# Patient Record
Sex: MALE | Race: WHITE
[De-identification: names, ages, dates, MRNs, and addresses within clinical notes are randomized per-mention and may not be internally consistent; named-entity substitution may affect disease eponyms.]

---

## 2017-07-10 ENCOUNTER — HOSPITAL ENCOUNTER (OUTPATIENT)
Dept: HOSPITAL 45 - C.CTS | Age: 62
Discharge: HOME | End: 2017-07-10
Attending: OTOLARYNGOLOGY
Payer: COMMERCIAL

## 2017-07-10 DIAGNOSIS — J32.9: Primary | ICD-10-CM

## 2017-11-20 ENCOUNTER — HOSPITAL ENCOUNTER (OUTPATIENT)
Dept: HOSPITAL 45 - C.LABBC | Age: 62
Discharge: HOME | End: 2017-11-20
Attending: INTERNAL MEDICINE
Payer: COMMERCIAL

## 2017-11-20 DIAGNOSIS — I10: Primary | ICD-10-CM

## 2017-11-20 DIAGNOSIS — R73.01: ICD-10-CM

## 2017-11-20 LAB
ALBUMIN/GLOB SERPL: 1.1 {RATIO} (ref 0.9–2)
ALP SERPL-CCNC: 66 U/L (ref 45–117)
ALT SERPL-CCNC: 36 U/L (ref 12–78)
ANION GAP SERPL CALC-SCNC: 7 MMOL/L (ref 3–11)
APPEARANCE UR: CLEAR
AST SERPL-CCNC: 16 U/L (ref 15–37)
BASOPHILS # BLD: 0.03 K/UL (ref 0–0.2)
BASOPHILS NFR BLD: 0.4 %
BILIRUB UR-MCNC: (no result) MG/DL
BUN SERPL-MCNC: 19 MG/DL (ref 7–18)
BUN/CREAT SERPL: 13.9 (ref 10–20)
CALCIUM SERPL-MCNC: 8.4 MG/DL (ref 8.5–10.1)
CHLORIDE SERPL-SCNC: 101 MMOL/L (ref 98–107)
CHOLEST/HDLC SERPL: 4.2 {RATIO}
CO2 SERPL-SCNC: 30 MMOL/L (ref 21–32)
COLOR UR: (no result)
COMPLETE: YES
CREAT SERPL-MCNC: 1.35 MG/DL (ref 0.6–1.4)
EOSINOPHIL NFR BLD AUTO: 257 K/UL (ref 130–400)
EST. AVERAGE GLUCOSE BLD GHB EST-MCNC: 117 MG/DL
GLOBULIN SER-MCNC: 3.5 GM/DL (ref 2.5–4)
GLUCOSE SERPL-MCNC: 93 MG/DL (ref 70–99)
GLUCOSE UR QL: 46 MG/DL
HCT VFR BLD CALC: 49.2 % (ref 42–52)
IG%: 0.6 %
IMM GRANULOCYTES NFR BLD AUTO: 23.8 %
KETONES UR QL STRIP: 123 MG/DL
LYMPHOCYTES # BLD: 1.6 K/UL (ref 1.2–3.4)
MANUAL MICROSCOPIC REQUIRED?: NO
MCH RBC QN AUTO: 33.7 PG (ref 25–34)
MCHC RBC AUTO-ENTMCNC: 35.8 G/DL (ref 32–36)
MCV RBC AUTO: 94.3 FL (ref 80–100)
MONOCYTES NFR BLD: 8.6 %
NEUTROPHILS # BLD AUTO: 2.7 %
NEUTROPHILS NFR BLD AUTO: 63.9 %
NITRITE UR QL STRIP: (no result)
NITRITE UR QL STRIP: 120 MG/DL (ref 0–150)
PH UR STRIP: 7 [PH] (ref 4.5–7.5)
PH UR: 193 MG/DL (ref 0–200)
PMV BLD AUTO: 9 FL (ref 7.4–10.4)
POTASSIUM SERPL-SCNC: 3.7 MMOL/L (ref 3.5–5.1)
PSA SERPL-MCNC: 1.96 NG/ML (ref 0–4)
RBC # BLD AUTO: 5.22 M/UL (ref 4.7–6.1)
REVIEW REQ?: NO
SODIUM SERPL-SCNC: 138 MMOL/L (ref 136–145)
SP GR UR STRIP: 1.02 (ref 1–1.03)
TSH SERPL-ACNC: 1.85 UIU/ML (ref 0.3–4.5)
URINE EPITHELIAL CELL AUTO: (no result) /LPF (ref 0–5)
UROBILINOGEN UR-MCNC: (no result) MG/DL
VERY LOW DENSITY LIPOPROT CALC: 24 MG/DL
WBC # BLD AUTO: 6.73 K/UL (ref 4.8–10.8)

## 2017-11-22 ENCOUNTER — HOSPITAL ENCOUNTER (OUTPATIENT)
Dept: HOSPITAL 45 - C.LAB1850 | Age: 62
Discharge: HOME | End: 2017-11-22
Attending: INTERNAL MEDICINE
Payer: COMMERCIAL

## 2017-11-22 DIAGNOSIS — R31.29: Primary | ICD-10-CM

## 2017-11-22 LAB
APPEARANCE UR: CLEAR
BILIRUB UR-MCNC: (no result) MG/DL
COLOR UR: YELLOW
MANUAL MICROSCOPIC REQUIRED?: NO
NITRITE UR QL STRIP: (no result)
PH UR STRIP: 5.5 [PH] (ref 4.5–7.5)
REVIEW REQ?: NO
SP GR UR STRIP: 1.02 (ref 1–1.03)
URINE BILL WITH OR WITHOUT MIC: (no result)
UROBILINOGEN UR-MCNC: (no result) MG/DL

## 2018-01-12 ENCOUNTER — HOSPITAL ENCOUNTER (OUTPATIENT)
Dept: HOSPITAL 45 - C.CATH | Age: 63
Discharge: HOME | End: 2018-01-12
Attending: INTERNAL MEDICINE
Payer: COMMERCIAL

## 2018-01-12 VITALS — HEART RATE: 96 BPM | OXYGEN SATURATION: 98 % | DIASTOLIC BLOOD PRESSURE: 102 MMHG | SYSTOLIC BLOOD PRESSURE: 142 MMHG

## 2018-01-12 VITALS — SYSTOLIC BLOOD PRESSURE: 163 MMHG | OXYGEN SATURATION: 98 % | HEART RATE: 99 BPM | DIASTOLIC BLOOD PRESSURE: 117 MMHG

## 2018-01-12 VITALS — OXYGEN SATURATION: 98 % | SYSTOLIC BLOOD PRESSURE: 130 MMHG | DIASTOLIC BLOOD PRESSURE: 98 MMHG | HEART RATE: 96 BPM

## 2018-01-12 VITALS — DIASTOLIC BLOOD PRESSURE: 116 MMHG | SYSTOLIC BLOOD PRESSURE: 152 MMHG | HEART RATE: 96 BPM | OXYGEN SATURATION: 98 %

## 2018-01-12 VITALS
TEMPERATURE: 98.06 F | HEART RATE: 98 BPM | OXYGEN SATURATION: 98 % | DIASTOLIC BLOOD PRESSURE: 117 MMHG | SYSTOLIC BLOOD PRESSURE: 163 MMHG

## 2018-01-12 VITALS — HEART RATE: 96 BPM | OXYGEN SATURATION: 98 % | SYSTOLIC BLOOD PRESSURE: 142 MMHG | DIASTOLIC BLOOD PRESSURE: 102 MMHG

## 2018-01-12 VITALS
WEIGHT: 240.3 LBS | BODY MASS INDEX: 36.42 KG/M2 | HEIGHT: 67.99 IN | WEIGHT: 240.3 LBS | BODY MASS INDEX: 36.42 KG/M2 | HEIGHT: 67.99 IN

## 2018-01-12 VITALS — HEART RATE: 82 BPM | OXYGEN SATURATION: 98 % | SYSTOLIC BLOOD PRESSURE: 138 MMHG | DIASTOLIC BLOOD PRESSURE: 85 MMHG

## 2018-01-12 DIAGNOSIS — Z82.2: ICD-10-CM

## 2018-01-12 DIAGNOSIS — E78.5: ICD-10-CM

## 2018-01-12 DIAGNOSIS — Z79.899: ICD-10-CM

## 2018-01-12 DIAGNOSIS — I10: ICD-10-CM

## 2018-01-12 DIAGNOSIS — E66.3: ICD-10-CM

## 2018-01-12 DIAGNOSIS — F32.9: ICD-10-CM

## 2018-01-12 DIAGNOSIS — Z82.49: ICD-10-CM

## 2018-01-12 DIAGNOSIS — Z79.01: ICD-10-CM

## 2018-01-12 DIAGNOSIS — I48.91: Primary | ICD-10-CM

## 2018-01-15 NOTE — RESULT NOTES
Verified Results  XR HIPS BILATERAL WITH AP PELVIS 3-4 VIEW [id number] 10:31AM Miriam Bhardwaj Order Number: [id number]     Test Name Result Flag Reference   XR HIPS BILATERAL WITH AP PELVIS 3-4 VW (Report)     BILATERAL HIPS AND PELVIS     INDICATION: Low back pain     COMPARISON: None     VIEWS: AP pelvis and coned down views of each hip; 5 images      FINDINGS:     No acute pelvic fracture or pathologic bone lesions  Degenerative changes are noted in the right sacroiliac joint   Visualized bony pelvis appears intact  LEFT HIP:   No significant degenerative changes  Bony alignment is maintained  Soft tissues are unremarkable  RIGHT HIP:   No significant degenerative changes  Bony alignment is maintained  Soft tissues are unremarkable  IMPRESSION:     Mild degenerative changes, right sacroiliac joint  Intact hip joint spaces         Workstation performed: [id number]     Signed by:   Martha Wilburn MD   3/1/16     (1) CBC/PLT/DIFF [id number] 12:00AM [facility]     Test Name Result Flag Reference   WBC 7 1 x10E3/uL  3 4-10 8   RBC 4 79 x10E6/uL  4 14-5 80   Hemoglobin 14 5 g/dL  12 6-17 7   Hematocrit 44 2 %  37 5-51 0   MCV 92 fL  79-97   MCH 30 3 pg  26 6-33 0   MCHC 32 8 g/dL  31 5-35 7   RDW 14 9 %  12 3-15 4   Platelets 211 J40B7/ID  150-379   Neutrophils 72 %     Lymphs 19 %     Monocytes 8 %     Eos 1 %     Basos 0 %     Neutrophils (Absolute) 5 1 x10E3/uL  1 4-7 0   Lymphs (Absolute) 1 3 x10E3/uL  0 7-3 1   Monocytes(Absolute) 0 6 x10E3/uL  0 1-0 9   Eos (Absolute) 0 1 x10E3/uL  0 0-0 4   Baso (Absolute) 0 0 x10E3/uL  0 0-0 2   Immature Granulocytes 0 %     Immature Grans (Abs) 0 0 x10E3/uL  0 0-0 1     (1) COMPREHENSIVE METABOLIC PANEL [id number] 62:40QH [facility]     Test Name Result Flag Reference   Glucose, Serum 112 mg/dL H 65-99   BUN 12 mg/dL  8-27   Creatinine, Serum 0 85 mg/dL  0 76-1 27   eGFR If NonAfricn Am 94 mL/min/1 73  >59   eGFR If Africn Am 109 mL/min/1 73  >59   BUN/Creatinine Ratio 14  10-22   Sodium, Serum 142 mmol/L  134-144   Potassium, Serum 4 9 mmol/L  3 5-5 2   Chloride, Serum 101 mmol/L     Carbon Dioxide, Total 23 mmol/L  18-29   Calcium, Serum 9 7 mg/dL  8 6-10 2   Protein, Total, Serum 7 2 g/dL  6 0-8 5   Albumin, Serum 4 8 g/dL  3 6-4 8   Globulin, Total 2 4 g/dL  1 5-4 5   A/G Ratio 2 0  1 1-2 5   Bilirubin, Total 0 5 mg/dL  0 0-1 2   Alkaline Phosphatase, S 58 IU/L     AST (SGOT) 21 IU/L  0-40   ALT (SGPT) 18 IU/L  0-44     (1) TSH [id number] 12:00AM Dipika Ray     Test Name Result Flag Reference   TSH 14 280 uIU/mL H 0 450-4 500     (1) CK (CPK) [id number] 12:00AM Dipika Ray     Test Name Result Flag Reference   Creatine Kinase,Total,Serum 142 U/L       ([facility]) Sedimentation Rate-Westergren [id number] 12:00AM [place] Ray     Test Name Result Flag Reference   Sedimentation Rate-Westergren 7 mm/hr  0-30     (1) PSA (SCREEN) (Dx V76 44 Screen for Prostate Cancer) [id number] 12:00AM Dipika Ray     Test Name Result Flag Reference   Prostate Specific Ag, Serum 2 7 ng/mL  0 0-4 0   Roche ECLIA methodology  According to the American Urological Association, Serum PSA should  decrease and remain at undetectable levels after radical  prostatectomy  The AUA defines biochemical recurrence as an initial  PSA value 0 2 ng/mL or greater followed by a subsequent confirmatory  PSA value 0 2 ng/mL or greater  Values obtained with different assay methods or kits cannot be used  interchangeably  Results cannot be interpreted as absolute evidence  of the presence or absence of malignant disease  [facility]) LP [id number] 12:00AM [place] Ray     Test Name Result Flag Reference   Cholesterol, Total 287 mg/dL H 100-199   Triglycerides 281 mg/dL H 0-149   HDL Cholesterol 20 mg/dL L >39   According to ATP-III Guidelines, HDL-C >59 mg/dL is considered a  negative risk factor for CHD     VLDL Cholesterol Delon 56 mg/dL H 5-40   LDL Cholesterol Calc 211 mg/dL H 0-99     ([facility]) Rheumatoid Arthritis Factor [id number] 12:00AM Karl Push     Test Name Result Flag Reference   RA Latex Turbid   5 7 IU/mL  0 0-13 9

## 2018-01-17 NOTE — PROGRESS NOTES
Assessment   1  Encounter for preventive health examination (V70 0) (Z00 00)1   2  Arthralgia of multiple joints (719 49) (M25 50)  3  Myalgia (729 1) (M79 1)  4  Chronic low back pain (724 2,338 29) (M54 5,G89 29)  5  Bilateral hip pain (719 45) (M25 551,M25 552)     1 Amended By: Marvel Chun; Feb 29 2016 11:37 AM EST    Plan  Arthralgia of multiple joints, Benign essential hypertension, Hyperlipidemia,  Hypothyroidism, Myalgia    · (1) EARL SCREEN, (INC  PATTERN IF INDICATED); Status:Active; Requested  for:06Pcf4834;   Benign essential hypertension    · Renew: Lisinopril 20 MG Oral Tablet; take 1 tablet twice a day  Benign essential hypertension, Hyperlipidemia, Hypothyroidism    · (1) CBC/PLT/DIFF; Status:Active; Requested for:16Sqz6510;    · (1) COMPREHENSIVE METABOLIC PANEL; Status:Active; Requested for:29Feb2016;    · (1) LIPID PANEL FASTING W DIRECT LDL REFLEX; Status:Active; Requested  for:07Hjo0845;    · (1) LYME ANTIBODY PROFILE W/REFLEX TO WESTERN BLOT; Status:Active; Requested for:29Feb2016;    · (1) TSH; Status:Active; Requested for:90Eku5911;   Bilateral hip pain, Chronic low back pain    · XR HIPS BILATERAL WITH AP PELVIS 3-4 VIEW; Status:Active; Requested  for:10Jxx6935;   Esophagitis, reflux    · Renew: Omeprazole 20 MG Oral Capsule Delayed Release; take 1 capsule daily  Hypothyroidism    · Renew: Levothyroxine Sodium 137 MCG Oral Tablet; Take 1 tablet daily  Myalgia    · (1) CK (CPK); Status:Active; Requested for:29Feb2016;    · (1) RHEUMATOID FACTOR SCREEN; Status:Active; Requested for:86Xoc4347;    · ([facility]) Sedimentation Rate-Westergren; Status:Active; Requested for:29Feb2016;   Screening for colon cancer    · COLONOSCOPY; Status:Active; Requested for:29Feb2016;   Screening for prostate cancer    · (1) PSA (SCREEN) (Dx V76 44 Screen for Prostate Cancer); Status:Active; Requested  for:58Fxp0294;     Discussion/Summary  Impression: health maintenance visit   Currently, he eats an adequate diet and has an inadequate exercise regimen  Prostate cancer screening: PSA was ordered  Testicular cancer screening: the risks and benefits of testicular cancer screening were discussed  Colorectal cancer screening: colonoscopy has been ordered  Screening lab work includes glucose and lipid profile  The immunizations are up to date  Advice and education were given regarding nutrition, weight bearing exercise and cardiovascular risk reduction  Patient's overall physical exam at this time is fairly unremarkable  He does have symptoms consistent with some degenerative joint disease or myalgia involving his legs low back and hips  We will check lab work to rule out rheumatoid conditions  We will also check x-rays of the pelvis and hips  We will also check thyroid blood work lipids and PSA  He discontinued his Vytorin 10/10 several months ago and we will assess his need for restarting a statin  Given the fact that his myalgias did not improve after 3 months off of the Vytorin 10/10 it is likely that that was not the cause of his symptoms  His blood pressure today in the office is stable  I recommended he continue with his 40 mg daily of lisinopril   He does have labile or "white coat" hypertension but I do believe it is safe for him to proceed with his dental work  We will see him again in 6 months for routine follow-up but we will be in contact as the results of his testing is available  Chief Complaint  pt here for his yearly physical      History of Present Illness  HM, Adult Male:   General Health: The patient's health since the last visit is described as fair  He has regular dental visits  He denies vision problems  He denies hearing loss  Immunizations status: up to date  Lifestyle:  He consumes a diverse and healthy diet  He does not have any weight concerns  He does not exercise regularly  He does not use tobacco  He denies alcohol use     Screening:   HPI: Patient presents for annual physical  He has complaints of bilateral hip and leg pain which is joint and muscular related  He stopped his statin medication approximately 4 months ago on the assumption that his myalgias were caused by his Vytorin 10/10  Symptoms have persisted however off of the medication  He is compliant with his other medications  There have been some concerns with his blood pressure, in particular had a recent dental appointment when his pressure was found to be significantly elevated  The patient relates that his blood pressure is quite labile and when he is at home relaxing his blood pressure is actually quite good  He had discontinued one of his doses of lisinopril but as of one week ago is back on his full prescribed dosage  In the past week his pressures have been relatively good with maximums of 140s over 90s, but as low as 110/70 when he is at rest  He is also being treated for hypothyroidism and reflux esophagitis  Review of Systems    Constitutional: No fever or chills, feels well, no tiredness, no recent weight gain or weight loss  Eyes: No complaints of eye pain, no red eyes, no discharge from eyes, no itchy eyes  ENT: no complaints of earache, no hearing loss, no nosebleeds, no nasal discharge, no sore throat, no hoarseness  Cardiovascular: as noted in HPI  Respiratory: No complaints of shortness of breath, no wheezing, no cough, no SOB on exertion, no orthopnea or PND  Gastrointestinal: No complaints of abdominal pain, no constipation, no nausea or vomiting, no diarrhea or bloody stools  Genitourinary: No complaints of dysuria, no incontinence, no hesitancy, no nocturia, no genital lesion, no testicular pain  Musculoskeletal: as noted in HPI  Integumentary: No complaints of skin rash or skin lesions, no itching, no skin wound, no dry skin  Neurological: No compliants of headache, no confusion, no convulsions, no numbness or tingling, no dizziness or fainting, no limb weakness, no difficulty walking  Psychiatric: Is not suicidal, no sleep disturbances, no anxiety or depression, no change in personality, no emotional problems  Endocrine: No complaints of proptosis, no hot flashes, no muscle weakness, no erectile dysfunction, no deepening of the voice, no feelings of weakness  Hematologic/Lymphatic: No complaints of swollen glands, no swollen glands in the neck, does not bleed easily, no easy bruising  Active Problems   1  Benign essential hypertension (401 1) (I10)  2  Esophagitis, reflux (530 11) (K21 0)  3  Folliculitis (061 8) (V00 2)  4  Hyperlipidemia (272 4) (E78 5)  5  Hypothyroidism (244 9) (E03 9)    Past Medical History    · History of Squamous Cell Carcinoma Of The Tongue (V10 01)    Surgical History    · History of Radical Neck Dissection   · History of Rotator Cuff Repair    Family History    · Family history of Heart Disease (V17 49)   · Family history of Hypertension (V17 49)    Social History    · Being A Social Drinker   · Former smoker (V15 82) (V22 620)    Current Meds  1  Levothyroxine Sodium 137 MCG Oral Tablet; Take 1 tablet daily; Therapy: 30MVU7109 to (Last Rx:11Jan2015)  Requested for: 88OVO8044 Ordered  2  Lisinopril 20 MG Oral Tablet; take 1 tablet twice a day; Therapy: 93JFZ2295 to (Last Rx:85Fzw1398)  Requested for: 07JXL3606 Ordered  3  Omeprazole 20 MG Oral Capsule Delayed Release; take 1 capsule daily; Therapy: 23ZFP5150 to (Last Rx:22Oct2014)  Requested for: 27ESE1200 Ordered    Allergies   1  No Known Drug Allergies   2  No Known Environmental Allergies  3   No Known Food Allergies    Vitals   Recorded: 62VRJ7833 11:11AM Recorded: 26Fyr2133 10:50AM   Temperature  97 2 F, Tympanic   Heart Rate  103   Respiration  19   Systolic 073 663   Diastolic 90 90   Height  6 ft    Weight  203 lb 3 04 oz   BMI Calculated  27 56   BSA Calculated  2 15   O2 Saturation  98     Physical Exam    Constitutional   General appearance: No acute distress, well appearing and well nourished  Head and Face   Head and face: Abnormal   Scarring from previous head and neck surgery and radiation treatment for cancer  Eyes   Conjunctiva and lids: No erythema, swelling or discharge  Pupils and irises: Equal, round, reactive to light  Ears, Nose, Mouth, and Throat   External inspection of ears and nose: Normal     Otoscopic examination: Tympanic membranes translucent with normal light reflex  Canals patent without erythema  Hearing: Normal     Nasal mucosa, septum, and turbinates: Normal without edema or erythema  Lips, teeth, and gums: Normal, good dentition  Oropharynx: Normal with no erythema, edema, exudate or lesions  Neck   Neck: Abnormal   Surgical and radiation treatment scarring  Thyroid: Abnormal     Pulmonary   Respiratory effort: No increased work of breathing or signs of respiratory distress  Auscultation of lungs: Clear to auscultation  Cardiovascular   Auscultation of heart: Normal rate and rhythm, normal S1 and S2, no murmurs  Carotid pulses: 2+ bilaterally  Abdominal aorta: Normal     Examination of extremities for edema and/or varicosities: Normal     Abdomen   Abdomen: Non-tender, no masses  Examination for hernias: No hernias appreciated  Musculoskeletal   Gait and station: Normal     Inspection/palpation of joints, bones, and muscles: Normal     Stability: Normal     Neurologic   Cranial nerves: Cranial nerves 2-12 intact  Nonfocal neurologic exam    Psychiatric   Judgment and insight: Normal     Orientation to person, place and time: Normal     Recent and remote memory: Intact      Mood and affect: Normal        Signatures   Electronically signed by : Demetrice Ramirez DO; Feb 29 2016 11:38AM EST                       (Author)

## 2018-07-26 ENCOUNTER — HOSPITAL ENCOUNTER (OUTPATIENT)
Dept: HOSPITAL 45 - C.NEUR | Age: 63
Discharge: HOME | End: 2018-07-26
Attending: INTERNAL MEDICINE
Payer: COMMERCIAL

## 2018-07-26 DIAGNOSIS — G47.33: Primary | ICD-10-CM

## 2022-06-15 ENCOUNTER — OFFICE VISIT (OUTPATIENT)
Dept: URGENT CARE | Facility: CLINIC | Age: 67
End: 2022-06-15
Payer: COMMERCIAL

## 2022-06-15 VITALS
HEART RATE: 100 BPM | SYSTOLIC BLOOD PRESSURE: 136 MMHG | WEIGHT: 179 LBS | TEMPERATURE: 96.9 F | RESPIRATION RATE: 16 BRPM | OXYGEN SATURATION: 99 % | BODY MASS INDEX: 25.06 KG/M2 | HEIGHT: 71 IN | DIASTOLIC BLOOD PRESSURE: 68 MMHG

## 2022-06-15 DIAGNOSIS — M25.531 ACUTE PAIN OF RIGHT WRIST: Primary | ICD-10-CM

## 2022-06-15 PROCEDURE — 99203 OFFICE O/P NEW LOW 30 MIN: CPT | Performed by: FAMILY MEDICINE

## 2022-06-15 RX ORDER — ATORVASTATIN CALCIUM 20 MG/1
1 TABLET, FILM COATED ORAL DAILY
COMMUNITY
Start: 2016-04-07 | End: 2022-06-30 | Stop reason: ALTCHOICE

## 2022-06-15 RX ORDER — MELOXICAM 7.5 MG/1
7.5 TABLET ORAL DAILY
Qty: 60 TABLET | Refills: 0 | Status: SHIPPED | OUTPATIENT
Start: 2022-06-15 | End: 2022-07-26 | Stop reason: ALTCHOICE

## 2022-06-15 RX ORDER — LISINOPRIL 20 MG/1
1 TABLET ORAL 2 TIMES DAILY
COMMUNITY
Start: 2010-11-16 | End: 2022-07-26 | Stop reason: ALTCHOICE

## 2022-06-15 RX ORDER — METHYLPREDNISOLONE 4 MG/1
TABLET ORAL
Qty: 21 TABLET | Refills: 0 | Status: SHIPPED | OUTPATIENT
Start: 2022-06-15 | End: 2022-06-23 | Stop reason: ALTCHOICE

## 2022-06-15 RX ORDER — OMEPRAZOLE 20 MG/1
1 CAPSULE, DELAYED RELEASE ORAL DAILY
COMMUNITY
Start: 2011-02-11

## 2022-06-15 RX ORDER — LEVOTHYROXINE SODIUM 137 UG/1
1 TABLET ORAL DAILY
COMMUNITY
Start: 2011-02-11 | End: 2022-06-30 | Stop reason: ALTCHOICE

## 2022-06-15 NOTE — PROGRESS NOTES
[facility]        NAME: Soren Carmen is a 78 y o  male  : 1955    MRN: [id number]  DATE: Malia 15, 2022  TIME: 11:02 AM    Assessment and Plan   Acute pain of right wrist [M25 531]  1  Acute pain of right wrist  meloxicam (MOBIC) 7 5 mg tablet    methylPREDNISolone 4 MG tablet therapy pack         Patient Instructions       Follow up with PCP in 3-5 days  Proceed to  ER if symptoms worsen  Chief Complaint     Chief Complaint   Patient presents with    Wrist Pain     Pt reports right wrist pain and right hand swelling/redness noted on   Reports his wrist was sore on Saturday when fishing  History of Present Illness       58-year-old male presenting with right wrist pain beginning 4 days ago  He states that prior to the onset of his symptoms, he was fly fishing for a long period time and shortly thereafter, noticed increased redness and swelling of his right wrist   He states in the days following his swelling has increased and has now moved up into the middle forearm region  Pain is reproduced with any attempted bending of the wrist or turning of the wrist   Denies any numbness or tingling  Review of Systems   Review of Systems   Constitutional: Negative  HENT: Negative  Eyes: Negative  Respiratory: Negative  Cardiovascular: Negative  Gastrointestinal: Negative  Genitourinary: Negative  Musculoskeletal: Positive for arthralgias, joint swelling and myalgias  Skin: Negative  Allergic/Immunologic: Negative  Neurological: Negative  Hematological: Negative  Psychiatric/Behavioral: Negative            Current Medications       Current Outpatient Medications:     meloxicam (MOBIC) 7 5 mg tablet, Take 1 tablet (7 5 mg total) by mouth daily, Disp: 60 tablet, Rfl: 0    methylPREDNISolone 4 MG tablet therapy pack, Use as directed on package, Disp: 21 tablet, Rfl: 0    atorvastatin (LIPITOR) 20 mg tablet, Take 1 tablet by mouth daily (Patient not taking: Reported on 6/15/2022), Disp: , Rfl:     levothyroxine 137 mcg tablet, Take 1 tablet by mouth daily (Patient not taking: Reported on 6/15/2022), Disp: , Rfl:     lisinopril (ZESTRIL) 20 mg tablet, Take 1 tablet by mouth 2 (two) times a day (Patient not taking: Reported on 6/15/2022), Disp: , Rfl:     omeprazole (PriLOSEC) 20 mg delayed release capsule, Take 1 capsule by mouth daily (Patient not taking: Reported on 6/15/2022), Disp: , Rfl:     Current Allergies     Allergies as of 06/15/2022    (No Known Allergies)            The following portions of the patient's history were reviewed and updated as appropriate: allergies, current medications, past family history, past medical history, past social history, past surgical history and problem list      No past medical history on file  No past surgical history on file  No family history on file  Medications have been verified  Objective   /68   Pulse 100   Temp (!) 96 9 °F (36 1 °C)   Resp 16   Ht 5' 11" (1 803 m)   Wt 81 2 kg (179 lb)   SpO2 99%   BMI 24 97 kg/m²   No LMP for male patient  Physical Exam     Physical Exam  Constitutional:       Appearance: He is well-developed  HENT:      Head: Normocephalic  Eyes:      Pupils: Pupils are equal, round, and reactive to light  Cardiovascular:      Rate and Rhythm: Normal rate  Pulmonary:      Effort: Pulmonary effort is normal    Musculoskeletal:         General: Swelling and tenderness present  No signs of injury  Normal range of motion  Cervical back: Normal range of motion  Skin:     General: Skin is warm  Neurological:      Mental Status: He is alert and oriented to person, place, and time

## 2022-06-23 ENCOUNTER — APPOINTMENT (OUTPATIENT)
Dept: RADIOLOGY | Facility: CLINIC | Age: 67
End: 2022-06-23
Payer: COMMERCIAL

## 2022-06-23 ENCOUNTER — OFFICE VISIT (OUTPATIENT)
Dept: FAMILY MEDICINE CLINIC | Facility: CLINIC | Age: 67
End: 2022-06-23
Payer: COMMERCIAL

## 2022-06-23 ENCOUNTER — TELEPHONE (OUTPATIENT)
Dept: FAMILY MEDICINE CLINIC | Facility: CLINIC | Age: 67
End: 2022-06-23

## 2022-06-23 VITALS
TEMPERATURE: 98 F | WEIGHT: 180 LBS | RESPIRATION RATE: 18 BRPM | OXYGEN SATURATION: 99 % | HEIGHT: 71 IN | SYSTOLIC BLOOD PRESSURE: 168 MMHG | BODY MASS INDEX: 25.2 KG/M2 | DIASTOLIC BLOOD PRESSURE: 94 MMHG | HEART RATE: 108 BPM
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
## 2022-06-23 DIAGNOSIS — Z00.00 ANNUAL PHYSICAL EXAM: Primary | ICD-10-CM

## 2022-06-23 DIAGNOSIS — Z13.1 SCREENING FOR DIABETES MELLITUS: ICD-10-CM

## 2022-06-23 DIAGNOSIS — Z12.11 SCREENING FOR COLON CANCER: ICD-10-CM

## 2022-06-23 DIAGNOSIS — R21 RASH: ICD-10-CM

## 2022-06-23 DIAGNOSIS — Z13.29 SCREENING FOR THYROID DISORDER: ICD-10-CM

## 2022-06-23 DIAGNOSIS — Z13.6 SCREENING FOR CARDIOVASCULAR CONDITION: ICD-10-CM

## 2022-06-23 DIAGNOSIS — M79.641 BILATERAL HAND PAIN: ICD-10-CM

## 2022-06-23 DIAGNOSIS — M79.642 BILATERAL HAND PAIN: ICD-10-CM

## 2022-06-23 DIAGNOSIS — Z87.39 HISTORY OF GOUT: ICD-10-CM

## 2022-06-23 PROBLEM — Z85.89 HISTORY OF HEAD AND NECK CANCER: Status: ACTIVE | Noted: 2022-06-23

## 2022-06-23 PROBLEM — C14.0 THROAT CANCER (HCC): Status: ACTIVE | Noted: 2022-06-23

## 2022-06-23 PROCEDURE — 99387 INIT PM E/M NEW PAT 65+ YRS: CPT | Performed by: NURSE PRACTITIONER

## 2022-06-23 PROCEDURE — 3288F FALL RISK ASSESSMENT DOCD: CPT | Performed by: NURSE PRACTITIONER

## 2022-06-23 PROCEDURE — 1160F RVW MEDS BY RX/DR IN RCRD: CPT | Performed by: NURSE PRACTITIONER

## 2022-06-23 PROCEDURE — 1101F PT FALLS ASSESS-DOCD LE1/YR: CPT | Performed by: NURSE PRACTITIONER

## 2022-06-23 PROCEDURE — 73130 X-RAY EXAM OF HAND: CPT

## 2022-06-23 PROCEDURE — 3725F SCREEN DEPRESSION PERFORMED: CPT | Performed by: NURSE PRACTITIONER

## 2022-06-23 PROCEDURE — 3008F BODY MASS INDEX DOCD: CPT | Performed by: NURSE PRACTITIONER

## 2022-06-23 PROCEDURE — 1036F TOBACCO NON-USER: CPT | Performed by: NURSE PRACTITIONER

## 2022-06-23 RX ORDER — CLOTRIMAZOLE AND BETAMETHASONE DIPROPIONATE 10; .5 MG/ML; MG/ML
LOTION TOPICAL 2 TIMES DAILY
Qty: 30 ML | Refills: 0 | Status: SHIPPED | OUTPATIENT
Start: 2022-06-23 | End: 2022-06-23 | Stop reason: SDUPTHER

## 2022-06-23 RX ORDER — CLOTRIMAZOLE AND BETAMETHASONE DIPROPIONATE 10; .5 MG/ML; MG/ML
LOTION TOPICAL 2 TIMES DAILY
Qty: 30 ML | Refills: 0 | Status: SHIPPED | OUTPATIENT
Start: 2022-06-23 | End: 2022-07-26

## 2022-06-23 NOTE — TELEPHONE ENCOUNTER
Can you let him know I resent the prescription to [facility] in [place]

## 2022-06-23 NOTE — PATIENT INSTRUCTIONS
Have labs completed- must be fasting prior to lab draw  Have x-rays of bilateral hands done downstairs prior to leaving  Apply prescription cream to forehead rash  Instructed to monitor BP at home and record those results and bring results to next appointment  Wellness Visit for Adults   AMBULATORY CARE:   A wellness visit  is when you see your healthcare provider to get screened for health problems  Your healthcare provider will also give you advice on how to stay healthy  Write down your questions so you remember to ask them  Ask your healthcare provider how often you should have a wellness visit  What happens at a wellness visit:  Your healthcare provider will ask about your health, and your family history of health problems  This includes high blood pressure, heart disease, and cancer  He or she will ask if you have symptoms that concern you, if you smoke, and about your mood  You may also be asked about your intake of medicines, supplements, food, and alcohol  Any of the following may be done: Your weight  will be checked  Your height may also be checked so your body mass index (BMI) can be calculated  Your BMI shows if you are at a healthy weight  Your blood pressure  and heart rate will be checked  Your temperature may also be checked  Blood and urine tests  may be done  Blood tests may be done to check your cholesterol levels  Abnormal cholesterol levels increase your risk for heart disease and stroke  You may also need a blood or urine test to check for diabetes if you are at increased risk  Urine tests may be done to look for signs of an infection or kidney disease  A physical exam  includes checking your heartbeat and lungs with a stethoscope  Your healthcare provider may also check your skin to look for sun damage  Screening tests  may be recommended  A screening test is done to check for diseases that may not cause symptoms   The screening tests you may need depend on your age, gender, family history, and lifestyle habits  For example, colorectal screening may be recommended if you are 48years old or older  Screening tests you need if you are a woman:   A Pap smear  is used to screen for cervical cancer  Pap smears are usually done every 3 to 5 years depending on your age  You may need them more often if you have had abnormal Pap smear test results in the past  Ask your healthcare provider how often you should have a Pap smear  A mammogram  is an x-ray of your breasts to screen for breast cancer  Experts recommend mammograms every 2 years starting at age 48 years  You may need a mammogram at age 52 years or younger if you have an increased risk for breast cancer  Talk to your healthcare provider about when you should start having mammograms and how often you need them  Vaccines you may need:   Get an influenza vaccine  every year  The influenza vaccine protects you from the flu  Several types of viruses cause the flu  The viruses change over time, so new vaccines are made each year  Get a tetanus-diphtheria (Td) booster vaccine  every 10 years  This vaccine protects you against tetanus and diphtheria  Tetanus is a severe infection that may cause painful muscle spasms and lockjaw  Diphtheria is a severe bacterial infection that causes a thick covering in the back of your mouth and throat  Get a human papillomavirus (HPV) vaccine  if you are female and aged 23 to 32 or male 23 to 24 and never received it  This vaccine protects you from HPV infection  HPV is the most common infection spread by sexual contact  HPV may also cause vaginal, penile, and anal cancers  Get a pneumococcal vaccine  if you are aged 72 years or older  The pneumococcal vaccine is an injection given to protect you from pneumococcal disease  Pneumococcal disease is an infection caused by pneumococcal bacteria  The infection may cause pneumonia, meningitis, or an ear infection      Get a shingles vaccine  if you are 61 or older, even if you have had shingles before  The shingles vaccine is an injection to protect you from the varicella-zoster virus  This is the same virus that causes chickenpox  Shingles is a painful rash that develops in people who had chickenpox or have been exposed to the virus  How to eat healthy:  My Plate is a model for planning healthy meals  It shows the types and amounts of foods that should go on your plate  Fruits and vegetables make up about half of your plate, and grains and protein make up the other half  A serving of dairy is included on the side of your plate  The amount of calories and serving sizes you need depends on your age, gender, weight, and height  Examples of healthy foods are listed below:  Eat a variety of vegetables  such as dark green, red, and orange vegetables  You can also include canned vegetables low in sodium (salt) and frozen vegetables without added butter or sauces  Eat a variety of fresh fruits , canned fruit in 100% juice, frozen fruit, and dried fruit  Include whole grains  At least half of the grains you eat should be whole grains  Examples include whole-wheat bread, wheat pasta, brown rice, and whole-grain cereals such as oatmeal     Eat a variety of protein foods such as seafood (fish and shellfish), lean meat, and poultry without skin (turkey and chicken)  Examples of lean meats include pork leg, shoulder, or tenderloin, and beef round, sirloin, tenderloin, and extra lean ground beef  Other protein foods include eggs and egg substitutes, beans, peas, soy products, nuts, and seeds  Choose low-fat dairy products such as skim or 1% milk or low-fat yogurt, cheese, and cottage cheese  Limit unhealthy fats  such as butter, hard margarine, and shortening  Exercise:  Exercise at least 30 minutes per day on most days of the week  Some examples of exercise include walking, biking, dancing, and swimming   You can also fit in more physical activity by taking the stairs instead of the elevator or parking farther away from stores  Include muscle strengthening activities 2 days each week  Regular exercise provides many health benefits  It helps you manage your weight, and decreases your risk for type 2 diabetes, heart disease, stroke, and high blood pressure  Exercise can also help improve your mood  Ask your healthcare provider about the best exercise plan for you  General health and safety guidelines:   Do not smoke  Nicotine and other chemicals in cigarettes and cigars can cause lung damage  Ask your healthcare provider for information if you currently smoke and need help to quit  E-cigarettes or smokeless tobacco still contain nicotine  Talk to your healthcare provider before you use these products  Limit alcohol  A drink of alcohol is 12 ounces of beer, 5 ounces of wine, or 1½ ounces of liquor  Lose weight, if needed  Being overweight increases your risk of certain health conditions  These include heart disease, high blood pressure, type 2 diabetes, and certain types of cancer  Protect your skin  Do not sunbathe or use tanning beds  Use sunscreen with a SPF 15 or higher  Apply sunscreen at least 15 minutes before you go outside  Reapply sunscreen every 2 hours  Wear protective clothing, hats, and sunglasses when you are outside  Drive safely  Always wear your seatbelt  Make sure everyone in your car wears a seatbelt  A seatbelt can save your life if you are in an accident  Do not use your cell phone when you are driving  This could distract you and cause an accident  Pull over if you need to make a call or send a text message  Practice safe sex  Use latex condoms if are sexually active and have more than one partner  Your healthcare provider may recommend screening tests for sexually transmitted infections (STIs)  Wear helmets, lifejackets, and protective gear    Always wear a helmet when you ride a bike or motorcycle, go skiing, or play sports that could cause a head injury  Wear protective equipment when you play sports  Wear a lifejacket when you are on a boat or doing water sports  © Copyright [employer] 2022 Information is for End User's use only and may not be sold, redistributed or otherwise used for commercial purposes  All illustrations and images included in CareNotes® are the copyrighted property of A D A M , Inc  or [employer]  The above information is an  only  It is not intended as medical advice for individual conditions or treatments  Talk to your doctor, nurse or pharmacist before following any medical regimen to see if it is safe and effective for you

## 2022-06-23 NOTE — PROGRESS NOTES
[facility]    NAME: Jo Batista  AGE: 78 y o  SEX: male  : 1955     DATE: 2022     Assessment and Plan:     Problem List Items Addressed This Visit    None     Visit Diagnoses     Annual physical exam    -  Primary    Relevant Orders    CBC and differential    Comprehensive metabolic panel    Lipid panel    TSH, 3rd generation with Free T4 reflex    UA (URINE) with reflex to Scope    Screening for cardiovascular condition        Relevant Orders    CBC and differential    Comprehensive metabolic panel    Lipid panel    Screening for thyroid disorder        Relevant Orders    TSH, 3rd generation with Free T4 reflex    Screening for diabetes mellitus        Relevant Orders    UA (URINE) with reflex to Scope    Tick bite, unspecified site, subsequent encounter        Relevant Medications    clotrimazole-betamethasone (LOTRISONE) 1-0 05 % lotion    Other Relevant Orders    Lyme Antibody Profile with reflex to WB    History of gout        Relevant Orders    Uric acid    Bilateral hand pain        Relevant Orders    XR hand 3+ vw left    XR hand 3+ vw right    Screening for colon cancer        Relevant Orders    Ambulatory referral for colonoscopy    Rash        Relevant Medications    clotrimazole-betamethasone (LOTRISONE) 1-0 05 % lotion          Immunizations and preventive care screenings were discussed with patient today  Appropriate education was printed on patient's after visit summary  Counseling:  Alcohol/drug use: discussed moderation in alcohol intake, the recommendations for healthy alcohol use, and avoidance of illicit drug use  Dental Health: discussed importance of regular tooth brushing, flossing, and dental visits  Injury prevention: discussed safety/seat belts, safety helmets, smoke detectors, carbon dioxide detectors, and smoking near bedding or upholstery    Sexual health: discussed sexually transmitted diseases, partner selection, use of condoms, avoidance of unintended pregnancy, and contraceptive alternatives  · Exercise: the importance of regular exercise/physical activity was discussed  Recommend exercise 3-5 times per week for at least 30 minutes  BMI Counseling: Body mass index is 25 1 kg/m²  The BMI is above normal  Nutrition recommendations include decreasing portion sizes, encouraging healthy choices of fruits and vegetables, decreasing fast food intake, consuming healthier snacks, limiting drinks that contain sugar, moderation in carbohydrate intake and increasing intake of lean protein  Exercise recommendations include exercising 3-5 times per week  Rationale for BMI follow-up plan is due to patient being overweight or obese  Depression Screening and Follow-up Plan: Patient was screened for depression during today's encounter  They screened negative with a PHQ-2 score of 2  Return in 1 month (on 7/23/2022) for Recheck  Chief Complaint:     Chief Complaint   Patient presents with   [facility] Patient Complete Physical--has not seen a doctor for 3-4 years  Having pains in hands--told has gout      History of Present Illness:     Adult Annual Physical   Patient here for a comprehensive physical exam  The patient reports problems - joint pain in B/L hands  Patient diagnosed with throat cancer in 2006 and had neck dissection in 2006 followed by radiation and drug treatment  Patient also reports that he gets spikes in BP but has times that BP is normal  Patient reports he was monitoring BP's at home in past but not recently  Patient no longer taking Lisinopril and refusing to take BP medication at this time  Patient counseled on risks of uncontrolled hypertension  Patient advised to start monitoring BP once daily at home and record readings and bring readings to next appointment to review   Patient also refuses to take statins since they cause muscle pain in legs and weakness  Patient also stopped thyroid medication years ago and didn't notice any changes so he believes he doesn't need it  Patient c/o bilateral hand pain- worse at the base of right thumb  Patient reports he has been diagnosed with gout in his hands in the past  Discussed that I will order x-rays of B/L hands today to check for osteoarthritis and rule out any fractures and uric acid lab ordered  Patient also reports rash to his forehead and frontal head area that comes and goes  Patient has tried hydrocortisone cream and was on oral steroids last week which helped to improve the rash but unfortunately it keeps returning  Lotrisone cream prescribed today for rash and patient instructed to use for 1 week after rash resolves  Diet and Physical Activity  · Diet/Nutrition: well balanced diet and consuming 3-5 servings of fruits/vegetables daily  · Exercise: walking, 5-7 times a week on average and 1-2 miles daily- walks to work         Depression Screening  PHQ-2/9 Depression Screening    Little interest or pleasure in doing things: 1 - several days  Feeling down, depressed, or hopeless: 1 - several days  PHQ-2 Score: 2  PHQ-2 Interpretation: Negative depression screen       General Health  · Sleep: sleeps well and gets 7-8 hours of sleep on average  · Hearing: normal - bilateral   · Vision: most recent eye exam >1 year ago and wears glasses  · Dental: no dental visits for >1 year, brushes teeth twice daily and flosses teeth occasionally   Health  · Symptoms include: none     Review of Systems:     Review of Systems   Constitutional: Negative  Negative for chills, fatigue and fever  HENT: Positive for postnasal drip  Negative for congestion, ear discharge, ear pain, rhinorrhea, sinus pressure, sinus pain and sore throat  Baseline difficulty swallowing due to neck dissection surgery    Eyes: Negative  Negative for pain, discharge and visual disturbance  Respiratory: Negative  Negative for cough, chest tightness, shortness of breath and wheezing  Cardiovascular: Negative  Negative for chest pain, palpitations and leg swelling  Gastrointestinal: Negative  Negative for abdominal pain, constipation, diarrhea, nausea and vomiting  Endocrine: Negative  Negative for polydipsia and polyuria  Genitourinary: Negative  Negative for difficulty urinating, dysuria and hematuria  Musculoskeletal: Positive for arthralgias (B/L hands ), back pain (chronic lower back pain- reports DDD lumbar spine) and joint swelling (right wrist and base of right thumb )  Negative for myalgias  Skin: Positive for rash (rash on forehead- goes away and comes back  Oral steroids last week and topical hydrocortisone clears it up, just returns at a later date  )  Negative for wound  Allergic/Immunologic: Positive for environmental allergies  Neurological: Negative  Negative for dizziness, seizures, syncope, light-headedness and headaches  Hematological: Negative  Does not bruise/bleed easily  Psychiatric/Behavioral: Negative  Negative for dysphoric mood  The patient is not nervous/anxious  All other systems reviewed and are negative  Past Medical History:     History reviewed  No pertinent past medical history     Past Surgical History:     Past Surgical History:   Procedure Laterality Date    NECK SURGERY Right     SHOULDER OPEN ROTATOR CUFF REPAIR Right       Family History:     Family History   Problem Relation Age of Onset    Heart attack Father     Stroke Father     Kidney disease Sister     Prostate cancer Brother       Social History:     Social History     Socioeconomic History    Marital status:      Spouse name: None    Number of children: None    Years of education: None    Highest education level: None   Occupational History    None   Tobacco Use    Smoking status: Former Smoker    Smokeless tobacco: Former User   Vaping Use    Vaping Use: Some days   Emaline Folds Substances: THC, CBD   Substance and Sexual Activity    Alcohol use: Yes     Alcohol/week: 4 0 - 6 0 standard drinks     Types: 2 - 3 Cans of beer, 2 - 3 Shots of liquor per week    Drug use: Yes    Sexual activity: None   Other Topics Concern    None   Social History Narrative    None     Social Determinants of Health     Financial Resource Strain: Not on file   Food Insecurity: Not on file   Transportation Needs: Not on file   Physical Activity: Not on file   Stress: Not on file   Social Connections: Not on file   Intimate Partner Violence: Not At Risk    Fear of Current or Ex-Partner: No    Emotionally Abused: No    Physically Abused: No    Sexually Abused: No   Housing Stability: Not on file      Current Medications:     Current Outpatient Medications   Medication Sig Dispense Refill    clotrimazole-betamethasone (LOTRISONE) 1-0 05 % lotion Apply topically 2 (two) times a day 30 mL 0    meloxicam (MOBIC) 7 5 mg tablet Take 1 tablet (7 5 mg total) by mouth daily 60 tablet 0    omeprazole (PriLOSEC) 20 mg delayed release capsule Take 1 capsule by mouth daily      atorvastatin (LIPITOR) 20 mg tablet Take 1 tablet by mouth daily (Patient not taking: No sig reported)      levothyroxine 137 mcg tablet Take 1 tablet by mouth daily (Patient not taking: No sig reported)      lisinopril (ZESTRIL) 20 mg tablet Take 1 tablet by mouth 2 (two) times a day (Patient not taking: No sig reported)       No current facility-administered medications for this visit  Allergies:     No Known Allergies   Physical Exam:     /94   Pulse (!) 108   Temp 98 °F (36 7 °C) (Tympanic)   Resp 18   Ht 5' 11" (1 803 m)   Wt 81 6 kg (180 lb)   SpO2 99%   BMI 25 10 kg/m²     Physical Exam  Vitals and nursing note reviewed  Constitutional:       General: He is not in acute distress  Appearance: Normal appearance  He is normal weight  He is not ill-appearing  HENT:      Head: Normocephalic and atraumatic  Right Ear: Ear canal and external ear normal  There is impacted cerumen  Left Ear: Ear canal and external ear normal  There is impacted cerumen  Nose: Nose normal  No congestion or rhinorrhea  Mouth/Throat:      Mouth: Mucous membranes are moist       Pharynx: Oropharynx is clear  No oropharyngeal exudate or posterior oropharyngeal erythema  Eyes:      Extraocular Movements: Extraocular movements intact  Conjunctiva/sclera: Conjunctivae normal    Neck:      Comments: Right sided neck scar from neck dissection surgery   Cardiovascular:      Rate and Rhythm: Regular rhythm  Tachycardia present  Pulses: Normal pulses  Heart sounds: Normal heart sounds  No murmur heard  Pulmonary:      Effort: Pulmonary effort is normal  No respiratory distress  Breath sounds: Normal breath sounds  No wheezing  Abdominal:      General: Bowel sounds are normal       Palpations: Abdomen is soft  Tenderness: There is no abdominal tenderness  Musculoskeletal:         General: Normal range of motion  Cervical back: Normal range of motion and neck supple  Right lower leg: No edema  Left lower leg: No edema  Lymphadenopathy:      Cervical: No cervical adenopathy  Skin:     General: Skin is warm and dry  Capillary Refill: Capillary refill takes less than 2 seconds  Findings: Rash (scattered red macular spots to foreahead and frontal head ) present  Neurological:      General: No focal deficit present  Mental Status: He is alert and oriented to person, place, and time  Psychiatric:         Mood and Affect: Mood normal          Behavior: Behavior normal          Thought Content:  Thought content normal          Judgment: Judgment normal           HERMELINDA Dempsey  [facility]

## 2022-06-23 NOTE — TELEPHONE ENCOUNTER
Patient states that prescription of Myrna Mon should have been sent to [facility] in [place]  Can you correct this?     Thank you

## 2022-06-29 LAB
ALBUMIN SERPL-MCNC: 4.4 G/DL (ref 3.6–5.1)
ALBUMIN/GLOB SERPL: 1.6 (CALC) (ref 1–2.5)
ALP SERPL-CCNC: 58 U/L (ref 35–144)
ALT SERPL-CCNC: 17 U/L (ref 9–46)
APPEARANCE UR: CLEAR
AST SERPL-CCNC: 23 U/L (ref 10–35)
B BURGDOR AB SER IA-ACNC: <0.9 INDEX
BACTERIA UR QL AUTO: ABNORMAL /HPF
BASOPHILS # BLD AUTO: 57 CELLS/UL (ref 0–200)
BASOPHILS NFR BLD AUTO: 0.7 %
BILIRUB SERPL-MCNC: 0.4 MG/DL (ref 0.2–1.2)
BILIRUB UR QL STRIP: NEGATIVE
BUN SERPL-MCNC: 9 MG/DL (ref 7–25)
BUN/CREAT SERPL: 13 (CALC) (ref 6–22)
CALCIUM SERPL-MCNC: 9.8 MG/DL (ref 8.6–10.3)
CHLORIDE SERPL-SCNC: 101 MMOL/L (ref 98–110)
CHOLEST SERPL-MCNC: 289 MG/DL
CHOLEST/HDLC SERPL: 8 (CALC)
CO2 SERPL-SCNC: 30 MMOL/L (ref 20–32)
COLOR UR: YELLOW
CREAT SERPL-MCNC: 0.68 MG/DL (ref 0.7–1.25)
EOSINOPHIL # BLD AUTO: 164 CELLS/UL (ref 15–500)
EOSINOPHIL NFR BLD AUTO: 2 %
ERYTHROCYTE [DISTWIDTH] IN BLOOD BY AUTOMATED COUNT: 13.6 % (ref 11–15)
GLOBULIN SER CALC-MCNC: 2.7 G/DL (CALC) (ref 1.9–3.7)
GLUCOSE SERPL-MCNC: 107 MG/DL (ref 65–139)
GLUCOSE UR QL STRIP: NEGATIVE
HCT VFR BLD AUTO: 44.6 % (ref 38.5–50)
HDLC SERPL-MCNC: 36 MG/DL
HGB BLD-MCNC: 14.8 G/DL (ref 13.2–17.1)
HGB UR QL STRIP: NEGATIVE
HYALINE CASTS #/AREA URNS LPF: ABNORMAL /LPF
KETONES UR QL STRIP: NEGATIVE
LDLC SERPL CALC-MCNC: 229 MG/DL (CALC)
LEUKOCYTE ESTERASE UR QL STRIP: NEGATIVE
LYMPHOCYTES # BLD AUTO: 1681 CELLS/UL (ref 850–3900)
LYMPHOCYTES NFR BLD AUTO: 20.5 %
MCH RBC QN AUTO: 30.1 PG (ref 27–33)
MCHC RBC AUTO-ENTMCNC: 33.2 G/DL (ref 32–36)
MCV RBC AUTO: 90.7 FL (ref 80–100)
MONOCYTES # BLD AUTO: 640 CELLS/UL (ref 200–950)
MONOCYTES NFR BLD AUTO: 7.8 %
NEUTROPHILS # BLD AUTO: 5658 CELLS/UL (ref 1500–7800)
NEUTROPHILS NFR BLD AUTO: 69 %
NITRITE UR QL STRIP: NEGATIVE
NONHDLC SERPL-MCNC: 253 MG/DL (CALC)
PH UR STRIP: 8 [PH] (ref 5–8)
PLATELET # BLD AUTO: 278 THOUSAND/UL (ref 140–400)
PMV BLD REES-ECKER: 10.3 FL (ref 7.5–12.5)
POTASSIUM SERPL-SCNC: 4.3 MMOL/L (ref 3.5–5.3)
PROT SERPL-MCNC: 7.1 G/DL (ref 6.1–8.1)
PROT UR QL STRIP: ABNORMAL
RBC # BLD AUTO: 4.92 MILLION/UL (ref 4.2–5.8)
RBC #/AREA URNS HPF: ABNORMAL /HPF
SL AMB EGFR AFRICAN AMERICAN: 115 ML/MIN/1.73M2
SL AMB EGFR NON AFRICAN AMERICAN: 99 ML/MIN/1.73M2
SODIUM SERPL-SCNC: 140 MMOL/L (ref 135–146)
SP GR UR STRIP: 1.01 (ref 1–1.03)
SQUAMOUS #/AREA URNS HPF: ABNORMAL /HPF
T4 FREE SERPL-MCNC: 0.9 NG/DL (ref 0.8–1.8)
TRIGL SERPL-MCNC: 109 MG/DL
TSH SERPL-ACNC: 22.46 MIU/L (ref 0.4–4.5)
URATE SERPL-MCNC: 5.7 MG/DL (ref 4–8)
WBC # BLD AUTO: 8.2 THOUSAND/UL (ref 3.8–10.8)
WBC #/AREA URNS HPF: ABNORMAL /HPF

## 2022-06-30 ENCOUNTER — TELEPHONE (OUTPATIENT)
Dept: FAMILY MEDICINE CLINIC | Facility: CLINIC | Age: 67
End: 2022-06-30

## 2022-06-30 DIAGNOSIS — E03.9 HYPOTHYROIDISM, UNSPECIFIED TYPE: Primary | ICD-10-CM

## 2022-06-30 DIAGNOSIS — E78.5 HYPERLIPIDEMIA, UNSPECIFIED HYPERLIPIDEMIA TYPE: ICD-10-CM

## 2022-06-30 RX ORDER — LEVOTHYROXINE SODIUM 0.05 MG/1
50 TABLET ORAL DAILY
Qty: 30 TABLET | Refills: 1 | Status: SHIPPED | OUTPATIENT
Start: 2022-06-30 | End: 2022-07-01 | Stop reason: SDUPTHER

## 2022-06-30 RX ORDER — ROSUVASTATIN CALCIUM 10 MG/1
10 TABLET, COATED ORAL DAILY
Qty: 30 TABLET | Refills: 1 | Status: SHIPPED | OUTPATIENT
Start: 2022-06-30 | End: 2022-07-01 | Stop reason: SDUPTHER

## 2022-06-30 NOTE — TELEPHONE ENCOUNTER
Patient called  Patient is returning you call and would like a call back to discuss lab results      Thank you

## 2022-06-30 NOTE — TELEPHONE ENCOUNTER
Reviewed all lab results in detail with patient  Patient agreeable to trying thyroid and cholesterol medication so Levothyroxine 50 mcg once daily and Rosuvastain 10 mg once daily ordered and patient will  at pharmacy  Patient also reported that he continues to monitor BP's at home and is having both normal and elevated readings  Patient will bring BP log to upcoming appointment and we will determine if he needs BP medication at that time

## 2022-07-01 ENCOUNTER — TELEPHONE (OUTPATIENT)
Dept: FAMILY MEDICINE CLINIC | Facility: CLINIC | Age: 67
End: 2022-07-01

## 2022-07-01 DIAGNOSIS — E03.9 HYPOTHYROIDISM, UNSPECIFIED TYPE: ICD-10-CM

## 2022-07-01 DIAGNOSIS — E78.5 HYPERLIPIDEMIA, UNSPECIFIED HYPERLIPIDEMIA TYPE: ICD-10-CM

## 2022-07-01 RX ORDER — ROSUVASTATIN CALCIUM 10 MG/1
10 TABLET, COATED ORAL DAILY
Qty: 30 TABLET | Refills: 1 | Status: SHIPPED | OUTPATIENT
Start: 2022-07-01 | End: 2022-10-14 | Stop reason: SDUPTHER

## 2022-07-01 RX ORDER — LEVOTHYROXINE SODIUM 0.05 MG/1
50 TABLET ORAL DAILY
Qty: 30 TABLET | Refills: 1 | Status: SHIPPED | OUTPATIENT
Start: 2022-07-01 | End: 2022-08-23

## 2022-07-01 NOTE — TELEPHONE ENCOUNTER
Can these be resent to [address] patient does not use the pharmacy they were sent to yesterday

## 2022-07-26 ENCOUNTER — OFFICE VISIT (OUTPATIENT)
Dept: FAMILY MEDICINE CLINIC | Facility: CLINIC | Age: 67
End: 2022-07-26
Payer: COMMERCIAL

## 2022-07-26 VITALS
OXYGEN SATURATION: 100 % | HEIGHT: 71 IN | BODY MASS INDEX: 25.26 KG/M2 | TEMPERATURE: 97.6 F | HEART RATE: 89 BPM | WEIGHT: 180.4 LBS | SYSTOLIC BLOOD PRESSURE: 162 MMHG | RESPIRATION RATE: 18 BRPM | DIASTOLIC BLOOD PRESSURE: 86 MMHG

## 2022-07-26 DIAGNOSIS — R01.1 SYSTOLIC MURMUR: ICD-10-CM

## 2022-07-26 DIAGNOSIS — C14.0 THROAT CANCER (HCC): ICD-10-CM

## 2022-07-26 DIAGNOSIS — Z51.89 ENCOUNTER FOR MEDICATION ADJUSTMENT: Primary | ICD-10-CM

## 2022-07-26 DIAGNOSIS — I15.2 HYPERTENSION DUE TO ENDOCRINE DISORDER: ICD-10-CM

## 2022-07-26 PROCEDURE — 1160F RVW MEDS BY RX/DR IN RCRD: CPT | Performed by: NURSE PRACTITIONER

## 2022-07-26 PROCEDURE — 99214 OFFICE O/P EST MOD 30 MIN: CPT | Performed by: NURSE PRACTITIONER

## 2022-07-26 PROCEDURE — 3725F SCREEN DEPRESSION PERFORMED: CPT | Performed by: NURSE PRACTITIONER

## 2022-07-26 RX ORDER — LISINOPRIL 10 MG/1
10 TABLET ORAL DAILY
Qty: 30 TABLET | Refills: 2 | Status: SHIPPED | OUTPATIENT
Start: 2022-07-26 | End: 2022-10-07

## 2022-07-26 NOTE — PATIENT INSTRUCTIONS
Take rosuvastatin every other day   Adding lisinopril 10 mg PO thiago;y  TSH in 2-3 weeks  Schedule echo   Follow up in 4 weeks

## 2022-07-26 NOTE — PROGRESS NOTES
Assessment/Plan:    Throat cancer ([facility] [address] )  Treatment 2006,continues to see oncology in yearly follow up, will continue to monitor          Problem List Items Addressed This Visit        Digestive    Throat cancer [facility])     Treatment 2006,continues to see oncology in yearly follow up, will continue to monitor            Other Visit Diagnoses     Encounter for medication adjustment    -  Primary    Relevant Orders    TSH, 3rd generation with Free T4 reflex    Comprehensive metabolic panel    Systolic murmur        Relevant Orders    Echo complete w/ contrast if indicated    Hypertension due to endocrine disorder        Relevant Medications    lisinopril (ZESTRIL) 10 mg tablet            Subjective:      Patient ID: Louise Elena is a 78 y o  male  Here today in follow up -initially saw a different provider- history of throat cancer with radiation in 2006  Approximately 3 years ago stopped all medications for hypertension, hyperlipidemia, hypothyroid- he established care with this practice 2 months ago due to accelerated BP at the dentist - where he requires extensive dental care     Reviewed previous charts and labs and discussed returning to medications    Plan: TSH 22-current dose levothyroxine 50 mcg, last two weeks - will repeat TSH in 2 - 3 weeks             Beginning lisinopril 10 mg PO daily            2/6 systolic murmur noted on today's exam - echo referral for evaluation of murmur and probable Cardiology referral in future    Will follow up with me in 4 weeks       Hypertension  This is a chronic problem  The current episode started more than 1 year ago  The problem is unchanged  The problem is resistant  Associated symptoms include neck pain (""tightness in the neck")  Pertinent negatives include no anxiety, blurred vision, chest pain, headaches, malaise/fatigue, orthopnea, palpitations, peripheral edema, PND, shortness of breath or sweats  There are no associated agents to hypertension   Risk factors for coronary artery disease include male gender and dyslipidemia  Past treatments include ACE inhibitors  The current treatment provides no improvement  There are no compliance problems  There is no history of angina, heart failure or PVD  There is no history of chronic renal disease  Hyperlipidemia  This is a chronic problem  The current episode started more than 1 year ago  The problem is controlled  Recent lipid tests were reviewed and are high  Exacerbating diseases include hypothyroidism  He has no history of chronic renal disease, diabetes, liver disease, obesity or nephrotic syndrome  There are no known factors aggravating his hyperlipidemia  Pertinent negatives include no chest pain or shortness of breath  Current antihyperlipidemic treatment includes statins (reports has been having muscle aches- will trial [id number] other day )  The current treatment provides no improvement of lipids  Compliance problems: adhearence to medications  Risk factors for coronary artery disease include dyslipidemia, family history, hypertension and male sex  The following portions of the patient's history were reviewed and updated as appropriate: allergies, past family history, past medical history, past social history, past surgical history and problem list     Review of Systems   Constitutional: Negative  Negative for malaise/fatigue  HENT: Positive for dental problem  Negative for mouth sores and sinus pain  Eyes: Negative  Negative for blurred vision  Respiratory: Negative  Negative for shortness of breath  Cardiovascular: Negative  Negative for chest pain, palpitations, orthopnea and PND  Gastrointestinal: Negative  Endocrine: Negative  Genitourinary: Negative  Musculoskeletal: Positive for neck pain (""tightness in the neck")  Allergic/Immunologic: Negative  Neurological: Negative  Negative for headaches  Hematological: Negative  Psychiatric/Behavioral: Negative  Objective:      /86 (BP Location: Right arm, Patient Position: Sitting, Cuff Size: Standard)   Pulse 89   Temp 97 6 °F (36 4 °C) (Tympanic)   Resp 18   Ht 5' 11" (1 803 m)   Wt 81 8 kg (180 lb 6 4 oz)   SpO2 100%   BMI 25 16 kg/m²          Physical Exam  Vitals and nursing note reviewed  Constitutional:       General: He is not in acute distress  Appearance: Normal appearance  He is not ill-appearing  HENT:      Head: Normocephalic and atraumatic  Nose: Nose normal  No congestion  Eyes:      General:         Right eye: No discharge  Left eye: No discharge  Extraocular Movements: Extraocular movements intact  Conjunctiva/sclera: Conjunctivae normal    Cardiovascular:      Rate and Rhythm: Normal rate and regular rhythm  Heart sounds: Murmur heard  Pulmonary:      Effort: Pulmonary effort is normal  No respiratory distress  Breath sounds: Normal breath sounds  Abdominal:      General: Bowel sounds are normal       Palpations: Abdomen is soft  Musculoskeletal:         General: Normal range of motion  Cervical back: Normal range of motion  Skin:     General: Skin is warm and dry  Capillary Refill: Capillary refill takes less than 2 seconds  Neurological:      Mental Status: He is alert and oriented to person, place, and time  Psychiatric:         Mood and Affect: Mood normal          Behavior: Behavior normal          Thought Content:  Thought content normal          Judgment: Judgment normal        No Known Allergies

## 2022-08-08 ENCOUNTER — HOSPITAL ENCOUNTER (OUTPATIENT)
Dept: NON INVASIVE DIAGNOSTICS | Age: 67
Discharge: HOME | End: 2022-08-08
Payer: COMMERCIAL

## 2022-08-08 VITALS
DIASTOLIC BLOOD PRESSURE: 86 MMHG | WEIGHT: 180 LBS | HEIGHT: 71 IN | BODY MASS INDEX: 25.2 KG/M2 | SYSTOLIC BLOOD PRESSURE: 162 MMHG

## 2022-08-08 DIAGNOSIS — R01.1 SYSTOLIC MURMUR: ICD-10-CM

## 2022-08-08 LAB
AORTIC ROOT: 3.9 CM
AORTIC VALVE MEAN VELOCITY: 17 M/S
APICAL FOUR CHAMBER EJECTION FRACTION: 46 %
AV AREA BY CONTINUOUS VTI: 2.5 CM2
AV AREA PEAK VELOCITY: 2.3 CM2
AV LVOT MEAN GRADIENT: 5 MMHG
AV LVOT PEAK GRADIENT: 9 MMHG
AV MEAN GRADIENT: 14 MMHG
AV PEAK GRADIENT: 23 MMHG
AV VALVE AREA: 2.49 CM2
AV VELOCITY RATIO: 0.61
AVA (PLAN): 1.5 CM2
DOP CALC AO PEAK VEL: 2.41 M/S
DOP CALC AO VTI: 42.46 CM
DOP CALC LVOT AREA: 3.8 CM2
DOP CALC LVOT DIAMETER: 2.2 CM
DOP CALC LVOT PEAK VEL VTI: 27.85 CM
DOP CALC LVOT PEAK VEL: 1.47 M/S
DOP CALC LVOT STROKE INDEX: 50 ML/M2
DOP CALC LVOT STROKE VOLUME: 105.81 CM3
E WAVE DECELERATION TIME: 407 MS
FRACTIONAL SHORTENING: 24 % (ref 28–44)
INTERVENTRICULAR SEPTUM IN DIASTOLE (PARASTERNAL SHORT AXIS VIEW): 1 CM
INTERVENTRICULAR SEPTUM: 1 CM (ref 0.6–1.1)
LAAS-AP2: 14 CM2
LAAS-AP4: 14 CM2
LEFT ATRIUM AREA SYSTOLE SINGLE PLANE A4C: 14.2 CM2
LEFT ATRIUM SIZE: 3.4 CM
LEFT INTERNAL DIMENSION IN SYSTOLE: 3.1 CM (ref 2.1–4)
LEFT VENTRICLE DIASTOLIC VOLUME (MOD BIPLANE): 106 ML
LEFT VENTRICLE SYSTOLIC VOLUME (MOD BIPLANE): 47 ML
LEFT VENTRICULAR INTERNAL DIMENSION IN DIASTOLE: 4.1 CM (ref 3.5–6)
LEFT VENTRICULAR POSTERIOR WALL IN END DIASTOLE: 1.1 CM
LEFT VENTRICULAR STROKE VOLUME: 35 ML
LV EF: 56 %
LVSV (TEICH): 35 ML
MV E'TISSUE VEL-SEP: 6 CM/S
MV PEAK A VEL: 1.06 M/S
MV PEAK E VEL: 60 CM/S
MV STENOSIS PRESSURE HALF TIME: 118 MS
MV VALVE AREA P 1/2 METHOD: 1.86 CM2
PA SYSTOLIC PRESSURE: 30 MMHG
RIGHT ATRIUM AREA SYSTOLE A4C: 13.2 CM2
RIGHT VENTRICLE ID DIMENSION: 2.8 CM
SL CV LEFT ATRIUM LENGTH A2C: 3.9 CM
SL CV LV EF: 55
SL CV PED ECHO LEFT VENTRICLE DIASTOLIC VOLUME (MOD BIPLANE) 2D: 73 ML
SL CV PED ECHO LEFT VENTRICLE SYSTOLIC VOLUME (MOD BIPLANE) 2D: 39 ML
TR MAX PG: 23 MMHG
TR PEAK VELOCITY: 2.4 M/S
TRICUSPID VALVE PEAK REGURGITATION VELOCITY: 2.42 M/S

## 2022-08-08 PROCEDURE — 93306 TTE W/DOPPLER COMPLETE: CPT | Performed by: INTERNAL MEDICINE

## 2022-08-08 PROCEDURE — 93306 TTE W/DOPPLER COMPLETE: CPT

## 2022-08-19 LAB
ALBUMIN SERPL-MCNC: 4.3 G/DL (ref 3.6–5.1)
ALBUMIN/GLOB SERPL: 1.8 (CALC) (ref 1–2.5)
ALP SERPL-CCNC: 60 U/L (ref 35–144)
ALT SERPL-CCNC: 17 U/L (ref 9–46)
AST SERPL-CCNC: 22 U/L (ref 10–35)
BILIRUB SERPL-MCNC: 0.3 MG/DL (ref 0.2–1.2)
BUN SERPL-MCNC: 12 MG/DL (ref 7–25)
BUN/CREAT SERPL: NORMAL (CALC) (ref 6–22)
CALCIUM SERPL-MCNC: 9.5 MG/DL (ref 8.6–10.3)
CHLORIDE SERPL-SCNC: 100 MMOL/L (ref 98–110)
CO2 SERPL-SCNC: 30 MMOL/L (ref 20–32)
CREAT SERPL-MCNC: 0.71 MG/DL (ref 0.7–1.35)
GFR/BSA.PRED SERPLBLD CYS-BASED-ARV: 101 ML/MIN/1.73M2
GLOBULIN SER CALC-MCNC: 2.4 G/DL (CALC) (ref 1.9–3.7)
GLUCOSE SERPL-MCNC: 97 MG/DL (ref 65–99)
POTASSIUM SERPL-SCNC: 4.7 MMOL/L (ref 3.5–5.3)
PROT SERPL-MCNC: 6.7 G/DL (ref 6.1–8.1)
SODIUM SERPL-SCNC: 137 MMOL/L (ref 135–146)
T4 FREE SERPL-MCNC: 1.2 NG/DL (ref 0.8–1.8)
TSH SERPL-ACNC: 16.65 MIU/L (ref 0.4–4.5)

## 2022-08-23 DIAGNOSIS — E03.9 ACQUIRED HYPOTHYROIDISM: Primary | ICD-10-CM

## 2022-08-23 RX ORDER — LEVOTHYROXINE SODIUM 0.07 MG/1
75 TABLET ORAL
Qty: 30 TABLET | Refills: 3 | Status: SHIPPED | OUTPATIENT
Start: 2022-08-23 | End: 2022-09-30

## 2022-08-26 ENCOUNTER — OFFICE VISIT (OUTPATIENT)
Dept: FAMILY MEDICINE CLINIC | Facility: CLINIC | Age: 67
End: 2022-08-26
Payer: COMMERCIAL

## 2022-08-26 VITALS
OXYGEN SATURATION: 98 % | RESPIRATION RATE: 14 BRPM | SYSTOLIC BLOOD PRESSURE: 142 MMHG | HEART RATE: 82 BPM | TEMPERATURE: 98.4 F | HEIGHT: 71 IN | DIASTOLIC BLOOD PRESSURE: 90 MMHG | WEIGHT: 177.8 LBS | BODY MASS INDEX: 24.89 KG/M2

## 2022-08-26 DIAGNOSIS — Z23 IMMUNIZATION DUE: ICD-10-CM

## 2022-08-26 DIAGNOSIS — R01.1 HEART MURMUR: ICD-10-CM

## 2022-08-26 DIAGNOSIS — I10 PRIMARY HYPERTENSION: Primary | ICD-10-CM

## 2022-08-26 DIAGNOSIS — R21 RASH: ICD-10-CM

## 2022-08-26 PROCEDURE — 90471 IMMUNIZATION ADMIN: CPT | Performed by: NURSE PRACTITIONER

## 2022-08-26 PROCEDURE — 99214 OFFICE O/P EST MOD 30 MIN: CPT | Performed by: NURSE PRACTITIONER

## 2022-08-26 PROCEDURE — 90750 HZV VACC RECOMBINANT IM: CPT | Performed by: NURSE PRACTITIONER

## 2022-08-26 PROCEDURE — 1160F RVW MEDS BY RX/DR IN RCRD: CPT | Performed by: NURSE PRACTITIONER

## 2022-08-26 PROCEDURE — 90472 IMMUNIZATION ADMIN EACH ADD: CPT | Performed by: NURSE PRACTITIONER

## 2022-08-26 PROCEDURE — 3725F SCREEN DEPRESSION PERFORMED: CPT | Performed by: NURSE PRACTITIONER

## 2022-08-26 PROCEDURE — 90677 PCV20 VACCINE IM: CPT | Performed by: NURSE PRACTITIONER

## 2022-08-26 RX ORDER — CLINDAMYCIN PHOSPHATE AND BENZOYL PEROXIDE 10; 50 MG/G; MG/G
1 GEL TOPICAL 2 TIMES DAILY
Qty: 45 G | Refills: 1 | Status: SHIPPED | OUTPATIENT
Start: 2022-08-26

## 2022-08-26 NOTE — PROGRESS NOTES
Assessment/Plan   Problem List Items Addressed This Visit    None     Visit Diagnoses     Primary hypertension    -  Primary    Heart murmur        Relevant Orders    Ambulatory Referral to Cardiology    Rash        Relevant Medications    Clindamycin Phos-Benzoyl Perox gel    Immunization due        Relevant Orders    Zoster Vaccine Recombinant IM (Completed)    Pneumococcal Conjugate Vaccine 20-valent (Pcv20) (Completed)                Chief Complaint   Patient presents with    Follow-up     1 month       Subjective   Patient ID: Pankaj Fuentes is a 78 y o  male  Vitals:    08/26/22 0952   BP: 142/90   Pulse: 82   Resp: 14   Temp: 98 4 °F (36 9 °C)   SpO2: 98%     Lengthy discussion with Simeon in regards to his BP - I would like to increase his dose of lisinopril or try a different hypertensive medication but he reports that he is afraid that he will get "too low and dizzy" He will continue to monitor at home and call with any changes      Will trial a antibiotic/petty peroxide cream on forehead rash     Hypertension  This is a recurrent problem  The current episode started more than 1 month ago  The problem is unchanged  The problem is controlled  Pertinent negatives include no blurred vision, chest pain, malaise/fatigue, neck pain, peripheral edema or shortness of breath  There are no associated agents to hypertension  There are no known risk factors for coronary artery disease  Past treatments include ACE inhibitors  The current treatment provides mild improvement  There are no compliance problems  Rash  This is a recurrent problem  The current episode started 1 to 4 weeks ago  The problem has been waxing and waning since onset  Location: [place]  The problem is mild  The rash is characterized by redness (scattered papules)  Pertinent negatives include no congestion, decreased physical activity, decreased responsiveness, itching, joint pain or shortness of breath   Past treatments include oral steroids and antihistamine  The treatment provided no relief  There is no history of allergies, asthma, eczema or varicella  The following portions of the patient's history were reviewed and updated as appropriate: allergies, current medications, past medical history, past social history, past surgical history and problem list     Review of Systems   Constitutional: Negative  Negative for decreased responsiveness and malaise/fatigue  HENT: Positive for trouble swallowing (history of throat cancer )  Negative for congestion and facial swelling  Eyes: Negative  Negative for blurred vision  Respiratory: Negative  Negative for shortness of breath  Cardiovascular: Negative  Negative for chest pain  Gastrointestinal: Negative  Endocrine: Negative  Genitourinary: Negative  Musculoskeletal: Negative  Negative for joint pain and neck pain  Skin: Positive for rash  Negative for itching  Allergic/Immunologic: Negative  Neurological: Negative  Hematological: Negative  Psychiatric/Behavioral: Negative  Objective     Physical Exam  Vitals and nursing note reviewed  Constitutional:       Appearance: He is not ill-appearing  HENT:      Head: Normocephalic and atraumatic  Nose: Nose normal  No congestion  Eyes:      General:         Right eye: No discharge  Extraocular Movements: Extraocular movements intact  Conjunctiva/sclera: Conjunctivae normal    Cardiovascular:      Rate and Rhythm: Normal rate and regular rhythm  Pulses: Normal pulses  Heart sounds: Murmur heard  Pulmonary:      Effort: Pulmonary effort is normal  No respiratory distress  Abdominal:      Palpations: Abdomen is soft  Tenderness: There is no right CVA tenderness or left CVA tenderness  Musculoskeletal:         General: Normal range of motion  Cervical back: Normal range of motion  Right lower leg: No edema  Left lower leg: No edema     Lymphadenopathy: Cervical: No cervical adenopathy  Skin:     General: Skin is warm and dry  Capillary Refill: Capillary refill takes less than 2 seconds  Findings: Rash present  Neurological:      Mental Status: He is alert and oriented to person, place, and time  Psychiatric:         Mood and Affect: Mood normal          Behavior: Behavior normal          Thought Content:  Thought content normal          Judgment: Judgment normal

## 2022-09-21 ENCOUNTER — OFFICE VISIT (OUTPATIENT)
Dept: WOUND CARE | Facility: CLINIC | Age: 67
End: 2022-09-21
Payer: COMMERCIAL

## 2022-09-21 VITALS
DIASTOLIC BLOOD PRESSURE: 140 MMHG | HEART RATE: 106 BPM | TEMPERATURE: 64.4 F | RESPIRATION RATE: 18 BRPM | SYSTOLIC BLOOD PRESSURE: 200 MMHG

## 2022-09-21 DIAGNOSIS — Z87.891 FORMER SMOKER, STOPPED SMOKING IN DISTANT PAST: ICD-10-CM

## 2022-09-21 DIAGNOSIS — I16.0 HYPERTENSIVE URGENCY: ICD-10-CM
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
## 2022-09-21 DIAGNOSIS — M27.2 OSTEORADIONECROSIS OF JAW: Primary | ICD-10-CM

## 2022-09-21 PROCEDURE — 99213 OFFICE O/P EST LOW 20 MIN: CPT | Performed by: FAMILY MEDICINE

## 2022-09-21 PROCEDURE — 99204 OFFICE O/P NEW MOD 45 MIN: CPT | Performed by: FAMILY MEDICINE

## 2022-09-21 NOTE — PROGRESS NOTES
Patient ID: Nakul Howard is a 78 y o  male Date of Birth 1955       Chief Complaint   Patient presents with   [facility]     Patient here for HBO consult at the recommendation of his oral surgery prior tooth extractions  He has a hx of tongue cancer which was treated with surgery, radiation and chemo  He had prior HBO therapy in 2013 at [facility]  Allergies:  Patient has no known allergies  Diagnosis:      Diagnosis ICD-10-CM Associated Orders   1  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  M27 2     Y84 2    2  Former smoker, stopped smoking in distant past  Z87 [id number]    3  Hypertensive urgency  I16 0            Assessment & Plan:   Multiple dental caries with 17 extractions needed with a history of bone loss possibly secondary to ORN   Awaiting records for radiation and chemo   Hypertensive urgency without symptoms  Carefully instructed that should he develop any symptoms of stroke or chest pain, etc , he is to call 911 and go to the emergency room  Patient has appointment with Cardiology tomorrow  Heart murmur also  EF normal    Former smoker  Will need chest x-ray  Normal CBC and CMP within the past two months   Cerumen impaction both ears  Will make appointment with PCP for cleaning  [facility] presents today for a consultation for HBO treatment  HBO Indication    Osteoradionecrosis    Osteoradionecrosis location 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    The patient has/had cancer and location  Yes right side of the tongue    Date radiation treatments started 2006    Date radiation treatments completed 2007    Radiation treatments ended at least 6 months previous to consideration of HBO   Yes    Anatomical site of radiation therapy right neck and jaw      Radiation therapy records obtained No    The patient has a degenerative process of Bone    HBO is being used  Pre-operatively `    There is a plan that includes pre and post-surgery HBO and HBO being used in adjunct to conventional Rx  Based on the information included herein, it is my determination that the patient has a medical necessity for HBOT and meets the conditions for the adjunctive RX wound care or comprehensive plan  Yes    Based on the patients diagnosis of Osteoradionecrosis HBO is medically necessary      Brief history of co-morbidities that may affect HBO treatment:    Previously treated with: No chemotherapy or medications which are contraindications to HBO    Patient reports s/s of No acute infections    Eyes    Patient has  No history of Myopia, Cataracts or Optic Nerve    Ears    Patient has a history of No ear abnormalities or conditions    Ears assessed for tympanic membrane or middle ear abnormalities via otoscope    Patient instructed on how to clear ears and demonstrate proper technique: Yes    Right ear baseline TEEDS:  cerumen impaction    Left ear baseline TEEDS:  cerumen impaction    ENT consult for Myringotomy tube insertion due to No consult is needed    Cerumen removal performed:  PCP    Neurological    Patient jackson a history of seizures: No    Cardiovascular    Patient has a history of: Hypertension    EKG ordered: No    Echocardiogram ordered: No, done 8/8/22  EF 46%    Cardiovascular consult ordered: Yes    Smoking  Social History     Tobacco Use   Smoking Status Former Smoker   Smokeless Tobacco Former User       Respiratory    Patient has a history of pneumothorax: No    Patient has a history of: No respiratory conditions requiring further work-up prior to HBO    Lungs clear bilaterally: No    Chest x-ray ordered: Yes    Psychiatric    Patient has confinement anxiety: No but he is anxious  Patient education and consent    Informed Consent:  Joaquin Diaz has no contraindications to hyperbaric oxygen therapy   We have discussed the risks (ear and sinus barotrauma, pneumothorax, visual refractory changes, oxygen-induced seizures, and claustrophobia) and potential benefits of hyperbaric oxygen therapy  Patient understands these risks along with the potential benefits and agrees to undergo hyperbaric oxygen therapy  I discussed with and educated the patient about the HBO procedure, smoking/drug/alcohol policy, and items not allowed in the hyperbaric chamber  Yes    Patient has been oriented to the HBO chamber  Clearing techniques have been reviewed  Written consent for HBO obtained: Yes    A trained emergency response team and ICU is available in this facility to assist with complications if required: Yes    HBO treatment goal    Pavel Franco is an excellent candidate for hyperbaric oxygen treatment as adjunctive therapy for the treatment of osteoradionecrosis  Radiation causes an obliterative endarteritis, subsequent hypoxic/hypovascular tissue and secondary fibrosis  Hyperbaric oxygen has been shown to be an effective treatment in radiation tissue injury by stimulating angiogenesis and inducing neovacularization in the hypoxic irradiated tissue  Serial hyperbaric oxygen treatments improve local host immune response and enhance the clearance of infection by improving the leukocyte oxidative killing of aerobic bacteria and the direct bactericidal activity against many anaerobic bacteria  Hyperbaric oxygen also stimulates tissue growth through fibroblast proliferation, collagen synthesis, stimulation of the release of vascular endothelial growth factors (VEGF), induction of the receptor appearance of platelet derived growth factors (PDGF) and angiogenesis  Subjective:   9/21/22: This is a 65-year-old male referred for hyperbaric oxygen consultation  In 2006 the patient was diagnosed with tongue cancer on the right side  He underwent radical neck dissection, chemotherapy and radiation therapy  Radiation extended in to 2007  He has done relatively well except he  continues to have problems with dental caries    He states that he has had mandibular bone loss and needs 17 extractions  He requires hyperbaric oxygen therapy for possible ORN  He also has xerostomia and has tried many medications over the years  At this time he tries to control this with frequent drinks of water  Patient has no history of claustrophobia or seizure disorder  He was recently diagnosed with a heart murmur and has an appointment to see Cardiology tomorrow  He did have echocardiogram within EF of 46%  Mild diastolic dysfunction  Mild mitral regurg  Patient also has hypertension which is not adequately controled  Pre pandemic, the patient stopped all of his medications and then recently restarted  Unfortunately, he has wide swings of his blood pressure  Today his blood pressure is extremely high but without any symptoms        The following portions of the patient's history were reviewed and updated as appropriate:   Patient Active Problem List   Diagnosis    Acute pain of right wrist    Arthralgia of multiple joints    Bilateral hip pain    Benign essential hypertension    Chronic low back pain    Esophagitis, reflux    Folliculitis    Hyperlipidemia    Hypothyroidism    Myalgia    [facility]    History of head and neck cancer     Past Medical History:   Diagnosis Date    Anxiety     Cancer ([facility] [address] )     Chemotherapy follow-up examination     Heart murmur     History of radiation therapy 2006    Hypertension      Past Surgical History:   Procedure Laterality Date    ANKLE SURGERY Left     NECK SURGERY Right     SHOULDER OPEN ROTATOR CUFF REPAIR Right      Family History   Problem Relation Age of Onset    Hypertension Mother     Obesity Mother     Diabetes Father     Heart attack Father     Stroke Father     Heart disease Father     Hyperlipidemia Sister     Obesity Sister     Hypertension Sister     Diabetes Sister     Kidney disease Sister     Hyperlipidemia Brother     Hypertension Brother     Diabetes Brother     Prostate cancer Brother Social History     Socioeconomic History    Marital status:      Spouse name: None    Number of children: None    Years of education: None    Highest education level: None   Occupational History    None   Tobacco Use    Smoking status: Former Smoker    Smokeless tobacco: Former User   Vaping Use    Vaping Use: Never used   Substance and Sexual Activity    Alcohol use: Yes     Alcohol/week: 4 0 - 6 0 standard drinks     Types: 2 - 3 Cans of beer, 2 - 3 Shots of liquor per week     Comment: socially    Drug use: Yes     Types: Marijuana    Sexual activity: None   Other Topics Concern    None   Social History Narrative    None     Social Determinants of Health     Financial Resource Strain: Not on file   Food Insecurity: Not on file   Transportation Needs: Not on file   Physical Activity: Inactive    Days of Exercise per Week: 0 days    Minutes of Exercise per Session: 0 min   Stress: No Stress Concern Present    Feeling of Stress : Only a little   Social Connections: Not on file   Intimate Partner Violence: Not At Risk    Fear of Current or Ex-Partner: No    Emotionally Abused: No    Physically Abused: No    Sexually Abused: No   Housing Stability: Not on file        Current Outpatient Medications:     Clindamycin Phos-Benzoyl Perox gel, Apply 1 application topically 2 (two) times a day, Disp: 45 g, Rfl: 1    levothyroxine (Euthyrox) 75 mcg tablet, Take 1 tablet (75 mcg total) by mouth daily in the early morning, Disp: 30 tablet, Rfl: 3    lisinopril (ZESTRIL) 10 mg tablet, Take 1 tablet (10 mg total) by mouth daily, Disp: 30 tablet, Rfl: 2    omeprazole (PriLOSEC) 20 mg delayed release capsule, Take 1 capsule by mouth daily, Disp: , Rfl:     rosuvastatin (CRESTOR) 10 MG tablet, Take 1 tablet (10 mg total) by mouth daily, Disp: 30 tablet, Rfl: 1    Review of Systems   Constitutional: Negative for appetite change, chills, fatigue, fever and unexpected weight change     HENT: Positive for dental problem (Multiple dental caries), trouble swallowing (Dryness) and voice change  Negative for congestion, hearing loss, postnasal drip and sinus pressure  Eyes: Negative for discharge and visual disturbance  Respiratory: Negative for cough and shortness of breath  Cardiovascular: Negative for chest pain, palpitations and leg swelling  Gastrointestinal: Negative for abdominal pain, blood in stool, constipation, diarrhea and nausea  Endocrine: Negative  Genitourinary: Negative for difficulty urinating, dysuria and urgency  Musculoskeletal: Negative for back pain and gait problem  Skin: Negative for rash and wound  Allergic/Immunologic: Negative  Neurological: Negative for dizziness, tremors, seizures, weakness, numbness and headaches  Hematological: Does not bruise/bleed easily  Psychiatric/Behavioral: Negative for dysphoric mood  The patient is nervous/anxious  Objective:  BP (!) 200/140   Pulse (!) 106   Temp (!) 64 4 °F (18 °C)   Resp 18   Pain Score: 0-No pain     Physical Exam  Vitals and nursing note reviewed  Constitutional:       Appearance: Normal appearance  He is well-developed and normal weight  HENT:      Head: Normocephalic and atraumatic  Right Ear: External ear normal  There is impacted cerumen  Left Ear: External ear normal  There is impacted cerumen  Mouth/Throat:      Mouth: Mucous membranes are dry  Dentition: Dental caries present  Eyes:      General: Lids are normal          Right eye: No discharge  Left eye: No discharge  Conjunctiva/sclera: Conjunctivae normal    Neck:      Vascular: No carotid bruit  Comments: Scarring on the right neck secondary to radical neck dissection  Cardiovascular:      Rate and Rhythm: Normal rate and regular rhythm  Heart sounds: Murmur (Radiating to the left axilla) heard  Systolic murmur is present with a grade of 3/6  No friction rub  No gallop  Pulmonary:      Effort: Pulmonary effort is normal       Breath sounds: Normal breath sounds and air entry  Abdominal:      General: Abdomen is flat  Palpations: Abdomen is soft  There is no hepatomegaly or splenomegaly  Tenderness: There is no abdominal tenderness  There is no guarding or rebound  Musculoskeletal:      Cervical back: Neck supple  Right lower leg: No edema  Left lower leg: No edema  Lymphadenopathy:      Cervical: No cervical adenopathy  Skin:     General: Skin is warm and dry  Findings: No wound  Neurological:      Mental Status: He is alert and oriented to person, place, and time  Gait: Gait is intact  Psychiatric:         Attention and Perception: Attention normal          Mood and Affect: Affect normal  Mood is anxious (Slightly)  Speech: Speech normal          Behavior: Behavior is cooperative  Cognition and Memory: Cognition normal               Procedures           Wound Instructions:  No orders of the defined types were placed in this encounter  Marcus Jorge MD, CHT, CWS    Portions of the record may have been created with voice recognition software  Occasional wrong word or "sound alike" substitutions may have occurred due to the inherent limitations of voice recognition software  Read the chart carefully and recognize, using context, where substitutions have occurred

## 2022-09-21 NOTE — PROGRESS NOTES
Patient here for HBO therapy consult at the request of his oral surgeon  He needs teeth extractions  He reports a recent dx of heart murmur for which he is seeing cardiology on 9/22  He reports HTN, at times "dangerously high"  He will discuss with cardiology as well  He denies chest pain or SOB but admits to some chest 'tightness' with exertion that is relieved with rest   He had previous HBO treatments in [facility] and reports tolerating treatments well  He denies ear problems with previous HBO treatment but reports he has 'wax buildup" and needs to have his ears cleaned prior to HBO  He denies sinus problems  He denies confinement anxiety but feels his anxiety level is high and that he may need some anxiety medications during treatment  He denies respiratory issues such as asthma, emphysema, COPD  He does not recall the last CXR he had done  Denies history of spontaneous pneumothorax or seizures  He denies any implanted devices  He has "mild cataracts which haven't changed in the last 5 yrs"    HBO therapy reviewed with patient and Patient informations given, Patient declined tour of HBO room

## 2022-09-22 ENCOUNTER — CONSULT (OUTPATIENT)
Dept: CARDIOLOGY CLINIC | Facility: CLINIC | Age: 67
End: 2022-09-22
Payer: COMMERCIAL

## 2022-09-22 ENCOUNTER — TELEPHONE (OUTPATIENT)
Dept: FAMILY MEDICINE CLINIC | Facility: CLINIC | Age: 67
End: 2022-09-22

## 2022-09-22 VITALS
DIASTOLIC BLOOD PRESSURE: 122 MMHG | BODY MASS INDEX: 24.81 KG/M2 | SYSTOLIC BLOOD PRESSURE: 180 MMHG | WEIGHT: 177.2 LBS | HEIGHT: 71 IN | HEART RATE: 87 BPM

## 2022-09-22 DIAGNOSIS — R94.31 ABNORMAL ECG: ICD-10-CM

## 2022-09-22 DIAGNOSIS — R07.89 OTHER CHEST PAIN: ICD-10-CM

## 2022-09-22 DIAGNOSIS — I35.0 NONRHEUMATIC AORTIC VALVE STENOSIS: ICD-10-CM

## 2022-09-22 DIAGNOSIS — E78.5 HYPERLIPIDEMIA, UNSPECIFIED HYPERLIPIDEMIA TYPE: ICD-10-CM

## 2022-09-22 DIAGNOSIS — E03.9 ACQUIRED HYPOTHYROIDISM: Primary | ICD-10-CM

## 2022-09-22 DIAGNOSIS — R93.1 ABNORMAL ECHOCARDIOGRAM: ICD-10-CM

## 2022-09-22 DIAGNOSIS — I10 PRIMARY HYPERTENSION: Primary | ICD-10-CM

## 2022-09-22 PROCEDURE — 99214 OFFICE O/P EST MOD 30 MIN: CPT | Performed by: INTERNAL MEDICINE

## 2022-09-22 PROCEDURE — 93000 ELECTROCARDIOGRAM COMPLETE: CPT | Performed by: INTERNAL MEDICINE

## 2022-09-22 RX ORDER — AMOXICILLIN 500 MG/1
500 TABLET, FILM COATED ORAL DAILY
COMMUNITY
Start: 2022-09-16 | End: 2022-10-14

## 2022-09-22 NOTE — PROGRESS NOTES
[facility] Cardiology  Office Consultation  Joaquin Diaz 78 y o  male MRN: [id number]        Chief Complaint    Chief Complaint   Patient presents with    Heart Murmur     New pt  Cardiac Clearance  Referring Provider: HERMELINDA Nettles    Impression & Plan:    1  Primary hypertension  I think there is a significant component of white coat hypertension  He has extraordinarily high blood pressures in the office with very normal blood pressures at home  Presently on lisinopril 10 mg daily  We did check his cuff against our BP readings today and it is consistent  Continue current lisinopril 10 mg daily, patient will call with about 1 week of home blood pressure readings  - Ambulatory Referral to Cardiology  - POCT ECG    2  Hyperlipidemia, unspecified hyperlipidemia type  Has recently started rosuvastatin 10 mg daily  Recheck lipid panel in 1-2 months  - Lipid panel    3  Nonrheumatic aortic valve stenosis  Mild in severity presently  We will continue to monitor  Echocardiogram about every 2 years would suffice for surveillance  4  Abnormal ECG  His EKG has anterior T-wave abnormalities that are pretty significant  There is some LAD territory hypokinesis/akinesis on echocardiography  My concern is that this may represent prior infarct  However, also in the setting of his asymmetric septal hypertrophy there is the possibility this is due to hypertrophic cardiomyopathy  We will start with a nuclear stress test to evaluate for perfusion in this territory   - NM myocardial perfusion spect (rx stress and/or rest); Future    5  Abnormal echocardiogram  - NM myocardial perfusion spect (rx stress and/or rest); Future    6  Other chest pain  - NM myocardial perfusion spect (rx stress and/or rest); Future         We will see Joaquin Diaz back in 3 months for routine follow-up      HPI: Joaquin Diaz is a 78y o  year old male with mild aortic stenosis, hypertension, hyperlipidemia, and 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) pending hyperbaric oxygen treatment  He was referred for HTN and a cardiac murmur  His echo on 8/8/22 showed normal LV size and systolic function, LVEF 89%, G1DD, mild MR/TR and mild aortic stenosis, mild aortic root dilation, mild LVH  I personally reviewed and reinterpreted this study  There is moderate to severe septal hypertrophy with thickness of about 1 6-1 7 cm, compared to 1 3 cm in LVPW  There is regional hypokinesis involving the apex, apical septal, apical inferior segments  His home BP log shows yesterday 130s/80s in the morning, 197/142 when he was at his hyperbaric oxygen consultation, and 120/72 when he returned home  He reports that his home BP cuff matched his HBO consultation BP yesterday  His recorded BP yesterday at HBO consultation was 200/140  Presently, his home cuff is recording 183/130  He states that his blood pressure when he checks at work is low, if anything  He reports that he will get chest tightness with exertion, such as when walking up a steep hill, which has been occurring for decades  He had a stress test and was told her had a high blood pressure response to exercise which was the cause of his symptoms  It was around then he was started on antihypertensive medications  He does also state he frequently walks or hikes casually without symptoms  There has been no significant change to the nature of this in decade  He walks hours per day for work without symptoms  No issues this past winter shoveling snow  Review of Systems   Constitutional: Negative for activity change and unexpected weight change  HENT: Negative for facial swelling  Eyes: Negative for visual disturbance  Respiratory: Negative for cough and chest tightness  Cardiovascular: Negative for chest pain  Gastrointestinal: Negative for blood in stool  Musculoskeletal: Negative for myalgias  Skin: Negative for pallor  Allergic/Immunologic: Negative for immunocompromised state  Neurological: Negative for syncope and light-headedness  Psychiatric/Behavioral: Negative for agitation and hallucinations  Past Medical History:   Diagnosis Date    Anxiety     Cancer [facility])     Chemotherapy follow-up examination     History of radiation therapy 2006    Hypertension      Past Surgical History:   Procedure Laterality Date    ANKLE SURGERY Left     NECK SURGERY Right     SHOULDER OPEN ROTATOR CUFF REPAIR Right      Social History     Substance and Sexual Activity   Alcohol Use Yes    Alcohol/week: 4 0 - 6 0 standard drinks    Types: 2 - 3 Cans of beer, 2 - 3 Shots of liquor per week    Comment: socially     Social History     Substance and Sexual Activity   Drug Use Yes    Types: Marijuana     Social History     Tobacco Use   Smoking Status Former Smoker   Smokeless Tobacco Former User     Family History   Problem Relation Age of Onset    Hypertension Mother     Obesity Mother     Diabetes Father     Heart attack Father     Stroke Father     Heart disease Father     Hyperlipidemia Sister     Obesity Sister     Hypertension Sister     Diabetes Sister     Kidney disease Sister     Hyperlipidemia Brother     Hypertension Brother     Diabetes Brother     Prostate cancer Brother        Allergies:  No Known Allergies    Medications (as of START of this encounter):    Outpatient Medications Prior to Visit   Medication Sig Dispense Refill    amoxicillin (AMOXIL) 500 MG tablet Take 500 mg by mouth in the morning      Clindamycin Phos-Benzoyl Perox gel Apply 1 application topically 2 (two) times a day 45 g 1    levothyroxine (Euthyrox) 75 mcg tablet Take 1 tablet (75 mcg total) by mouth daily in the early morning 30 tablet 3    lisinopril (ZESTRIL) 10 mg tablet Take 1 tablet (10 mg total) by mouth daily 30 tablet 2    omeprazole (PriLOSEC) 20 mg delayed release capsule Take 1 capsule by mouth daily      rosuvastatin (CRESTOR) 10 MG tablet Take 1 tablet (10 mg total) by mouth daily 30 tablet 1     No facility-administered medications prior to visit  Vitals:    09/22/22 0903   BP: (!) 180/122   Pulse: 87     Weight (last 2 days)     Date/Time Weight    09/22/22 0903 80 4 (177 2)          General: Nadiya Olivares is a well appearing male, in no acute distress, sitting comfortably  HEENT: moist mucous membranes, EOMI  Neck:  No JVD, supple, trachea midline   Cardiovascular: unremarkable S1, soft S2, 3/6 systolic murmur, regular rate and rhythm  Pulmonary: normal respiratory effort, CTAB   Abdomen: soft and nondistended  Extremities: No lower extremity edema  Warm and well perfused extremities  Neuro: no focal motor deficits, AAOx3 (person, place, time)  Psych: Normal mood and affect, cooperative      Laboratory Studies:    Laboratory studies personally reviewed  Lipid panel 06/28/2022, , , HDL 36,  mg/dL    Cardiac testing:     EKG reviewed personally:   EKG in the office today shows normal sinus rhythm, 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, 87 bpm, marked anterior T-wave abnormalities  Abnormal study  Time Spent:  Total time (face-to-face and non-face-to-face) spent on today's visit was 40 minutes  This includes preparation for the visits (i e  reviewing test results), performance of a medically appropriate history and examination, and orders for medications, tests or other procedures  This time is exclusive of procedures performed and time spent teaching  Vinod Rehman MD    This note was completed in part utilizing [employer] fluency direct voice recognition software  Grammatical errors, random word insertion, spelling mistakes, occasional wrong word or "sound-alike" substitutions and incomplete sentences may be an occasional consequence of the system secondary to software limitations, ambient noise and hardware issues   At the time of dictation, efforts were made to edit, clarify and /or correct errors  Please read the chart carefully and recognize, using context, where substitutions have occurred  If you have any questions or concerns about the context, text or information contained within the body of this dictation, please contact myself, the provider, for further clarification

## 2022-09-22 NOTE — PATIENT INSTRUCTIONS
Schedule stress test     Please call us in 1-2 weeks with about 7 days worth of your blood pressure log from home / work

## 2022-09-22 NOTE — H&P (VIEW-ONLY)
[facility] Cardiology  Office Consultation  Hiram Castro 78 y o  male MRN: [id number]        Chief Complaint    Chief Complaint   Patient presents with    Heart Murmur     New pt  Cardiac Clearance  Referring Provider: HERMELINDA Guzman    Impression & Plan:    1  Primary hypertension  I think there is a significant component of white coat hypertension  He has extraordinarily high blood pressures in the office with very normal blood pressures at home  Presently on lisinopril 10 mg daily  We did check his cuff against our BP readings today and it is consistent  Continue current lisinopril 10 mg daily, patient will call with about 1 week of home blood pressure readings  - Ambulatory Referral to Cardiology  - POCT ECG    2  Hyperlipidemia, unspecified hyperlipidemia type  Has recently started rosuvastatin 10 mg daily  Recheck lipid panel in 1-2 months  - Lipid panel    3  Nonrheumatic aortic valve stenosis  Mild in severity presently  We will continue to monitor  Echocardiogram about every 2 years would suffice for surveillance  4  Abnormal ECG  His EKG has anterior T-wave abnormalities that are pretty significant  There is some LAD territory hypokinesis/akinesis on echocardiography  My concern is that this may represent prior infarct  However, also in the setting of his asymmetric septal hypertrophy there is the possibility this is due to hypertrophic cardiomyopathy  We will start with a nuclear stress test to evaluate for perfusion in this territory   - NM myocardial perfusion spect (rx stress and/or rest); Future    5  Abnormal echocardiogram  - NM myocardial perfusion spect (rx stress and/or rest); Future    6  Other chest pain  - NM myocardial perfusion spect (rx stress and/or rest); Future         We will see Hiram Castro back in 3 months for routine follow-up      HPI: Hiram Castro is a 78y o  year old male with mild aortic stenosis, hypertension, hyperlipidemia, and 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) pending hyperbaric oxygen treatment  He was referred for HTN and a cardiac murmur  His echo on 8/8/22 showed normal LV size and systolic function, LVEF 41%, G1DD, mild MR/TR and mild aortic stenosis, mild aortic root dilation, mild LVH  I personally reviewed and reinterpreted this study  There is moderate to severe septal hypertrophy with thickness of about 1 6-1 7 cm, compared to 1 3 cm in LVPW  There is regional hypokinesis involving the apex, apical septal, apical inferior segments  His home BP log shows yesterday 130s/80s in the morning, 197/142 when he was at his hyperbaric oxygen consultation, and 120/72 when he returned home  He reports that his home BP cuff matched his HBO consultation BP yesterday  His recorded BP yesterday at HBO consultation was 200/140  Presently, his home cuff is recording 183/130  He states that his blood pressure when he checks at work is low, if anything  He reports that he will get chest tightness with exertion, such as when walking up a steep hill, which has been occurring for decades  He had a stress test and was told her had a high blood pressure response to exercise which was the cause of his symptoms  It was around then he was started on antihypertensive medications  He does also state he frequently walks or hikes casually without symptoms  There has been no significant change to the nature of this in decade  He walks hours per day for work without symptoms  No issues this past winter shoveling snow  Review of Systems   Constitutional: Negative for activity change and unexpected weight change  HENT: Negative for facial swelling  Eyes: Negative for visual disturbance  Respiratory: Negative for cough and chest tightness  Cardiovascular: Negative for chest pain  Gastrointestinal: Negative for blood in stool  Musculoskeletal: Negative for myalgias  Skin: Negative for pallor  Allergic/Immunologic: Negative for immunocompromised state  Neurological: Negative for syncope and light-headedness  Psychiatric/Behavioral: Negative for agitation and hallucinations  Past Medical History:   Diagnosis Date    Anxiety     Cancer [facility])     Chemotherapy follow-up examination     History of radiation therapy 2006    Hypertension      Past Surgical History:   Procedure Laterality Date    ANKLE SURGERY Left     NECK SURGERY Right     SHOULDER OPEN ROTATOR CUFF REPAIR Right      Social History     Substance and Sexual Activity   Alcohol Use Yes    Alcohol/week: 4 0 - 6 0 standard drinks    Types: 2 - 3 Cans of beer, 2 - 3 Shots of liquor per week    Comment: socially     Social History     Substance and Sexual Activity   Drug Use Yes    Types: Marijuana     Social History     Tobacco Use   Smoking Status Former Smoker   Smokeless Tobacco Former User     Family History   Problem Relation Age of Onset    Hypertension Mother     Obesity Mother     Diabetes Father     Heart attack Father     Stroke Father     Heart disease Father     Hyperlipidemia Sister     Obesity Sister     Hypertension Sister     Diabetes Sister     Kidney disease Sister     Hyperlipidemia Brother     Hypertension Brother     Diabetes Brother     Prostate cancer Brother        Allergies:  No Known Allergies    Medications (as of START of this encounter):    Outpatient Medications Prior to Visit   Medication Sig Dispense Refill    amoxicillin (AMOXIL) 500 MG tablet Take 500 mg by mouth in the morning      Clindamycin Phos-Benzoyl Perox gel Apply 1 application topically 2 (two) times a day 45 g 1    levothyroxine (Euthyrox) 75 mcg tablet Take 1 tablet (75 mcg total) by mouth daily in the early morning 30 tablet 3    lisinopril (ZESTRIL) 10 mg tablet Take 1 tablet (10 mg total) by mouth daily 30 tablet 2    omeprazole (PriLOSEC) 20 mg delayed release capsule Take 1 capsule by mouth daily      rosuvastatin (CRESTOR) 10 MG tablet Take 1 tablet (10 mg total) by mouth daily 30 tablet 1     No facility-administered medications prior to visit  Vitals:    09/22/22 0903   BP: (!) 180/122   Pulse: 87     Weight (last 2 days)     Date/Time Weight    09/22/22 0903 80 4 (177 2)          General: Abiola Kaiser is a well appearing male, in no acute distress, sitting comfortably  HEENT: moist mucous membranes, EOMI  Neck:  No JVD, supple, trachea midline   Cardiovascular: unremarkable S1, soft S2, 3/6 systolic murmur, regular rate and rhythm  Pulmonary: normal respiratory effort, CTAB   Abdomen: soft and nondistended  Extremities: No lower extremity edema  Warm and well perfused extremities  Neuro: no focal motor deficits, AAOx3 (person, place, time)  Psych: Normal mood and affect, cooperative      Laboratory Studies:    Laboratory studies personally reviewed  Lipid panel 06/28/2022, , , HDL 36,  mg/dL    Cardiac testing:     EKG reviewed personally:   EKG in the office today shows normal sinus rhythm, 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, 87 bpm, marked anterior T-wave abnormalities  Abnormal study  Time Spent:  Total time (face-to-face and non-face-to-face) spent on today's visit was 40 minutes  This includes preparation for the visits (i e  reviewing test results), performance of a medically appropriate history and examination, and orders for medications, tests or other procedures  This time is exclusive of procedures performed and time spent teaching  Adri Moreno MD    This note was completed in part utilizing [employer] fluency direct voice recognition software  Grammatical errors, random word insertion, spelling mistakes, occasional wrong word or "sound-alike" substitutions and incomplete sentences may be an occasional consequence of the system secondary to software limitations, ambient noise and hardware issues   At the time of dictation, efforts were made to edit, clarify and /or correct errors  Please read the chart carefully and recognize, using context, where substitutions have occurred  If you have any questions or concerns about the context, text or information contained within the body of this dictation, please contact myself, the provider, for further clarification

## 2022-09-22 NOTE — TELEPHONE ENCOUNTER
Patient came in and said you wanted lab work done but he never received lab slips  He uses [facility]  Also he is going to do treatment in the hypobaric chamber and they want a chest xray done first   Are you also able to order the chest xray? He will get that done downstairs   I told him you were out of the office and would be returning on 9/23

## 2022-09-23 ENCOUNTER — APPOINTMENT (OUTPATIENT)
Dept: RADIOLOGY | Facility: CLINIC | Age: 67
End: 2022-09-23
Payer: COMMERCIAL

## 2022-09-23 DIAGNOSIS — Z87.891 FORMER SMOKER, STOPPED SMOKING IN DISTANT PAST: ICD-10-CM

## 2022-09-23 PROCEDURE — 71046 X-RAY EXAM CHEST 2 VIEWS: CPT

## 2022-09-23 NOTE — TELEPHONE ENCOUNTER
Please let syeda know I entered the lab for his thyroid and the chest xray was already placed for the hyperbaric treatment by that provider

## 2022-09-28 LAB
CHOLEST SERPL-MCNC: 207 MG/DL
CHOLEST/HDLC SERPL: 6.9 (CALC)
HDLC SERPL-MCNC: 30 MG/DL
LDLC SERPL CALC-MCNC: 157 MG/DL (CALC)
NONHDLC SERPL-MCNC: 177 MG/DL (CALC)
TRIGL SERPL-MCNC: 95 MG/DL

## 2022-09-29 ENCOUNTER — HOSPITAL ENCOUNTER (OUTPATIENT)
Dept: NUCLEAR MEDICINE | Facility: HOSPITAL | Age: 67
End: 2022-09-29
Attending: INTERNAL MEDICINE
Payer: COMMERCIAL

## 2022-09-29 ENCOUNTER — HOSPITAL ENCOUNTER (OUTPATIENT)
Dept: NON INVASIVE DIAGNOSTICS | Facility: HOSPITAL | Age: 67
Discharge: HOME | End: 2022-09-29
Attending: INTERNAL MEDICINE
Payer: COMMERCIAL

## 2022-09-29 DIAGNOSIS — R07.89 OTHER CHEST PAIN: ICD-10-CM

## 2022-09-29 DIAGNOSIS — R93.1 ABNORMAL ECHOCARDIOGRAM: ICD-10-CM

## 2022-09-29 DIAGNOSIS — R94.31 ABNORMAL ECG: ICD-10-CM

## 2022-09-29 LAB
CHEST PAIN STATEMENT: NORMAL
MAX DIASTOLIC BP: 110 MMHG
MAX HEART RATE: 115 BPM
MAX PREDICTED HEART RATE: 153 BPM
MAX. SYSTOLIC BP: 170 MMHG
PROTOCOL NAME: NORMAL
REASON FOR TERMINATION: NORMAL
T4 FREE SERPL-MCNC: 1.2 NG/DL (ref 0.8–1.8)
TARGET HR FORMULA: NORMAL
TEST INDICATION: NORMAL
TIME IN EXERCISE PHASE: NORMAL
TSH SERPL-ACNC: 9.59 MIU/L (ref 0.4–4.5)

## 2022-09-29 PROCEDURE — G1004 CDSM NDSC: HCPCS

## 2022-09-29 PROCEDURE — 78452 HT MUSCLE IMAGE SPECT MULT: CPT

## 2022-09-29 PROCEDURE — 78452 HT MUSCLE IMAGE SPECT MULT: CPT | Performed by: INTERNAL MEDICINE

## 2022-09-29 PROCEDURE — A9502 TC99M TETROFOSMIN: HCPCS

## 2022-09-29 PROCEDURE — 93017 CV STRESS TEST TRACING ONLY: CPT

## 2022-09-29 RX ADMIN — REGADENOSON 0.4 MG: 0.08 INJECTION, SOLUTION INTRAVENOUS at 14:53

## 2022-09-30 ENCOUNTER — TELEPHONE (OUTPATIENT)
Dept: CARDIOLOGY CLINIC | Facility: CLINIC | Age: 67
End: 2022-09-30

## 2022-09-30 DIAGNOSIS — E03.9 ACQUIRED HYPOTHYROIDISM: Primary | ICD-10-CM

## 2022-09-30 LAB
NUC STRESS EJECTION FRACTION: 48 %
SL CV REST NUCLEAR ISOTOPE DOSE: 10.53 MCI
SL CV STRESS NUCLEAR ISOTOPE DOSE: 30.1 MCI
STRESS ANGINA INDEX: 1
STRESS BASELINE HR: 80 BPM
STRESS POST PEAK BP: 170 MMHG
STRESS/REST PERFUSION RATIO: 1.13

## 2022-09-30 PROCEDURE — 93018 CV STRESS TEST I&R ONLY: CPT | Performed by: INTERNAL MEDICINE

## 2022-09-30 PROCEDURE — 93016 CV STRESS TEST SUPVJ ONLY: CPT | Performed by: INTERNAL MEDICINE

## 2022-09-30 RX ORDER — LEVOTHYROXINE SODIUM 0.1 MG/1
100 TABLET ORAL
Qty: 30 TABLET | Refills: 5 | Status: SHIPPED | OUTPATIENT
Start: 2022-09-30

## 2022-10-03 ENCOUNTER — TELEPHONE (OUTPATIENT)
Dept: CARDIOLOGY CLINIC | Facility: CLINIC | Age: 67
End: 2022-10-03

## 2022-10-03 DIAGNOSIS — I35.0 NONRHEUMATIC AORTIC VALVE STENOSIS: Primary | ICD-10-CM

## 2022-10-03 NOTE — TELEPHONE ENCOUNTER
----- Message from Maday Batres MD sent at 10/3/2022  9:31 AM EDT -----  Regarding: Cardiac catheterization      This is a patient of Dylan's who had an abnormal stress nuclear study last week that I read  Since he has been off I called the patient and recommended a cardiac catheterization and he agrees  Can you help set this patient up for cardiac catheterization? I placed the orders    Thank you

## 2022-10-03 NOTE — TELEPHONE ENCOUNTER
Patient scheduled for Left Heart CATH on 10/7/22 at [facility] with Dr Mookie Buenrostro  Patient aware of all general instructions given verbally over the phone  Insurance: [employer]     Please obtain auth       Thank you,  Ita Rose

## 2022-10-04 ENCOUNTER — APPOINTMENT (OUTPATIENT)
Dept: LAB | Facility: CLINIC | Age: 67
End: 2022-10-04
Payer: COMMERCIAL

## 2022-10-04 DIAGNOSIS — I35.0 NODULAR CALCIFIC AORTIC VALVE STENOSIS: Primary | ICD-10-CM

## 2022-10-04 LAB
ALBUMIN SERPL BCP-MCNC: 4.1 G/DL (ref 3.5–5)
ALP SERPL-CCNC: 71 U/L (ref 46–116)
ALT SERPL W P-5'-P-CCNC: 28 U/L (ref 12–78)
ANION GAP SERPL CALCULATED.3IONS-SCNC: 3 MMOL/L (ref 4–13)
AST SERPL W P-5'-P-CCNC: 25 U/L (ref 5–45)
BASOPHILS # BLD AUTO: 0.05 THOUSANDS/ΜL (ref 0–0.1)
BASOPHILS NFR BLD AUTO: 1 % (ref 0–1)
BILIRUB SERPL-MCNC: 0.6 MG/DL (ref 0.2–1)
BUN SERPL-MCNC: 15 MG/DL (ref 5–25)
CALCIUM SERPL-MCNC: 9.8 MG/DL (ref 8.3–10.1)
CHLORIDE SERPL-SCNC: 102 MMOL/L (ref 96–108)
CO2 SERPL-SCNC: 31 MMOL/L (ref 21–32)
CREAT SERPL-MCNC: 0.91 MG/DL (ref 0.6–1.3)
EOSINOPHIL # BLD AUTO: 0.08 THOUSAND/ΜL (ref 0–0.61)
EOSINOPHIL NFR BLD AUTO: 1 % (ref 0–6)
ERYTHROCYTE [DISTWIDTH] IN BLOOD BY AUTOMATED COUNT: 14.9 % (ref 11.6–15.1)
GFR SERPL CREATININE-BSD FRML MDRD: 86 ML/MIN/1.73SQ M
GLUCOSE P FAST SERPL-MCNC: 85 MG/DL (ref 65–99)
HCT VFR BLD AUTO: 42.4 % (ref 36.5–49.3)
HGB BLD-MCNC: 13.5 G/DL (ref 12–17)
IMM GRANULOCYTES # BLD AUTO: 0.03 THOUSAND/UL (ref 0–0.2)
IMM GRANULOCYTES NFR BLD AUTO: 0 % (ref 0–2)
LYMPHOCYTES # BLD AUTO: 2.09 THOUSANDS/ΜL (ref 0.6–4.47)
LYMPHOCYTES NFR BLD AUTO: 24 % (ref 14–44)
MCH RBC QN AUTO: 30.3 PG (ref 26.8–34.3)
MCHC RBC AUTO-ENTMCNC: 31.8 G/DL (ref 31.4–37.4)
MCV RBC AUTO: 95 FL (ref 82–98)
MONOCYTES # BLD AUTO: 0.79 THOUSAND/ΜL (ref 0.17–1.22)
MONOCYTES NFR BLD AUTO: 9 % (ref 4–12)
NEUTROPHILS # BLD AUTO: 5.66 THOUSANDS/ΜL (ref 1.85–7.62)
NEUTS SEG NFR BLD AUTO: 65 % (ref 43–75)
NRBC BLD AUTO-RTO: 0 /100 WBCS
PLATELET # BLD AUTO: 294 THOUSANDS/UL (ref 149–390)
PMV BLD AUTO: 10.6 FL (ref 8.9–12.7)
POTASSIUM SERPL-SCNC: 4.6 MMOL/L (ref 3.5–5.3)
PROT SERPL-MCNC: 7.7 G/DL (ref 6.4–8.4)
RBC # BLD AUTO: 4.46 MILLION/UL (ref 3.88–5.62)
SODIUM SERPL-SCNC: 136 MMOL/L (ref 135–147)
WBC # BLD AUTO: 8.7 THOUSAND/UL (ref 4.31–10.16)

## 2022-10-04 PROCEDURE — 85025 COMPLETE CBC W/AUTO DIFF WBC: CPT

## 2022-10-04 PROCEDURE — 80053 COMPREHEN METABOLIC PANEL: CPT

## 2022-10-04 PROCEDURE — 36415 COLL VENOUS BLD VENIPUNCTURE: CPT

## 2022-10-05 NOTE — TELEPHONE ENCOUNTER
Reviewed  Thank you  Good BP's at home, suggests White Coat Hypertension in office  Patient coming in a couple days for cath  No need to call at this time to follow-up blood pressures

## 2022-10-07 ENCOUNTER — HOSPITAL ENCOUNTER (OUTPATIENT)
Facility: HOSPITAL | Age: 67
Setting detail: OUTPATIENT SURGERY
Discharge: HOME | End: 2022-10-07
Attending: INTERNAL MEDICINE | Admitting: INTERNAL MEDICINE
Payer: COMMERCIAL

## 2022-10-07 VITALS
DIASTOLIC BLOOD PRESSURE: 94 MMHG | TEMPERATURE: 97.9 F | HEART RATE: 74 BPM | HEIGHT: 71 IN | BODY MASS INDEX: 24.51 KG/M2 | WEIGHT: 175.04 LBS | RESPIRATION RATE: 18 BRPM | OXYGEN SATURATION: 98 % | SYSTOLIC BLOOD PRESSURE: 137 MMHG

## 2022-10-07 DIAGNOSIS — I25.119 CORONARY ARTERY DISEASE INVOLVING NATIVE CORONARY ARTERY OF NATIVE HEART WITH ANGINA PECTORIS (HCC): ICD-10-CM

## 2022-10-07 DIAGNOSIS — I42.1 HOCM (HYPERTROPHIC OBSTRUCTIVE CARDIOMYOPATHY) (HCC): Primary | ICD-10-CM

## 2022-10-07 DIAGNOSIS — R94.39 ABNORMAL NUCLEAR STRESS TEST: ICD-10-CM

## 2022-10-07 DIAGNOSIS — I10 PRIMARY HYPERTENSION: ICD-10-CM

## 2022-10-07 LAB
ATRIAL RATE: 91 BPM
P AXIS: 48 DEGREES
PR INTERVAL: 192 MS
QRS AXIS: -51 DEGREES
QRSD INTERVAL: 100 MS
QT INTERVAL: 386 MS
QTC INTERVAL: 456 MS
T WAVE AXIS: 65 DEGREES
VENTRICULAR RATE: 84 BPM

## 2022-10-07 PROCEDURE — 93010 ELECTROCARDIOGRAM REPORT: CPT | Performed by: INTERNAL MEDICINE

## 2022-10-07 PROCEDURE — C1769 GUIDE WIRE: HCPCS | Performed by: INTERNAL MEDICINE

## 2022-10-07 PROCEDURE — 93005 ELECTROCARDIOGRAM TRACING: CPT

## 2022-10-07 PROCEDURE — 99152 MOD SED SAME PHYS/QHP 5/>YRS: CPT | Performed by: INTERNAL MEDICINE

## 2022-10-07 PROCEDURE — NC001 PR NO CHARGE: Performed by: INTERNAL MEDICINE

## 2022-10-07 PROCEDURE — C1894 INTRO/SHEATH, NON-LASER: HCPCS | Performed by: INTERNAL MEDICINE

## 2022-10-07 PROCEDURE — 93454 CORONARY ARTERY ANGIO S&I: CPT | Performed by: INTERNAL MEDICINE

## 2022-10-07 PROCEDURE — 99153 MOD SED SAME PHYS/QHP EA: CPT | Performed by: INTERNAL MEDICINE

## 2022-10-07 PROCEDURE — 93458 L HRT ARTERY/VENTRICLE ANGIO: CPT | Performed by: INTERNAL MEDICINE

## 2022-10-07 RX ORDER — HEPARIN SODIUM 1000 [USP'U]/ML
INJECTION, SOLUTION INTRAVENOUS; SUBCUTANEOUS AS NEEDED
Status: DISCONTINUED | OUTPATIENT
Start: 2022-10-07 | End: 2022-10-07 | Stop reason: HOSPADM

## 2022-10-07 RX ORDER — ASPIRIN 81 MG/1
324 TABLET, CHEWABLE ORAL ONCE
Status: COMPLETED | OUTPATIENT
Start: 2022-10-07 | End: 2022-10-07

## 2022-10-07 RX ORDER — SODIUM CHLORIDE 9 MG/ML
125 INJECTION, SOLUTION INTRAVENOUS CONTINUOUS
Status: DISCONTINUED | OUTPATIENT
Start: 2022-10-07 | End: 2022-10-07 | Stop reason: HOSPADM

## 2022-10-07 RX ORDER — MIDAZOLAM HYDROCHLORIDE 2 MG/2ML
INJECTION, SOLUTION INTRAMUSCULAR; INTRAVENOUS AS NEEDED
Status: DISCONTINUED | OUTPATIENT
Start: 2022-10-07 | End: 2022-10-07 | Stop reason: HOSPADM

## 2022-10-07 RX ORDER — SODIUM CHLORIDE 9 MG/ML
150 INJECTION, SOLUTION INTRAVENOUS CONTINUOUS
Status: DISCONTINUED | OUTPATIENT
Start: 2022-10-07 | End: 2022-10-07 | Stop reason: HOSPADM

## 2022-10-07 RX ORDER — LIDOCAINE HYDROCHLORIDE 10 MG/ML
INJECTION, SOLUTION EPIDURAL; INFILTRATION; INTRACAUDAL; PERINEURAL AS NEEDED
Status: DISCONTINUED | OUTPATIENT
Start: 2022-10-07 | End: 2022-10-07 | Stop reason: HOSPADM

## 2022-10-07 RX ORDER — NITROGLYCERIN 20 MG/100ML
INJECTION INTRAVENOUS AS NEEDED
Status: DISCONTINUED | OUTPATIENT
Start: 2022-10-07 | End: 2022-10-07 | Stop reason: HOSPADM

## 2022-10-07 RX ORDER — VERAPAMIL HCL 2.5 MG/ML
AMPUL (ML) INTRAVENOUS AS NEEDED
Status: DISCONTINUED | OUTPATIENT
Start: 2022-10-07 | End: 2022-10-07 | Stop reason: HOSPADM

## 2022-10-07 RX ORDER — METOPROLOL SUCCINATE 25 MG/1
25 TABLET, EXTENDED RELEASE ORAL DAILY
Qty: 30 TABLET | Refills: 3 | Status: SHIPPED | OUTPATIENT
Start: 2022-10-07

## 2022-10-07 RX ORDER — FENTANYL CITRATE 50 UG/ML
INJECTION, SOLUTION INTRAMUSCULAR; INTRAVENOUS AS NEEDED
Status: DISCONTINUED | OUTPATIENT
Start: 2022-10-07 | End: 2022-10-07 | Stop reason: HOSPADM

## 2022-10-07 RX ADMIN — SODIUM CHLORIDE 125 ML/HR: 0.9 INJECTION, SOLUTION INTRAVENOUS at 07:25

## 2022-10-07 RX ADMIN — ASPIRIN 81 MG CHEWABLE TABLET 324 MG: 81 TABLET CHEWABLE at 07:01

## 2022-10-07 NOTE — DISCHARGE INSTRUCTIONS
SITE CARE  1  Please see the post cardiac catheterization dishcarge instructions  No lifting greater than 10 lbs  or strenuous activity for 48 hrs  2  Remove band aid tomorrow  Shower and wash area (wrist) gently with soap and water- beginning tomorrow  Rinse and pat dry  Apply new water seal band aid  Repeat this process for 5 days  No powders, creams lotions or antibiotic ointments for 5 days  No tub baths, hot tubs/sauna or swimming for 5 days  3  Please call our office ([phone]) if you have any fever, redness, swelling, discharge from your wrist/access site  4  No driving for 1 day       -----------------------------------------------------------------------------------------------------------------------------------------------------------------------------  PROCEDURE INFORMATION    LEFT HEART CATHETERIZATION    WHAT YOU NEED TO KNOW:   A left heart catheterization is a procedure to look at your heart and its arteries  You may need this procedure if you have chest pain, heart disease, or your heart is not working as it should  DISCHARGE INSTRUCTIONS:   Follow up with your healthcare provider as directed:  Write down your questions so you remember to ask them during your visits  Limit activity as directed:   Avoid unnecessary stair climbing for 48 hours, if a catheter was put in your groin  Do not place pressure on your arm, hand, or wrist, if the catheter was placed in your wrist  Avoid pushing, pulling, or heavy lifting with that arm  If you need to cough, support the area where the catheter was inserted with your hand  Ask your healthcare provider how long you need to limit movement and avoid certain activities  You may feel like resting more after your procedure  Slowly start to do more each day  Rest when you feel it is needed  Drink liquids as directed:  Liquids help flush the dye used for your procedure out of your body   Ask your healthcare provider how much liquid to drink each day, and which liquids to drink  Some foods, such as soup and fruit, also provide liquid  Wound care:  Ask your healthcare provider about how to care for your incision wound  Ask when you can get into a tub, shower, or pool  Contact your healthcare provider if:   You have a fever  The skin around your wound is red, swollen, or has pus coming from it  You have trouble breathing, or your skin is itchy, swollen, or has a rash  You have questions or concerns about your condition or care  Seek care immediately or call 911 if: The area where the catheter was placed is swollen and filled with blood or is bleeding  The leg or arm used for the procedure becomes numb or turns white or blue  You feel lightheaded, short of breath, and have chest pain  You cough up blood  You have any of the following signs of a heart attack:      Squeezing, pressure, or pain in your chest that lasts longer than 5 minutes or returns    Discomfort or pain in your back, neck, jaw, stomach, or arm     Trouble breathing    Nausea or vomiting    Lightheadedness or a sudden cold sweat, especially with chest pain or trouble breathing    Your arm or leg feels warm, tender, and painful  It may look swollen and red  You have any of the following signs of a stroke:     Part of your face droops or is numb    Weakness in an arm or leg    Confusion or difficulty speaking    Dizziness, a severe headache, or vision loss  © 2017 [facility] Information is for End User's use only and may not be sold, redistributed or otherwise used for commercial purposes  All illustrations and images included in CareNotes® are the copyrighted property of A D A M , Inc  or [employer]  The above information is an  only  It is not intended as medical advice for individual conditions or treatments   Talk to your doctor, nurse or pharmacist before following any medical regimen to see if it is safe and effective for you

## 2022-10-07 NOTE — PROGRESS NOTES
Cardiac cath performed with the R radial artery  95 % mid LAD lesion  R to L collaterals      AV gradient on pull back - 30 mmHg    LVOT gradient at rest from VIKTORIA - 45 mmHg                               Post PVC -   115 mmHg    Needs referral to Dr Edy Dimas for HOCM

## 2022-10-07 NOTE — INTERVAL H&P NOTE
Update: (This section must be completed if the H&P was completed greater than 24 hrs to procedure or admission)    H&P reviewed  After examining the patient, I find no changed to the H&P since it had been written  Patient re-evaluated   Accept as history and physical     Simeon Pastor/October 7, 2022/8:21 AM

## 2022-10-14 ENCOUNTER — OFFICE VISIT (OUTPATIENT)
Dept: FAMILY MEDICINE CLINIC | Facility: CLINIC | Age: 67
End: 2022-10-14
Payer: COMMERCIAL

## 2022-10-14 VITALS
DIASTOLIC BLOOD PRESSURE: 90 MMHG | HEIGHT: 71 IN | SYSTOLIC BLOOD PRESSURE: 140 MMHG | OXYGEN SATURATION: 99 % | TEMPERATURE: 96.5 F | RESPIRATION RATE: 18 BRPM | WEIGHT: 179.8 LBS | HEART RATE: 71 BPM | BODY MASS INDEX: 25.17 KG/M2

## 2022-10-14 DIAGNOSIS — E78.5 HYPERLIPIDEMIA, UNSPECIFIED HYPERLIPIDEMIA TYPE: ICD-10-CM

## 2022-10-14 DIAGNOSIS — Z23 NEED FOR INFLUENZA VACCINATION: Primary | ICD-10-CM

## 2022-10-14 DIAGNOSIS — H61.23 BILATERAL IMPACTED CERUMEN: ICD-10-CM

## 2022-10-14 DIAGNOSIS — L98.9 SKIN LESION: ICD-10-CM

## 2022-10-14 DIAGNOSIS — I42.1 HOCM (HYPERTROPHIC OBSTRUCTIVE CARDIOMYOPATHY) (HCC): ICD-10-CM

## 2022-10-14 PROCEDURE — 69209 REMOVE IMPACTED EAR WAX UNI: CPT | Performed by: NURSE PRACTITIONER

## 2022-10-14 PROCEDURE — 99213 OFFICE O/P EST LOW 20 MIN: CPT | Performed by: NURSE PRACTITIONER

## 2022-10-14 PROCEDURE — 90662 IIV NO PRSV INCREASED AG IM: CPT | Performed by: NURSE PRACTITIONER

## 2022-10-14 PROCEDURE — 90471 IMMUNIZATION ADMIN: CPT | Performed by: NURSE PRACTITIONER

## 2022-10-14 RX ORDER — ROSUVASTATIN CALCIUM 10 MG/1
10 TABLET, COATED ORAL DAILY
Qty: 90 TABLET | Refills: 1 | Status: SHIPPED | OUTPATIENT
Start: 2022-10-14

## 2022-10-14 RX ORDER — CHLORHEXIDINE GLUCONATE 0.12 MG/ML
RINSE ORAL
COMMUNITY
Start: 2022-09-28

## 2022-10-14 NOTE — PROGRESS NOTES
Assessment/Plan   Problem List Items Addressed This Visit        Cardiovascular and Mediastinum    HOCM (hypertrophic obstructive cardiomyopathy) ([facility] [address] )     Sees cardiology - new medications initialed-will continue to monitor            Other    Hyperlipidemia    Relevant Medications    rosuvastatin (CRESTOR) 10 MG tablet      Other Visit Diagnoses     Need for influenza vaccination    -  Primary    Relevant Orders    influenza vaccine, high-dose, PF 0 7 mL (FLUZONE HIGH-DOSE) (Completed)    Skin lesion        Relevant Orders    Ambulatory Referral to Dermatology        Ear cerumen removal    Date/Time: 10/14/2022 9:55 AM  Performed by: HERMELINDA Herman  Authorized by: HERMELINDA Herman   Universal Protocol:  Risks and benefits: risks, benefits and alternatives were discussed  Consent given by: patient  Timeout called at: 10/14/2022 9:56 AM   Patient understanding: patient states understanding of the procedure being performed      Patient location:  Clinic  Indications / Diagnosis:  Cerumen impaction - hearing loss  Procedure details:     Local anesthetic:  None    Location:  L ear and R ear    Procedure type: irrigation only    Post-procedure details:     Complication:  None    Hearing quality:  Normal    Patient tolerance of procedure: Tolerated well, no immediate complications              Chief Complaint   Patient presents with   • Cerumen Impaction     Ear lavage         Subjective   Patient ID: Shawna Cortez is a 78 y o  male  Vitals:    10/14/22 0926   BP: 140/90   Pulse: 71   Resp: 18   Temp: (!) 96 5 °F (35 8 °C)   SpO2: 99%     Ear Fullness   There is pain in both ears  This is a chronic problem  The current episode started more than 1 month ago  The problem occurs constantly  The problem has been unchanged  There has been no fever  The patient is experiencing no pain  Associated symptoms include hearing loss  Treatments tried: peroxide  The treatment provided mild relief   His past medical history is significant for hearing loss  The following portions of the patient's history were reviewed and updated as appropriate: allergies, current medications, past medical history, past social history, past surgical history and problem list     Review of Systems   Constitutional: Negative  HENT: Positive for hearing loss  Eyes: Negative  Respiratory: Negative  Cardiovascular: Negative  Gastrointestinal: Negative  Endocrine: Negative  Genitourinary: Negative  Musculoskeletal: Negative  Skin: Positive for wound  Allergic/Immunologic: Negative  Neurological: Negative  Hematological: Negative  Psychiatric/Behavioral: Negative  Objective     Physical Exam  HENT:      Head:        Right Ear: No decreased hearing noted  No middle ear effusion  There is impacted cerumen (remnoved without difficulty via irrigation -bi;ateral ear canals)  Tympanic membrane is not perforated, erythematous or bulging  Tympanic membrane has normal mobility  Left Ear: No decreased hearing noted  No middle ear effusion  There is impacted cerumen  Tympanic membrane is not perforated, erythematous or bulging  Tympanic membrane has normal mobility  Nose: Nose normal  No congestion  Eyes:      General:         Right eye: No discharge  Left eye: No discharge  Extraocular Movements: Extraocular movements intact  Conjunctiva/sclera: Conjunctivae normal    Cardiovascular:      Rate and Rhythm: Normal rate and regular rhythm  Pulses: Normal pulses  Heart sounds: Normal heart sounds  No murmur heard  Pulmonary:      Effort: Pulmonary effort is normal       Breath sounds: Normal breath sounds  Musculoskeletal:         General: Normal range of motion  Cervical back: Normal range of motion  Skin:     General: Skin is warm and dry  Neurological:      Mental Status: He is oriented to person, place, and time     Psychiatric:         Mood and Affect: Mood normal          Behavior: Behavior normal          Thought Content:  Thought content normal          Judgment: Judgment normal

## 2022-11-02 ENCOUNTER — OFFICE VISIT (OUTPATIENT)
Dept: CARDIOLOGY CLINIC | Facility: CLINIC | Age: 67
End: 2022-11-02

## 2022-11-02 VITALS
BODY MASS INDEX: 24.53 KG/M2 | HEIGHT: 71 IN | DIASTOLIC BLOOD PRESSURE: 118 MMHG | WEIGHT: 175.2 LBS | HEART RATE: 78 BPM | SYSTOLIC BLOOD PRESSURE: 182 MMHG

## 2022-11-02 DIAGNOSIS — I10 WHITE COAT SYNDROME WITH DIAGNOSIS OF HYPERTENSION: ICD-10-CM

## 2022-11-02 DIAGNOSIS — E78.5 HYPERLIPIDEMIA, UNSPECIFIED HYPERLIPIDEMIA TYPE: ICD-10-CM

## 2022-11-02 DIAGNOSIS — I42.1 HOCM (HYPERTROPHIC OBSTRUCTIVE CARDIOMYOPATHY) (HCC): Primary | ICD-10-CM

## 2022-11-02 DIAGNOSIS — I25.119 CORONARY ARTERY DISEASE INVOLVING NATIVE CORONARY ARTERY OF NATIVE HEART WITH ANGINA PECTORIS (HCC): ICD-10-CM

## 2022-11-02 DIAGNOSIS — I35.0 NONRHEUMATIC AORTIC VALVE STENOSIS: ICD-10-CM

## 2022-11-02 RX ORDER — ROSUVASTATIN CALCIUM 20 MG/1
20 TABLET, COATED ORAL DAILY
Qty: 30 TABLET | Refills: 11 | Status: SHIPPED | OUTPATIENT
Start: 2022-11-02

## 2022-11-02 RX ORDER — METOPROLOL SUCCINATE 50 MG/1
50 TABLET, EXTENDED RELEASE ORAL DAILY
Qty: 30 TABLET | Refills: 11 | Status: SHIPPED | OUTPATIENT
Start: 2022-11-02

## 2022-11-02 RX ORDER — ASPIRIN 81 MG/1
81 TABLET ORAL DAILY
Qty: 30 TABLET | Refills: 11 | Status: SHIPPED | OUTPATIENT
Start: 2022-11-02

## 2022-11-02 NOTE — PROGRESS NOTES
Cardiology Outpatient Follow-Up Note - Bethel Leon 78 y o  male MRN: [id number]      Assessment/Plan:    1  HOCM (hypertrophic obstructive cardiomyopathy) ([facility] )  With NYHA I - II symptoms  Euvolemic  Symptoms improved on metoprolol XL  Wall Thickness: IVS 1 7 cm by echo 8/8/22 (my remeasurement)  LVOT Obstruction: Resting gradient 45 mmHg by cardiac cath; post PVC gradient 125 mmHg by cath 10/7/22  SCD Risk: No fx of SCD, no personal hx of syncope  Holter ordered  MRI: Ordered, pending  Current medical therapy: Metoprolol XL 25 mg daily; increase to 50 mg daily    - metoprolol succinate (TOPROL-XL) 50 mg 24 hr tablet; Take 1 tablet (50 mg total) by mouth daily  Dispense: 30 tablet; Refill: 11  - MRI cardiac  w wo contrast; Future  - Holter monitor; Future    2  White coat syndrome with diagnosis of hypertension  Well controlled BP at home, consistently elevated in the healthcare setting  Continue metoprolol XL as above, will not add another agent at this time  - metoprolol succinate (TOPROL-XL) 50 mg 24 hr tablet; Take 1 tablet (50 mg total) by mouth daily  Dispense: 30 tablet; Refill: 11    3  Nonrheumatic aortic valve stenosis  Will continue to periodically monitor, last mild by echo 8/2/22    4  Hyperlipidemia, unspecified hyperlipidemia type  Increase crestor to 20 mg daily  - rosuvastatin (CRESTOR) 20 MG tablet; Take 1 tablet (20 mg total) by mouth daily  Dispense: 30 tablet; Refill: 11    5  Coronary artery disease involving native coronary artery of native heart with angina pectoris ([facility] )  Near chronic total occlusion of mid LAD by 10/07/2022 cardiac catheterization  Reversible perfusion abnormality by NM SPECT 09/29/2022  Presently without angina on medical therapy  Increase metoprolol XL to 50 mg daily  Increase Crestor to 20 mg daily  - metoprolol succinate (TOPROL-XL) 50 mg 24 hr tablet; Take 1 tablet (50 mg total) by mouth daily  Dispense: 30 tablet;  Refill: 11  - aspirin (ECOTRIN LOW STRENGTH) 81 mg EC tablet; Take 1 tablet (81 mg total) by mouth daily  Dispense: 30 tablet; Refill: 11    We discussed screening of first-degree relatives by EKG and echocardiogram     Cardiac catheterization attending requested consultation with Dr Cherry Ro scheduled for 2022  We will see Jaxson Reyes back after his consultation with Dr Cherry Ro    Subjective:     HPI: Jaxson Reyes is a 78y o  year old male with aortic stenosis (mild), hyperlipidemia, hypertension and white coat hypertension, and recently diagnosed CAD (near  of mid LAD on 10/07/2022 cardiac catheterization), hypertrophic obstructive cardiomyopathy who presents today for follow-up  The patient was initially seen by me on 2022 for evaluation of a heart murmur, with complaints of exertional chest tightness  My review of his echocardiogram performed on 2022 showed moderate to severe asymmetric septal hypertrophy 1 6-1 7 cm and LAD territory regional wall motion abnormalities  He was sent for stress testing  Nuclear stress test on 2022 showed reversible perfusion defect in the mid to apical anterior and anterior septal segments  He was subsequently sent for cardiac catheterization  Cardiac catheterization on 10/07/2022 showed a 99% mid tubular LAD lesion with JUAN 1-2 flow, concern for   There was significant LVOT obstructive gradient, about 45 mmHg at rest but 125 mmHg post PVC  He was started on metoprolol XL 25 mg daily, no PCI performed  His grandmother  at 80 from CHF "with an enlarged heart"  Otherwise there's no significant family history of heart failure  There is no known family history of sudden death  Lately he feels that his chest tightness has improved  He has been walking 3-4 flights of steps at a time for work without symptoms  He has had no symptoms on his walks, including up hills  His home BP this morning was 129/88  He has averaged 110-120 / 70-80 at work   His home averages at 120-130 / 80-90  ROS:  Review of Systems:  Review of Systems   Constitutional: Negative for activity change and unexpected weight change  HENT: Negative for facial swelling  Eyes: Negative for visual disturbance  Respiratory: Negative for cough and chest tightness  Cardiovascular: Negative for chest pain  Gastrointestinal: Negative for blood in stool  Musculoskeletal: Negative for myalgias  Skin: Negative for pallor  Allergic/Immunologic: Negative for immunocompromised state  Neurological: Negative for syncope and light-headedness  Psychiatric/Behavioral: Negative for agitation and hallucinations  Objective:     Vitals:   Vitals:    11/02/22 0913   Weight: 79 5 kg (175 lb 3 2 oz)   Height: 5' 11" (1 803 m)    Body surface area is 1 99 meters squared  Wt Readings from Last 3 Encounters:   11/02/22 79 5 kg (175 lb 3 2 oz)   10/14/22 81 6 kg (179 lb 12 8 oz)   10/07/22 79 4 kg (175 lb 0 7 oz)       Physical Exam:  General: Agustin Ambriz is a well appearing male, in no acute distress, sitting comfortably  HEENT: moist mucous membranes, EOMI  Neck:  No JVD, supple, trachea midline  Cardiovascular: unremarkable S1/S2, regular rate and rhythm, 3/6 SM LUSB/RUSB  Pulmonary: normal respiratory effort, CTAB  Abdomen: soft and nondistended  Extremities: No lower extremity edema  Warm and well perfused extremities  Neuro: no focal motor deficits, AAOx3 (person, place, time)  Psych: Normal mood and affect, cooperative      Medications (at the START of this encounter):   Outpatient Medications Prior to Visit   Medication Sig Dispense Refill   • chlorhexidine (PERIDEX) 0 12 % solution RINSE MOUTH WITH 1/2OZ FOR 30-60 SECONDS TWICE DAILY     • levothyroxine (Euthyrox) 100 mcg tablet Take 1 tablet (100 mcg total) by mouth daily in the early morning 30 tablet 5   • metoprolol succinate (TOPROL-XL) 25 mg 24 hr tablet Take 1 tablet (25 mg total) by mouth daily 30 tablet 3   • omeprazole (PriLOSEC) 20 mg delayed release capsule Take 1 capsule by mouth daily     • rosuvastatin (CRESTOR) 10 MG tablet Take 1 tablet (10 mg total) by mouth daily 90 tablet 1   • Clindamycin Phos-Benzoyl Perox gel Apply 1 application topically 2 (two) times a day (Patient not taking: Reported on 11/2/2022) 45 g 1     No facility-administered medications prior to visit  Labs & Results:        EKG personally reviewed:  n/a        Time Spent:  Total time (face-to-face and non-face-to-face) spent on today's visit was 25 minutes  This includes preparation for the visits (i e  reviewing test results), performance of a medically appropriate history and examination, and orders for medications, tests or other procedures  This time is exclusive of procedures performed and time spent teaching  This note was completed in part utilizing M*QPD direct voice recognition software  Grammatical errors, random word insertion, spelling mistakes, occasional wrong word or "sound-alike" substitutions and incomplete sentences may be an occasional consequence of the system secondary to software limitations, ambient noise and hardware issues  At the time of dictation, efforts were made to edit, clarify and /or correct errors  Please read the chart carefully and recognize, using context, where substitutions have occurred  If you have any questions or concerns about the context, text or information contained within the body of this dictation, please contact myself, the provider, for further clarification

## 2022-11-02 NOTE — PATIENT INSTRUCTIONS
Your first degree relatives should have an EKG and ultrasound of their heart (echocardiogram) to screen for hypertrophic cardiomyopathy

## 2022-11-04 ENCOUNTER — HOSPITAL ENCOUNTER (OUTPATIENT)
Dept: NON INVASIVE DIAGNOSTICS | Age: 67
Discharge: HOME | End: 2022-11-04

## 2022-11-04 DIAGNOSIS — I42.1 HOCM (HYPERTROPHIC OBSTRUCTIVE CARDIOMYOPATHY) (HCC): ICD-10-CM

## 2022-11-10 ENCOUNTER — TELEPHONE (OUTPATIENT)
Dept: FAMILY MEDICINE CLINIC | Facility: CLINIC | Age: 67
End: 2022-11-10

## 2022-11-10 NOTE — TELEPHONE ENCOUNTER
Katherin Woods wanted you to please place lab orders for [facility]  He would like to go on Monday Am He would like us to call him when placed    Thank you

## 2022-11-11 DIAGNOSIS — E03.9 ACQUIRED HYPOTHYROIDISM: Primary | ICD-10-CM

## 2022-11-11 NOTE — TELEPHONE ENCOUNTER
"              After Visit Summary   1/19/2018    Feng Dunn    MRN: [id number]           Patient Information     Date Of Birth          2005        Visit Information        Provider Department      1/19/2018 3:00 PM Viviana Madrigal MD [facility]        Today's Diagnoses     Viral illness    -  1    Throat pain        Childhood obesity, BMI  percentile          Care Instructions    Follow up in 1 week if not improved  Follow up in 6 months for weight visit and labs          Follow-ups after your visit        Who to contact     If you have questions or need follow up information about today's clinic visit or your schedule please contact [facility] directly at [phone].  Normal or non-critical lab and imaging results will be communicated to you by [employer], letter or phone within 4 business days after the clinic has received the results. If you do not hear from us within 7 days, please contact the clinic through [employer] or phone. If you have a critical or abnormal lab result, we will notify you by phone as soon as possible.  Submit refill requests through [email or web address] or call your pharmacy and they will forward the refill request to us. Please allow 3 business days for your refill to be completed.          Additional Information About Your Visit        MyChart Information     [email or web address] lets you send messages to your doctor, view your test results, renew your prescriptions, schedule appointments and more. To sign up, go to [email or web address], contact your [place] clinic or call [phone] during business hours.            Care EveryWhere ID     This is your Care EveryWhere ID. This could be used by other organizations to access your [place] medical records  Opted out of Care Everywhere exchange        Your Vitals Were     Pulse Temperature Height Pulse Oximetry BMI (Body Mass Index)       99 97.2  F (36.2  C) (Oral) 5' 4.75\" (1.645 m) 96% 36.51 kg/m2 " Orders placed        Blood Pressure from Last 3 Encounters:   01/19/18 139/79   10/19/17 120/70   04/28/16 124/76    Weight from Last 3 Encounters:   01/19/18 217 lb 11.2 oz (98.7 kg) (>99 %)*   10/19/17 216 lb (98 kg) (>99 %)*   04/28/16 166 lb 12.8 oz (75.7 kg) (>99 %)*     * Growth percentiles are based on [facility] 2-20 Years data.              We Performed the Following     Beta strep group A culture     Strep, Rapid Screen          Today's Medication Changes          These changes are accurate as of: 1/19/18  3:19 PM.  If you have any questions, ask your nurse or doctor.               Stop taking these medicines if you haven't already. Please contact your care team if you have questions.     guaiFENesin-dextromethorphan 100-10 MG/5ML syrup   Commonly known as:  ROBITUSSIN DM   Stopped by:  Viviana Madrigal MD           pseudoePHEDrine 30 MG tablet   Commonly known as:  SUDAFED   Stopped by:  Viviana Madrigal MD                    Primary Care Provider Office Phone # Fax #    Deysi Brenda Emelyn Martin [phone]       [address] [place] [address]        Equal Access to Services     CARSON BURR AH: Hadii luis ku hadasho Soomaali, waaxda luqadaha, qaybta kaalmada adeegyada, waxay rubiin haypetern isael love. So [facility] [phone].    ATENCIÓN: Si habla español, tiene a landaverde disposición servicios gratuitos de asistencia lingüística. Llame al [phone].    We comply with applicable federal civil rights laws and [place] laws. We do not discriminate on the basis of race, color, national origin, age, disability, sex, sexual orientation, or gender identity.            Thank you!     Thank you for choosing [facility]  for your care. Our goal is always to provide you with excellent care. Hearing back from our patients is one way we can continue to improve our services. Please take a few minutes to complete the written survey that you may receive in the mail after your visit with us. Thank  you!             Your Updated Medication List - Protect others around you: Learn how to safely use, store and throw away your medicines at www.disposemymeds.org.      Notice  As of 1/19/2018  3:19 PM    You have not been prescribed any medications.

## 2022-11-14 ENCOUNTER — CLINICAL SUPPORT (OUTPATIENT)
Dept: FAMILY MEDICINE CLINIC | Facility: CLINIC | Age: 67
End: 2022-11-14

## 2022-11-14 DIAGNOSIS — E03.9 ACQUIRED HYPOTHYROIDISM: Primary | ICD-10-CM

## 2022-11-14 DIAGNOSIS — Z23 IMMUNIZATION DUE: Primary | ICD-10-CM

## 2022-11-14 LAB
T4 FREE SERPL-MCNC: 1.4 NG/DL (ref 0.8–1.8)
TSH SERPL-ACNC: 9.23 MIU/L (ref 0.4–4.5)

## 2022-11-14 RX ORDER — LEVOTHYROXINE SODIUM 0.12 MG/1
125 TABLET ORAL
Qty: 90 TABLET | Refills: 0 | Status: SHIPPED | OUTPATIENT
Start: 2022-11-14

## 2022-11-18 ENCOUNTER — HOSPITAL ENCOUNTER (OUTPATIENT)
Dept: MRI IMAGING | Facility: HOSPITAL | Age: 67
End: 2022-11-18
Attending: INTERNAL MEDICINE

## 2022-11-18 DIAGNOSIS — I42.1 HOCM (HYPERTROPHIC OBSTRUCTIVE CARDIOMYOPATHY) (HCC): ICD-10-CM

## 2022-11-18 RX ADMIN — GADOBUTROL 15 ML: 604.72 INJECTION INTRAVENOUS at 12:23

## 2022-11-23 ENCOUNTER — TELEPHONE (OUTPATIENT)
Dept: DERMATOLOGY | Facility: CLINIC | Age: 67
End: 2022-11-23

## 2022-11-23 NOTE — TELEPHONE ENCOUNTER
Called pt to see if needing dermatology services via referral; pt was waiting on wound care treatment and was going to cb, as not heard back, called to check if now wants/needs to schedule apt

## 2022-12-05 ENCOUNTER — TELEPHONE (OUTPATIENT)
Dept: CARDIOLOGY CLINIC | Facility: CLINIC | Age: 67
End: 2022-12-05

## 2022-12-13 ENCOUNTER — PREP FOR PROCEDURE (OUTPATIENT)
Dept: CARDIOLOGY CLINIC | Facility: CLINIC | Age: 67
End: 2022-12-13

## 2022-12-13 ENCOUNTER — TELEPHONE (OUTPATIENT)
Dept: CARDIOLOGY CLINIC | Facility: CLINIC | Age: 67
End: 2022-12-13

## 2022-12-13 ENCOUNTER — TELEPHONE (OUTPATIENT)
Dept: DERMATOLOGY | Facility: CLINIC | Age: 67
End: 2022-12-13

## 2022-12-13 DIAGNOSIS — E03.9 HYPOTHYROIDISM, UNSPECIFIED TYPE: Primary | ICD-10-CM

## 2022-12-13 DIAGNOSIS — I25.10 LAD STENOSIS: Primary | ICD-10-CM

## 2022-12-13 DIAGNOSIS — I35.0 NONRHEUMATIC AORTIC VALVE STENOSIS: Primary | ICD-10-CM

## 2022-12-13 RX ORDER — LEVOTHYROXINE SODIUM 0.15 MG/1
150 TABLET ORAL
Qty: 90 TABLET | Refills: 1 | Status: SHIPPED | OUTPATIENT
Start: 2022-12-13
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
## 2022-12-16 ENCOUNTER — PATIENT MESSAGE (OUTPATIENT)
Dept: CARDIOLOGY CLINIC | Facility: CLINIC | Age: 67
End: 2022-12-16

## 2022-12-16 NOTE — TELEPHONE ENCOUNTER
---- Message -----  From: Krys Harper MA  Sent: 12/16/2022   1:53 PM EST  To: Kathi Rea, Brandt Blackwell, *    AIM approved order # [id number] for [id number] @ [facility]    Used ROGER  Valid dates:  12/16/22-2/13/23

## 2022-12-16 NOTE — TELEPHONE ENCOUNTER
Patient scheduled for PCI on 12/28/22 at [facility] with Dr Chrissy Shaffer  Instructions sent to patient through [address]  Patient aware of all general instructions  Medication holds:   N/A    Labs to be done by 12/20/22: BMP / CMP / CBC (fasting)     Insurance: BLUE CROSS      Please obtain auth       Thank you,  Arla Kussmaul

## 2022-12-17 ENCOUNTER — APPOINTMENT (OUTPATIENT)
Dept: LAB | Facility: HOSPITAL | Age: 67
End: 2022-12-17
Attending: INTERNAL MEDICINE

## 2022-12-17 DIAGNOSIS — I35.0 NONRHEUMATIC AORTIC VALVE STENOSIS: Primary | ICD-10-CM

## 2022-12-17 LAB
ALBUMIN SERPL BCP-MCNC: 3.7 G/DL (ref 3.5–5)
ALP SERPL-CCNC: 71 U/L (ref 46–116)
ALT SERPL W P-5'-P-CCNC: 29 U/L (ref 12–78)
ANION GAP SERPL CALCULATED.3IONS-SCNC: 7 MMOL/L (ref 4–13)
AST SERPL W P-5'-P-CCNC: 24 U/L (ref 5–45)
BASOPHILS # BLD AUTO: 0.05 THOUSANDS/ÂΜL (ref 0–0.1)
BASOPHILS NFR BLD AUTO: 1 % (ref 0–1)
BILIRUB SERPL-MCNC: 0.52 MG/DL (ref 0.2–1)
BUN SERPL-MCNC: 14 MG/DL (ref 5–25)
CALCIUM SERPL-MCNC: 9.6 MG/DL (ref 8.3–10.1)
CHLORIDE SERPL-SCNC: 106 MMOL/L (ref 96–108)
CO2 SERPL-SCNC: 30 MMOL/L (ref 21–32)
CREAT SERPL-MCNC: 0.78 MG/DL (ref 0.6–1.3)
EOSINOPHIL # BLD AUTO: 0.09 THOUSAND/ÂΜL (ref 0–0.61)
EOSINOPHIL NFR BLD AUTO: 1 % (ref 0–6)
ERYTHROCYTE [DISTWIDTH] IN BLOOD BY AUTOMATED COUNT: 14.5 % (ref 11.6–15.1)
GFR SERPL CREATININE-BSD FRML MDRD: 92 ML/MIN/1.73SQ M
GLUCOSE P FAST SERPL-MCNC: 97 MG/DL (ref 65–99)
HCT VFR BLD AUTO: 44.8 % (ref 36.5–49.3)
HGB BLD-MCNC: 13.8 G/DL (ref 12–17)
IMM GRANULOCYTES # BLD AUTO: 0.03 THOUSAND/UL (ref 0–0.2)
IMM GRANULOCYTES NFR BLD AUTO: 0 % (ref 0–2)
LYMPHOCYTES # BLD AUTO: 1.7 THOUSANDS/ÂΜL (ref 0.6–4.47)
LYMPHOCYTES NFR BLD AUTO: 21 % (ref 14–44)
MCH RBC QN AUTO: 29.7 PG (ref 26.8–34.3)
MCHC RBC AUTO-ENTMCNC: 30.8 G/DL (ref 31.4–37.4)
MCV RBC AUTO: 96 FL (ref 82–98)
MONOCYTES # BLD AUTO: 0.62 THOUSAND/ÂΜL (ref 0.17–1.22)
MONOCYTES NFR BLD AUTO: 8 % (ref 4–12)
NEUTROPHILS # BLD AUTO: 5.44 THOUSANDS/ÂΜL (ref 1.85–7.62)
NEUTS SEG NFR BLD AUTO: 69 % (ref 43–75)
NRBC BLD AUTO-RTO: 0 /100 WBCS
PLATELET # BLD AUTO: 285 THOUSANDS/UL (ref 149–390)
PMV BLD AUTO: 9.9 FL (ref 8.9–12.7)
POTASSIUM SERPL-SCNC: 4 MMOL/L (ref 3.5–5.3)
PROT SERPL-MCNC: 7.4 G/DL (ref 6.4–8.4)
RBC # BLD AUTO: 4.65 MILLION/UL (ref 3.88–5.62)
SODIUM SERPL-SCNC: 143 MMOL/L (ref 135–147)
TSH SERPL DL<=0.05 MIU/L-ACNC: 3.05 UIU/ML (ref 0.45–4.5)
WBC # BLD AUTO: 7.93 THOUSAND/UL (ref 4.31–10.16)

## 2022-12-28 ENCOUNTER — HOSPITAL ENCOUNTER (OUTPATIENT)
Facility: HOSPITAL | Age: 67
Setting detail: OUTPATIENT SURGERY
Discharge: HOME | End: 2022-12-29
Attending: INTERNAL MEDICINE | Admitting: INTERNAL MEDICINE

## 2022-12-28 DIAGNOSIS — Z95.5 STATUS POST INSERTION OF DRUG ELUTING CORONARY ARTERY STENT: Primary | ICD-10-CM

## 2022-12-28 DIAGNOSIS — E78.5 HYPERLIPIDEMIA, UNSPECIFIED HYPERLIPIDEMIA TYPE: ICD-10-CM

## 2022-12-28 DIAGNOSIS — I25.10 LAD STENOSIS: ICD-10-CM

## 2022-12-28 LAB
ANION GAP SERPL CALCULATED.3IONS-SCNC: 4 MMOL/L (ref 4–13)
ATRIAL RATE: 63 BPM
ATRIAL RATE: 71 BPM
BUN SERPL-MCNC: 11 MG/DL (ref 5–25)
CALCIUM SERPL-MCNC: 9.3 MG/DL (ref 8.3–10.1)
CHLORIDE SERPL-SCNC: 104 MMOL/L (ref 96–108)
CO2 SERPL-SCNC: 30 MMOL/L (ref 21–32)
CREAT SERPL-MCNC: 0.72 MG/DL (ref 0.6–1.3)
GFR SERPL CREATININE-BSD FRML MDRD: 95 ML/MIN/1.73SQ M
GLUCOSE P FAST SERPL-MCNC: 109 MG/DL (ref 65–99)
GLUCOSE SERPL-MCNC: 109 MG/DL (ref 65–140)
KCT BLD-ACNC: 239 SEC (ref 89–137)
KCT BLD-ACNC: 334 SEC (ref 89–137)
P AXIS: 35 DEGREES
P AXIS: 37 DEGREES
POTASSIUM SERPL-SCNC: 3.8 MMOL/L (ref 3.5–5.3)
PR INTERVAL: 186 MS
PR INTERVAL: 194 MS
QRS AXIS: -46 DEGREES
QRS AXIS: -47 DEGREES
QRSD INTERVAL: 104 MS
QRSD INTERVAL: 94 MS
QT INTERVAL: 384 MS
QT INTERVAL: 396 MS
QTC INTERVAL: 405 MS
QTC INTERVAL: 417 MS
SODIUM SERPL-SCNC: 138 MMOL/L (ref 135–147)
SPECIMEN SOURCE: ABNORMAL
SPECIMEN SOURCE: ABNORMAL
T WAVE AXIS: 71 DEGREES
T WAVE AXIS: 86 DEGREES
VENTRICULAR RATE: 63 BPM
VENTRICULAR RATE: 71 BPM

## 2022-12-28 DEVICE — XIENCE SKYPOINT™ EVEROLIMUS ELUTING CORONARY STENT SYSTEM 3.25 MM X 38 MM / RAPID-EXCHANGE
Type: IMPLANTABLE DEVICE | Site: CORONARY | Status: FUNCTIONAL
Brand: XIENCE SKYPOINT™

## 2022-12-28 RX ORDER — NITROGLYCERIN 20 MG/100ML
INJECTION INTRAVENOUS CODE/TRAUMA/SEDATION MEDICATION
Status: DISCONTINUED | OUTPATIENT
Start: 2022-12-28 | End: 2022-12-28 | Stop reason: HOSPADM

## 2022-12-28 RX ORDER — LIDOCAINE HYDROCHLORIDE 10 MG/ML
INJECTION, SOLUTION EPIDURAL; INFILTRATION; INTRACAUDAL; PERINEURAL CODE/TRAUMA/SEDATION MEDICATION
Status: DISCONTINUED | OUTPATIENT
Start: 2022-12-28 | End: 2022-12-28 | Stop reason: HOSPADM

## 2022-12-28 RX ORDER — ONDANSETRON 2 MG/ML
4 INJECTION INTRAMUSCULAR; INTRAVENOUS EVERY 6 HOURS PRN
Status: DISCONTINUED | OUTPATIENT
Start: 2022-12-28 | End: 2022-12-29 | Stop reason: HOSPADM

## 2022-12-28 RX ORDER — CLOPIDOGREL BISULFATE 75 MG/1
600 TABLET ORAL ONCE
Status: COMPLETED | OUTPATIENT
Start: 2022-12-28 | End: 2022-12-28

## 2022-12-28 RX ORDER — ACETAMINOPHEN 325 MG/1
650 TABLET ORAL EVERY 4 HOURS PRN
Status: DISCONTINUED | OUTPATIENT
Start: 2022-12-28 | End: 2022-12-29 | Stop reason: HOSPADM

## 2022-12-28 RX ORDER — HYDRALAZINE HYDROCHLORIDE 20 MG/ML
5 INJECTION INTRAMUSCULAR; INTRAVENOUS EVERY 8 HOURS PRN
Status: DISCONTINUED | OUTPATIENT
Start: 2022-12-28 | End: 2022-12-29 | Stop reason: HOSPADM

## 2022-12-28 RX ORDER — ATORVASTATIN CALCIUM 80 MG/1
80 TABLET, FILM COATED ORAL
Status: DISCONTINUED | OUTPATIENT
Start: 2022-12-28 | End: 2022-12-29 | Stop reason: HOSPADM

## 2022-12-28 RX ORDER — PANTOPRAZOLE SODIUM 20 MG/1
20 TABLET, DELAYED RELEASE ORAL
Status: DISCONTINUED | OUTPATIENT
Start: 2022-12-29 | End: 2022-12-29 | Stop reason: HOSPADM

## 2022-12-28 RX ORDER — ASPIRIN 81 MG/1
324 TABLET, CHEWABLE ORAL ONCE
Status: COMPLETED | OUTPATIENT
Start: 2022-12-28 | End: 2022-12-28

## 2022-12-28 RX ORDER — CLOPIDOGREL BISULFATE 75 MG/1
75 TABLET ORAL DAILY
Status: DISCONTINUED | OUTPATIENT
Start: 2022-12-29 | End: 2022-12-29 | Stop reason: HOSPADM

## 2022-12-28 RX ORDER — LEVOTHYROXINE SODIUM 0.07 MG/1
150 TABLET ORAL
Status: DISCONTINUED | OUTPATIENT
Start: 2022-12-29 | End: 2022-12-29 | Stop reason: HOSPADM

## 2022-12-28 RX ORDER — METOPROLOL SUCCINATE 50 MG/1
50 TABLET, EXTENDED RELEASE ORAL DAILY
Status: DISCONTINUED | OUTPATIENT
Start: 2022-12-29 | End: 2022-12-29 | Stop reason: HOSPADM

## 2022-12-28 RX ORDER — ASPIRIN 81 MG/1
81 TABLET, CHEWABLE ORAL DAILY
Status: DISCONTINUED | OUTPATIENT
Start: 2022-12-29 | End: 2022-12-29 | Stop reason: HOSPADM

## 2022-12-28 RX ORDER — VERAPAMIL HCL 2.5 MG/ML
AMPUL (ML) INTRAVENOUS CODE/TRAUMA/SEDATION MEDICATION
Status: DISCONTINUED | OUTPATIENT
Start: 2022-12-28 | End: 2022-12-28 | Stop reason: HOSPADM

## 2022-12-28 RX ORDER — SODIUM CHLORIDE 9 MG/ML
75 INJECTION, SOLUTION INTRAVENOUS CONTINUOUS
Status: DISPENSED | OUTPATIENT
Start: 2022-12-28 | End: 2022-12-28

## 2022-12-28 RX ORDER — MIDAZOLAM HYDROCHLORIDE 2 MG/2ML
INJECTION, SOLUTION INTRAMUSCULAR; INTRAVENOUS CODE/TRAUMA/SEDATION MEDICATION
Status: DISCONTINUED | OUTPATIENT
Start: 2022-12-28 | End: 2022-12-28 | Stop reason: HOSPADM

## 2022-12-28 RX ORDER — NITROGLYCERIN 0.4 MG/1
0.4 TABLET SUBLINGUAL
Status: DISCONTINUED | OUTPATIENT
Start: 2022-12-28 | End: 2022-12-29 | Stop reason: HOSPADM

## 2022-12-28 RX ORDER — METOPROLOL SUCCINATE 50 MG/1
50 TABLET, EXTENDED RELEASE ORAL ONCE
Status: DISCONTINUED | OUTPATIENT
Start: 2022-12-28 | End: 2022-12-28

## 2022-12-28 RX ORDER — SODIUM CHLORIDE 9 MG/ML
125 INJECTION, SOLUTION INTRAVENOUS CONTINUOUS
Status: DISCONTINUED | OUTPATIENT
Start: 2022-12-28 | End: 2022-12-28

## 2022-12-28 RX ORDER — HEPARIN SODIUM 1000 [USP'U]/ML
INJECTION, SOLUTION INTRAVENOUS; SUBCUTANEOUS CODE/TRAUMA/SEDATION MEDICATION
Status: DISCONTINUED | OUTPATIENT
Start: 2022-12-28 | End: 2022-12-28 | Stop reason: HOSPADM

## 2022-12-28 RX ORDER — FENTANYL CITRATE 50 UG/ML
INJECTION, SOLUTION INTRAMUSCULAR; INTRAVENOUS CODE/TRAUMA/SEDATION MEDICATION
Status: DISCONTINUED | OUTPATIENT
Start: 2022-12-28 | End: 2022-12-28 | Stop reason: HOSPADM

## 2022-12-28 RX ADMIN — SODIUM CHLORIDE 125 ML/HR: 0.9 INJECTION, SOLUTION INTRAVENOUS at 09:52

## 2022-12-28 RX ADMIN — CLOPIDOGREL BISULFATE 600 MG: 75 TABLET ORAL at 09:52

## 2022-12-28 RX ADMIN — HYDRALAZINE HYDROCHLORIDE 5 MG: 20 INJECTION, SOLUTION INTRAMUSCULAR; INTRAVENOUS at 20:26

## 2022-12-28 RX ADMIN — ASPIRIN 243 MG: 81 TABLET, CHEWABLE ORAL at 09:52

## 2022-12-28 RX ADMIN — ATORVASTATIN CALCIUM 80 MG: 80 TABLET, FILM COATED ORAL at 18:04

## 2022-12-28 NOTE — DISCHARGE SUMMARY
Discharge Summary - Ezio Lambert 75 y o  male MRN: [id number]    Unit/Bed#: BE CATH LAB ROOM Encounter: [id number]    Admission Date: 12/28/2022   Discharge Date:  12/29/22    Disposition: Home    Condition at Discharge: good     PCP: Cheryle Bare, [place]      OP Cardiologist: Dr Dimitri Fan     Interventional cardiologist: Dr Tashi Baker     Admitting Diagnosis: Coronary artery disease       Secondary Diagnoses:   Hypertrophic obstructive cardiomyopathy  Hypertension            BP (!) 154/108 (BP Location: Left arm)   Pulse 92   Temp 98 2 °F (36 8 °C) (Oral)   Resp 18   Ht 5' 11" (1 803 m)   Wt 77 1 kg (170 lb)   SpO2 98%   BMI 23 71 kg/m²       Review of Systems    Physical Exam        HPI and Hospital Course:  Shaun Albert, a 75year-old male patient, presented to [address] [place] [address] for staged PCI of subtotal chronic occlusion mid LAD lesion  Past medical history significant for aortic stenosis (mild), hypertrophic obstructive cardiomyopathy (resting gradient on cardiac cath 45 and post PVC of 125, asymmetric septal hypertrophy measuring 1 7),  hyperlipidemia, hypertension (whitecoat hypertension)  Cardiac catheterization performed 10 7/2022 showed near  of mid LAD JUAN flow of 2  Cardiac catheterization was performed via the right radial artery  Successful PCI of mid LAD chronic total occlusion  The occlusion was crossed with a  150 wire via mamba microcatheter  In addition IVUS was used to assist with vessel size and placement of a 3 25 X 38 mm MENDY with guide liner support  Residual stenosis 0%  No events overnight  Patient denies chest pain, shortness of breath or palpitations  Right radial dressing changed no hematoma no bleeding  Discharge labs:    creatinine 0 63 and   Hemoglobin 13 2 and hematocrit 41 4  Platelets 514          Discharge plan  1  Dual antiplatelet therapy with aspirin 81 mg daily and Plavix 75 mg daily    2   Crestor 40 mg daily  3   metoprolol 50 mg daily  4    Cardiac rehab  5  Follow-up office visit with Dr Juan Luis Jones on 1/17/2023 at 11:40 AM in the [facility] office    6   Office visit with Dr Barbara Bond on 2/14/2023 at 9 AM for further evaluation HCOM          Current Facility-Administered Medications   Medication Dose Route Frequency   • acetaminophen (TYLENOL) tablet 650 mg  650 mg Oral Q4H PRN   • [START ON 12/29/2022] aspirin chewable tablet 81 mg  81 mg Oral Daily   • atorvastatin (LIPITOR) tablet 80 mg  80 mg Oral Daily With Dinner   • [START ON 12/29/2022] clopidogrel (PLAVIX) tablet 75 mg  75 mg Oral Daily   • [START ON 12/29/2022] levothyroxine tablet 150 mcg  150 mcg Oral Early Morning   • [START ON 12/29/2022] metoprolol succinate (TOPROL-XL) 24 hr tablet 50 mg  50 mg Oral Daily   • nitroglycerin (NITROSTAT) SL tablet 0 4 mg  0 4 mg Sublingual Q5 Min PRN   • ondansetron (ZOFRAN) injection 4 mg  4 mg Intravenous Q6H PRN   • [START ON 12/29/2022] pantoprazole (PROTONIX) EC tablet 20 mg  20 mg Oral Early Morning   • sodium chloride 0 9 % infusion  75 mL/hr Intravenous Continuous       Pertinent Labs/diagnostics:        Lab Ressults:  Recent Results (from the past 24 hour(s))   Basic metabolic panel    Collection Time: 12/28/22  9:53 AM   Result Value Ref Range    Sodium 138 135 - 147 mmol/L    Potassium 3 8 3 5 - 5 3 mmol/L    Chloride 104 96 - 108 mmol/L    CO2 30 21 - 32 mmol/L    ANION GAP 4 4 - 13 mmol/L    BUN 11 5 - 25 mg/dL    Creatinine 0 72 0 60 - 1 30 mg/dL    Glucose 109 65 - 140 mg/dL    Glucose, Fasting 109 (H) 65 - 99 mg/dL    Calcium 9 3 8 3 - 10 1 mg/dL    eGFR 95 ml/min/1 73sq m           Lipid Profile:   Lab Results   Component Value Date    CHOL 287 (H) 02/29/2016    CHOL 132 11/27/2013    CHOL 186 05/25/2013     Lab Results   Component Value Date    HDL 30 (L) 09/28/2022    HDL 36 (L) 06/28/2022    HDL 20 (L) 02/29/2016     Lab Results   Component Value Date    LDLCALC 157 (H) 09/28/2022    LDLCALC 229 (H) 06/28/2022    LDLCALC 211 (H) 02/29/2016     Lab Results   Component Value Date    TRIG 95 09/28/2022    TRIG 109 06/28/2022    TRIG 281 (H) 02/29/2016         Cardiac testing:   No results found for this or any previous visit  No results found for this or any previous visit  No valid procedures specified  No results found for this or any previous visit  Tele: NSR , rate 70-80's  No ectopy noted on the monitor  Discharge instructions/Information to patient and family:   See after visit summary for information provided to patient and family  Provisions for Follow-Up Care:  See after visit summary for information related to follow-up care and any pertinent home health orders  Planned Readmission: No    Discharge Statement:  I spent 1 hour minutes discharging the patient  This time was spent on the day of discharge  I had direct contact with the patient on the day of discharge  Additional documentation is required if more than 30 minutes were spent on discharge  ** Please Note: Fluency Direct Dictation voice to text software may have been used in the creation of this document   **

## 2022-12-28 NOTE — H&P
Interventional H&P   Ann Marie Eli 75 y o  male MRN: [id number]  Unit/Bed#:  Encounter: [id number]      Consults  PCP: HERMELINDA Valdes   Outpatient Cardiologist: Jimenez    History of Present Illness   Physician Requesting Consult: Meir Aquino MD  Reason for Consult / Principal Problem: HOCM and CAD    HPI: Ann Marie Eli is a 75y o  year old male with a past medical history of hypertrophic obstructive cardiomyopathy with a resting gradient on cath of 39 and post PVC of 125, asymmetric septal hypertrophy measuring at 1 7, hypertension, hyperlipidemia, coronary artery disease subtotal  of the mid LAD with JUAN flow 2 that is here for coronary angiogram and LAD stenting  The patient initially presented in the fall to the outpatient cardiology clinic for hypertension and newfound murmur  He was diagnosed with hypertrophic cardiomyopathy and he also had a pharmacological stress test done  At that time the stress test did show a moderate defect in the mid to apical anterior wall and anterior septal locations that were predominantly reversible  His echo did have some LAD territory hypokinesis  He was brought to the catheterization lab that showed a resting and post PVC gradient  He also had a mid LAD lesion that was 90% with JUAN flow 2  He did not have stenting of the LAD at that time and it was recommended for medical management with outpatient follow-up  Review of Systems  Review of system was conducted and was negative except for as stated in the HPI  Historical Information   Past Medical History:   Diagnosis Date   • Anxiety    • Cancer [facility])    • Chemotherapy follow-up examination    • History of radiation therapy 2006   • Hypertension      Past Surgical History:   Procedure Laterality Date   • ANKLE SURGERY Left    • CARDIAC CATHETERIZATION Left 10/7/2022    Procedure: Cardiac catheterization;  Surgeon: Alhaji Hay MD;  Location: [facility] CARDI[facility];   Service: Cardiology   • CARDIAC CATHETERIZATION Left 10/7/2022    Procedure: Cardiac Left Heart Cath;  Surgeon: Rosanna Samayoa MD;  Location: [facility]; Service: Cardiology   • CARDIAC CATHETERIZATION N/A 10/7/2022    Procedure: Cardiac Coronary Angiogram;  Surgeon: Rosanna Samayoa MD;  Location: [facility]; Service: Cardiology   • NECK SURGERY Right    • SHOULDER OPEN ROTATOR CUFF REPAIR Right      Social History     Substance and Sexual Activity   Alcohol Use Yes   • Alcohol/week: 4 0 - 6 0 standard drinks   • Types: 2 - 3 Cans of beer, 2 - 3 Shots of liquor per week    Comment: once a mnth     Social History     Substance and Sexual Activity   Drug Use Yes   • Types: Marijuana    Comment: occ last used 10/6     Social History     Tobacco Use   Smoking Status Former   Smokeless Tobacco Former     Family History: non-contributory    Meds/Allergies   Hospital Medications:   No current facility-administered medications for this encounter  Home Medications:   No medications prior to admission  No Known Allergies    Objective   Vitals: There were no vitals taken for this visit  Invasive Devices     Line  Duration           Arterial Sheath -- days                Physical Exam  GEN: Vera Goff appears well, alert and oriented x 3, pleasant and cooperative   HEENT:  Normocephalic, atraumatic, anicteric, moist mucous membranes  NECK: No JVD or carotid bruits   HEART: regular rhythm, regular rate, normal S1 and S2, no murmurs, clicks, gallops or rubs   LUNGS: Clear to auscultation bilaterally; no wheezes, rales, or rhonchi; respiration nonlabored   ABDOMEN:  Normoactive bowel sounds, soft, no tenderness, no distention  EXTREMITIES: peripheral pulses palpable; no edema  NEURO: no gross focal findings; cranial nerves grossly intact   SKIN:  Dry, intact, warm to touch    Lab Results: I have personally reviewed pertinent lab results                              Imaging: I have personally reviewed pertinent reports  Telemetry:       EKG:         Previous STRESS TEST:  No results found for this or any previous visit  No results found for this or any previous visit  Results for orders placed during the hospital encounter of 09/29/22    NM myocardial perfusion spect (rx stress and/or rest)    Interpretation Summary  •  Stress ECG: A pharmacological stress test was performed using regadenoson  The patient experienced non-limiting angina during the test  Low level exercise was used during the pharmacological stress test  The patient reported dyspnea and chest tightness during the stress test  Symptoms ended during recovery  •  Stress ECG: The stress ECG is negative for ischemia after pharmacologic stress  •  Perfusion: There is a left ventricular perfusion defect that is medium in size present in the mid to apical anterior and anteroseptal location(s) that is predominantly reversible  Viability in the area is good  •  Stress Function: Left ventricular function post-stress is low normal  Post-stress ejection fraction is 48 %  There is a defect in the mid to apical anterior location(s)  Previous Cath/PCI:  No results found for this or any previous visit  No results found for this or any previous visit  No results found for this or any previous visit  ECHO:  No results found for this or any previous visit  Results for orders placed during the hospital encounter of 08/08/22    Echo complete w/ contrast if indicated    Interpretation Summary  •  Left Ventricle: Left ventricular cavity size is normal  Wall thickness is mildly increased  The left ventricular ejection fraction is 55%  Systolic function is normal  Wall motion is normal  Diastolic function is mildly abnormal, consistent with grade I (abnormal) relaxation  •  Aortic Valve: The aortic valve is trileaflet  The leaflets are not thickened  The leaflets are moderately calcified  There is moderately reduced mobility   There is mild stenosis  The aortic valve velocity is increased in the setting of stenosis and increased flow  •  Mitral Valve: There is mild regurgitation  •  Tricuspid Valve: There is mild regurgitation  •  Pulmonic Valve: There is mild regurgitation  •  Aorta: The aortic root is mildly dilated  PERYR:  No results found for this or any previous visit  No results found for this or any previous visit  CMR:  No results found for this or any previous visit  Results for orders placed during the hospital encounter of 11/18/22    MRI cardiac  w wo contrast    Narrative  Cardiac MRI with and without contrast    INDICATION:  I42 1: Obstructive hypertrophic cardiomyopathy  Technique:  1  3 plane SSFP localizers  2  SSFP cine imaging in long and short axis plane  3  T2 weighted DIR FSE in short axis plane  4  15 ml gadobutrol power injected  5  2D inversion recovery FGRE for delayed myocardial enhancement  6  The patient tolerated the procedure well without complication  Measurements:  Gregg-septal wall 18 mm  Postero-lateral wall 9 mm  Left ventricular end-diastolic dimension 38 mm  Left ventricular end-systolic dimension 20 mm  Ejection fraction 65 % by visual estimate, as artifact from ectopy renders postprocessing software unreliable  Left atrium 29 mm  Aortic Root 29 mm    Findings:  1  Normal left ventricular systolic function  Basal interventricular septal hypertrophy measuring up to 18 mm with a spiral pattern of hypertrophy from base to apex  Systolic flow acceleration within the LVOT with systolic anterior motion of the  anterior mitral valve leaflet  Normal LV cavity size  2  Normal right ventricular size and systolic function by visual estimate  3  The aortic, mitral, and tricuspid valves open without restriction  There is no significant valvular regurgitation, however cine MRI is inaccurate in the qualitative assessment of valvular regurgitation  4  The left atrium is normal in size   The aortic root is normal in size  5  There is no evidence of myocardial edema  6  Delayed post-gadolinium imaging demonstrates possible small foci of delayed gadolinium enhancement in the basal interventricular septum, involving less than 5% of the myocardium, noting limited evaluation due to poor myocardial nulling  Impression  Impression:  1  Findings of hypertrophic cardiomyopathy with a component of SHANNON and LVOT obstruction  Normal left ventricular systolic function  2  Normal right ventricular size and systolic function  3  Normal bilateral atria  No valvular abnormalities  4  Possible small foci of interstitial fibrosis in the basal interventricular septum, involving less than 5% of myocardium  Workstation performed: [id number]    No results found for this or any previous visit  HOLTER  No results found for this or any previous visit  Results for orders placed during the hospital encounter of 22    Holter monitor    Interpretation Summary  PT NAME: Priya Tran  : 1955  AGE: 75 y o  GENDER: male  MRN: [id number]   PROCEDURE: Holter monitor        INDICATIONS: HOCM      FINDINGS:  1  A 48 hour holter monitor demonstrated normal sinus rhythm with an average rate of 66 BPM; a minimum rate of 46 BPM; and a maximum rate of 152 BPM   2  There were no ventricular ectopic beats  3  There were 56 supraventricular ectopic beats, including a 36 beat run of atrial tachycardia  4  The longest R-R interval was 1 3 seconds  5  The patient kept a diary during holter monitor  It demonstrated episodes of chest tightness that did not correlate to any dysrhythmia  Impression  Abnormal 48 hour holter monitor  Patient in normal sinus rhythm throughout holter monitoring  No premature ventricular contractions  36 beat run of atrial tachycardia  No significant pauses  Symptoms on diary did not correlate to any dysrhythmia  Assessment/Plan   Priya Tran is a 75 y o  year old male with a past medical history of hypertrophic obstructive cardiomyopathy with a resting gradient on cath of 45 and post PVC of 125, asymmetric septal hypertrophy measuring at 1 7, hypertension, hyperlipidemia, coronary artery disease subtotal  of the mid LAD with JUAN flow 2 that is here for coronary angiogram and LAD stenting  Coronary artery disease mid LAD subtotal occlusion but with JUAN flow 2:  -Plan for revascularization and stenting of the mid LAD lesion  The patient will need aggressive lipid control-would recommend increasing Crestor and adding another agent      Case discussed and reviewed with Dr Stephanie Lind who agrees with my assessment and plan  Thank you for involving us in the care of your patient  Kuldeep Eubanks MD  Cardiology Fellow   PGY-5    ==========================================================================================    Counseling / Coordination of Care  Total floor / unit time spent today 1 hour minutes  Greater than 50% of total time was spent with the patient and / or family counseling and / or coordination of care  A description of the counseling / coordination of care:          Epic/ Allscripts/Care Everywhere records reviewed:     ** Please Note: Fluency DirectDictation voice to text software may have been used in the creation of this document   **

## 2022-12-28 NOTE — Clinical Note
Guide catheter inserted over guidewire  Patient states that he has not been out of his house in about 6-8 months due to Covid and is not willing to come out at this time with the numbers being so high in our area.  He states he is taking Carbamazepine 200 mg BID and Gabapentin 300 mg TID.  Please refill 90 supply of each to [facility] and patient is on recall list starting in December to call him to schedule.

## 2022-12-28 NOTE — DISCHARGE INSTRUCTIONS
1  Please see the post angioplasty discharge instructions  No heavy lifting, greater than 10 lbs  or strenuous activity for 5 days  Follow angioplasty discharge instructions  2 Remove band aid tomorrow  Shower and wash area- wrist gently with soap and water- beginning tomorrow  Rinse and pat dry  Apply new water seal band aid  Repeat this process for 5 days  No powders, creams lotions or antibiotic ointments  for 5 days  No tub baths, hot tubs or swimming for 5 days  3  Call [facility]  Cardiology Office ([phone]) if you develop a fever, redness or drainage at your wrist access site  4  No driving for 2 days    5  Do not stop aspirin or Plavix (clopiogrel) any reason without a cardiologist’s consent, or the stent could block up and cause a heart attack  6  Stent card and book

## 2022-12-29 VITALS
OXYGEN SATURATION: 98 % | RESPIRATION RATE: 18 BRPM | BODY MASS INDEX: 23.8 KG/M2 | DIASTOLIC BLOOD PRESSURE: 108 MMHG | HEIGHT: 71 IN | HEART RATE: 92 BPM | SYSTOLIC BLOOD PRESSURE: 154 MMHG | WEIGHT: 170 LBS | TEMPERATURE: 98.2 F

## 2022-12-29 LAB
ANION GAP SERPL CALCULATED.3IONS-SCNC: 6 MMOL/L (ref 4–13)
BUN SERPL-MCNC: 10 MG/DL (ref 5–25)
CALCIUM SERPL-MCNC: 9.5 MG/DL (ref 8.3–10.1)
CHLORIDE SERPL-SCNC: 105 MMOL/L (ref 96–108)
CO2 SERPL-SCNC: 28 MMOL/L (ref 21–32)
CREAT SERPL-MCNC: 0.63 MG/DL (ref 0.6–1.3)
ERYTHROCYTE [DISTWIDTH] IN BLOOD BY AUTOMATED COUNT: 14.6 % (ref 11.6–15.1)
GFR SERPL CREATININE-BSD FRML MDRD: 101 ML/MIN/1.73SQ M
GLUCOSE SERPL-MCNC: 98 MG/DL (ref 65–140)
HCT VFR BLD AUTO: 41.4 % (ref 36.5–49.3)
HGB BLD-MCNC: 13.2 G/DL (ref 12–17)
MCH RBC QN AUTO: 29.9 PG (ref 26.8–34.3)
MCHC RBC AUTO-ENTMCNC: 31.9 G/DL (ref 31.4–37.4)
MCV RBC AUTO: 94 FL (ref 82–98)
PLATELET # BLD AUTO: 249 THOUSANDS/UL (ref 149–390)
PMV BLD AUTO: 10.1 FL (ref 8.9–12.7)
POTASSIUM SERPL-SCNC: 4 MMOL/L (ref 3.5–5.3)
RBC # BLD AUTO: 4.41 MILLION/UL (ref 3.88–5.62)
SODIUM SERPL-SCNC: 139 MMOL/L (ref 135–147)
WBC # BLD AUTO: 10.12 THOUSAND/UL (ref 4.31–10.16)

## 2022-12-29 RX ORDER — CLOPIDOGREL BISULFATE 75 MG/1
75 TABLET ORAL DAILY
Qty: 30 TABLET | Refills: 11 | Status: SHIPPED | OUTPATIENT
Start: 2022-12-30

## 2022-12-29 RX ORDER — ROSUVASTATIN CALCIUM 40 MG/1
40 TABLET, COATED ORAL DAILY
Qty: 30 TABLET | Refills: 11 | Status: SHIPPED | OUTPATIENT
Start: 2022-12-29

## 2022-12-29 RX ADMIN — ASPIRIN 81 MG: 81 TABLET, CHEWABLE ORAL at 08:22

## 2022-12-29 RX ADMIN — METOPROLOL SUCCINATE 50 MG: 50 TABLET, EXTENDED RELEASE ORAL at 08:23

## 2022-12-29 RX ADMIN — CLOPIDOGREL BISULFATE 75 MG: 75 TABLET ORAL at 08:22

## 2022-12-29 RX ADMIN — PANTOPRAZOLE SODIUM 20 MG: 20 TABLET, DELAYED RELEASE ORAL at 05:00

## 2022-12-29 RX ADMIN — LEVOTHYROXINE SODIUM 150 MCG: 75 TABLET ORAL at 05:00

## 2023-01-07 ENCOUNTER — APPOINTMENT (OUTPATIENT)
Dept: LAB | Facility: CLINIC | Age: 68
End: 2023-01-07

## 2023-01-07 DIAGNOSIS — E03.9 ACQUIRED HYPOTHYROIDISM: Primary | ICD-10-CM

## 2023-01-07 LAB — TSH SERPL DL<=0.05 MIU/L-ACNC: 0.55 UIU/ML (ref 0.45–4.5)

## 2023-01-11 ENCOUNTER — CLINICAL SUPPORT (OUTPATIENT)
Dept: CARDIAC REHAB | Facility: HOSPITAL | Age: 68
End: 2023-01-11

## 2023-01-11 DIAGNOSIS — Z95.5 STATUS POST INSERTION OF DRUG ELUTING CORONARY ARTERY STENT: Primary | ICD-10-CM

## 2023-01-11 NOTE — PROGRESS NOTES
CARDIAC REHAB ASSESSMENT    Today's date: 2023  Patient name: Sarita Diop     : 1955       MRN: [id number]  PCP: HERMELINDA Coleman  Referring Physician: Dr Yaniv Hernandez  Cardiologist: Dr Judd Acosta  Surgeon: n/a  Dx:   Encounter Diagnosis   Name Primary? • Status post insertion of drug eluting coronary artery stent        Date of onset: 2022  Cultural needs: n/a    Weight    Wt Readings from Last 1 Encounters:   22 77 1 kg (170 lb)      Height:   Ht Readings from Last 1 Encounters:   22 5' 11" (1 803 m)     Medical History:   Past Medical History:   Diagnosis Date   • Anxiety    • Cancer ([facility] [address] )    • Chemotherapy follow-up examination    • History of radiation therapy    • Hypertension          Physical Limitations: arthritis, left shoulder pain, right shoulder operated on previously    Fall Risk: Low   Comments: Ambulates with a steady gait with no assist device    Anginal Equivalent: None/denies angina   NTG use: No prescription    Risk Factors   Cholesterol: Yes  Smoking: Never used  HTN: Yes  DM: No  Obesity: No   Inactivity: No  Stress:  perceived  stress: 6/10   Stressors:social stress/anxiety   Goals for Stress Management:Practice Relaxation Techniques, Exercise, Keep a positive mindset, Consult a Counselor, Enjoy a hobby and Spend time with family    Family History:  Family History   Problem Relation Age of Onset   • Hypertension Mother    • Obesity Mother    • Diabetes Father    • Heart attack Father    • Stroke Father    • Heart disease Father    • Hyperlipidemia Sister    • Obesity Sister    • Hypertension Sister    • Diabetes Sister    • Kidney disease Sister    • Hyperlipidemia Brother    • Hypertension Brother    • Diabetes Brother    • Prostate cancer Brother        Allergies: Patient has no known allergies    ETOH:   Social History     Substance and Sexual Activity   Alcohol Use Yes   • Alcohol/week: 4 0 - 6 0 standard drinks   • Types: 2 - 3 Cans of beer, 2 - 3 Shots of liquor per week    Comment: once a mnth         Current Medications:   Current Outpatient Medications   Medication Sig Dispense Refill   • aspirin (ECOTRIN LOW STRENGTH) 81 mg EC tablet Take 1 tablet (81 mg total) by mouth daily 30 tablet 11   • chlorhexidine (PERIDEX) 0 12 % solution RINSE MOUTH WITH 1/2OZ FOR 30-60 SECONDS TWICE DAILY     • clopidogrel (PLAVIX) 75 mg tablet Take 1 tablet (75 mg total) by mouth daily Do not start before December 30, 2022  30 tablet 11   • levothyroxine (Euthyrox) 150 mcg tablet Take 1 tablet (150 mcg total) by mouth daily in the early morning 90 tablet 1   • metoprolol succinate (TOPROL-XL) 50 mg 24 hr tablet Take 1 tablet (50 mg total) by mouth daily 30 tablet 11   • omeprazole (PriLOSEC) 20 mg delayed release capsule Take 1 capsule by mouth daily     • rosuvastatin (CRESTOR) 40 MG tablet Take 1 tablet (40 mg total) by mouth daily 30 tablet 11     No current facility-administered medications for this visit           Functional Status Prior to Diagnosis for Treatment   Occupation: full time job security  Recreation: none  ADL’s: No limitations  [place]: No limitations  Exercise: no regular aerobic exercise  Other: walks a lot with his job    Current Functional Status  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  Recreation: none  ADL’s:No limitations  [place]: No limitations  Exercise: no regular aerobic exercise  Other: n/a    Patient Specific Goals:  Complete cardiac rehab program so that he is able to be cleared for further procedures leading to dental work, improve feelings of anxiety and learn to control BP in stressful situations (white coat syndrome, social interactions), establish a regular exercise program which he can maintain once rehab sessions are completed    Short Term Program Goals: dietary modifications increased strength improved energy/stamina with ADLs exercise 120-150 mins/wk    Long Term Goals: increased maximal walking duration  increased intial training workload  Improved [facility] Activity Status score  Improved functional capacity  Improved Quality of Life - [facility] score reduced  Improved lipid profile    Ability to reach goals/rehabilitation potential:  Excellent    Projected return to function: 12 weeks  Objective tests: sub-max TM ETT      Nutritional   Reviewed details of Rate your Plate  Discussed key elements of heart healthy eating  Reviewed patient goals for dietary modifications and their clinical implications  Reviewed most recent lipid profile  Goals for dietary modification based on Rate Your Plate Dietary Assessment:  more meatless meals  low fat dairy   increase whole grains  low sodium      Emotional/Social  Patient has a history of anxiety   Patient reports feelings of anxiety  Reports sufficient emotional support  Provided contact information for [facility]  Provided information on [employer] [place]    Marital status: significant other    Domestic Violence Screening: No    Comments: n/a

## 2023-01-11 NOTE — PROGRESS NOTES
Cardiac Rehabilitation Plan of Care   Initial Care Plan          Today's date: 2023   # of Exercise Sessions Completed: 1-evaluation  Patient name: Anthony Garcia      : 1955  Age: 75 y o  MRN: [id number]  Referring Physician: HERMELINDA Lakhani  Cardiologist: Dr Shirley Das  Provider: Ondina  Clinician: Elan Ramsey MS, CEP      Dx:   Encounter Diagnosis   Name Primary? • Status post insertion of drug eluting coronary artery stent      Date of onset: 2022      SUMMARY OF PROGRESS:  Mr Juliette Anaya is here today for his cardiac rehabilitation evaluation after recent stenting procedure  He reports doing well overall  His main concern at this time is elevated BP  He reports it has always been elevated anytime he is at a doctors office or a setting like today for his evaluation  He keeps a log of BP at home home and reading seem to trend in a normal range  BP is elevated today at rest 200/110  He reports he is doing well with his diet, and is unable to eat a lot of meat, fruit, and some other foods due to texture and taste since he had throat cancer and operation  Overall he feels he follows a pretty healthy diet  He denies any depression (PHQ-Q-9=0), but does admit to feelings of anxiety (FREEMAN-7=4)  He reports he has good emotional support from his significant other and family  His goals for his rehab program are to complete cardiac rehab program so that he is able to be cleared for further procedures leading to dental work, improve feelings of anxiety and learn to control BP in stressful situations (white coat syndrome, social interactions), and to establish a regular exercise program which he can maintain once rehab sessions are completed  He is hoping that getting into a regular exercise routine will help control some of is anxiety and help to control BP better   He has considered counseling as a way to manage BP/anxiety, and so I gave him information on [facility] today to help him get started  He completed TM ETT today for his assessment and was able to reach 5 8 METs at 11:30 min with RPE of 6  He tolerated exercise well and showed normal hemodynamic response to exercise  Will plan to start exercise at 3 0-3 5 METs and increase as tolerated by patient over first 30 days of rehab  He is in agreement to attend cardiac rehab sessions 3x/week for 12 weeks, or 36 sessions  He will start his sessions tomorrow 2023  He is also planning to attend weekly education sessions on cardiac risk factor management        Medication compliance: Yes   Comments: Pt reports to be compliant with medications  Fall Risk: Low   Comments: Ambulates with a steady gait with no assist device    EKG Interpretation: NSR      EXERCISE ASSESSMENT and PLAN    Exercise Prescription:      Frequency: 3 days/week   Supplement with home exercise 2+ days/wk as tolerated       Minutes: 30-40         METS: 3 0-3 5            HR: 30 beats above resting   RPE: 4-6         Modalities: Treadmill, UBE, Lifecycle, Rower, NuStep and Recumbent bike      30 Day Goals for Exercise Progression:    Frequency: 3 days/week of cardiac rehab       Supplement with home exercise 2+ days/wk as tolerated    Minutes: 40                              >150 mins/wk of moderate intensity exercise   METS: 3 5   HR: 30 beats above resting    RPE: 4-6   Modalities: Treadmill, UBE, Lifecycle, Elliptical, Rower, NuStep and Recumbent bike    Strength trainin-3 days / week  12-15 repetitions  1-2 sets per modality   Will be added following 2-3 weeks of monitored exercise sessions   Modalities: Pull Downs, Lateral Raise, Arm Extension, Arm Curl, Upright Rows, Front Raises and leg ext    Home Exercise: none, 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, walks 10 min to and from work    Goals: 10% improvement in functional capacity - based on max METs achieved in fitness assessment, improved DASI score by 10%, Increase in exercise capacity by 40% - based on peak METs tolerated in cardiac rehab exercise session, Exercise 5 days/wk, >150mins/wk of moderate intensity exercise, Resume ADLs with increased strength, Attend Rehab regularly and start a home exercise program    Progression Toward Goals:  Reviewed Pt goals and determined plan of care    Education: benefit of exercise for CAD risk factors, home exercise guidelines, AHA guidelines to achieve >150 mins/wk of moderate exercise and RPE scale   Plan:education on home exercise guidelines, home exercise 30+ mins 2 days opposite CR and Education class: Risk Factors for Heart Disease  Readiness to change: Preparation:  (Getting ready to change)       NUTRITION ASSESSMENT AND PLAN    Weight control:    Starting weight: 174 2 lb   Current weight:       Diabetes: N/A  A1c: n/a    last measured: n/a    Lipid management: Discussed diet and lipid management and Last lipid profile 9/28/2022  Chol 207  TRG 95  HDL 30      Goals:LDL <100, HDL >40, TRG <150 and CHOL <200    Measurable goals were based Rate Your Plate Dietary Self-Assessment  These are the areas in which the patient could score higher on the assessment  Goals include recommendations for a heart healthy diet based on American Heart Association      Progression Toward Goals: Reviewed Pt goals and determined plan of care    Education: heart healthy eating  low sodium diet  hydration  nutrition for  lipid management  Plan: Education class: Reading Food Labels and Education Class: Heart Healthy Eating  Readiness to change: Action:  (Changing behavior)      PSYCHOSOCIAL ASSESSMENT AND PLAN    Emotional:  Depression assessment:  PHQ-9 = 0  0 =No Depression            Anxiety measure:  FREEMAN-7 = 4  0-4  = Not anxious  Self-reported stress level:  6-social (anxiety?) interactions  Social support: Excellent-significant other and family    Goals:  Reduce perceived stress to 1-3/10, PHQ-9 - reduced severity by one level, Feelings in [facility] Score < 3, Physical Fitness in [facility] Score < 3, Daily Activity in [facility] Score < 3, Overall Health in [facility] Score < 3, increased energy, Feel less anxious and manage BP    Progression Toward Goals: Reviewed Pt goals and determined plan of care    Education: signs/sxs of depression, benefits of a positive support system and stress management techniques  Plan: Class: Stress and Your Health, Class: Relaxation, Refer to [employer], Practice relaxation techniques, Exercise, Enjoy a hobby and Keep a positive mindset  Readiness to change: Preparation:  (Getting ready to change)       OTHER CORE COMPONENTS     Tobacco:   Social History     Tobacco Use   Smoking Status Former   Smokeless Tobacco Former       Tobacco Use Intervention:   N/A:  Patient is a non-smoker     Anginal Symptoms:  None   NTG use: No prescription    Blood pressure:    Restin/98   Exercise: 200/110    Goals: consistent BP < 130/80, reduced dietary sodium <2300mg, moderate intensity exercise >150 mins/wk and medication compliance    Progression Toward Goals: Reviewed Pt goals and determined plan of care    Education:  understanding high blood pressure and it's relationship to CAD and low sodium diet and HTN  Plan: Class: Understanding Heart Disease, Class: Common Heart Medications, Avoid Processed foods, engage in regular exercise, check labels for sodium content and manage BP better when in stressful situations  Readiness to change: Preparation:  (Getting ready to change)

## 2023-01-12 ENCOUNTER — CLINICAL SUPPORT (OUTPATIENT)
Dept: CARDIAC REHAB | Facility: HOSPITAL | Age: 68
End: 2023-01-12

## 2023-01-12 DIAGNOSIS — Z95.5 STATUS POST INSERTION OF DRUG ELUTING CORONARY ARTERY STENT: Primary | ICD-10-CM

## 2023-01-13 ENCOUNTER — CLINICAL SUPPORT (OUTPATIENT)
Dept: CARDIAC REHAB | Facility: HOSPITAL | Age: 68
End: 2023-01-13

## 2023-01-13 DIAGNOSIS — Z95.5 STATUS POST INSERTION OF DRUG ELUTING CORONARY ARTERY STENT: Primary | ICD-10-CM

## 2023-01-17 ENCOUNTER — CLINICAL SUPPORT (OUTPATIENT)
Dept: CARDIAC REHAB | Facility: HOSPITAL | Age: 68
End: 2023-01-17

## 2023-01-17 ENCOUNTER — OFFICE VISIT (OUTPATIENT)
Dept: CARDIOLOGY CLINIC | Facility: CLINIC | Age: 68
End: 2023-01-17

## 2023-01-17 VITALS
BODY MASS INDEX: 24.61 KG/M2 | DIASTOLIC BLOOD PRESSURE: 98 MMHG | SYSTOLIC BLOOD PRESSURE: 196 MMHG | HEIGHT: 71 IN | HEART RATE: 94 BPM | WEIGHT: 175.8 LBS

## 2023-01-17 DIAGNOSIS — I42.1 HOCM (HYPERTROPHIC OBSTRUCTIVE CARDIOMYOPATHY) (HCC): Primary | ICD-10-CM

## 2023-01-17 DIAGNOSIS — I35.0 NONRHEUMATIC AORTIC VALVE STENOSIS: ICD-10-CM

## 2023-01-17 DIAGNOSIS — K21.9 GASTROESOPHAGEAL REFLUX DISEASE, UNSPECIFIED WHETHER ESOPHAGITIS PRESENT: ICD-10-CM

## 2023-01-17 DIAGNOSIS — I10 PRIMARY HYPERTENSION: ICD-10-CM

## 2023-01-17 DIAGNOSIS — Z95.5 STATUS POST INSERTION OF DRUG ELUTING CORONARY ARTERY STENT: ICD-10-CM

## 2023-01-17 DIAGNOSIS — I25.10 CORONARY ARTERY DISEASE INVOLVING NATIVE CORONARY ARTERY OF NATIVE HEART WITHOUT ANGINA PECTORIS: ICD-10-CM

## 2023-01-17 DIAGNOSIS — E78.5 HYPERLIPIDEMIA, UNSPECIFIED HYPERLIPIDEMIA TYPE: ICD-10-CM

## 2023-01-17 DIAGNOSIS — Z95.5 STATUS POST INSERTION OF DRUG ELUTING CORONARY ARTERY STENT: Primary | ICD-10-CM

## 2023-01-17 RX ORDER — FAMOTIDINE 20 MG/1
20 TABLET, FILM COATED ORAL DAILY
Qty: 30 TABLET | Refills: 11 | Status: SHIPPED | OUTPATIENT
Start: 2023-01-17

## 2023-01-17 NOTE — PROGRESS NOTES
Cardiology Outpatient Follow-Up Note - Miriam Polanco 75 y o  male MRN: [id number]      Assessment/Plan:  1  HOCM (hypertrophic obstructive cardiomyopathy) ([facility] [address] )  With NYHA I - II symptoms  Euvolemic  Symptoms improved on metoprolol XL  Wall Thickness: IVS 1 7 cm by echo 8/8/22 (my remeasurement); anteroseptum 18 mm by MRI 11/18/22  LVOT Obstruction: Resting gradient 45 mmHg by cardiac cath; post PVC gradient 125 mmHg by cath 10/7/22; we will check an supine bicycle echo stress  SCD Risk: No fx of SCD, no personal hx of syncope  Holter 11/4/22 without PVCs or VT    MRI: MRI 11/18/22 c/w HOCM showed interstitial fibrosis involving less than 5% of the myocardium  - Echo stress test, exercise; Future    2  Primary hypertension  Home readings are very good  There is likely a white-coat component to his higher office readings  No changes to therapy  3  Coronary artery disease involving native coronary artery of native heart without angina pectoris  S/p PCI to the LAD Dec 2022  Continue metoprolol XL 50 mg daily; DAPT with ASA + clopidogrel until June 2023      4  Status post insertion of drug eluting coronary artery stent    5  Hyperlipidemia, unspecified hyperlipidemia type  Continue rosuvastatin 40 mg daily    6  Nonrheumatic aortic valve stenosis  Continue to monitor  Mild  7  Gastroesophageal reflux disease, unspecified whether esophagitis present  Ompeprazole reduces the metabolism of clopidogrel into its active metabolite  We will substitute with famotidine until he has completed his DAPT duration    - famotidine (PEPCID) 20 mg tablet; Take 1 tablet (20 mg total) by mouth in the morning  Dispense: 30 tablet; Refill: 11      We will see Miriam Polanco back in 6 months for routine follow-up  Subjective:     HPI: Miriam Polanco is a 75y o  year old male with HOCM, mild AS, HLD, HTN and white coat syndrome, CAD with near  of LAD s/p PCI Dec 2022, presenting today for follow-up       The patient was initially seen by me on 09/22/2022 for evaluation of a heart murmur, with complaints of exertional chest tightness  My review of his echocardiogram performed on 08/08/2022 showed moderate to severe asymmetric septal hypertrophy 1 6-1 7 cm and LAD territory regional wall motion abnormalities  He was sent for stress testing      Nuclear stress test on 09/29/2022 showed reversible perfusion defect in the mid to apical anterior and anterior septal segments  He was subsequently sent for cardiac catheterization      Cardiac catheterization on 10/07/2022 showed a 99% mid tubular LAD lesion with JUAN 1-2 flow, concern for   There was significant LVOT obstructive gradient, about 45 mmHg at rest but 125 mmHg post PVC  He was started on metoprolol XL 25 mg daily, no PCI performed  He returned for PCI which was performed on 12/28/22 with 3 25x38 mm MENDY to mLAD  He had a cardiac MRI on 11/18/22 for further evaluation of HOCM  The MRI showed wall thickness of 18 mm in the anteroseptum, Kirk and LVOT obstruction, normal RV size and function, and small foci of interstitial fibrosis in the basal interventricular septum, involving less than 5% of the myocardium      He reports resolution of exertional chest pressure and shortness of breath  It had initially improved with initiation of medical therapy, further improved now s/p PCI  Has not had BASS, presyncope, syncope  Home BP readings today 100-130 / 61-81  Most recent this /65 with HR 66  On Sunday morning 118/81 HR 80  ROS:  Review of Systems:  Review of Systems   Constitutional: Negative for activity change and unexpected weight change  HENT: Negative for facial swelling  Eyes: Negative for visual disturbance  Respiratory: Negative for cough and chest tightness  Cardiovascular: Negative for chest pain  Gastrointestinal: Negative for blood in stool  Musculoskeletal: Negative for myalgias  Skin: Negative for pallor  Allergic/Immunologic: Negative for immunocompromised state  Neurological: Negative for syncope and light-headedness  Psychiatric/Behavioral: Negative for agitation and hallucinations  Objective:     Vitals:   Vitals:    01/17/23 1136   BP: (!) 196/98   BP Location: Left arm   Patient Position: Sitting   Cuff Size: Standard   Pulse: 94   Weight: 79 7 kg (175 lb 12 8 oz)   Height: 5' 11" (1 803 m)    Body surface area is 2 meters squared  Wt Readings from Last 3 Encounters:   01/17/23 79 7 kg (175 lb 12 8 oz)   12/28/22 77 1 kg (170 lb)   11/02/22 79 5 kg (175 lb 3 2 oz)       Physical Exam:  General: Juliann Cardoso is a well appearing male, in no acute distress, sitting comfortably  HEENT: moist mucous membranes, EOMI  Neck:  No JVD, supple, trachea midline  Cardiovascular: unremarkable S1/S2, regular rate and rhythm, 3/6 systolic murmur  Pulmonary: normal respiratory effort, CTAB  Abdomen: soft and nondistended  Extremities: No lower extremity edema  Warm and well perfused extremities  Neuro: no focal motor deficits, AAOx3 (person, place, time)  Psych: Normal mood and affect, cooperative      Medications (at the START of this encounter):   Outpatient Medications Prior to Visit   Medication Sig Dispense Refill   • aspirin (ECOTRIN LOW STRENGTH) 81 mg EC tablet Take 1 tablet (81 mg total) by mouth daily 30 tablet 11   • chlorhexidine (PERIDEX) 0 12 % solution RINSE MOUTH WITH 1/2OZ FOR 30-60 SECONDS TWICE DAILY     • clopidogrel (PLAVIX) 75 mg tablet Take 1 tablet (75 mg total) by mouth daily Do not start before December 30, 2022  30 tablet 11   • levothyroxine (Euthyrox) 150 mcg tablet Take 1 tablet (150 mcg total) by mouth daily in the early morning 90 tablet 1   • metoprolol succinate (TOPROL-XL) 50 mg 24 hr tablet Take 1 tablet (50 mg total) by mouth daily 30 tablet 11   • omeprazole (PriLOSEC) 20 mg delayed release capsule Take 1 capsule by mouth daily     • rosuvastatin (CRESTOR) 40 MG tablet Take 1 tablet (40 mg total) by mouth daily 30 tablet 11     No facility-administered medications prior to visit  Labs & Results:          EKG personally reviewed:  n/a        Time Spent:  Total time (face-to-face and non-face-to-face) spent on today's visit was 20 minutes  This includes preparation for the visits (i e  reviewing test results), performance of a medically appropriate history and examination, and orders for medications, tests or other procedures  This time is exclusive of procedures performed and time spent teaching  This note was completed in part utilizing [employer] direct voice recognition software  Grammatical errors, random word insertion, spelling mistakes, occasional wrong word or "sound-alike" substitutions and incomplete sentences may be an occasional consequence of the system secondary to software limitations, ambient noise and hardware issues  At the time of dictation, efforts were made to edit, clarify and /or correct errors  Please read the chart carefully and recognize, using context, where substitutions have occurred  If you have any questions or concerns about the context, text or information contained within the body of this dictation, please contact myself, the provider, for further clarification

## 2023-01-18 ENCOUNTER — CLINICAL SUPPORT (OUTPATIENT)
Dept: CARDIAC REHAB | Facility: HOSPITAL | Age: 68
End: 2023-01-18

## 2023-01-18 DIAGNOSIS — Z95.5 STATUS POST INSERTION OF DRUG ELUTING CORONARY ARTERY STENT: Primary | ICD-10-CM

## 2023-01-19 ENCOUNTER — APPOINTMENT (OUTPATIENT)
Dept: CARDIAC REHAB | Facility: HOSPITAL | Age: 68
End: 2023-01-19

## 2023-01-20 ENCOUNTER — CLINICAL SUPPORT (OUTPATIENT)
Dept: CARDIAC REHAB | Facility: HOSPITAL | Age: 68
End: 2023-01-20

## 2023-01-20 DIAGNOSIS — Z95.5 STATUS POST INSERTION OF DRUG ELUTING CORONARY ARTERY STENT: Primary | ICD-10-CM

## 2023-01-24 ENCOUNTER — CLINICAL SUPPORT (OUTPATIENT)
Dept: CARDIAC REHAB | Facility: HOSPITAL | Age: 68
End: 2023-01-24

## 2023-01-24 DIAGNOSIS — Z95.5 STATUS POST INSERTION OF DRUG ELUTING CORONARY ARTERY STENT: Primary | ICD-10-CM

## 2023-01-24 NOTE — PROGRESS NOTES
Exercise session details
Detail Level: Detailed
Add 43122 Cpt? (Important Note: In 2017 The Use Of 28377 Is Being Tracked By Cms To Determine Future Global Period Reimbursement For Global Periods): yes

## 2023-01-26 ENCOUNTER — CLINICAL SUPPORT (OUTPATIENT)
Dept: CARDIAC REHAB | Facility: HOSPITAL | Age: 68
End: 2023-01-26

## 2023-01-26 DIAGNOSIS — Z95.5 STATUS POST INSERTION OF DRUG ELUTING CORONARY ARTERY STENT: Primary | ICD-10-CM

## 2023-01-27 ENCOUNTER — CLINICAL SUPPORT (OUTPATIENT)
Dept: CARDIAC REHAB | Facility: HOSPITAL | Age: 68
End: 2023-01-27

## 2023-01-27 DIAGNOSIS — Z95.5 STATUS POST INSERTION OF DRUG ELUTING CORONARY ARTERY STENT: Primary | ICD-10-CM

## 2023-01-30 ENCOUNTER — TELEPHONE (OUTPATIENT)
Dept: NON INVASIVE DIAGNOSTICS | Facility: HOSPITAL | Age: 68
End: 2023-01-30

## 2023-01-30 NOTE — TELEPHONE ENCOUNTER
LVM for PT to call back to reschedule his Echo stress test that is a Bike stress  He is scheduled at [facility] so test needs to be moved to here at [facility]

## 2023-01-31 ENCOUNTER — CLINICAL SUPPORT (OUTPATIENT)
Dept: CARDIAC REHAB | Facility: HOSPITAL | Age: 68
End: 2023-01-31

## 2023-01-31 ENCOUNTER — HOSPITAL ENCOUNTER (OUTPATIENT)
Dept: NON INVASIVE DIAGNOSTICS | Age: 68
Discharge: HOME | End: 2023-01-31

## 2023-01-31 DIAGNOSIS — Z95.5 STATUS POST INSERTION OF DRUG ELUTING CORONARY ARTERY STENT: Primary | ICD-10-CM

## 2023-02-02 ENCOUNTER — CLINICAL SUPPORT (OUTPATIENT)
Dept: CARDIAC REHAB | Facility: HOSPITAL | Age: 68
End: 2023-02-02

## 2023-02-02 DIAGNOSIS — Z95.5 STATUS POST INSERTION OF DRUG ELUTING CORONARY ARTERY STENT: Primary | ICD-10-CM

## 2023-02-03 ENCOUNTER — CLINICAL SUPPORT (OUTPATIENT)
Dept: CARDIAC REHAB | Facility: HOSPITAL | Age: 68
End: 2023-02-03

## 2023-02-03 DIAGNOSIS — Z95.5 STATUS POST INSERTION OF DRUG ELUTING CORONARY ARTERY STENT: Primary | ICD-10-CM

## 2023-02-06 ENCOUNTER — HOSPITAL ENCOUNTER (OUTPATIENT)
Dept: NON INVASIVE DIAGNOSTICS | Facility: HOSPITAL | Age: 68
Discharge: HOME | End: 2023-02-06

## 2023-02-06 DIAGNOSIS — I42.1 HOCM (HYPERTROPHIC OBSTRUCTIVE CARDIOMYOPATHY) (HCC): ICD-10-CM

## 2023-02-06 LAB
AORTIC ROOT: 3.8 CM
GLOBAL LONGITUIDAL STRAIN: -11 %
MAX HR PERCENT: 82 %
MAX HR: 126 BPM
RATE PRESSURE PRODUCT: NORMAL
SL CV ECHO LV DYNAMIC OBSTRUCTION PEAK GRADIENT (REST): 27 MMHG
SL CV ECHO LV DYNAMIC OBSTRUCTION PEAK GRADIENT (VALSAL: 42 MMHG
SL CV LV EF: 60
SL CV STRESS RECOVERY BP: NORMAL MMHG
SL CV STRESS RECOVERY HR: 93 BPM
SL CV STRESS RECOVERY O2 SAT: 98 %
SL CV STRESS STAGE REACHED: 4
STRESS ANGINA INDEX: 0
STRESS BASELINE BP: NORMAL MMHG
STRESS BASELINE HR: 88 BPM
STRESS DUKE TREADMILL SCORE: 7
STRESS O2 SAT REST: 99 %
STRESS PEAK HR: 108 BPM
STRESS POST ESTIMATED WORKLOAD: 5.4 METS
STRESS POST EXERCISE DUR MIN: 7 MIN
STRESS POST EXERCISE DUR SEC: 4 SEC
STRESS POST O2 SAT PEAK: 98 %
STRESS POST PEAK BP: 214 MMHG
STRESS ST DEPRESSION: 0 MM

## 2023-02-07 ENCOUNTER — CLINICAL SUPPORT (OUTPATIENT)
Dept: CARDIAC REHAB | Facility: HOSPITAL | Age: 68
End: 2023-02-07

## 2023-02-07 DIAGNOSIS — Z95.5 STATUS POST INSERTION OF DRUG ELUTING CORONARY ARTERY STENT: Primary | ICD-10-CM

## 2023-02-07 LAB
MAX DIASTOLIC BP: 100 MMHG
MAX HEART RATE: 126 BPM
MAX PREDICTED HEART RATE: 152 BPM
MAX. SYSTOLIC BP: 214 MMHG
PROTOCOL NAME: NORMAL
TARGET HR FORMULA: NORMAL
TEST INDICATION: NORMAL
TIME IN EXERCISE PHASE: NORMAL

## 2023-02-09 ENCOUNTER — CLINICAL SUPPORT (OUTPATIENT)
Dept: CARDIAC REHAB | Facility: HOSPITAL | Age: 68
End: 2023-02-09

## 2023-02-09 DIAGNOSIS — Z95.5 STATUS POST INSERTION OF DRUG ELUTING CORONARY ARTERY STENT: Primary | ICD-10-CM

## 2023-02-10 ENCOUNTER — CLINICAL SUPPORT (OUTPATIENT)
Dept: CARDIAC REHAB | Facility: HOSPITAL | Age: 68
End: 2023-02-10

## 2023-02-10 DIAGNOSIS — Z95.5 STATUS POST INSERTION OF DRUG ELUTING CORONARY ARTERY STENT: Primary | ICD-10-CM

## 2023-02-10 NOTE — PROGRESS NOTES
Cardiac Rehabilitation Plan of Care   30 Day Reassessment          Today's date: 2/10/2023   # of Exercise Sessions Completed: 15  Patient name: Mor Saavedra      : 1955  Age: 75 y o  MRN: [id number]  Referring Physician: Yuliana Hope MD  Cardiologist: Dr Dre Rosado  Provider: Ondina  Clinician: Dillon Kussmaul, MS, CEP      Dx:   Encounter Diagnosis   Name Primary? • Status post insertion of drug eluting coronary artery stent Yes     Date of onset: 2022      SUMMARY OF PROGRESS:  Mr Daniel Espinosa has started his cardiac rehabilitation program after recent stenting procedure  He has attended 15 sessions over the past 30 days and is attending his rehab sessions regularly 3x/week  He reports he has been feeling good with the exercise and he is enjoying coming to cardiac rehab  He is doing well progressing his exercise times and intensities  He is currently completing 40-50 min of aerobic exercise at 4 1 METs with stable hemodynamic response to exercise  He started strength training program today and will focus on 2x/week  He is participating in weekly education sessions on cardiac risk factor management  Will continue to progress exercise times and intensities as tolerated over next 30 days  His goals for his rehab program are to complete cardiac rehab program so that he is able to be cleared for further procedures leading to dental work, improve feelings of anxiety and learn to control BP in stressful situations (white coat syndrome, social interactions), and to establish a regular exercise program which he can maintain once rehab sessions are completed  He reports he is on target with his personal goals at 30 days  He reports he feels his blood pressure is getting better controlled  He has started daily meditation since starting rehab and he feels exercise program is helping ease some of his anxiety as well   He is attending rehab sessions regularly and feels he is benefiting from the program  He is establishing a regular exercise program at rehab, and he has also started doing more walking at home  He typically walks to and from work daily and he has added on 30 min of walking some days after work and also on the weekends  He reports he is doing well with his diet, and is unable to eat a lot of meat, fruit, and some other foods due to texture and taste since he had throat cancer and operation  Overall he feels he follows a pretty healthy diet  He denies any depression (PHQ-Q-9=0), but does admit to feelings of anxiety (FREEMAN-7=4)  He reports he has good emotional support from his significant other and family          Medication compliance: Yes   Comments: Pt reports to be compliant with medications  Fall Risk: Low   Comments: Ambulates with a steady gait with no assist device    EKG Interpretation: NSR      EXERCISE ASSESSMENT and PLAN    Exercise Prescription:      Frequency: 3 days/week   Supplement with home exercise 2+ days/wk as tolerated       Minutes: 40-50         METS: 4 1            HR: 30 beats above resting   RPE: 4-6         Modalities: Treadmill, UBE, Lifecycle, Rower, NuStep and Recumbent bike      30 Day Goals for Exercise Progression:    Frequency: 3 days/week of cardiac rehab       Supplement with home exercise 2+ days/wk as tolerated    Minutes: 40-60                              >150 mins/wk of moderate intensity exercise   METS: 4 1-4 5   HR: 30 beats above resting    RPE: 4-6   Modalities: Treadmill, UBE, Lifecycle, Elliptical, Rower, NuStep and Recumbent bike    Strength trainin-3 days / week  12-15 repetitions  1-2 sets per modality   Will be added following 2-3 weeks of monitored exercise sessions   Modalities: Pull Downs, Lateral Raise, Arm Extension, Arm Curl, Upright Rows, Front Raises and leg ext    Home Exercise: walking 5-6x/week, 10-30 min, 1-1 5 miles    Goals: 10% improvement in functional capacity - based on max METs achieved in fitness assessment, improved DASI score by 10%, Increase in exercise capacity by 40% - based on peak METs tolerated in cardiac rehab exercise session, Exercise 5 days/wk, >150mins/wk of moderate intensity exercise, Resume ADLs with increased strength, Attend Rehab regularly and start a home exercise program    Progression Toward Goals:  Pt is progressing and showing improvement  toward the following goals:  attending CR sessions 3x/week regularly, increasing strength and endurance with exercise, showing increased functional capacity with increased MET levels, establishing regular exercise program, adding more walking at home up to 30 min some evenings and on weekends  Education: benefit of exercise for CAD risk factors, home exercise guidelines, AHA guidelines to achieve >150 mins/wk of moderate exercise and RPE scale   Plan:education on home exercise guidelines, home exercise 30+ mins 2 days opposite CR and Education class: Risk Factors for Heart Disease  Readiness to change: Action      NUTRITION ASSESSMENT AND PLAN    Weight control:    Starting weight: 174 2 lb   Current weight: 175 4 lb    Diabetes: N/A  A1c: n/a    last measured: n/a    Lipid management: Discussed diet and lipid management and Last lipid profile 9/28/2022  Chol 207  TRG 95  HDL 30      Goals:LDL <100, HDL >40, TRG <150 and CHOL <200    Measurable goals were based Rate Your Plate Dietary Self-Assessment  These are the areas in which the patient could score higher on the assessment  Goals include recommendations for a heart healthy diet based on American Heart Association  Progression Toward Goals: Pt is progressing and showing improvement  toward the following goals:  reports doing well with diet and is trying to make some changes to his diet  He has trouble finding healthy foods that he is able to eat due to texture          Education: heart healthy eating  low sodium diet  hydration  nutrition for  lipid management  Plan: Education class: Reading Food Labels and Education Class: Heart Healthy Eating  Readiness to change: Action:  (Changing behavior)      PSYCHOSOCIAL ASSESSMENT AND PLAN    Emotional:  Depression assessment:  PHQ-9 = 0  0 =No Depression            Anxiety measure:  FREEMAN-7 = 4  0-4  = Not anxious  Self-reported stress level:  6-social (anxiety?) interactions  Social support: Excellent-significant other and family    Goals:  Reduce perceived stress to 1-3/10, PHQ-9 - reduced severity by one level, Feelings in [place] Score < 3, Physical Fitness in [place] Score < 3, Daily Activity in [place] Score < 3, Overall Health in [place] Score < 3, increased energy, Feel less anxious and manage BP    Progression Toward Goals: Pt is progressing and showing improvement  toward the following goals:  no concerns with depression at this time  Does admit to anxiety, but feels he is managing better  He reports he has started daily meditation which is helping anxiety and decreasing BP  Education: signs/sxs of depression, benefits of a positive support system and stress management techniques  Plan: Class: Stress and Your Health, Class: Relaxation, Refer to [employer], Practice relaxation techniques, Exercise, Enjoy a hobby and Keep a positive mindset  Readiness to change: Action      OTHER CORE COMPONENTS     Tobacco:   Social History     Tobacco Use   Smoking Status Former   Smokeless Tobacco Former       Tobacco Use Intervention:   N/A:  Patient is a non-smoker     Anginal Symptoms:  None   NTG use: No prescription    Blood pressure:    Restin//98   Exercise: 158//110    Goals: consistent BP < 130/80, reduced dietary sodium <2300mg, moderate intensity exercise >150 mins/wk and medication compliance    Progression Toward Goals: Pt is progressing and showing improvement  toward the following goals:  BP is being checked sporadically at rehab sessions due to white coat syndrome and causing anxiety   When checked BP it is elevated, but over time we have seen it trending down at rest  He keeps log at home and reports 130-140/70-80  Roxanne Castro         Education:  understanding high blood pressure and it's relationship to CAD and low sodium diet and HTN  Plan: Class: Understanding Heart Disease, Class: Common Heart Medications, Avoid Processed foods, engage in regular exercise, check labels for sodium content and manage BP better when in stressful situations  Readiness to change: Preparation:  (Getting ready to change)

## 2023-02-13 NOTE — PROGRESS NOTES
HCM Consult- Cardiology   Elvis Poe 75 y o  male MRN: [id number]  Unit/Bed#:  Encounter: [id number]    Patient Active Problem List    Diagnosis Date Noted   • Coronary artery disease involving native coronary artery of native heart without angina pectoris 01/17/2023   • Status post insertion of drug eluting coronary artery stent 12/28/2022   • HOCM (hypertrophic obstructive cardiomyopathy) ([facility] ) 10/14/2022   • Nonrheumatic aortic valve stenosis 09/22/2022   • Throat cancer ([facility] ) 06/23/2022   • History of head and neck cancer 06/23/2022   • Acute pain of right wrist 06/15/2022   • Arthralgia of multiple joints 02/29/2016   • Bilateral hip pain 02/29/2016   • Chronic low back pain 02/29/2016   • Myalgia 07/67/2767   • Folliculitis 04/87/0798   • Esophagitis, reflux 10/10/2012   • Hyperlipidemia 10/10/2012   • Primary hypertension 05/10/2012   • Hypothyroidism 05/10/2012     Plan: Mr Salvador Zavala was seen and evaluated with cardiology fellow, Dr Nathan Mcdermott  He was recently diagnosed with obstructive HCM  After being initiated on medical therapy along undergoing PCI for CAD his cardiac symptoms improved markedly  He has completed 15 cardiac rehab sessions on 2- with excellent progress  He walks daily for about an hour and works as a , which involves walking and taking stairs (2-3 flights), with no symptoms  Although his blood pressure is elevated at today's visit, he is known to have white coat syndrome  He reports BP being better controlled when checking at home: -130s, DBP 80-90s, and HR 70-80s  These readings are on metoprolol succinate 50 mg qd  We will change metoprolol to bisoprolol 5 mg qd for even better blood pressure control  He reports weight loss in the past few years due to dietary changes associated with difficulty swallowing along with loss of taste attributed to previous radical surgery and radiation therapy for cancer   He is also undergoing work up for jaw osteonecrosis with plan to have multiple teeth extraction once he is done with DAPT  Lipid panel from 9/2022 revealed elevated total cholesterol (207) and LDL (157) levels along with low HDL (30)  He has been taking rosuvastatin 40 mg qd for lipid management and we will recheck lipid panel  Today we also will initiate genetic testing to evaluate for pathogenic mutations associted with HCM and phenocopies and to help with family screening  He does have a heart murmur on examination today that appears to be consistent with aortic valve sclerosis/ stenosis  Mr Nain Patterson will be seen in [facility] clinic in 6 month  Physician Requesting Consult: Anand Montelongo MD  Reason for Consult / Principal Problem: HOCM    HPI: Mr Sha Arias is a 75year old man with past medical history of mild aortic valve stenosis, hyperlipidemia, hypertension, and coronary artery disease (s/p PCI for  of LAD in December 2022), head and neck cancer in 2006 (s/p surgery and radiation) who is referred for evaluation after recent diagnosis of obstructive HCM  He was seen by Dr Lina Payne on 09/22/2022 for evaluation of a heart murmur and complaint of exertional chest tightness  Re-interpretation of an echocardiogram performed earlier (08/08/2022) indicated severe asymmetric septal hypertrophy 1 6-1 7 cm and LAD territory regional wall motion abnormalities  Further evaluation included nuclear stress test on 09/29/2022 that showed reversible perfusion defect in the mid to apical anterior and anterior septal segments  Cardiac catheterization subsequently (10-) indicated a 99% tubular mid LAD lesion with JUAN 1-2 flow  Significant LVOT obstruction was noted during catheterization with a resting gradient of 45 mmHg that increased to 125 mmHg after a PVC  He was started on metoprolol XL 25 mg and underwent PCI of the LAD lesion on 12/28/22 with placement of a 3 25x38 mm MENDY   A cardiac MRI on 11/18/22 showed wall thickness of 18 mm in the anteroseptum, SHANNON and LVOT obstruction, normal RV size and function, and small foci of interstitial fibrosis in the basal interventricular septum, involving less than 5% of the myocardium  His symptoms improved markedly after initiation of medical therapy and PCI  Other work up has included:    48-hour ambulatory ECG monitor (11-4-2022): INDICATIONS: HOCM   FINDINGS:  1  A 48 hour holter monitor demonstrated normal sinus rhythm with an average rate of 66 BPM; a minimum rate of 46 BPM; and a maximum rate of 152 BPM   2  There were no ventricular ectopic beats  3  There were 56 supraventricular ectopic beats, including a 36 beat run of atrial tachycardia  4  The longest R-R interval was 1 3 seconds  5  The patient kept a diary during holter monitor  It demonstrated episodes of chest tightness that did not correlate to any dysrhythmia      IMPRESSION:  1  Abnormal 48 hour holter monitor  2  Patient in normal sinus rhythm throughout holter monitoring  3  No premature ventricular contractions  4  36 beat run of atrial tachycardia  5  No significant pauses  Symptoms on diary did not correlate to any dysrhythmia  Bicycle stress echo (2-6-2023): •  Left Ventricle: Left ventricular cavity size is normal  Wall thickness is severely increased  There is severe asymmetric hypertrophy of the septal wall  maximum septal wall thickness was 21 mm  The left ventricular ejection fraction is 60%  Systolic function is normal  Global longitudinal strain is reduced at -11%  There is focal akinesis of the apical anteroseptal segment  Diastolic function is abnormal  There is  outflow tract dynamic obstruction at rest with a peak gradient of 27 0 mmHg  There is outflow tract dynamic obstruction with valsalva with a peak gradient of 42 0 mmHg  •  Aortic Valve: The aortic valve is trileaflet  The leaflets are moderately thickened  The leaflets are moderately calcified  There is mildly reduced mobility  •  Mitral Valve:  There is mild annular calcification  There is systolic anterior motion of the anterior leaflet with late peaking gradient  •  Stress ECG: No ST deviation is noted  There were no arrhythmias during recovery    The stress ECG is negative for ischemia after maximal exercise, without reproduction of symptoms  •  Post Stress Echo: Left ventricle cavity has normal reduction in size post-stress  The left ventricle systolic function is hyperdynamic post-stress  The post-stress echo showed unchanged wall motion abnormalities compared to baseline  •  Echo Post Impression: Study was negative for inducible myocardial ischemia after submaximal exercise  There was no inducible high outflow tract gradients at peak exercise  Borderline aortic valve stenosis was present  Risk stratification:  Nonsustained ventricular tachycardia - No  Severe left ventricular hypertrophy (>30 mm) - No  Family history of sudden death: No  Unexplained syncope: No  LVOT obstruction: yes  Atrial fibrillation and left atrial dilation: no  Age - 75  NYHA class 1  Myocardial fibrosis - 5%  LV systolic dysfunction - No  Apical aneurysm - apical akinesis due to CAD    Review of systems: Denies exertional shortnes of breath, chest pain, dizziness, lower extremity edema, or palpitations  Family history: Father,  at age of 59, presumably from MI, was heavy smoker, had CABG at 64  Mother,  at age of 68 from GBS  He had 4 sisters, 2 brothers  1 sister  at age of 54 from kidney failure and one brother  at age of 72 from prostate cancer  Living sisters' age ranges from 48-76, have HTN and diabetes  Living brother is 58 and has HLD, and HTN  Mr Obed Coleman has 2 sons: 28 and 46 yo, both have white coat syndrome related HTN    Denies history of sudden cardiac death in the close or distant family    Genetic testing: initiated at this visit    Historical Information   Past Medical History:   Diagnosis Date   • Anxiety    • Cancer [facility])    • Chemotherapy follow-up examination    • History of radiation therapy 2006   • Hypertension      Past Surgical History:   Procedure Laterality Date   • ANKLE SURGERY Left    • CARDIAC CATHETERIZATION Left 10/7/2022    Procedure: Cardiac catheterization;  Surgeon: Joann Larose MD;  Location: [facility]; Service: Cardiology   • CARDIAC CATHETERIZATION Left 10/7/2022    Procedure: Cardiac Left Heart Cath;  Surgeon: Joann Larose MD;  Location: [facility]; Service: Cardiology   • CARDIAC CATHETERIZATION N/A 10/7/2022    Procedure: Cardiac Coronary Angiogram;  Surgeon: Joann Larose MD;  Location: [facility]; Service: Cardiology   • CARDIAC CATHETERIZATION N/A 12/28/2022    Procedure: Cardiac pci;  Surgeon: Janeth Garcia MD;  Location: [facility];   Service: Cardiology   • NECK SURGERY Right    • SHOULDER OPEN ROTATOR CUFF REPAIR Right      Family History   Problem Relation Age of Onset   • Hypertension Mother    • Obesity Mother    • Diabetes Father    • Heart attack Father    • Stroke Father    • Heart disease Father    • Hyperlipidemia Sister    • Obesity Sister    • Hypertension Sister    • Diabetes Sister    • Kidney disease Sister    • Hyperlipidemia Brother    • Hypertension Brother    • Diabetes Brother    • Prostate cancer Brother      Current Outpatient Medications on File Prior to Visit   Medication Sig Dispense Refill   • aspirin (ECOTRIN LOW STRENGTH) 81 mg EC tablet Take 1 tablet (81 mg total) by mouth daily 30 tablet 11   • chlorhexidine (PERIDEX) 0 12 % solution RINSE MOUTH WITH 1/2OZ FOR 30-60 SECONDS TWICE DAILY     • clopidogrel (PLAVIX) 75 mg tablet Take 1 tablet (75 mg total) by mouth daily Do not start before December 30, 2022  30 tablet 11   • famotidine (PEPCID) 20 mg tablet Take 1 tablet (20 mg total) by mouth in the morning 30 tablet 11   • levothyroxine (Euthyrox) 150 mcg tablet Take 1 tablet (150 mcg total) by mouth daily in the early morning 90 tablet 1   • metoprolol succinate (TOPROL-XL) 50 mg 24 hr tablet Take 1 tablet (50 mg total) by mouth daily 30 tablet 11   • rosuvastatin (CRESTOR) 40 MG tablet Take 1 tablet (40 mg total) by mouth daily 30 tablet 11     No current facility-administered medications on file prior to visit  No Known Allergies  Social History     Substance and Sexual Activity   Alcohol Use Yes   • Alcohol/week: 4 0 - 6 0 standard drinks   • Types: 2 - 3 Cans of beer, 2 - 3 Shots of liquor per week    Comment: once a mnth     Social History     Substance and Sexual Activity   Drug Use Yes   • Types: Marijuana    Comment: occ last used 10/6     Social History     Tobacco Use   Smoking Status Former   Smokeless Tobacco Former     Objective   Vitals:    02/14/23 0904 02/14/23 0909   BP: (!) 187/113 162/98   BP Location: Right arm Left arm   Patient Position: Sitting Sitting   Cuff Size: Standard Standard   Pulse: 86    SpO2: 99%    Weight: 77 7 kg (171 lb 3 2 oz)    Body surface area is 1 97 meters squared  Body mass index is 23 88 kg/m²      Invasive Devices     Peripheral Intravenous Line  Duration           Peripheral IV 12/28/22 Left Antecubital 46 days          Line  Duration           Arterial Sheath -- days              Physical Exam:  GEN: Ary Thompson appears well, alert and oriented x 3, pleasant and cooperative   HEENT: pupils equal, round, and reactive to light; extraocular muscles intact  NECK: supple, no carotid bruits   HEART: regular rhythm, normal S1 and S2, 3/6 systolic murmur with radiation to the carotids  LUNGS: clear to auscultation bilaterally; no wheezes, rales, or rhonchi   ABDOMEN: normal bowel sounds, soft, no tenderness, no distention  EXTREMITIES: peripheral pulses normal; no clubbing, cyanosis, or edema  NEURO: no focal findings   SKIN: normal without suspicious lesions on exposed skin    Lab Results:   Lab Results   Component Value Date    WBC 10 12 12/29/2022    RBC 4 41 12/29/2022    HGB 13 2 12/29/2022    HCT 41 4 12/29/2022    MCV 94 12/29/2022     12/29/2022    RDW 14 6 12/29/2022     Lab Results   Component Value Date     02/29/2016    K 4 0 12/29/2022     12/29/2022    CO2 28 12/29/2022    BUN 10 12/29/2022    CREATININE 0 63 12/29/2022    EGFR 101 12/29/2022    GLUCOSE 112 (H) 02/29/2016    CALCIUM 9 5 12/29/2022    AST 24 12/17/2022    ALT 29 12/17/2022    ALKPHOS 71 12/17/2022    PROT 7 2 02/29/2016    BILITOT 0 5 02/29/2016     No results found for: MG  Lab Results   Component Value Date    CHOL 287 (H) 02/29/2016    HDL 30 (L) 09/28/2022    TRIG 95 09/28/2022    LDLCALC 157 (H) 09/28/2022     Lab Results   Component Value Date    ZRE8BRYQRZPY 0 545 01/07/2023    FREET4 1 3 12/09/2022     Imaging:   I have personally reviewed pertinent films in PACS  Stress strip    Result Date: 2/7/2023  Narrative: Confirmed by Feliciano Gomez ([id number]),  Ihsan Miller ([id number]) on 2/7/2023 9:09:59 AM    Echo stress test, exercise    Result Date: 2/6/2023  Narrative: •  Left Ventricle: Left ventricular cavity size is normal  Wall thickness is severely increased  There is severe asymmetric hypertrophy of the septal wall  maximum septal wall thickness was 21 mm  The left ventricular ejection fraction is 60%  Systolic function is normal  Global longitudinal strain is reduced at -11%  There is focal akinesis of the apical anteroseptal segment  Diastolic function is abnormal  There is  outflow tract dynamic obstruction at rest with a peak gradient of 27 0 mmHg  There is outflow tract dynamic obstruction with valsalva with a peak gradient of 42 0 mmHg  •  Aortic Valve: The aortic valve is trileaflet  The leaflets are moderately thickened  The leaflets are moderately calcified  There is mildly reduced mobility  •  Mitral Valve: There is mild annular calcification  There is systolic anterior motion of the anterior leaflet with late peaking gradient  •  Stress ECG: No ST deviation is noted   There were no arrhythmias during recovery    The stress ECG is negative for ischemia after maximal exercise, without reproduction of symptoms  •  Post Stress Echo: Left ventricle cavity has normal reduction in size post-stress  The left ventricle systolic function is hyperdynamic post-stress  The post-stress echo showed unchanged wall motion abnormalities compared to baseline  •  Echo Post Impression: Study was negative for inducible myocardial ischemia after submaximal exercise  There was no inducible high outflow tract gradients at peak exercise  Borderline aortic valve stenosis was present  Strain was performed to quantify interventricular dyssynchrony and evaluate components of myocardial function due to HCM  Results from the utilization of Strain Analysis are listed in the report below  MRI cardiac  w wo contrast  Status: Final result     PACS Images   Show images for MRI cardiac w wo contrast  Study Result  Narrative & Impression   Cardiac MRI with and without contrast     INDICATION:  I42 1: Obstructive hypertrophic cardiomyopathy      Technique:  1  3 plane SSFP localizers  2  SSFP cine imaging in long and short axis plane  3  T2 weighted DIR FSE in short axis plane  4  15 ml gadobutrol power injected  5  2D inversion recovery FGRE for delayed myocardial enhancement  6  The patient tolerated the procedure well without complication      Measurements:   Gregg-septal wall 18 mm  Postero-lateral wall 9 mm  Left ventricular end-diastolic dimension 38 mm  Left ventricular end-systolic dimension 20 mm  Ejection fraction 65 % by visual estimate, as artifact from ectopy renders postprocessing software unreliable  Left atrium 29 mm  Aortic Root 29 mm     Findings:    1  Normal left ventricular systolic function  Basal interventricular septal hypertrophy measuring up to 18 mm with a spiral pattern of hypertrophy from base to apex    Systolic flow acceleration within the LVOT with systolic anterior motion of the   anterior mitral valve leaflet  Normal LV cavity size  2  Normal right ventricular size and systolic function by visual estimate  3  The aortic, mitral, and tricuspid valves open without restriction  There is no significant valvular regurgitation, however cine MRI is inaccurate in the qualitative assessment of valvular regurgitation  4  The left atrium is normal in size  The aortic root is normal in size  5  There is no evidence of myocardial edema  6  Delayed post-gadolinium imaging demonstrates possible small foci of delayed gadolinium enhancement in the basal interventricular septum, involving less than 5% of the myocardium, noting limited evaluation due to poor myocardial nulling      IMPRESSION:  Impression:  1  Findings of hypertrophic cardiomyopathy with a component of SHANNON and LVOT obstruction  Normal left ventricular systolic function  2  Normal right ventricular size and systolic function  3  Normal bilateral atria  No valvular abnormalities  4  Possible small foci of interstitial fibrosis in the basal interventricular septum, involving less than 5% of myocardium      Workstation performed: [id number]      EKG:       Counseling / Coordination of Care  Total floor / unit time spent today 60 minutes  Greater than 50% of total time was spent with the patient and / or family counseling and / or coordination of care

## 2023-02-14 ENCOUNTER — APPOINTMENT (OUTPATIENT)
Dept: CARDIAC REHAB | Facility: HOSPITAL | Age: 68
End: 2023-02-14

## 2023-02-14 ENCOUNTER — OFFICE VISIT (OUTPATIENT)
Dept: CARDIAC SURGERY | Facility: CLINIC | Age: 68
End: 2023-02-14

## 2023-02-14 VITALS
SYSTOLIC BLOOD PRESSURE: 162 MMHG | BODY MASS INDEX: 23.88 KG/M2 | HEART RATE: 86 BPM | WEIGHT: 171.2 LBS | DIASTOLIC BLOOD PRESSURE: 98 MMHG | OXYGEN SATURATION: 99 %

## 2023-02-14 DIAGNOSIS — I42.1 HOCM (HYPERTROPHIC OBSTRUCTIVE CARDIOMYOPATHY) (HCC): ICD-10-CM

## 2023-02-14 DIAGNOSIS — I25.10 CORONARY ARTERY DISEASE INVOLVING NATIVE CORONARY ARTERY OF NATIVE HEART, UNSPECIFIED WHETHER ANGINA PRESENT: ICD-10-CM

## 2023-02-14 DIAGNOSIS — I35.0 NONRHEUMATIC AORTIC VALVE STENOSIS: Primary | ICD-10-CM

## 2023-02-14 RX ORDER — BISOPROLOL FUMARATE 5 MG/1
2.5 TABLET, FILM COATED ORAL 2 TIMES DAILY
Qty: 90 TABLET | Refills: 3 | Status: SHIPPED | OUTPATIENT
Start: 2023-02-14

## 2023-02-14 RX ORDER — BISOPROLOL FUMARATE 10 MG/1
10 TABLET, FILM COATED ORAL DAILY
Qty: 90 TABLET | Refills: 3 | Status: CANCELLED | OUTPATIENT
Start: 2023-02-14

## 2023-02-16 ENCOUNTER — CONSULT (OUTPATIENT)
Dept: DERMATOLOGY | Facility: CLINIC | Age: 68
End: 2023-02-16

## 2023-02-16 ENCOUNTER — CLINICAL SUPPORT (OUTPATIENT)
Dept: CARDIAC REHAB | Facility: HOSPITAL | Age: 68
End: 2023-02-16

## 2023-02-16 VITALS — BODY MASS INDEX: 24.08 KG/M2 | WEIGHT: 172 LBS | TEMPERATURE: 97.7 F | HEIGHT: 71 IN

## 2023-02-16 DIAGNOSIS — C44.219 BASAL CELL CARCINOMA OF AURICLE OF LEFT EAR: ICD-10-CM

## 2023-02-16 DIAGNOSIS — Z87.2 HISTORY OF DERMATITIS: ICD-10-CM

## 2023-02-16 DIAGNOSIS — Z95.5 STATUS POST INSERTION OF DRUG ELUTING CORONARY ARTERY STENT: Primary | ICD-10-CM

## 2023-02-16 DIAGNOSIS — L98.9 SKIN LESION: ICD-10-CM

## 2023-02-16 DIAGNOSIS — D48.5 NEOPLASM OF UNCERTAIN BEHAVIOR OF SKIN: Primary | ICD-10-CM

## 2023-02-16 DIAGNOSIS — D18.01 HEMANGIOMA OF SKIN: ICD-10-CM

## 2023-02-16 NOTE — PATIENT INSTRUCTIONS
NEOPLASM OF UNCERTAIN BEHAVIOR OF SKIN    Assessment and Plan:  I have discussed with the patient that a sample of skin via a "skin biopsy” would be potentially helpful to further make a specific diagnosis under the microscope  Based on a thorough discussion of this condition and the management approach to it (including a comprehensive discussion of the known risks, side effects and potential benefits of treatment), the patient (family) agrees to implement the following specific plan:    Procedure:  Skin Biopsy  After a thorough discussion of treatment options and risk/benefits/alternatives (including but not limited to local pain, scarring, dyspigmentation, blistering, possible superinfection, and inability to confirm a diagnosis via histopathology), verbal and written consent were obtained and portion of the rash was biopsied for tissue sample  See below for consent that was obtained from patient and subsequent Procedure Note  INFORMED CONSENT DISCUSSION AND POST-OPERATIVE INSTRUCTIONS FOR PATIENT    I   RATIONALE FOR PROCEDURE  I understand that a skin biopsy allows the Dermatologist to test a lesion or rash under the microscope to obtain a diagnosis  It usually involves numbing the area with numbing medication and removing a small piece of skin; sometimes the area will be closed with sutures  In this specific procedure, sutures are not usually needed  If any sutures are placed, then they are usually need to be removed in 2 weeks or less  I understand that my Dermatologist recommends that a skin "shave" biopsy be performed today  A local anesthetic, similar to the kind that a dentist uses when filling a cavity, will be injected with a very small needle into the skin area to be sampled  The injected skin and tissue underneath "will go to sleep” and become numb so no pain should be felt afterwards    An instrument shaped like a tiny "razor blade" (shave biopsy instrument) will be used to cut a small piece of tissue and skin from the area so that a sample of tissue can be taken and examined more closely under the microscope  A slight amount of bleeding will occur, but it will be stopped with direct pressure and a pressure bandage and any other appropriate methods  I understands that a scar will form where the wound was created  Surgical ointment will be applied to help protect the wound  Sutures are not usually needed  II   RISKS AND POTENTIAL COMPLICATIONS   I understand the risks and potential complications of a skin biopsy include but are not limited to the following:  Bleeding  Infection  Pain  Scar/keloid  Skin discoloration  Incomplete Removal  Recurrence  Nerve Damage/Numbness/Loss of Function  Allergic Reaction to Anesthesia  Biopsies are diagnostic procedures and based on findings additional treatment or evaluation may be required  Loss or destruction of specimen resulting in no additional findings    My Dermatologist has explained to me the nature of the condition, the nature of the procedure, and the benefits to be reasonably expected compared with alternative approaches  My Dermatologist has discussed the likelihood of major risks or complications of this procedure including the specific risks listed above, such as bleeding, infection, and scarring/keloid  I understand that a scar is expected after this procedure  I understand that my physician cannot predict if the scar will form a "keloid," which extends beyond the borders of the wound that is created  A keloid is a thick, painful, and bumpy scar  A keloid can be difficult to treat, as it does not always respond well to therapy, which includes injecting cortisone directly into the keloid every few weeks  While this usually reduces the pain and size of the scar, it does not eliminate it  I understand that photographs may be taken before and after the procedure    These will be maintained as part of the medical providers confidential records and may not be made available to me  I further authorize the medical provider to use the photographs for teaching purposes or to illustrate scientific papers, books, or lectures if in his/her judgment, medical research, education, or science may benefit from its use  I have had an opportunity to fully inquire about the risks and benefits of this procedure and its alternatives  I have been given ample time and opportunity to ask questions and to seek a second opinion if I wished to do so  I acknowledge that there have specifically been no guarantees as to the cosmetic results from the procedure  I am aware that with any procedure there is always the possibility of an unexpected complication  III  POST-PROCEDURAL CARE (WHAT YOU WILL NEED TO DO "AFTER THE BIOPSY" TO OPTIMIZE HEALING)    Keep the area clean and dry  Try NOT to remove the bandage or get it wet for the first 24 hours  Gently clean the area and apply surgical ointment (such as Vaseline petrolatum ointment, which is available "over the counter" and not a prescription) to the biopsy site for up to 2 weeks straight  This acts to protect the wound from the outside world  Sutures are not usually placed in this procedure  If any sutures were placed, return for suture removal as instructed (generally 1 week for the face, 2 weeks for the body)  Take Acetaminophen (Tylenol) for discomfort, if no contraindications  Ibuprofen or aspirin could make bleeding worse  Call our office immediately for signs of infection: fever, chills, increased redness, warmth, tenderness, discomfort/pain, or pus or foul smell coming from the wound  WHAT TO DO IF THERE IS ANY BLEEDING? If a small amount of bleeding is noticed, place a clean cloth over the area and apply firm pressure for ten minutes  Check the wound after 10 minutes of direct pressure    If bleeding persists, try one more time for an additional 10 minutes of direct pressure on the area   If the bleeding becomes heavier or does not stop after the second attempt, or if you have any other questions about this procedure, then please call your [facility] [address]'s Dermatologist by calling [phone] (SKIN)  I hereby acknowledge that I have reviewed and verified the site with my Dermatologist and have requested and authorized my Dermatologist to proceed with the procedure        HISTORY OF DERMATITIS    Assessment and Plan:  Based on a thorough discussion of this condition and the management approach to it (including a comprehensive discussion of the known risks, side effects and potential benefits of treatment), the patient (family) agrees to implement the following specific plan:  No treatment at today's visit

## 2023-02-16 NOTE — PROGRESS NOTES
[facility] Note     Patient Name: Mor Saavedra  Encounter Date: 2/16/23    Have you been cared for by a [place] [address] Dermatologist in the last 3 years and, if so, which description applies to you? NO  I am considered a "new" patient and must complete all patient intake questions  I am MALE/not capable of bearing children  REVIEW OF SYSTEMS:  Have you recently had or currently have any of the following? · Recent fever or chills? No  · Any non-healing wound? YES, behind left ear   PAST MEDICAL HISTORY:  Have you personally ever had or currently have any of the following? If "YES," then please provide more detail  · Skin cancer (such as Melanoma, Basal Cell Carcinoma, Squamous Cell Carcinoma? No  · Tuberculosis, HIV/AIDS, Hepatitis B or C: No  · Systemic Immunosuppression such as Diabetes, Biologic or Immunotherapy, Chemotherapy, Organ Transplantation, Bone Marrow Transplantation YES, chemo ,2006  · Radiation Treatment YES, 2006   FAMILY HISTORY:  Any "first degree relatives" (parent, brother, sister, or child) with the following? • Skin Cancer, Pancreatic or Other Cancer? YES, maternal grandfather had hx of skin cancer   PATIENT EXPERIENCE:    • Do you want the Dermatologist to perform a COMPLETE skin exam today including a clinical examination under the "bra and underwear" areas? NO  • If necessary, do we have your permission to call and leave a detailed message on your Preferred Phone number that includes your specific medical information?   Yes      No Known Allergies   Current Outpatient Medications:   •  aspirin (ECOTRIN LOW STRENGTH) 81 mg EC tablet, Take 1 tablet (81 mg total) by mouth daily, Disp: 30 tablet, Rfl: 11  •  bisoprolol (ZEBETA) 5 mg tablet, Take 0 5 tablets (2 5 mg total) by mouth 2 (two) times a day, Disp: 90 tablet, Rfl: 3  •  chlorhexidine (PERIDEX) 0 12 % solution, RINSE MOUTH WITH 1/2OZ FOR 30-60 SECONDS TWICE DAILY, Disp: , Rfl:   •  clopidogrel (PLAVIX) 75 mg tablet, Take 1 tablet (75 mg total) by mouth daily Do not start before December 30, 2022 , Disp: 30 tablet, Rfl: 11  •  famotidine (PEPCID) 20 mg tablet, Take 1 tablet (20 mg total) by mouth in the morning, Disp: 30 tablet, Rfl: 11  •  levothyroxine (Euthyrox) 150 mcg tablet, Take 1 tablet (150 mcg total) by mouth daily in the early morning, Disp: 90 tablet, Rfl: 1  •  rosuvastatin (CRESTOR) 40 MG tablet, Take 1 tablet (40 mg total) by mouth daily, Disp: 30 tablet, Rfl: 11          • Whom besides the patient is providing clinical information about today's encounter?   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 back in 2006, patient doesn't remember what cancer it was    Assessment and Plan:  • I have discussed with the patient that a sample of skin via a "skin biopsy” would be potentially helpful to further make a specific diagnosis under the microscope  • Based on a thorough discussion of this condition and the management approach to it (including a comprehensive discussion of the known risks, side effects and potential benefits of treatment), the patient (family) agrees to implement the following specific plan:    o Procedure:  Skin Biopsy    After a thorough discussion of treatment options and risk/benefits/alternatives (including but not limited to local pain, scarring, dyspigmentation, blistering, possible superinfection, and inability to confirm a diagnosis via histopathology), verbal and written consent were obtained and portion of the rash was biopsied for tissue sample  See below for consent that was obtained from patient and subsequent Procedure Note  PROCEDURE TANGENTIAL (SHAVE) BIOPSY NOTE:    • Performing Physician: Dr Girard  • Anatomic Location; Clinical Description with size (cm); Pre-Op Diagnosis:   ATTENTION:  DERMPATH GROUP    SPECIMEN A; Skin; Anatomic Location: Left post auricular ear; Procedure/Protocol: Skin Specimen (submit in [facility]):Tangential Biopsy (includes shave, scoop, saucerization, curette) (CPT 81010; each additional tangential biopsy is CPT 60840)   75y o  year old  Male with a Morphological Description 1 1 cm eroted pearly pink plaque:  Differential Diagnosis and/or Specific Clinical Question: Rule out:  BCC    • Post-op diagnosis: Same     • Local anesthesia: 1% xylocaine     • Topical anesthesia: None    • Hemostasis: Aluminum chloride       After obtaining informed consent  at which time there was a discussion about the purpose of biopsy  and low risks of infection and bleeding  The area was prepped and draped in the usual fashion  Anesthesia was obtained with 1% lidocaine with epinephrine  A shave biopsy to an appropriate sampling depth was obtained by Shave (Dermablade or 15 blade) The resulting wound was covered with surgical ointment and bandaged appropriately  The patient tolerated the procedure well without complications and was without signs of functional compromise  Specimen has been sent for review by Dermatopathology  Standard post-procedure care has been explained and has been included in written form within the patient's copy of Informed Consent  INFORMED CONSENT DISCUSSION AND POST-OPERATIVE INSTRUCTIONS FOR PATIENT    I   RATIONALE FOR PROCEDURE  I understand that a skin biopsy allows the Dermatologist to test a lesion or rash under the microscope to obtain a diagnosis    It usually involves numbing the area with numbing medication and removing a small piece of skin; sometimes the area will be closed with sutures  In this specific procedure, sutures are not usually needed  If any sutures are placed, then they are usually need to be removed in 2 weeks or less  I understand that my Dermatologist recommends that a skin "shave" biopsy be performed today  A local anesthetic, similar to the kind that a dentist uses when filling a cavity, will be injected with a very small needle into the skin area to be sampled  The injected skin and tissue underneath "will go to sleep” and become numb so no pain should be felt afterwards  An instrument shaped like a tiny "razor blade" (shave biopsy instrument) will be used to cut a small piece of tissue and skin from the area so that a sample of tissue can be taken and examined more closely under the microscope  A slight amount of bleeding will occur, but it will be stopped with direct pressure and a pressure bandage and any other appropriate methods  I understands that a scar will form where the wound was created  Surgical ointment will be applied to help protect the wound  Sutures are not usually needed  II   RISKS AND POTENTIAL COMPLICATIONS   I understand the risks and potential complications of a skin biopsy include but are not limited to the following:  • Bleeding  • Infection  • Pain  • Scar/keloid  • Skin discoloration  • Incomplete Removal  • Recurrence  • Nerve Damage/Numbness/Loss of Function  • Allergic Reaction to Anesthesia  • Biopsies are diagnostic procedures and based on findings additional treatment or evaluation may be required  • Loss or destruction of specimen resulting in no additional findings    My Dermatologist has explained to me the nature of the condition, the nature of the procedure, and the benefits to be reasonably expected compared with alternative approaches    My Dermatologist has discussed the likelihood of major risks or complications of this procedure including the specific risks listed above, such as bleeding, infection, and scarring/keloid  I understand that a scar is expected after this procedure  I understand that my physician cannot predict if the scar will form a "keloid," which extends beyond the borders of the wound that is created  A keloid is a thick, painful, and bumpy scar  A keloid can be difficult to treat, as it does not always respond well to therapy, which includes injecting cortisone directly into the keloid every few weeks  While this usually reduces the pain and size of the scar, it does not eliminate it  I understand that photographs may be taken before and after the procedure  These will be maintained as part of the medical providers confidential records and may not be made available to me  I further authorize the medical provider to use the photographs for teaching purposes or to illustrate scientific papers, books, or lectures if in his/her judgment, medical research, education, or science may benefit from its use  I have had an opportunity to fully inquire about the risks and benefits of this procedure and its alternatives  I have been given ample time and opportunity to ask questions and to seek a second opinion if I wished to do so  I acknowledge that there have specifically been no guarantees as to the cosmetic results from the procedure  I am aware that with any procedure there is always the possibility of an unexpected complication  III  POST-PROCEDURAL CARE (WHAT YOU WILL NEED TO DO "AFTER THE BIOPSY" TO OPTIMIZE HEALING)    • Keep the area clean and dry  Try NOT to remove the bandage or get it wet for the first 24 hours  • Gently clean the area and apply surgical ointment (such as Vaseline petrolatum ointment, which is available "over the counter" and not a prescription) to the biopsy site for up to 2 weeks straight  This acts to protect the wound from the outside world        • Sutures are not usually placed in this procedure  If any sutures were placed, return for suture removal as instructed (generally 1 week for the face, 2 weeks for the body)  • Take Acetaminophen (Tylenol) for discomfort, if no contraindications  Ibuprofen or aspirin could make bleeding worse  • Call our office immediately for signs of infection: fever, chills, increased redness, warmth, tenderness, discomfort/pain, or pus or foul smell coming from the wound  WHAT TO DO IF THERE IS ANY BLEEDING? If a small amount of bleeding is noticed, place a clean cloth over the area and apply firm pressure for ten minutes  Check the wound after 10 minutes of direct pressure  If bleeding persists, try one more time for an additional 10 minutes of direct pressure on the area  If the bleeding becomes heavier or does not stop after the second attempt, or if you have any other questions about this procedure, then please call your [facility] Dermatologist by calling [phone] (SKIN)  I hereby acknowledge that I have reviewed and verified the site with my Dermatologist and have requested and authorized my Dermatologist to proceed with the procedure  HISTORY OF DERMATITIS  Physical Exam:  • Anatomic Location Affected:  forehead  • Morphological Description:  Clear today  • Pertinent Positives:  • Pertinent Negatives: Additional History of Present Condition:  Patient noted that rash comes and goes; had for about 1 year, applied clindamycin in past    Assessment and Plan:  Based on a thorough discussion of this condition and the management approach to it (including a comprehensive discussion of the known risks, side effects and potential benefits of treatment), the patient (family) agrees to implement the following specific plan:  • No treatment at today's visit      The patient has a 0 4 cm bright red vascular papule at the superior aspect of the base of his left ear, consistent with a longstanding hemangioma dating back years    It has been examined by multiple healthcare providers  It does not bother him  Return to soon as possible with any new or changing skin lesions    Scribe Attestation    I,:  Bharath Cables am acting as a scribe while in the presence of the attending physician :       I,:  Uriel Freitas MD personally performed the services described in this documentation    as scribed in my presence :

## 2023-02-17 ENCOUNTER — TELEPHONE (OUTPATIENT)
Dept: CARDIOLOGY CLINIC | Facility: CLINIC | Age: 68
End: 2023-02-17

## 2023-02-17 ENCOUNTER — CLINICAL SUPPORT (OUTPATIENT)
Dept: CARDIAC REHAB | Facility: HOSPITAL | Age: 68
End: 2023-02-17

## 2023-02-17 DIAGNOSIS — Z95.5 STATUS POST INSERTION OF DRUG ELUTING CORONARY ARTERY STENT: Primary | ICD-10-CM

## 2023-02-21 ENCOUNTER — CLINICAL SUPPORT (OUTPATIENT)
Dept: CARDIAC REHAB | Facility: HOSPITAL | Age: 68
End: 2023-02-21

## 2023-02-21 DIAGNOSIS — Z95.5 STATUS POST INSERTION OF DRUG ELUTING CORONARY ARTERY STENT: Primary | ICD-10-CM

## 2023-02-22 ENCOUNTER — TELEPHONE (OUTPATIENT)
Dept: CARDIAC SURGERY | Facility: CLINIC | Age: 68
End: 2023-02-22

## 2023-02-22 NOTE — TELEPHONE ENCOUNTER
Patient was seen in the [facility] clinic on 2/14/2023 and at the time his blood pressure was noted to be elevated  His metoprolol was changed to bisoprolol 5 mg daily I contacted the patient today and attempt to obtain updated blood pressure readings  I left a message for the patient, with my reason for call and asked him to return call to our office with updated blood pressure readings  We will await patient's return call      UPDATE from 2/23/2023    Patient returned an email with the following BP readings since medication change as above    2/17 8am 117/65 HR 67, 2/18 8am 136/87 HR 63, 2/19 8am 120/77 HR 63, 2/20 7am 119/74 HR66, 2/21 8am 110/68 HR 63, 2/22 1am 112/73 HR61, 8am 128/77 HR 69, 11am 111/68 HR 62    Will inform patient that these are acceptable numbers and we will continue current med regimen

## 2023-02-23 ENCOUNTER — CLINICAL SUPPORT (OUTPATIENT)
Dept: CARDIAC REHAB | Facility: HOSPITAL | Age: 68
End: 2023-02-23

## 2023-02-23 DIAGNOSIS — Z95.5 STATUS POST INSERTION OF DRUG ELUTING CORONARY ARTERY STENT: Primary | ICD-10-CM

## 2023-02-24 ENCOUNTER — CLINICAL SUPPORT (OUTPATIENT)
Dept: CARDIAC REHAB | Facility: HOSPITAL | Age: 68
End: 2023-02-24

## 2023-02-24 DIAGNOSIS — Z95.5 STATUS POST INSERTION OF DRUG ELUTING CORONARY ARTERY STENT: Primary | ICD-10-CM

## 2023-02-28 ENCOUNTER — TELEPHONE (OUTPATIENT)
Dept: DERMATOLOGY | Facility: CLINIC | Age: 68
End: 2023-02-28

## 2023-02-28 ENCOUNTER — OFFICE VISIT (OUTPATIENT)
Dept: FAMILY MEDICINE CLINIC | Facility: CLINIC | Age: 68
End: 2023-02-28

## 2023-02-28 ENCOUNTER — CLINICAL SUPPORT (OUTPATIENT)
Dept: CARDIAC REHAB | Facility: HOSPITAL | Age: 68
End: 2023-02-28

## 2023-02-28 VITALS
HEIGHT: 71 IN | DIASTOLIC BLOOD PRESSURE: 90 MMHG | TEMPERATURE: 97.6 F | OXYGEN SATURATION: 94 % | BODY MASS INDEX: 24.08 KG/M2 | WEIGHT: 172 LBS | SYSTOLIC BLOOD PRESSURE: 140 MMHG | RESPIRATION RATE: 20 BRPM | HEART RATE: 81 BPM

## 2023-02-28 DIAGNOSIS — R68.84 JAW PAIN: ICD-10-CM

## 2023-02-28 DIAGNOSIS — E03.9 HYPOTHYROIDISM, UNSPECIFIED TYPE: ICD-10-CM

## 2023-02-28 DIAGNOSIS — R13.10 DYSPHAGIA, UNSPECIFIED TYPE: ICD-10-CM

## 2023-02-28 DIAGNOSIS — I10 PRIMARY HYPERTENSION: Primary | ICD-10-CM

## 2023-02-28 DIAGNOSIS — C14.0 THROAT CANCER (HCC): ICD-10-CM

## 2023-02-28 DIAGNOSIS — Z95.5 STATUS POST INSERTION OF DRUG ELUTING CORONARY ARTERY STENT: Primary | ICD-10-CM

## 2023-02-28 DIAGNOSIS — K08.109 LOSS OF TEETH: ICD-10-CM

## 2023-02-28 DIAGNOSIS — N28.9: ICD-10-CM

## 2023-02-28 DIAGNOSIS — Z85.819 HISTORY OF THROAT CANCER: ICD-10-CM

## 2023-02-28 RX ORDER — LEVOTHYROXINE SODIUM 0.12 MG/1
125 TABLET ORAL
Qty: 30 TABLET | Refills: 4 | Status: SHIPPED | OUTPATIENT
Start: 2023-02-28

## 2023-02-28 NOTE — TELEPHONE ENCOUNTER
Pre- operative Mohs Telephone Scheduling Note    Do you have a pacemaker or defibrillator? no    Do you take antibiotics before skin or dental procedures? no  If yes, will likely require pre-operative antibiotics  Ask  the patient why they take the antibiotics (usually because of joint replacement)  Do you have a history of a joint replacements within the past 2 years? no   If yes, will likely require pre-operative antibiotics  Ask if orthopaedic surgeon has prescribed pre-operative antibiotics to take before procedures/dental work? Do you take any OTC medications that thin your blood (Aspirin, Aleve, Ibuprofen) or supplements that thin your blood (fish oil, garlic, vitamin E, Ginko Biloba)? yes: ASA 81 mg    Do you take any prescribed medications that thin your blood (Coumadin, Plavix, Xarelto, Eliquis or another prescribed blood thinner)? yes: plavix    Do you have an allergy to lidocaine or epinephrine? no    Do you have an allergy to shellfish? no    Do you have allergies to Iodine? no    Do you wear a lidocaine patch? no    Have you ever been diagnosed with HIV, AIDS, Hep B and Hep C? no    Do you use a cane, walker or wheelchair, oxygen? no    Is the patient from a nursing home? no     Do you smoke? no      If yes,  patient to try and stop 2 days before surgery and 7 days after the surgery  Minimizing smoking as much as possible during this time will improve healing and the cosmetic result after surgery  Date scheduled:   BCC left post-auricular ear 6/12 9:00 AM Dr Violeta Dickinson of Care with other provider (Oculoplastics, Plastics, ENT) required? no   IF YES, PLEASE FORWARD TO APPROPRIATE PERSONNEL TO HELP COORDINATE  Are there remaining tumors to be scheduled? no    Was Prior Authorization obtained?  No

## 2023-02-28 NOTE — PROGRESS NOTES
Name: Jabier Donaldson      : 1955      MRN: [id number]  Encounter Provider: HERMELINDA Kohler  Encounter Date: 2023   Encounter department: [address]     1  Primary hypertension    2  History of throat cancer  -     CT facial bones w contrast; Future; Expected date: 2023  -     CT soft tissue neck w contrast; Future; Expected date: 2023    3  Jaw pain  -     CT facial bones w contrast; Future; Expected date: 2023    4  Loss of teeth  -     CT facial bones w contrast; Future; Expected date: 2023    5  Dysphagia, unspecified type  -     CT soft tissue neck w contrast; Future; Expected date: 2023    6  Throat cancer ([facility] [address] )    7  Poor renal function  -     Basic metabolic panel; Future    8  Hypothyroidism, unspecified type  -     TSH, 3rd generation with Free T4 reflex; Future  -     levothyroxine (Euthyrox) 125 mcg tablet;  Take 1 tablet (125 mcg total) by mouth daily in the early morning           Subjective      Here today for 3 month follow up   Sees cardiology post MI and is currently in Cardiac rehab   He has an oral surgery pending due to recent MI and is currently on plavix   He was to have Hyperbaric Oxygen for his left jaw and prep for oral surgery - this has been on hold due to cardiac issues  But he continues with left sided jaw pain, gum swelling - numbness of lower lip and reports increased swallowing difficulty  Have discussed in the past need for imaging to see if there is an underlying pathology- history of throat cancer with treatment (2016)- has deferred in the past -waiting for oral surgeon- discussed need to scan at this time - may have a return of his cancer or a different pathology - agrees to CT of face and neck  He appears thinner - reports loss of appetite and difficulty swallowing - no change in weight on scale today     Also has a basal cell lesion behind left ear - has seen derm ad waiting for treatment     Will obtain additional labs and CT scans   Follow up post scans    Hypertension  This is a chronic (recent change to BP medications - reviewed at home readings which were WNL - "always have elevated readings here") problem  The current episode started more than 1 year ago  The problem has been waxing and waning since onset  The problem is controlled  Associated symptoms include shortness of breath (with activity)  Pertinent negatives include no anxiety, malaise/fatigue or sweats  There are no associated agents to hypertension  Risk factors for coronary artery disease include male gender, stress and dyslipidemia  Past treatments include beta blockers  The current treatment provides significant improvement  There are no compliance problems  Review of Systems   Constitutional: Positive for unexpected weight change  Negative for malaise/fatigue  HENT: Positive for dental problem and trouble swallowing  Eyes: Negative  Respiratory: Positive for shortness of breath (with activity)  Cardiovascular: Negative  Gastrointestinal: Negative  Endocrine: Negative  Genitourinary: Negative  Musculoskeletal: Negative  Skin: Negative  Allergic/Immunologic: Negative  Neurological: Negative  Hematological: Negative  Psychiatric/Behavioral: Negative  Current Outpatient Medications on File Prior to Visit   Medication Sig   • aspirin (ECOTRIN LOW STRENGTH) 81 mg EC tablet Take 1 tablet (81 mg total) by mouth daily   • bisoprolol (ZEBETA) 5 mg tablet Take 0 5 tablets (2 5 mg total) by mouth 2 (two) times a day   • chlorhexidine (PERIDEX) 0 12 % solution RINSE MOUTH WITH 1/2OZ FOR 30-60 SECONDS TWICE DAILY   • clopidogrel (PLAVIX) 75 mg tablet Take 1 tablet (75 mg total) by mouth daily Do not start before December 30, 2022     • famotidine (PEPCID) 20 mg tablet Take 1 tablet (20 mg total) by mouth in the morning   • rosuvastatin (CRESTOR) 40 MG tablet Take 1 tablet (40 mg total) by mouth daily   • [DISCONTINUED] levothyroxine (Euthyrox) 150 mcg tablet Take 1 tablet (150 mcg total) by mouth daily in the early morning       Objective     /90 (BP Location: Right arm, Patient Position: Sitting, Cuff Size: Standard)   Pulse 81   Temp 97 6 °F (36 4 °C) (Tympanic)   Resp 20   Ht 5' 11" (1 803 m)   Wt 78 kg (172 lb)   SpO2 94%   BMI 23 99 kg/m²     Physical Exam  Vitals and nursing note reviewed  Constitutional:       Appearance: He is ill-appearing  HENT:      Head: Normocephalic and atraumatic  Jaw: Tenderness and swelling present  Nose: Nose normal       Mouth/Throat:      Mouth: Mucous membranes are moist       Dentition: Abnormal dentition  Dental tenderness and dental caries present  Tongue: No lesions  Palate: No mass  Pharynx: Oropharynx is clear  No oropharyngeal exudate or posterior oropharyngeal erythema  Eyes:      General:         Right eye: No discharge  Left eye: No discharge  Extraocular Movements: Extraocular movements intact  Conjunctiva/sclera: Conjunctivae normal    Cardiovascular:      Rate and Rhythm: Normal rate and regular rhythm  Pulses: Normal pulses  Pulmonary:      Effort: Pulmonary effort is normal  No respiratory distress  Breath sounds: Normal breath sounds  Abdominal:      General: Bowel sounds are normal       Palpations: Abdomen is soft  Musculoskeletal:         General: No tenderness  Normal range of motion  Cervical back: Normal range of motion  Right lower leg: No edema  Skin:     General: Skin is warm and dry  Capillary Refill: Capillary refill takes less than 2 seconds  Coloration: Skin is not jaundiced  Findings: Lesion present  No bruising  Neurological:      Mental Status: He is alert and oriented to person, place, and time  Psychiatric:         Mood and Affect: Mood normal          Behavior: Behavior normal          Thought Content:  Thought content normal          Judgment: Judgment normal        HERMELINDA Leal

## 2023-02-28 NOTE — PATIENT INSTRUCTIONS
Obtain labs   Schedule CT 's   Follow up in office post CT's   Change levothyroxine 125 mcg and tsh in 6 weeks

## 2023-02-28 NOTE — LETTER
Handy Long     1955    [facility]  [address] [address]    Dear Handy Long,    You are scheduled to have the MOHS procedure on 6/12/2023 at 9:00 AM for [facility] with Cassie Childs  Our office is located in [facility] at the [facility] our address is [address] [place] [address]  Once you arrive please check in with our front staff in [address] and they will escort you to the [place] [address] waiting room  If you have someone bringing you to your appointment they may wait in the waiting room or accompany you in your visit  Below you will find some pre-op instructions along with some information regarding the MOHS procedure  If you have any questions please call our office at [phone]  Thank you,    [facility] MOHS Department         PRE-OPERATIVE INSTRUCTIONS - MOHS    Before your scheduled surgery, there are a number of important precautions and positive steps you should take to help prepare yourself for a successful treatment and speedy recovery  Some of the steps, which are listed below, may seem unnecessary and inconvenient, but they are important  For example, when you stop smoking, you increase your ability to heal  Occasionally, there may be valid reasons, personal or medical, why you can't comply  In such cases, please call the office so we can discuss possible ways to overcome any obstacles you may be encountering  If you have any questions about the surgery, or remember additional medical information that you forgot to mention to our staff, please contact the office prior to your surgery  GENERAL INFORMATION REGARDING MOHS MICROGRAPHIC SURGERY    Mohs surgery is a specialized technique for the removal of skin cancer developed by Dr Penny Loser Mohs over 50 years ago to improve the cure rates of skin cancer   Traditionally, skin cancers are treated by destructive methods (radiation, freezing, scraping, and burning) or excision (cutting out the tissue with standards margins and sending it to an outside laboratory for testing)  These methods all yield cure rates between 65%-94%  However, for cancers located in cosmetically sensitive areas, large tumors, or tumors unsuccessfully treated by other means, Mohs surgery offers a higher cure rate  In most cases, Mohs surgery provides you with a 99% cure rate for primary (previously untreated) basal cell cancer and a 95% cure rate for primary squamous cell cancer  In Mohs surgery, tissue is removed and processed in a way that we are able to check 100% of the margins, giving the highest cure rate for any method of treating skin cancers while providing maximal preservation of normal skin  This allows the surgeon to produce an optimal cosmetic result for the patient by maximizing the amount of tissue removed yielding as small a scar as possible    On the day of surgery, you will be given local anesthesia only (similar to what was given to you during your initial biopsy)  You will remain awake  You will verify the location of the skin cancer prior to the onset of the surgery  Once the area is numb, the tissue containing the skin cancer will be removed, taking a small safety margin  This margin is usually smaller than what would be taken with a standard excision  Once the tissue is removed, it is marked and oriented  The first layer (“Stage I”) will be processed in our laboratory  The wound will be treated for bleeding and a bandage will be placed to keep you comfortable while you wait an approximate 45 minutes-1 hour (for the processing of the tissue) in your room  Your Mohs surgeon will examine the pathology in the lab, checking all the margins  If any tumor remains, you will need to take a second layer of skin (“Stage 2”)  The area will be re-anesthetized and your Mohs surgeon will remove more skin only in the area where the tumor exists   This process will continue until all the skin cancer is removed  Unfortunately, there is no method to predict how many layers or stages will be taken  Once the tumor has been removed completely, we will discuss the best ways to close the defect  Most wounds may be closed with stitches  A larger wound may require a skin graft or a flap  In rare instances, especially for cancers around the eye or for larger cancers, we may work with another surgeon (oculoplastic, ENT, plastics) with special skills to assist with reconstruction  Medications: Please take all your normal medications the morning of your surgery  If you are a diabetic, please bring your insulin or medications with you, as well as a snack to avoid having low blood sugar during your day with us  1)  Blood Thinners  - Most people should stop all aspirins, aspirin-containing medications ([place], Anacin, Ecotrin, etc), and non-steroidal anti-inflammatory medications (Motrin, Naproxen, Advil, Midol, Aleve, etc ) for 7 days prior to your scheduled surgery and 2 days after (unless instructed otherwise after surgery)  You may take Tylenol for pain  - VERY IMPORTANT: If you take aspirin because you have had a stroke, heart attack, heart disease, other condition, or your physician has prescribed you to take it, please continue your aspirin     - Ask the doctor (that prescribed the medication) if prior to surgery you should stop your prescribed blood thinners, such as Coumadin/Warfarin, Plavix, or Aggrenox  NEVER stop them without your doctor's permission or knowledge  If you have had a stroke, heart attack, or have an irregular heartbeat, your doctor may want you to continue your medication  We can still do your surgery  You may have more bruising  2) Antibiotics  - If you usually require antibiotics prior to dental work, please let the office know at least 24 hours prior to your surgery   Medical conditions that sometimes require preoperative antibiotics include artificial heart valves, heart murmurs, artificial joints, and related problems  We will give you a different medication than the dentist, so please contact us for the correct antibiotic   - If you were prescribed pre-operative antibiotics by our office, please take the medication 2 hours prior to your procedure  3) Vitamins and Supplements  - Avoid taking any supplements with Vitamin E, Fish Oil, Gingko, Ginseng, and Garlic for 2 weeks before and 2 days after your surgery  These thin your blood    Alcohol: Avoid drinking alcohol for 2 days prior to your surgery, and for 2 days afterwards (it thins the blood and causes more bruising and swelling)  Smoking: Try to STOP or reduce smoking significantly the week before your surgery, and especially the week afterwards (it greatly improves how well you heal)  Tobacco smoke deprives the blood of oxygen, which is urgently needed by the wound during the healing process  Contact Lenses: Do not wear them on the day of the surgery  Instead, wear glasses and bring your case, in case we need to remove them  Clothing: Do not wear your nicest clothing on your surgery day  We recommend wearing a button down shirt that will not disrupt your post-operative dressing when changing later that night  Bathing: On the morning of your surgery, you may bathe or shower normally  If you get your hair done on a weekly basis, remember to get your hair washed the day before surgery  You will need to keep your surgical site dry for a minimum of 48 hours  Makeup: If your surgery is on the face, please do not wear any makeup on the day of the surgery  Jewelry: Please try to avoid wearing jewelry on the day of surgery  Food: On the morning of surgery, have breakfast but limit your intake of caffeinated beverages  They are diuretic and may inconvenience you during surgery   If you are following up with another surgeon the same day as your Mohs surgery, you must receive permission to eat breakfast from that surgeon  What to bring with you on the day of your surgery:  ? Bring snacks - Since you could be at the office long, you may bring snacks and/or lunch with you  Some snacks and drinks are available at the office as well    ? Bring a sweater - Bring a sweater or jacket that buttons or zips down the front and will not disturb your wound dressing during removal   ? Bring something to do - You will be spending much of the day in our office  There will be 45-60 minute waiting periods  between layers/stages, and while there is a television with cable in every room, it is nice to have something to keep you occupied such as books, magazines, knitting, music, or work  Planning Ahead:  ? Other Appointments - It is important to realize that no matter how small the skin cancer appears to be, looks can be deceiving  Since your surgery may last the entire day, you should not schedule any other appointments that day  ? Special Occasions - Surgery often creates swelling and bruising  Also, the post-op dressing may be rather large and obvious  Keep this in mind as you arrange your social/work schedule  If an important event is already planned, please check with your referring physician or your Mohs surgeon to see if the surgery can be postponed  ? Activity Limits after Surgery - If surgery was performed on your face, we recommend that you keep your activity level to a minimum for 2-3 days (the blood pressure elevation related to exercise can lead to bleeding)  If you have stitches in an area that will be under tension or significant movement (neck, back, arms, legs), you will need to avoid heavy lifting (anything over 5 lbs) or exercise for at least 2 weeks and possibly longer  We also advise that you limit out of town travel for the first 7 days after surgery  You should also wait at least 7 days before going into a pool or the ocean  ?  Housework - Since you will need to minimize activity after surgery, plan to do your groceries, laundry, gardening, and other heavy household chores prior to your surgery  Please make arrangements for assistance during the post-op period  If surgery is around your mouth area, you may need to eat soft foods, such as soup, milkshakes, or yogurt for 48 hours  Purchasing bandage supplies: Prior to surgery, please purchase the following items to care for your surgical wound properly   - Cotton swabs (Q-tips)  - Vaseline or Aquaphor  - Telfa pads (or any non-stick dressing)  - Paper tape or Hypafix tape  - Gauze pads (3x3)      We will provide you with some bandage supplies after surgery to get you started  Transportation: It is often reassuring and comforting to have a  drive you to and from the surgery  He or she is welcome to wait in the office during the surgery  Please note that for safety reasons, only the patient is allowed in the procedure room during surgery  Thank you for your cooperation  Rescheduling: If you need to reschedule your surgery, please notify the office as soon as possible

## 2023-03-02 ENCOUNTER — CLINICAL SUPPORT (OUTPATIENT)
Dept: CARDIAC REHAB | Facility: HOSPITAL | Age: 68
End: 2023-03-02

## 2023-03-02 DIAGNOSIS — Z95.5 STATUS POST INSERTION OF DRUG ELUTING CORONARY ARTERY STENT: Primary | ICD-10-CM

## 2023-03-03 ENCOUNTER — CLINICAL SUPPORT (OUTPATIENT)
Dept: CARDIAC REHAB | Facility: HOSPITAL | Age: 68
End: 2023-03-03

## 2023-03-03 DIAGNOSIS — Z95.5 STATUS POST INSERTION OF DRUG ELUTING CORONARY ARTERY STENT: Primary | ICD-10-CM

## 2023-03-04 ENCOUNTER — APPOINTMENT (OUTPATIENT)
Dept: LAB | Facility: CLINIC | Age: 68
End: 2023-03-04

## 2023-03-04 DIAGNOSIS — N28.9: ICD-10-CM

## 2023-03-04 DIAGNOSIS — I25.10 DISEASE OF CARDIOVASCULAR SYSTEM: ICD-10-CM

## 2023-03-04 DIAGNOSIS — E03.9 HYPOTHYROIDISM, UNSPECIFIED TYPE: ICD-10-CM

## 2023-03-04 LAB
ANION GAP SERPL CALCULATED.3IONS-SCNC: 1 MMOL/L (ref 4–13)
BUN SERPL-MCNC: 9 MG/DL (ref 5–25)
CALCIUM SERPL-MCNC: 9.8 MG/DL (ref 8.3–10.1)
CHLORIDE SERPL-SCNC: 105 MMOL/L (ref 96–108)
CHOLEST SERPL-MCNC: 103 MG/DL
CO2 SERPL-SCNC: 32 MMOL/L (ref 21–32)
CREAT SERPL-MCNC: 0.71 MG/DL (ref 0.6–1.3)
GFR SERPL CREATININE-BSD FRML MDRD: 96 ML/MIN/1.73SQ M
GLUCOSE P FAST SERPL-MCNC: 95 MG/DL (ref 65–99)
HDLC SERPL-MCNC: 30 MG/DL
LDLC SERPL CALC-MCNC: 62 MG/DL (ref 0–100)
POTASSIUM SERPL-SCNC: 4.5 MMOL/L (ref 3.5–5.3)
SODIUM SERPL-SCNC: 138 MMOL/L (ref 135–147)
T4 FREE SERPL-MCNC: 1.33 NG/DL (ref 0.76–1.46)
TRIGL SERPL-MCNC: 54 MG/DL
TSH SERPL DL<=0.05 MIU/L-ACNC: 0.11 UIU/ML (ref 0.45–4.5)

## 2023-03-07 ENCOUNTER — CLINICAL SUPPORT (OUTPATIENT)
Dept: CARDIAC REHAB | Facility: HOSPITAL | Age: 68
End: 2023-03-07

## 2023-03-07 DIAGNOSIS — Z95.5 STATUS POST INSERTION OF DRUG ELUTING CORONARY ARTERY STENT: Primary | ICD-10-CM

## 2023-03-09 ENCOUNTER — CLINICAL SUPPORT (OUTPATIENT)
Dept: CARDIAC REHAB | Facility: HOSPITAL | Age: 68
End: 2023-03-09

## 2023-03-09 DIAGNOSIS — Z95.5 STATUS POST INSERTION OF DRUG ELUTING CORONARY ARTERY STENT: Primary | ICD-10-CM

## 2023-03-10 ENCOUNTER — CLINICAL SUPPORT (OUTPATIENT)
Dept: CARDIAC REHAB | Facility: HOSPITAL | Age: 68
End: 2023-03-10

## 2023-03-10 DIAGNOSIS — Z95.5 STATUS POST INSERTION OF DRUG ELUTING CORONARY ARTERY STENT: Primary | ICD-10-CM

## 2023-03-10 NOTE — PROGRESS NOTES
Cardiac Rehabilitation Plan of Care   60 Day Reassessment        Today's date: 3/10/2023   # of Exercise Sessions Completed: 26  Patient name: Jabier Donaldson      : 1955  Age: 75 y o  MRN: [id number]  Referring Physician: Iam Rhoades MD  Cardiologist: Dr Benoit Barclay  Provider: Ondina  Clinician: Dasha Valencia MS, CEP       Dx:   Encounter Diagnosis   Name Primary? • Status post insertion of drug eluting coronary artery stent Yes     Date of onset: 2022      SUMMARY OF PROGRESS:  Mr Kenyatta Wright continues his cardiac rehabilitation program after stenting procedure on 2022  He has attended 26 sessions over the past 60 days and is attending his rehab sessions regularly 3x/week  He has 10 more sessions until discharge  He reports he has been feeling good with the exercise and he is enjoying coming to cardiac rehab  He is doing well progressing his exercise times and intensities  He is currently completing 50-60 min of aerobic exercise at 4 1-4 8 METs on different modalities such as TRM, UBE, Nustep, and recumbent bike  He has also started a strength training program completing 2x/week  His blood pressure is done sporadically due to white coat syndrome  Resting /98 and exercise //104  He is participating in weekly education sessions on cardiac risk factor management  Will continue to progress exercise times and intensities as tolerated over next 30 days and start to get patient ready for discharge planning  His goals for his rehab program are to complete cardiac rehab program so that he is able to be cleared for further procedures leading to dental work, improve feelings of anxiety and learn to control BP in stressful situations (white coat syndrome, social interactions), and to establish a regular exercise program which he can maintain once rehab sessions are completed  He reports he is on target with his personal goals at 60 days   He reported today that he has officially decided to retire next week because he thinks a lot of his stress and high blood pressures are related to his job  Yesterday he attended Stress and Your Health education class and he reported that gave him the push to officially retire! He is very happy with his decision  He reports he feels his blood pressure is getting better controlled and continues to monitor at home as well  He has started daily meditation since starting rehab and he feels exercise program is helping ease some of his anxiety as well  He is attending rehab sessions regularly and feels he is benefiting from the program  He is establishing a regular exercise program at rehab, and he has also started doing more walking at home  He typically walks to and from work daily and he has added on 30 min of walking some days after work and also on the weekends  He reports he is doing well with his diet, and is unable to eat a lot of meat, fruit, and some other foods due to texture and taste since he had throat cancer and operation  Overall he feels he follows a pretty healthy diet  He had recent lipid profile completed and he had overall improvement on chol, trig, and LDL! He denies any depression (PHQ-Q-9=0), but does admit to feelings of anxiety (FREEMAN-7=4)  Will reasses at discharge  He reports he has good emotional support from his significant other and family  Will continue to educate, monitor, and progress as tolerated in the next 30 days         Medication compliance: Yes   Comments: Pt reports to be compliant with medications  Fall Risk: Low   Comments: Ambulates with a steady gait with no assist device    EKG Interpretation: NSR      EXERCISE ASSESSMENT and PLAN    Exercise Prescription:      Frequency: 3 days/week   Supplement with home exercise 2+ days/wk as tolerated       Minutes: 45-60        METS: 4 1-4 8           HR: 30 beats above resting   RPE: 4-6         Modalities: Treadmill, UBE, NuStep and Recumbent bike      30 Day Goals for Exercise Progression:    Frequency: 3 days/week of cardiac rehab       Supplement with home exercise 2+ days/wk as tolerated    Minutes: 60                              >150 mins/wk of moderate intensity exercise   METS: 4 9-5 2   HR: 30 beats above resting    RPE: 4-6   Modalities: Treadmill, UBE, Rower, NuStep and Recumbent bike    Strength trainin-3 days / week  12-15 repetitions  1-2 sets per modality    Modalities: Pull Downs, Lateral Raise, Arm Extension, Arm Curl, Upright Rows, Front Raises and leg ext    Home Exercise: walking 5-6x/week, 10-30 min, 1-1 5 miles    Goals: 10% improvement in functional capacity - based on max METs achieved in fitness assessment, improved DASI score by 10%, Increase in exercise capacity by 40% - based on peak METs tolerated in cardiac rehab exercise session, Exercise 5 days/wk, >150mins/wk of moderate intensity exercise, Resume ADLs with increased strength, Attend Rehab regularly and start a home exercise program    Progression Toward Goals:  Pt is progressing and showing improvement  toward the following goals:  attending CR sessions 3x/week regularly, increasing strength and endurance with exercise, showing increased functional capacity with increased MET levels in the past 30 days, establishing regular exercise program, strength training program 2x/week, and adding more walking at home up to 30 min some evenings and on weekends  , Patient will continue to increase functional METs and establish structured continuation exercise program following discharge in the next 30 days, Will continue to educate and progress as tolerated      Education: benefit of exercise for CAD risk factors, home exercise guidelines, AHA guidelines to achieve >150 mins/wk of moderate exercise and RPE scale   Plan:education on home exercise guidelines, home exercise 30+ mins 2 days opposite CR and Education class: Risk Factors for Heart Disease  Readiness to change: Action      NUTRITION ASSESSMENT AND PLAN    Weight control:    Starting weight: 174 2 lb   Current weight:  167 4 lbs     Diabetes: N/A  A1c: n/a    last measured: n/a    Lipid management: Discussed diet and lipid management and Last lipid profile 3/4/2023  Chol 103  TRG 54  HDL 30  LDL 62    Goals:LDL <100, HDL >40, TRG <150 and CHOL <200    Measurable goals were based Rate Your Plate Dietary Self-Assessment  These are the areas in which the patient could score higher on the assessment  Goals include recommendations for a heart healthy diet based on American Heart Association  Progression Toward Goals: Pt is progressing and showing improvement  toward the following goals:  reports doing well with diet and is trying to make some changes to his diet  Weight loss since start of the program  He has trouble finding healthy foods that he is able to eat due to texture  Recent lipid profile completed and improved on overall Chol, Trig, and LDL! He reports 100% medication as well    , Will continue to educate and progress as tolerated      Education: heart healthy eating  low sodium diet  hydration  nutrition for  lipid management  Plan: Education class: Reading Food Labels and Education Class: Heart Healthy Eating  Readiness to change: Action:  (Changing behavior)      PSYCHOSOCIAL ASSESSMENT AND PLAN    Emotional:  Depression assessment:  PHQ-9 = 0  0 =No Depression            Anxiety measure:  FREEMAN-7 = 4  0-4  = Not anxious  Self-reported stress level:  6-social (anxiety?) interactions  Social support: Excellent-significant other and family    Goals:  Reduce perceived stress to 1-3/10, PHQ-9 - reduced severity by one level, Feelings in [place] Score < 3, Physical Fitness in [place] Score < 3, Daily Activity in [place] Score < 3, Overall Health in [place] Score < 3, increased energy, Feel less anxious and manage BP    Progression Toward Goals: Pt is progressing and showing improvement  toward the following goals:  no concerns with depression at this time  Does admit to anxiety, but feels he is managing better  He reports he has started daily meditation which is helping anxiety and decreasing BP  He has offically decided to retire next week! He reported that the Stress and Your Health education class yesterday helped him make the decision  He hopes this will help with his anxiety and blood pressure    , Will continue to educate and progress as tolerated  Education: signs/sxs of depression, benefits of a positive support system and stress management techniques  Plan: Class: Stress and Your Health, Class: Relaxation, Refer to [employer], Practice relaxation techniques, Exercise, Enjoy a hobby and Keep a positive mindset  Readiness to change: Action      OTHER CORE COMPONENTS     Tobacco:   Social History     Tobacco Use   Smoking Status Former   Smokeless Tobacco Former       Tobacco Use Intervention:   N/A:  Patient is a non-smoker     Anginal Symptoms:  None   NTG use: No prescription    Blood pressure:    Restin/98   Exercise: 140//104    Goals: consistent BP < 130/80, reduced dietary sodium <2300mg, moderate intensity exercise >150 mins/wk and medication compliance    Progression Toward Goals: Pt is progressing and showing improvement  toward the following goals:  BP is being checked sporadically at rehab sessions due to white coat syndrome and causing anxiety, medication compliance, wathcing sodium intake and acheiving moderate intensity exercise >150 mins/week with rehab and home walking    , Pt has not made progress toward the following goals: ongoing hypertension when sporadically checked in rehab but reports home blood pressure readings have been alot better   , Will continue to educate and progress as tolerated  He hopes with retiring it will help with his stress and his blood pressure control!      Education:  understanding high blood pressure and it's relationship to CAD and low sodium diet and HTN  Plan: Class: Understanding Heart Disease, Class: Common Heart Medications, Avoid Processed foods, engage in regular exercise, check labels for sodium content and manage BP better when in stressful situations  Readiness to change: Preparation:  (Getting ready to change)

## 2023-03-14 ENCOUNTER — CLINICAL SUPPORT (OUTPATIENT)
Dept: CARDIAC REHAB | Facility: HOSPITAL | Age: 68
End: 2023-03-14

## 2023-03-14 DIAGNOSIS — Z95.5 STATUS POST INSERTION OF DRUG ELUTING CORONARY ARTERY STENT: Primary | ICD-10-CM

## 2023-03-16 ENCOUNTER — CLINICAL SUPPORT (OUTPATIENT)
Dept: CARDIAC REHAB | Facility: HOSPITAL | Age: 68
End: 2023-03-16

## 2023-03-16 DIAGNOSIS — Z95.5 STATUS POST INSERTION OF DRUG ELUTING CORONARY ARTERY STENT: Primary | ICD-10-CM

## 2023-03-17 ENCOUNTER — CLINICAL SUPPORT (OUTPATIENT)
Dept: CARDIAC REHAB | Facility: HOSPITAL | Age: 68
End: 2023-03-17

## 2023-03-17 DIAGNOSIS — Z95.5 STATUS POST INSERTION OF DRUG ELUTING CORONARY ARTERY STENT: Primary | ICD-10-CM

## 2023-03-21 ENCOUNTER — CLINICAL SUPPORT (OUTPATIENT)
Dept: CARDIAC REHAB | Facility: HOSPITAL | Age: 68
End: 2023-03-21

## 2023-03-21 DIAGNOSIS — Z95.5 STATUS POST INSERTION OF DRUG ELUTING CORONARY ARTERY STENT: Primary | ICD-10-CM

## 2023-03-22 ENCOUNTER — HOSPITAL ENCOUNTER (OUTPATIENT)
Dept: CT IMAGING | Facility: HOSPITAL | Age: 68
Discharge: HOME | End: 2023-03-22

## 2023-03-22 DIAGNOSIS — R68.84 JAW PAIN: ICD-10-CM

## 2023-03-22 DIAGNOSIS — K08.109 LOSS OF TEETH: ICD-10-CM

## 2023-03-22 DIAGNOSIS — R13.10 DYSPHAGIA, UNSPECIFIED TYPE: ICD-10-CM

## 2023-03-22 DIAGNOSIS — Z85.819 HISTORY OF THROAT CANCER: ICD-10-CM

## 2023-03-22 RX ADMIN — IOHEXOL 85 ML: 350 INJECTION, SOLUTION INTRAVENOUS at 07:25

## 2023-03-22 NOTE — RESULT ENCOUNTER NOTE
Left message - CT showed no signs on malignancies, but other abnormalities were noted in the patient's jaw and teeth   Requested that the patient keep his upcoming follow-up on 03/29, where Nahomy Alvares will discuss this in more detail with him

## 2023-03-23 ENCOUNTER — CLINICAL SUPPORT (OUTPATIENT)
Dept: CARDIAC REHAB | Facility: HOSPITAL | Age: 68
End: 2023-03-23

## 2023-03-23 DIAGNOSIS — Z95.5 STATUS POST INSERTION OF DRUG ELUTING CORONARY ARTERY STENT: Primary | ICD-10-CM

## 2023-03-24 ENCOUNTER — CLINICAL SUPPORT (OUTPATIENT)
Dept: CARDIAC REHAB | Facility: HOSPITAL | Age: 68
End: 2023-03-24

## 2023-03-24 DIAGNOSIS — Z95.5 STATUS POST INSERTION OF DRUG ELUTING CORONARY ARTERY STENT: Primary | ICD-10-CM

## 2023-03-28 ENCOUNTER — CLINICAL SUPPORT (OUTPATIENT)
Dept: CARDIAC REHAB | Facility: HOSPITAL | Age: 68
End: 2023-03-28

## 2023-03-28 DIAGNOSIS — Z95.5 STATUS POST INSERTION OF DRUG ELUTING CORONARY ARTERY STENT: Primary | ICD-10-CM

## 2023-03-29 ENCOUNTER — APPOINTMENT (OUTPATIENT)
Dept: LAB | Facility: CLINIC | Age: 68
End: 2023-03-29

## 2023-03-29 ENCOUNTER — OFFICE VISIT (OUTPATIENT)
Dept: FAMILY MEDICINE CLINIC | Facility: CLINIC | Age: 68
End: 2023-03-29

## 2023-03-29 VITALS
WEIGHT: 167.4 LBS | OXYGEN SATURATION: 96 % | TEMPERATURE: 98.2 F | DIASTOLIC BLOOD PRESSURE: 88 MMHG | HEART RATE: 80 BPM | BODY MASS INDEX: 23.44 KG/M2 | RESPIRATION RATE: 16 BRPM | HEIGHT: 71 IN | SYSTOLIC BLOOD PRESSURE: 146 MMHG

## 2023-03-29 DIAGNOSIS — E03.9 ACQUIRED HYPOTHYROIDISM: ICD-10-CM

## 2023-03-29 DIAGNOSIS — J34.89 NASAL SORE: ICD-10-CM

## 2023-03-29 DIAGNOSIS — Z12.5 SCREENING PSA (PROSTATE SPECIFIC ANTIGEN): ICD-10-CM

## 2023-03-29 DIAGNOSIS — H80.90 OTOSCLEROSIS, UNSPECIFIED: Primary | ICD-10-CM

## 2023-03-29 RX ORDER — LEVOTHYROXINE SODIUM 0.1 MG/1
100 TABLET ORAL
Qty: 90 TABLET | Refills: 3 | Status: SHIPPED | OUTPATIENT
Start: 2023-03-29

## 2023-03-29 NOTE — PROGRESS NOTES
Name: Xochilt Navarro      : 1955      MRN: [id number]  Encounter Provider: HERMELINDA Urban  Encounter Date: 3/29/2023   Encounter department: [facility]    Assessment & Plan     1  Otosclerosis, unspecified  -     Ambulatory Referral to Oral Maxillofacial Surgery; Future    2  Screening PSA (prostate specific antigen)  -     PSA, ultrasensitive; Future    3  Nasal sore  -     mupirocin (BACTROBAN) 2 % ointment; Apply topically 3 (three) times a day    4  Acquired hypothyroidism  -     levothyroxine (Levo-T) 100 mcg tablet; Take 1 tablet (100 mcg total) by mouth daily in the early morning  -     TSH, 3rd generation with Free T4 reflex; Future           Subjective      Here today to discuss results of labs and CT of neck and facial bones  Labs - TSH remains hypothyroid- will decrease levothyroxine to 100 mcg daily and repeat TSH in 4-6 weeks      All other labs WNL   CT of neck and facial bones - no signs of metastatic disease - problem definitely dental related - Referral placed to [facility] in [facility]  Will follow up with appropriate speciality        Review of Systems   Constitutional: Negative  HENT: Negative  Eyes: Negative  Respiratory: Negative  Cardiovascular: Negative  Gastrointestinal: Negative  Endocrine: Negative  Genitourinary: Negative  Musculoskeletal: Negative  Skin: Negative  Allergic/Immunologic: Negative  Neurological: Negative  Hematological: Negative  Psychiatric/Behavioral: Negative  Current Outpatient Medications on File Prior to Visit   Medication Sig   • aspirin (ECOTRIN LOW STRENGTH) 81 mg EC tablet Take 1 tablet (81 mg total) by mouth daily   • bisoprolol (ZEBETA) 5 mg tablet Take 0 5 tablets (2 5 mg total) by mouth 2 (two) times a day   • clopidogrel (PLAVIX) 75 mg tablet Take 1 tablet (75 mg total) by mouth daily Do not start before 2022     • famotidine (PEPCID) 20 mg tablet Take 1 "tablet (20 mg total) by mouth in the morning   • rosuvastatin (CRESTOR) 40 MG tablet Take 1 tablet (40 mg total) by mouth daily   • [DISCONTINUED] levothyroxine (Euthyrox) 125 mcg tablet Take 1 tablet (125 mcg total) by mouth daily in the early morning   • [DISCONTINUED] chlorhexidine (PERIDEX) 0 12 % solution RINSE MOUTH WITH 1/2OZ FOR 30-60 SECONDS TWICE DAILY (Patient not taking: Reported on 3/29/2023)       Objective     /88 (BP Location: Right arm, Patient Position: Sitting, Cuff Size: Standard)   Pulse 80   Temp 98 2 °F (36 8 °C) (Tympanic)   Resp 16   Ht 5' 11\" (1 803 m)   Wt 75 9 kg (167 lb 6 4 oz)   SpO2 96%   BMI 23 35 kg/m²     Physical Exam  Vitals and nursing note reviewed  Constitutional:       Appearance: Normal appearance  He is not ill-appearing  HENT:      Head: Normocephalic and atraumatic  Nose: No congestion  Comments: Dry patch of skin inner right nares by septum     Mouth/Throat:      Mouth: Mucous membranes are moist    Eyes:      Extraocular Movements: Extraocular movements intact  Conjunctiva/sclera: Conjunctivae normal    Cardiovascular:      Rate and Rhythm: Normal rate and regular rhythm  Pulses: Normal pulses  Heart sounds: Normal heart sounds  No murmur heard  Pulmonary:      Effort: Pulmonary effort is normal  No respiratory distress  Breath sounds: Normal breath sounds  Abdominal:      General: Bowel sounds are normal       Palpations: Abdomen is soft  Musculoskeletal:         General: Normal range of motion  Cervical back: Normal range of motion  Skin:     General: Skin is dry  Capillary Refill: Capillary refill takes less than 2 seconds  Neurological:      Mental Status: He is alert and oriented to person, place, and time  Psychiatric:         Mood and Affect: Mood normal          Behavior: Behavior normal          Thought Content:  Thought content normal          Judgment: Judgment normal        Annia Toro, " CRNP

## 2023-03-29 NOTE — PATIENT INSTRUCTIONS
Referral to Oral Maxillary Surgeon    Bactroban ointment in right nares as needed  Follow up in end [place]

## 2023-03-30 ENCOUNTER — CLINICAL SUPPORT (OUTPATIENT)
Dept: CARDIAC REHAB | Facility: HOSPITAL | Age: 68
End: 2023-03-30

## 2023-03-30 DIAGNOSIS — Z95.5 STATUS POST INSERTION OF DRUG ELUTING CORONARY ARTERY STENT: Primary | ICD-10-CM

## 2023-03-31 ENCOUNTER — CLINICAL SUPPORT (OUTPATIENT)
Dept: CARDIAC REHAB | Facility: HOSPITAL | Age: 68
End: 2023-03-31

## 2023-03-31 DIAGNOSIS — Z95.5 STATUS POST INSERTION OF DRUG ELUTING CORONARY ARTERY STENT: Primary | ICD-10-CM

## 2023-03-31 LAB — PSA SERPL DL<=0.01 NG/ML-MCNC: 2.34 NG/ML (ref 0–4)

## 2023-04-04 ENCOUNTER — CLINICAL SUPPORT (OUTPATIENT)
Dept: CARDIAC REHAB | Facility: HOSPITAL | Age: 68
End: 2023-04-04

## 2023-04-04 DIAGNOSIS — Z95.5 STATUS POST INSERTION OF DRUG ELUTING CORONARY ARTERY STENT: Primary | ICD-10-CM

## 2023-04-04 NOTE — PROGRESS NOTES
Cardiac Rehabilitation Plan of Care   Discharge        Today's date: 2023   # of Exercise Sessions Completed: 36  Patient name: Kelly Arroyo      : 1955  Age: 75 y o  MRN: [id number]  Referring Physician: Gene Rodarte MD  Cardiologist: Dr Jaret Lazo  Provider: [facility]  Clinician: Diogenes Hartmann MS, CEP       Dx:   Encounter Diagnosis   Name Primary? • Status post insertion of drug eluting coronary artery stent Yes     Date of onset: 2022      SUMMARY OF PROGRESS:  Discharge note for Alisa Bill in Cardiac rehabilitation for the diagnosis of S/P insertion of drug eluding coronary artery stent  He had 29 3% improvement in functional capacity increasing His max METs in the submaximal TM ETT  from 5 8  to 7 5 with test termination of RPE 6  He had a 49 6% improvement in the [facility] activity estimated MET level with ADLs and physical activity  His [facility] QOL stayed the same from initial to discharge with a score of 20  PHQ-9 score increased from 0 to 2  His weight decreased by 7 8 pounds  Rate Your Plate score improved from 55 to 60  Giulianashannan Mckeon has been supplementing CR sessions with home exercise which includes walking outdoors daily for 2-5 miles  He reports increased stamina, strength and reduced SOB with activity  He tolerates 50-60 mins at 4 1 - 5 2 METs plus wt training  NSR on telemetry  RHR 61-70, ExHR   Resting -138/70-98 with appropriate response to exercise reaching 134-178/  All group education classes on cardiac risk factor modification were attended by the patient  Patient states his discharge plans are to continue to walk daily for 2-5 miles  Encouraged Pt to continue exercise  Frequency: 4-6 days/wk, Intensity: RPE 4-5, Time: 40-50 mins daily, 150-200 mins/wk  Pt was encouraged to continue eating heart healthy   Pt was encouraged to remain compliant with medications and f/u with cardiologist with any cardiac symptoms, medication management and updated lipid profile  Medication compliance: Yes   Comments: Pt reports to be compliant with medications  Fall Risk: Low   Comments: Ambulates with a steady gait with no assist device    EKG Interpretation: NSR      EXERCISE ASSESSMENT and PLAN    Exercise Prescription:      Frequency: 3 days/week   Supplement with home exercise 2+ days/wk as tolerated       Minutes: 50-60        METS: 4 1-5 2           HR: 30 beats above resting   RPE: 4-6         Modalities: Treadmill, UBE, NuStep and Recumbent bike      Exercise Progression after Discharge:    Frequency: 3-5 days of gym or home exercise   Minutes: 30-50  >150 mins/wk of moderate intensity exercise   METS: 4 0-5 5   HR: 30 beats above resting    RPE: 4-6   Modalities: Treadmill and outdoor walking    Strength trainin-3 days / week  12-15 repetitions  1-2 sets per modality    Modalities: Pull Downs, Lateral Raise, Arm Extension, Arm Curl, Upright Rows, Front Raises and leg ext    Home Exercise: walking 5-7x/week, 2-5 miles    Goals: 10% improvement in functional capacity - based on max METs achieved in fitness assessment, improved DASI score by 10%, Increase in exercise capacity by 40% - based on peak METs tolerated in cardiac rehab exercise session, Exercise 5 days/wk, >150mins/wk of moderate intensity exercise, Resume ADLs with increased strength, Attend Rehab regularly and start a home exercise program    Progression Toward Goals:  Goals met: improvement in functiona capacity, improved DASI score, increased exercise capacity, exercise 5 days/week, > 150 mins/week of moderate intensity exercise, resume ADLs with increased strength, attend rehab regularly, continuing to exercise at home , Patient will be encouraged to focus on lifestyle modifications following discharge      Education: benefit of exercise for CAD risk factors, home exercise guidelines, AHA guidelines to achieve >150 mins/wk of moderate exercise, RPE scale, class: Risk Factors for Heart Disease and exercise instructions/guidelines for discharge    Plan:education on home exercise guidelines, home exercise 30+ mins 2 days opposite CR and Education class: Risk Factors for Heart Disease  Readiness to change: Action      NUTRITION ASSESSMENT AND PLAN    Weight control:    Starting weight: 174 2 lb   Current weight:  166 lbs     Diabetes: N/A  A1c: n/a    last measured: n/a    Lipid management: Discussed diet and lipid management and Last lipid profile 3/4/2023  Chol 103  TRG 54  HDL 30  LDL 62    Goals:LDL <100, HDL >40, TRG <150 and CHOL <200    Measurable goals were based Rate Your Plate Dietary Self-Assessment  These are the areas in which the patient could score higher on the assessment  Goals include recommendations for a heart healthy diet based on American Heart Association  Progression Toward Goals: Pt is progressing and showing improvement  toward the following goals:  reports doing well with diet and is trying to make some changes to his diet  Weight loss since start of the program  He has trouble finding healthy foods that he is able to eat due to texture  Recent lipid profile completed and improved on overall Chol, Trig, and LDL! He reports 100% medication as well    , Will continue to educate and progress as tolerated  , Current weight of 166 lbs    Education: heart healthy eating  low sodium diet  hydration  nutrition for  lipid management  education class: Heart Healthy Eating  education class:  Label Reading  Plan: Education class: Reading Food Labels and Education Class: Heart Healthy Eating  Readiness to change: Action:  (Changing behavior)      PSYCHOSOCIAL ASSESSMENT AND PLAN    Emotional:  Depression assessment:  PHQ-9 = 2  1-4 = Minimal Depression            Anxiety measure:  FREEMAN-7 = 6  5-9 = Mild anxiety  Self-reported stress level:  6-social (anxiety?) interactions  Social support: Excellent-significant other and family    Goals:  Reduce perceived stress to 1-3/10, PHQ-9 - reduced severity by one level, Feelings in [facility] Score < 3, Physical Fitness in [facility] Score < 3, Daily Activity in [facility] Score < 3, Overall Health in [facility] Score < 3, increased energy, Feel less anxious and manage BP    Progression Toward Goals: Pt is progressing and showing improvement  toward the following goals:  no concerns with depression oand/or anxiety at this time, feels he is managing better  Compliant with medication for anxiety   , Goals met: Daily Activity in [facility] Score < 3, increased energy, feels less anxious  , Patient will be encouraged to focus on lifestyle modifications following discharge  Education: signs/sxs of depression, benefits of a positive support system, stress management techniques and class:  Stress and Your Health   Plan: Class: Stress and Your Health, Class: Relaxation, Refer to [employer], Practice relaxation techniques, Exercise, Enjoy a hobby and Keep a positive mindset  Readiness to change: Action      OTHER CORE COMPONENTS     Tobacco:   Social History     Tobacco Use   Smoking Status Former   Smokeless Tobacco Former       Tobacco Use Intervention:   N/A:  Patient is a non-smoker     Anginal Symptoms:  None   NTG use: No prescription    Blood pressure:    Restin-138/70-98   Exercise: 134-178/    Goals: consistent BP < 130/80, reduced dietary sodium <2300mg, moderate intensity exercise >150 mins/wk and medication compliance    Progression Toward Goals: Pt is progressing and showing improvement  toward the following goals:  more consistent BP, reducing dietary sodium and finding foods that have lower sodium content   , Goals met: > 150 mins/week of moderate intensity exercise, medication compliance , Patient will be encouraged to focus on lifestyle modifications following discharge  He hopes with retiring it will help with his stress and his blood pressure control!      Education:  understanding high blood pressure and it's relationship to CAD, low sodium diet and HTN, Education class:  Common Heart Medications and Education class: Understanding Heart Disease  Plan: Class: Understanding Heart Disease, Class: Common Heart Medications, Avoid Processed foods, engage in regular exercise, check labels for sodium content and manage BP better when in stressful situations  Readiness to change: Preparation:  (Getting ready to change)

## 2023-04-27 ENCOUNTER — VBI (OUTPATIENT)
Dept: ADMINISTRATIVE | Facility: OTHER | Age: 68
End: 2023-04-27

## 2023-05-12 ENCOUNTER — TELEPHONE (OUTPATIENT)
Dept: GASTROENTEROLOGY | Facility: CLINIC | Age: 68
End: 2023-05-12

## 2023-05-12 NOTE — TELEPHONE ENCOUNTER
Patients GI provider:  New pt    Number to return call: [phone]    Reason for call: Pt calling to set up colon consult  He is taking Plavix until the end of June  He would like to go the [place] office  No appointments available      Scheduled procedure/appointment date if applicable: N/A

## 2023-05-15 ENCOUNTER — CONSULT (OUTPATIENT)
Dept: GASTROENTEROLOGY | Facility: CLINIC | Age: 68
End: 2023-05-15

## 2023-05-15 VITALS
OXYGEN SATURATION: 99 % | SYSTOLIC BLOOD PRESSURE: 150 MMHG | HEIGHT: 71 IN | WEIGHT: 168 LBS | DIASTOLIC BLOOD PRESSURE: 90 MMHG | TEMPERATURE: 98.1 F | BODY MASS INDEX: 23.52 KG/M2 | HEART RATE: 76 BPM

## 2023-05-15 DIAGNOSIS — Z12.11 COLON CANCER SCREENING: Primary | ICD-10-CM

## 2023-05-15 DIAGNOSIS — K21.01 GASTROESOPHAGEAL REFLUX DISEASE WITH ESOPHAGITIS AND HEMORRHAGE: ICD-10-CM

## 2023-05-15 DIAGNOSIS — R13.12 OROPHARYNGEAL DYSPHAGIA: ICD-10-CM

## 2023-05-15 NOTE — PROGRESS NOTES
[facility] - Outpatient Consultation  Joe Queen 75 y o  male MRN: [id number]  Encounter: [id number]          ASSESSMENT AND PLAN:      1  Colon cancer screening  -His last colonoscopy was over 10 years ago reportedly normal  -I will schedule him for screening colonoscopy  Bowel prep for GoLytely given  - polyethylene glycol (GOLYTELY) 4000 mL solution; Take 4,000 mL by mouth once for 1 dose  Dispense: 4000 mL; Refill: 0    2  Gastroesophageal reflux disease with esophagitis and hemorrhage  -He is on famotidine 20 mg daily  - I reviewed diet and lifestyle precautions  This includes limiting coffee, soda, tomatoes, citrus, fatty and spicy foods  I recommend waiting 3 hours after dinner to lie down  I recommend eating small frequent meals as well as sleeping with head of bed elevated at night  -EGD to assess for esophagitis and Fall's esophagus  - EGD; Future    3  Oropharyngeal dysphagia  -Likely secondary to radiation related injury to esophageal muscle rule out stricture  -I will schedule him for EGD with possible dilation  -I asked him to eat soft food, cut meat and vegetable into small pieces  - EGD; Future      4  Prior history of throat cancer status postradiation complicated by osteonecrosis of the jaw -he is scheduled to see oral surgeon next week    ______________________________________________________________________    HPI: 58-year-old male with history of throat cancer in 2006 status postradiation here for colon cancer screening consult and evaluation of dysphagia  He had colonoscopy over 10 years ago  Reportedly normal colonoscopy  He has no change in bowel habit, melena or hematochezia  Reports intermittent left lower quadrant pain related to movement  He has been experiencing dysphagia and reports progressive weight loss  He has a dental issue please because of 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    He is scheduled to see oral surgeon next week     He has difficulty swallowing  Food gets stuck in the upper and mid esophagus  REVIEW OF SYSTEMS:    CONSTITUTIONAL: Denies any fever, chills, rigors, and weight loss  HEENT: No earache or tinnitus  Denies hearing loss or visual disturbances  CARDIOVASCULAR: No chest pain or palpitations  RESPIRATORY: Denies any cough, hemoptysis, shortness of breath or dyspnea on exertion  GASTROINTESTINAL: As noted in the History of Present Illness  GENITOURINARY: No problems with urination  Denies any hematuria or dysuria  NEUROLOGIC: No dizziness or vertigo, denies headaches  MUSCULOSKELETAL: Denies any muscle or joint pain  SKIN: Denies skin rashes or itching  ENDOCRINE: Denies excessive thirst  Denies intolerance to heat or cold  PSYCHOSOCIAL: Denies depression or anxiety  Denies any recent memory loss  Historical Information   Past Medical History:   Diagnosis Date   • Anxiety    • Cancer [facility])    • Chemotherapy follow-up examination    • History of radiation therapy 2006   • Hypertension      Past Surgical History:   Procedure Laterality Date   • ANKLE SURGERY Left    • CARDIAC CATHETERIZATION Left 10/07/2022    Procedure: Cardiac catheterization;  Surgeon: Shannon Canada MD;  Location: [facility] CARDIAC [facility]; Service: Cardiology   • CARDIAC CATHETERIZATION Left 10/07/2022    Procedure: Cardiac Left Heart Cath;  Surgeon: Shannon Canada MD;  Location: [facility]; Service: Cardiology   • CARDIAC CATHETERIZATION N/A 10/07/2022    Procedure: Cardiac Coronary Angiogram;  Surgeon: Shannon Canada MD;  Location: [facility]; Service: Cardiology   • CARDIAC CATHETERIZATION N/A 12/28/2022    Procedure: Cardiac pci;  Surgeon: Mychal Chan MD;  Location: [facility]ARDI[facility];   Service: Cardiology   • NECK SURGERY Right    • SHOULDER OPEN ROTATOR CUFF REPAIR Right    • SKIN BIOPSY       Social History   Social History     Substance and Sexual Activity   Alcohol Use Yes   • "Alcohol/week: 4 0 - 6 0 standard drinks   • Types: 2 - 3 Cans of beer, 2 - 3 Shots of liquor per week    Comment: once a mnth     Social History     Substance and Sexual Activity   Drug Use Yes   • Types: Marijuana    Comment: occ last used 10/6     Social History     Tobacco Use   Smoking Status Former   Smokeless Tobacco Former     Family History   Problem Relation Age of Onset   • Hypertension Mother    • Obesity Mother    • Diabetes Father    • Heart attack Father    • Stroke Father    • Heart disease Father    • Hyperlipidemia Sister    • Obesity Sister    • Hypertension Sister    • Diabetes Sister    • Kidney disease Sister    • Hyperlipidemia Brother    • Hypertension Brother    • Diabetes Brother    • Prostate cancer Brother    • Skin cancer Maternal Grandfather        Meds/Allergies       Current Outpatient Medications:   •  aspirin (ECOTRIN LOW STRENGTH) 81 mg EC tablet  •  bisoprolol (ZEBETA) 5 mg tablet  •  clopidogrel (PLAVIX) 75 mg tablet  •  famotidine (PEPCID) 20 mg tablet  •  levothyroxine (Levo-T) 100 mcg tablet  •  mupirocin (BACTROBAN) 2 % ointment  •  polyethylene glycol (GOLYTELY) 4000 mL solution  •  rosuvastatin (CRESTOR) 40 MG tablet    No Known Allergies        Objective     Blood pressure 150/90, pulse 76, temperature 98 1 °F (36 7 °C), temperature source Tympanic, height 5' 11\" (1 803 m), weight 76 2 kg (168 lb), SpO2 99 %  Body mass index is 23 43 kg/m²  PHYSICAL EXAM:      General Appearance:   Alert, cooperative, no distress   HEENT:   Normocephalic, atraumatic, anicteric      Neck:  Supple, symmetrical, trachea midline   Lungs:   Clear to auscultation bilaterally; no rales, rhonchi or wheezing; respirations unlabored    Heart[de-identified]   Regular rate and rhythm; no murmur, rub, or gallop     Abdomen:   Soft, non-tender, non-distended; normal bowel sounds; no masses, no organomegaly    Genitalia:   Deferred    Rectal:   Deferred    Extremities:  No cyanosis, clubbing or edema    Pulses:  " 2+ and symmetric    Skin:  No jaundice, rashes, or lesions    Lymph nodes:  No palpable cervical lymphadenopathy        Lab Results:   No visits with results within 1 Day(s) from this visit  Latest known visit with results is:   Appointment on 03/29/2023   Component Date Value   • PSA, Ultrasensitive 03/29/2023 2 340          Radiology Results:   No results found  Answers for HPI/ROS submitted by the patient on 5/12/2023  Chronicity: recurrent  Onset: more than 1 year ago  Onset quality: undetermined  Frequency: intermittently  Progression since onset: unchanged  Pain location: left flank  Pain - numeric: 2/10  Pain quality: aching  Radiates to: does not radiate  anorexia: No  arthralgias: No  belching: No  constipation: No  diarrhea: No  dysuria: No  fever: No  flatus: No  frequency: No  headaches: No  hematochezia: No  hematuria: No  melena: No  myalgias: Yes  nausea: No  weight loss:  Yes  Aggravated by: movement  Diagnostic workup: CT scan, ultrasound

## 2023-05-25 DIAGNOSIS — M86.68 OTHER CHRONIC OSTEOMYELITIS, OTHER SITE (HCC): Primary | ICD-10-CM

## 2023-05-26 ENCOUNTER — LAB REQUISITION (OUTPATIENT)
Dept: LAB | Facility: HOSPITAL | Age: 68
End: 2023-05-26

## 2023-05-26 ENCOUNTER — TELEPHONE (OUTPATIENT)
Dept: INFECTIOUS DISEASES | Facility: CLINIC | Age: 68
End: 2023-05-26

## 2023-05-26 DIAGNOSIS — M86.9 OSTEOMYELITIS, UNSPECIFIED (HCC): ICD-10-CM

## 2023-05-26 NOTE — TELEPHONE ENCOUNTER
Pt calls office and states that he was referred to pur office and would like to make an appt  3    Referral in chart as of 5/25    Informed pt that we will send over referral for review and will reach back out to him in regards to scheduling     Pt verbalizes understanding and gives thanks

## 2023-05-28 LAB
BACTERIA WND AEROBE CULT: ABNORMAL
BACTERIA WND AEROBE CULT: ABNORMAL
GRAM STN SPEC: ABNORMAL

## 2023-06-02 ENCOUNTER — TELEPHONE (OUTPATIENT)
Dept: GASTROENTEROLOGY | Facility: CLINIC | Age: 68
End: 2023-06-02

## 2023-06-02 NOTE — TELEPHONE ENCOUNTER
Scheduled date of colonoscopy (as of today):6/22/23  Physician performing colonoscopy:ENRIQUE  Location of colonoscopy:[facility]  Clearances: NA

## 2023-06-05 NOTE — TELEPHONE ENCOUNTER
Patient contacted office to reschedule his colonoscopy with Dr Ibanez  Patient is requesting the colonoscopy to be completed at the [facility]  Due to limited availability, please contact patient to reschedule his colonoscopy

## 2023-06-06 ENCOUNTER — TELEPHONE (OUTPATIENT)
Dept: GASTROENTEROLOGY | Facility: CLINIC | Age: 68
End: 2023-06-06

## 2023-06-06 NOTE — TELEPHONE ENCOUNTER
Scheduled date of colonoscopy (as of today):08/23/23  Physician performing colonoscopy:DR NUNEZ  Location of colonoscopy:[facility]  Bowel prep reviewed with patient:tanisha  Instructions reviewed with patient by:PT HAS PREP INSTRUCTIONS FROM OFFICE  Clearances: PLAVIX

## 2023-06-06 NOTE — TELEPHONE ENCOUNTER
Our mutual patient is scheduled for procedure: COLONOSCOPY    On: 08/23/23     With: Dr Mario Hua is taking the following blood thinner:   PLAVIX     Can this be stopped __5____ days prior to the procedure?       Physician Approving clearance: ________________________

## 2023-06-09 NOTE — TELEPHONE ENCOUNTER
Dr Angela Arnold, See med clearance from Dr below  Do you want to proceed with colonoscopy on plavix or wait til after January?

## 2023-06-12 ENCOUNTER — PROCEDURE VISIT (OUTPATIENT)
Dept: DERMATOLOGY | Facility: CLINIC | Age: 68
End: 2023-06-12
Payer: COMMERCIAL

## 2023-06-12 VITALS
SYSTOLIC BLOOD PRESSURE: 168 MMHG | TEMPERATURE: 97.8 F | HEART RATE: 65 BPM | DIASTOLIC BLOOD PRESSURE: 98 MMHG | HEIGHT: 71 IN | OXYGEN SATURATION: 86 % | WEIGHT: 167 LBS | BODY MASS INDEX: 23.38 KG/M2

## 2023-06-12 DIAGNOSIS — C44.219 BASAL CELL CARCINOMA OF AURICLE OF LEFT EAR: ICD-10-CM

## 2023-06-12 PROCEDURE — 17311 MOHS 1 STAGE H/N/HF/G: CPT | Performed by: DERMATOLOGY

## 2023-06-12 PROCEDURE — 17312 MOHS ADDL STAGE: CPT | Performed by: DERMATOLOGY

## 2023-06-12 NOTE — PROGRESS NOTES
MOHS Procedure Note    Patient: Jim Espana  : 1955  MRN: [id number]  Date: 2023    History of Present Illness: The patient is a 75 y o  male who presents with complaints of Basal Cell Carcinoma on the Left postauricular ear  Past Medical History:   Diagnosis Date   • Anxiety    • Cancer [facility])    • Chemotherapy follow-up examination    • History of radiation therapy    • Hypertension        Past Surgical History:   Procedure Laterality Date   • ANKLE SURGERY Left    • CARDIAC CATHETERIZATION Left 10/07/2022    Procedure: Cardiac catheterization;  Surgeon: Win Urena MD;  [facility]: [facility]; Service: Cardiology   • CARDIAC CATHETERIZATION Left 10/07/2022    Procedure: Cardiac Left Heart Cath;  Surgeon: Win Urena MD;  [facility]; Service: Cardiology   • CARDIAC CATHETERIZATION N/A 10/07/2022    Procedure: Cardiac Coronary Angiogram;  Surgeon: Win Urena MD;  [facility]; Service: Cardiology   • CARDIAC CATHETERIZATION N/A 2022    Procedure: Cardiac pci;  Surgeon: Lacey Lambert MD;  [facility]: [facility];   Service: Cardiology   • NECK SURGERY Right    • SHOULDER OPEN ROTATOR CUFF REPAIR Right    • SKIN BIOPSY           Current Outpatient Medications:   •  aspirin (ECOTRIN LOW STRENGTH) 81 mg EC tablet, Take 1 tablet (81 mg total) by mouth daily, Disp: 30 tablet, Rfl: 11  •  bisoprolol (ZEBETA) 5 mg tablet, Take 0 5 tablets (2 5 mg total) by mouth 2 (two) times a day, Disp: 90 tablet, Rfl: 3  •  clopidogrel (PLAVIX) 75 mg tablet, Take 1 tablet (75 mg total) by mouth daily Do not start before 2022 , Disp: 30 tablet, Rfl: 11  •  famotidine (PEPCID) 20 mg tablet, Take 1 tablet (20 mg total) by mouth in the morning, Disp: 30 tablet, Rfl: 11  •  levothyroxine (Levo-T) 100 mcg tablet, Take 1 tablet (100 mcg total) by mouth daily in the early morning, Disp: 90 tablet, Rfl: 3  •  mupirocin (BACTROBAN) 2 % ointment, Apply topically 3 (three) times a day, Disp: 22 g, Rfl: 2  •  rosuvastatin (CRESTOR) 40 MG tablet, Take 1 tablet (40 mg total) by mouth daily, Disp: 30 tablet, Rfl: 11  •  polyethylene glycol (GOLYTELY) 4000 mL solution, Take 4,000 mL by mouth once for 1 dose, Disp: 4000 mL, Rfl: 0    No Known Allergies    Physical Exam:   Vitals:    06/12/23 1131   BP: 168/98   Pulse: 65   Temp: 97 8 °F (36 6 °C)   SpO2: (!) 86%     General: Awake, Alert, Oriented x 3, Mood and affect appropriate  Respiratory: Respirations even and unlabored  Cardiovascular: Peripheral pulses intact; no edema  Musculoskeletal Exam: N/A    Assessment: 1 5 x 0 8 cm pink patch on the left postauricular ear  Biopsy proven to be Basal Cell Carcinoma (atleast Nodular Type); transected on the left postauricular ear  Plan: MOHS    H&P Time of Completion: 1217    MOHS Procedure Timeout    Flowsheet Row Most Recent Value   Timeout: 1905   Patient Identity Verified: Yes   Correct Site Verified: Yes   Correct Procedure Verified: Yes          MOHS Diagnosis/Indication/Location/ID    Flowsheet Row Most Recent Value   Pathology Type Basal cell carcinoma   Anatomic Site left postauricular area (mastoid)   Indications for MOHS tumor location   MOHS ID [id number]          MOHS Site/Accession/Pre-Post    Flowsheet Row Most Recent Value   Original Site Identified (as submitted by referring clinician) Photo, Referral   Biopsy Accession/Specimen # (as submitted by referring clincian) [id number]   Pre-MOHS Size Length (cm) 1 5   Pre-MOHS Size Width (cm) 0 8   Post-MOHS Size-Length (cm) 2   Post MOHS Size-Width (cm) 2 5   Repair Type Second intention   Anesthetic Used 1% Lidocaine with epinephrine          MOHS Tumor Stage 1 Information    Flowsheet Row Most Recent Value   Tissue Sections (blocks) 2   Microscopic Exam Section 1: Irregularly shaped cords and strands of basaloid keratinocytes infiltrate the dermis with a spiky growth pattern   The nuclei at the periphery of the islands have a palisaded arrangement  [address] are associated with a fibromyxoid stroma and clefting  Microscopic Exam Section 2: Irregularly shaped cords and strands of basaloid keratinocytes infiltrate the dermis with a spiky growth pattern  The nuclei at the periphery of the islands have a palisaded arrangement  [address] are associated with a fibromyxoid stroma and clefting  Tumor Clear After Stage I? No          MOHS Tumor Stage 2 Information    Flowsheet Row Most Recent Value   Tissue Sections (blocks) 2   Microscopic Exam Section 1: No tumor identified in section  Microscopic Exam Section 2: No tumor identified in section  Tumor Clear After Stage II? Yes                  Patient identified, procedure verified, site identified and verified  Time out completed  Surgical removal of the lesion discussed with the patient (risks and benefits, including possibility of scarring, infection, recurrence or potential for further treatment)  I have specifically identified the site with the patient  I have discussed the fact that the patient will have a scar after the procedure regardless of granulation or repair with sutures  I have discussed that the repair options can range from granulation in some cases to linear or curvilinear closures to larger flaps or grafts  There are sometimes flaps or grafts used that require multiples stages of surgery and will not be completed today, rather be completed over a series of appointments  I have discussed that occasionally due to location, size or depth of the lesion I may recommend consultation with and transfer of care for further removal or the reconstruction to another provider such as ophthalmology surgery, plastic surgery, ENT surgery, or surgical oncology   There are cases in which other testing such as imaging with MRI or CT scan or testing of lymph nodes is recommended because of the nature/depth/location of tumor seen during the removal  There is a risk of injury to nerves causing temporary or permanent numbness or the inability to move muscles full such as the inability to lift eyebrows  Questions answered and verbal and written consent was obtained  The tumor qualifies for Mohs based on AUC criteria  Dr Yadira Palafox served as the surgeon and pathologist during the procedure  BCC cleared with 2 stages of mohs and allowed to heal secondarily after detailed discussion of repair options  Well tolerated  Wound check in 2-3 weeks      Scribe Attestation    I,:  Cornell Prakash MA am acting as a scribe while in the presence of the attending physician :       I,:  aTna Raygoza MD personally performed the services described in this documentation    as scribed in my presence :

## 2023-06-12 NOTE — PATIENT INSTRUCTIONS
"Mohs Microscopic Surgery After Care    Your wound will heal via secondary intention, which means no additional surgery was performed after removal of your skin cancer  The wound was left open to heal gradually over time using wound care alone  We did put a layer of glue on the wound to help make wound care easier as well as help with decreasing the risk of bleeding after surgery  Wounds left to heal secondarily can take 2-3 months on average to heal   The healing occurs step by step  You will notice that over the first three weeks the area will drain straw colored fluid that may have some blood within it  This is normal  Over the first three weeks, you form a nice healing base called fibrin that serves as the scaffold or map for the rest of your body's healing process  The fibrin is a thick yellow tissue  People often worry that it is pus given the yellow color  Unlike pus, this is a thick layer that cannot be rubbed off  After this, you should expect the wound to \"fill in\" to the surface of your skin over the course of several weeks  Lastly, a new layer of skin will form over the wound  Until your body forms a good fibrin base (which takes ~3 weeks) you should avoid immersing the wound in water (such as in baths, whirlpools, swimming pools, hot tubs, lakes and oceans)  You however CAN and should wash the area daily, as instructed below  After healing, the scar will initially be red (and often times a deep red to purple color on the legs) but will gradually fade over the course of 1 year to to round scar lighter than the skin surrounding it  WOUND CARE AFTER YOUR PROCEDURE    Try NOT to remove the pressure bandage for 48 hours  Keep the area clean and dry while this bandage is on  After removing the bandage for the first time, gently clean the area with soap and water   If the bandage is difficult to remove, getting the bandage wet in the shower will sometimes help soften the adhesive and allow it " to be removed more easily  You will now need to cleanse this area daily in the shower with gentle soap  There is no need to scrub the area  There is no need for further wound care for 2 weeks or until the glue peels off entirely  You will notice over this time that the glue will start to flake off  Do not yet apply and Vaseline or Aquaphor to the area as this will dissolve your skin glue  If you would like to keep the area covered, non stick dressing and paper tape (or Hypafix) are recommended for sensitive skin but a bandaid is fine if it covers the area well  After 1-2 weeks, you will need start to apply plain Vaseline ointment (this is over the counter and not a prescription) to the site until it has healed over completely followed by a clean appropriately sized bandage to area  Non stick dressing and paper tape (or Hypafix) are recommended for sensitive skin but a bandaid is fine if it covers the area well  We advise you follow the wound care as noted below the entire time it takes for the areas to heal completely  MANAGING YOUR PAIN AFTER SURGERY     You can expect to have some pain after surgery  This is normal  The pain is typically worse the first two days after surgery, and quickly begins to get better  The best strategy for controlling your pain after surgery is around the clock pain control  You can take over the counter Acetaminophen (Tylenol) for discomfort, if no contraindications  If you are taking this at the maximum dose, you can alternate this with Motrin (ibuprofen or Advil) as well  Alternating these medications with each other allows you to maximize your pain control  In addition to Tylenol and Motrin, you can use heating pads or ice packs on your incisions to help reduce your pain  How will I alternate your regular strength over-the-counter pain medication? You will take a dose of pain medication every three hours     Start by taking 650 mg of Tylenol (2 pills of 325 mg)   3 hours later take 600 mg of Motrin (3 pills of 200 mg)   3 hours after taking the Motrin take 650 mg of Tylenol   3 hours after that take 600 mg of Motrin  See example - if your first dose of Tylenol is at 12:00 PM     12:00 PM  Tylenol 650 mg (2 pills of 325 mg)    3:00 PM  Motrin 600 mg (3 pills of 200 mg)    6:00 PM  Tylenol 650 mg (2 pills of 325 mg)    9:00 PM  Motrin 600 mg (3 pills of 200 mg)    Continue alternating every 3 hours      Important:   Do not take more than 4000mg of Tylenol or 3200mg of Motrin in a 24-hour period  What if I still have pain? If you have pain that is not controlled with the over-the-counter pain medications (Tylenol and Motrin or Advil), don't hesitate to call our staff using the number provided  We will help make sure you are managing your pain in the best way possible, and if necessary, we can provide a prescription for additional pain medication  CALL OUR OFFICE IMMEDIATELY FOR ANY SIGNS OF INFECTION:    This includes fever, chills, increased redness, warmth, tenderness, severe discomfort/pain, or pus or foul smell coming from the wound  If you are experiencing any of the above, please call [facility] directly at [phone] (SKIN)    IF BLEEDING IS NOTICED:    Place a clean cloth over the area and apply firm pressure for thirty minutes  Check the wound ONLY after 30 minutes of direct pressure; do not cheat and sneak a peak, as that does not count  If bleeding persists after 30 minutes of legitimate direct pressure, then try one more round of direct pressure to the area  Should the bleeding become heavier or not stop after the second attempt, call [facility] directly at [phone] (SKIN)  Your call will get routed to the dermatology surgeon on call even after hours

## 2023-06-14 ENCOUNTER — TELEPHONE (OUTPATIENT)
Dept: DERMATOLOGY | Facility: CLINIC | Age: 68
End: 2023-06-14

## 2023-06-14 NOTE — TELEPHONE ENCOUNTER
Called patient to see how he was feeling after his mohs  Patient states all is well, minimal pain, he will be removing the bandage later today  Patient denies any bleeding at this time

## 2023-06-15 ENCOUNTER — CONSULT (OUTPATIENT)
Dept: INFECTIOUS DISEASES | Facility: CLINIC | Age: 68
End: 2023-06-15
Payer: COMMERCIAL

## 2023-06-15 ENCOUNTER — TELEPHONE (OUTPATIENT)
Dept: INFECTIOUS DISEASES | Facility: CLINIC | Age: 68
End: 2023-06-15

## 2023-06-15 VITALS
RESPIRATION RATE: 17 BRPM | DIASTOLIC BLOOD PRESSURE: 65 MMHG | SYSTOLIC BLOOD PRESSURE: 132 MMHG | WEIGHT: 167 LBS | OXYGEN SATURATION: 99 % | TEMPERATURE: 97.8 F | BODY MASS INDEX: 23.38 KG/M2 | HEIGHT: 71 IN | HEART RATE: 65 BPM

## 2023-06-15 DIAGNOSIS — C14.0 THROAT CANCER (HCC): ICD-10-CM

## 2023-06-15 DIAGNOSIS — R13.12 OROPHARYNGEAL DYSPHAGIA: ICD-10-CM

## 2023-06-15 DIAGNOSIS — M86.68 OTHER CHRONIC OSTEOMYELITIS, OTHER SITE (HCC): Primary | ICD-10-CM

## 2023-06-15 PROCEDURE — 99204 OFFICE O/P NEW MOD 45 MIN: CPT | Performed by: INTERNAL MEDICINE

## 2023-06-15 RX ORDER — AMOXICILLIN 500 MG/1
TABLET, FILM COATED ORAL
COMMUNITY
Start: 2023-06-08 | End: 2023-06-15

## 2023-06-15 RX ORDER — CHLORHEXIDINE GLUCONATE 0.12 MG/ML
RINSE ORAL
COMMUNITY
Start: 2023-06-08

## 2023-06-15 RX ORDER — AMOXICILLIN 500 MG/1
1000 TABLET, FILM COATED ORAL EVERY 8 HOURS
Qty: 180 TABLET | Refills: 2 | Status: SHIPPED | OUTPATIENT
Start: 2023-06-15 | End: 2023-07-15

## 2023-06-15 NOTE — TELEPHONE ENCOUNTER
Spoke with [facility] today to acquire notes/testing from their office  She states that in order to do this, they need to reach out to the doctor  As soon as she obtains permission, she will fax these items directly to our office  Fax number provided

## 2023-06-15 NOTE — PROGRESS NOTES
Consultation - Infectious Disease   Sapna Sheldon 75 y o  male MRN: [id number]  Unit/Bed#:  Encounter: [id number]      IMPRESSION & RECOMMENDATIONS:   Impression/Recommendations:  1  Mandibular osteomyelitis  Although recent CT did not show definitive evidence of osteomyelitis, loose fragments of bone were removed during recent tooth extraction with bone culture positive for group F strep  Pathology showed fragments of bone with extensive bacterial colonization and evidence of peripheral acute inflammation  Fortunately, patient's symptoms have improved post tooth extraction  I discussed with him potential treatment options for possibility of residual mandibular osteomyelitis including placement of PICC line/IV antibiotic versus trial of appropriate high-dose oral antibiotic  As his symptoms are now well controlled, we both agreed to proceed with high-dose oral amoxicillin with close monitoring     -Restart amoxicillin at high dose 1 g every 8 hours   -Plan for at least an additional 6 weeks of oral antibiotic, tentatively through 7/26   -Check CBC with differential, BMP every other week  -Ongoing follow-up with OMFS  No ID contraindication to proceeding with further tooth extractions or mandibular debridement, but would avoid other procedures such as implants, etc     2   Prior tongue cancer with lymph node involvement status post lymph node dissection, XRT 2006  Complicated by poor dentition  3   Dysphagia  In the setting #2  Patient has been able to swallow 500 mg tablets of amoxicillin  I discussed above plan in detail with patient and answered all questions  Follow-up in 1 month, or sooner if new concerns arise          HISTORY OF PRESENT ILLNESS:  Reason for Consult: Mandibular osteomyelitis    HPI: Sapna Shedlon is a 75 y o  male with prior tongue cancer with lymph node involvement s/p LN dissection and XRT in 2006, CAD s/p PCI, HTN who presents for initial consultation regarding recent positive culture/pathology from left mandible  Patient has had problems with his teeth ever since his radiation therapy  More recently, one of his left bottom molars became loose and his PCP sent him for CT which showed no evidence of recurrent malignancy did show dental caries and periapical lucencies with evidence of idiopathic osteosclerosis  No radiographic evidence of lytic or blastic bone lesions at the time  Patient was then seen by OMFS and underwent extraction of this loose molar  At the time of extraction, some of the mandibular bone itself came out  This specimen was sent for culture and pathology  Culture ultimately grew group F strep and pathology showed fragments of bone with extensive bacterial colonization and evidence of peripheral acute inflammation  Patient has been on oral amoxicillin 500 mg every 8 hours for the past several weeks  His symptoms have improved ever since tooth extraction was performed  Denies open area at the site of extraction, drainage, pain, swelling or any other new complaints  His OMFS does wish to proceed with extraction of the remainder of his teeth and possible mandibular debridement but it was recommended that he be seen by us beforehand  REVIEW OF SYSTEMS:  A complete system-based review of systems is otherwise negative  PAST MEDICAL HISTORY:  Past Medical History:   Diagnosis Date   • Anxiety    • Cancer [facility])    • Chemotherapy follow-up examination    • History of radiation therapy 2006   • Hypertension      Past Surgical History:   Procedure Laterality Date   • ANKLE SURGERY Left    • CARDIAC CATHETERIZATION Left 10/07/2022    Procedure: Cardiac catheterization;  Surgeon: Mellisa Pineda MD;  [facility]; Service: Cardiology   • CARDIAC CATHETERIZATION Left 10/07/2022    Procedure: Cardiac Left Heart Cath;  Surgeon: Mellisa Pineda MD;  [facility]: [facility];   Service: Cardiology   • CARDIAC CATHETERIZATION N/A 10/07/2022 Procedure: Cardiac Coronary Angiogram;  Surgeon: Angela Sotomayor MD;  [facility]; Service: Cardiology   • CARDIAC CATHETERIZATION N/A 12/28/2022    Procedure: Cardiac pci;  Surgeon: Jordin Dorman MD;  Location:  CARDIAC CATH LAB; Service: Cardiology   • NECK SURGERY Right    • SHOULDER OPEN ROTATOR CUFF REPAIR Right    • SKIN BIOPSY         FAMILY HISTORY:  Non-contributory    SOCIAL HISTORY:  Social History     Substance and Sexual Activity   Alcohol Use Yes   • Alcohol/week: 4 0 - 6 0 standard drinks of alcohol   • Types: 2 - 3 Cans of beer, 2 - 3 Shots of liquor per week    Comment: once a mnth     Social History     Substance and Sexual Activity   Drug Use Yes   • Types: Marijuana    Comment: occ last used 10/6     Social History     Tobacco Use   Smoking Status Former   Smokeless Tobacco Former       ALLERGIES:  No Known Allergies    MEDICATIONS:  All current active medications have been reviewed  PHYSICAL EXAM:  Vitals:  [unfilled]  Paulie@[employer] com: Temperature: 97 8 °F (36 6 °C)     Physical Exam:  General:  Well-nourished, well-developed, in no acute distress, nontoxic  Eyes:  Conjunctive clear with no hemorrhages or effusions  Oropharynx: Poor oral dentition  No visible erythema, edema, draining lesions  Neck:  Supple, trachea midline  Lungs:  Clear to auscultation bilaterally, no accessory muscle use  Cardiac:  Regular rate and rhythm  Abdomen:  Soft, non-tender, non-distended  Extremities:  No peripheral cyanosis, clubbing, or edema  Skin:  No diffuse rashes or draining wounds  Neurological:  Moves all four extremities spontaneously    LABS, IMAGING, & OTHER STUDIES:  Lab Results:  I have personally reviewed pertinent labs  Culture of jaw wound obtained on 5/25/2023 with growth of group F strep  Pathology of left mandible with fragments of bone with extensive bacterial colonization and peripheral acute inflammation      Imaging Studies:   I have personally reviewed pertinent imaging study reports and images in PACS     3/22/2023 CT soft tissue neck showed no evidence of recurrent malignancy  Dental caries and periapical lucencies with evidence of idiopathic osteosclerosis at left mandibular molar tooth roots  No suspicious lytic or blastic bone lesions  EKG, Pathology, and Other Studies:   I have personally reviewed pertinent reports

## 2023-06-15 NOTE — PATIENT INSTRUCTIONS
Start your amoxicillin 1gram three times by mouth daily  Blood work every 2 weeks- start next week  Follow up in 1 month, or as needed sooner  Call us with questions/concerns

## 2023-06-16 ENCOUNTER — RA CDI HCC (OUTPATIENT)
Dept: OTHER | Facility: HOSPITAL | Age: 68
End: 2023-06-16

## 2023-06-16 ENCOUNTER — TELEPHONE (OUTPATIENT)
Dept: CARDIOLOGY CLINIC | Facility: CLINIC | Age: 68
End: 2023-06-16

## 2023-06-16 NOTE — PROGRESS NOTES
[facility]  coding opportunities       Chart reviewed, no opportunity found: CHART REVIEWED, NO OPPORTUNITY FOUND        Patients Insurance        Commercial Insurance: [id number] [facility]

## 2023-06-16 NOTE — TELEPHONE ENCOUNTER
Pt requiring oral surgery and before he schedule the appt he is asking if he will be able to hold Plavix for the procedure?

## 2023-06-23 ENCOUNTER — APPOINTMENT (OUTPATIENT)
Dept: LAB | Facility: CLINIC | Age: 68
End: 2023-06-23
Payer: COMMERCIAL

## 2023-06-23 DIAGNOSIS — E03.9 ACQUIRED HYPOTHYROIDISM: ICD-10-CM

## 2023-06-23 DIAGNOSIS — M86.68 OTHER CHRONIC OSTEOMYELITIS, OTHER SITE (HCC): ICD-10-CM

## 2023-06-23 LAB
ANION GAP SERPL CALCULATED.3IONS-SCNC: 1 MMOL/L
BASOPHILS # BLD AUTO: 0.07 THOUSANDS/ÂΜL (ref 0–0.1)
BASOPHILS NFR BLD AUTO: 0 % (ref 0–1)
BUN SERPL-MCNC: 16 MG/DL (ref 5–25)
CALCIUM SERPL-MCNC: 9 MG/DL (ref 8.3–10.1)
CHLORIDE SERPL-SCNC: 105 MMOL/L (ref 96–108)
CO2 SERPL-SCNC: 31 MMOL/L (ref 21–32)
CREAT SERPL-MCNC: 0.79 MG/DL (ref 0.6–1.3)
EOSINOPHIL # BLD AUTO: 0.11 THOUSAND/ÂΜL (ref 0–0.61)
EOSINOPHIL NFR BLD AUTO: 1 % (ref 0–6)
ERYTHROCYTE [DISTWIDTH] IN BLOOD BY AUTOMATED COUNT: 15.4 % (ref 11.6–15.1)
GFR SERPL CREATININE-BSD FRML MDRD: 92 ML/MIN/1.73SQ M
GLUCOSE P FAST SERPL-MCNC: 103 MG/DL (ref 65–99)
HCT VFR BLD AUTO: 39.9 % (ref 36.5–49.3)
HGB BLD-MCNC: 12.9 G/DL (ref 12–17)
IMM GRANULOCYTES # BLD AUTO: 0.06 THOUSAND/UL (ref 0–0.2)
IMM GRANULOCYTES NFR BLD AUTO: 0 % (ref 0–2)
LYMPHOCYTES # BLD AUTO: 1.15 THOUSANDS/ÂΜL (ref 0.6–4.47)
LYMPHOCYTES NFR BLD AUTO: 7 % (ref 14–44)
MCH RBC QN AUTO: 30.6 PG (ref 26.8–34.3)
MCHC RBC AUTO-ENTMCNC: 32.3 G/DL (ref 31.4–37.4)
MCV RBC AUTO: 95 FL (ref 82–98)
MONOCYTES # BLD AUTO: 1.05 THOUSAND/ÂΜL (ref 0.17–1.22)
MONOCYTES NFR BLD AUTO: 7 % (ref 4–12)
NEUTROPHILS # BLD AUTO: 13.17 THOUSANDS/ÂΜL (ref 1.85–7.62)
NEUTS SEG NFR BLD AUTO: 85 % (ref 43–75)
NRBC BLD AUTO-RTO: 0 /100 WBCS
PLATELET # BLD AUTO: 235 THOUSANDS/UL (ref 149–390)
PMV BLD AUTO: 10.8 FL (ref 8.9–12.7)
POTASSIUM SERPL-SCNC: 4.4 MMOL/L (ref 3.5–5.3)
RBC # BLD AUTO: 4.22 MILLION/UL (ref 3.88–5.62)
SODIUM SERPL-SCNC: 137 MMOL/L (ref 135–147)
T4 FREE SERPL-MCNC: 0.9 NG/DL (ref 0.61–1.12)
TSH SERPL DL<=0.05 MIU/L-ACNC: 6.61 UIU/ML (ref 0.45–4.5)
WBC # BLD AUTO: 15.61 THOUSAND/UL (ref 4.31–10.16)

## 2023-06-23 PROCEDURE — 84439 ASSAY OF FREE THYROXINE: CPT

## 2023-06-23 PROCEDURE — 80048 BASIC METABOLIC PNL TOTAL CA: CPT

## 2023-06-23 PROCEDURE — 84443 ASSAY THYROID STIM HORMONE: CPT

## 2023-06-23 PROCEDURE — 36415 COLL VENOUS BLD VENIPUNCTURE: CPT

## 2023-06-23 PROCEDURE — 85025 COMPLETE CBC W/AUTO DIFF WBC: CPT

## 2023-06-23 NOTE — TELEPHONE ENCOUNTER
Contacted [facility], option 2 as we have not yet received patient's records  Nobody answered x5 times  Faxed request over to them at [phone]

## 2023-06-26 ENCOUNTER — OFFICE VISIT (OUTPATIENT)
Dept: DERMATOLOGY | Facility: CLINIC | Age: 68
End: 2023-06-26
Payer: COMMERCIAL

## 2023-06-26 DIAGNOSIS — Z48.89 ENCOUNTER FOR POST SURGICAL WOUND CHECK: Primary | ICD-10-CM

## 2023-06-26 NOTE — PROGRESS NOTES
WOUND CHECK    Physical Exam:  • Anatomic Location Affected:  Left postauricular ear  • Description of wound: (after glue removed) healthy granulation tissue present, no bleeding or pain present  • Closure Type: secondary intention with cyanoacrylate application     Additional History of Present Condition:  Patient states he only washes the area gently with soap and water as directed  Skin glue still present  Overall, healing well  Assessment and Plan:  Based on a thorough discussion of this condition and the management approach to it (including a comprehensive discussion of the known risks, side effects and potential benefits of treatment), the patient (family) agrees to implement the following specific plan:  • Skin glue removed in office  • Clean are daily with a gentle soap and water  • Apply Vaseline and cover with Bandage daily  • Follow up in 1 month  May cancel appointment if healing well and no concerns  Well healing wound, glue removed and bandage applied  Wound care discussed and wound check in 1 month        Scribe Attestation    I,:  Jenniffer Card am acting as a scribe while in the presence of the attending physician :       I,:  Salvador Amin MD personally performed the services described in this documentation    as scribed in my presence :

## 2023-06-26 NOTE — PATIENT INSTRUCTIONS
Skin glue removed in office  Clean are daily with a gentle soap and water  Apply Vaseline and cover with Bandage daily  Follow up in 1 month  May cancel appointment if healing well and no concerns

## 2023-06-27 ENCOUNTER — OFFICE VISIT (OUTPATIENT)
Dept: LAB | Facility: CLINIC | Age: 68
End: 2023-06-27
Payer: COMMERCIAL

## 2023-06-27 ENCOUNTER — OFFICE VISIT (OUTPATIENT)
Dept: FAMILY MEDICINE CLINIC | Facility: CLINIC | Age: 68
End: 2023-06-27
Payer: COMMERCIAL

## 2023-06-27 VITALS
BODY MASS INDEX: 23.55 KG/M2 | HEART RATE: 60 BPM | DIASTOLIC BLOOD PRESSURE: 83 MMHG | OXYGEN SATURATION: 100 % | WEIGHT: 168.2 LBS | TEMPERATURE: 97.5 F | RESPIRATION RATE: 17 BRPM | SYSTOLIC BLOOD PRESSURE: 134 MMHG | HEIGHT: 71 IN

## 2023-06-27 DIAGNOSIS — Z13.6 SCREENING FOR CARDIOVASCULAR CONDITION: ICD-10-CM

## 2023-06-27 DIAGNOSIS — Z00.00 WELCOME TO MEDICARE PREVENTIVE VISIT: Primary | ICD-10-CM

## 2023-06-27 DIAGNOSIS — E03.9 ACQUIRED HYPOTHYROIDISM: ICD-10-CM

## 2023-06-27 DIAGNOSIS — I25.119 CORONARY ARTERY DISEASE INVOLVING NATIVE CORONARY ARTERY OF NATIVE HEART WITH ANGINA PECTORIS (HCC): ICD-10-CM

## 2023-06-27 LAB
ATRIAL RATE: 56 BPM
P AXIS: 36 DEGREES
PR INTERVAL: 196 MS
QRS AXIS: 37 DEGREES
QRSD INTERVAL: 94 MS
QT INTERVAL: 406 MS
QTC INTERVAL: 391 MS
T WAVE AXIS: 68 DEGREES
VENTRICULAR RATE: 56 BPM

## 2023-06-27 PROCEDURE — 93005 ELECTROCARDIOGRAM TRACING: CPT

## 2023-06-27 PROCEDURE — G0402 INITIAL PREVENTIVE EXAM: HCPCS | Performed by: NURSE PRACTITIONER

## 2023-06-27 PROCEDURE — 93010 ELECTROCARDIOGRAM REPORT: CPT | Performed by: INTERNAL MEDICINE

## 2023-06-27 RX ORDER — LEVOTHYROXINE SODIUM 0.12 MG/1
125 TABLET ORAL
Qty: 90 TABLET | Refills: 3 | Status: SHIPPED | OUTPATIENT
Start: 2023-06-27

## 2023-06-27 NOTE — PATIENT INSTRUCTIONS
Medicare Preventive Visit Patient Instructions  Thank you for completing your Welcome to Medicare Visit or Medicare Annual Wellness Visit today  Your next wellness visit will be due in one year (6/27/2024)  The screening/preventive services that you may require over the next 5-10 years are detailed below  Some tests may not apply to you based off risk factors and/or age  Screening tests ordered at today's visit but not completed yet may show as past due  Also, please note that scanned in results may not display below  Preventive Screenings:  Service Recommendations Previous Testing/Comments   Colorectal Cancer Screening  · Colonoscopy    · Fecal Occult Blood Test (FOBT)/Fecal Immunochemical Test (FIT)  · Fecal DNA/Cologuard Test  · Flexible Sigmoidoscopy Age: 39-70 years old   Colonoscopy: every 10 years (May be performed more frequently if at higher risk)  OR  FOBT/FIT: every 1 year  OR  Cologuard: every 3 years  OR  Sigmoidoscopy: every 5 years  Screening may be recommended earlier than age 39 if at higher risk for colorectal cancer  Also, an individualized decision between you and your healthcare provider will decide whether screening between the ages of 74-80 would be appropriate   Colonoscopy: Not on file  FOBT/FIT: Not on file  Cologuard: Not on file  Sigmoidoscopy: Not on file          Prostate Cancer Screening Individualized decision between patient and health care provider in men between ages of 53-78   Medicare will cover every 12 months beginning on the day after your 50th birthday PSA: No results in last 5 years           Hepatitis C Screening Once for adults born between [facility]  More frequently in patients at high risk for Hepatitis C Hep C Antibody: Not on file        Diabetes Screening 1-2 times per year if you're at risk for diabetes or have pre-diabetes Fasting glucose: 103 mg/dL (6/23/2023)  A1C: No results in last 5 years (No results in last 5 years)      Cholesterol Screening Once every 5 years if you don't have a lipid disorder  May order more often based on risk factors  Lipid panel: 03/04/2023         Other Preventive Screenings Covered by Medicare:  1  Abdominal Aortic Aneurysm (AAA) Screening: covered once if your at risk  You're considered to be at risk if you have a family history of AAA or a male between the age of 73-68 who smoking at least 100 cigarettes in your lifetime  2  Lung Cancer Screening: covers low dose CT scan once per year if you meet all of the following conditions: (1) Age 50-69; (2) No signs or symptoms of lung cancer; (3) Current smoker or have quit smoking within the last 15 years; (4) You have a tobacco smoking history of at least 20 pack years (packs per day x number of years you smoked); (5) You get a written order from a healthcare provider  3  Glaucoma Screening: covered annually if you're considered high risk: (1) You have diabetes OR (2) Family history of glaucoma OR (3)  aged 48 and older OR (3)  American aged 72 and older  3  Osteoporosis Screening: covered every 2 years if you meet one of the following conditions: (1) Have a vertebral abnormality; (2) On glucocorticoid therapy for more than 3 months; (3) Have primary hyperparathyroidism; (4) On osteoporosis medications and need to assess response to drug therapy  5  HIV Screening: covered annually if you're between the age of 12-76  Also covered annually if you are younger than 13 and older than 72 with risk factors for HIV infection  For pregnant patients, it is covered up to 3 times per pregnancy      Immunizations:  Immunization Recommendations   Influenza Vaccine Annual influenza vaccination during flu season is recommended for all persons aged >= 6 months who do not have contraindications   Pneumococcal Vaccine   * Pneumococcal conjugate vaccine = PCV13 (Prevnar 13), PCV15 (Vaxneuvance), PCV20 (Prevnar 20)  * Pneumococcal polysaccharide vaccine = PPSV23 (Pneumovax) Adults 19-64 years old: 1-3 doses may be recommended based on certain risk factors  Adults 72 years old: 1-2 doses may be recommended based off what pneumonia vaccine you previously received   Hepatitis B Vaccine 3 dose series if at intermediate or high risk (ex: diabetes, end stage renal disease, liver disease)   Tetanus (Td) Vaccine - COST NOT COVERED BY MEDICARE PART B Following completion of primary series, a booster dose should be given every 10 years to maintain immunity against tetanus  Td may also be given as tetanus wound prophylaxis  Tdap Vaccine - COST NOT COVERED BY MEDICARE PART B Recommended at least once for all adults  For pregnant patients, recommended with each pregnancy  Shingles Vaccine (Shingrix) - COST NOT COVERED BY MEDICARE PART B  2 shot series recommended in those aged 48 and above     Health Maintenance Due:      Topic Date Due   • Hepatitis C Screening  Never done   • Colorectal Cancer Screening  Never done     Immunizations Due:      Topic Date Due   • COVID-19 Vaccine (4 - Booster for Tasha series) 02/02/2022     Advance Directives   What are advance directives? Advance directives are legal documents that state your wishes and plans for medical care  These plans are made ahead of time in case you lose your ability to make decisions for yourself  Advance directives can apply to any medical decision, such as the treatments you want, and if you want to donate organs  What are the types of advance directives? There are many types of advance directives, and each state has rules about how to use them  You may choose a combination of any of the following:  · Living will: This is a written record of the treatment you want  You can also choose which treatments you do not want, which to limit, and which to stop at a certain time  This includes surgery, medicine, IV fluid, and tube feedings  · Durable power of  for healthcare [facility]):   This is a written record that states who you want to make healthcare choices for you when you are unable to make them for yourself  This person, called a proxy, is usually a family member or a friend  You may choose more than 1 proxy  · Do not resuscitate (DNR) order:  A DNR order is used in case your heart stops beating or you stop breathing  It is a request not to have certain forms of treatment, such as CPR  A DNR order may be included in other types of advance directives  · Medical directive: This covers the care that you want if you are in a coma, near death, or unable to make decisions for yourself  You can list the treatments you want for each condition  Treatment may include pain medicine, surgery, blood transfusions, dialysis, IV or tube feedings, and a ventilator (breathing machine)  · Values history: This document has questions about your views, beliefs, and how you feel and think about life  This information can help others choose the care that you would choose  Why are advance directives important? An advance directive helps you control your care  Although spoken wishes may be used, it is better to have your wishes written down  Spoken wishes can be misunderstood, or not followed  Treatments may be given even if you do not want them  An advance directive may make it easier for your family to make difficult choices about your care  © Copyright [employer] 2018 Information is for End User's use only and may not be sold, redistributed or otherwise used for commercial purposes   All illustrations and images included in CareNotes® are the copyrighted property of [employer]  or [address] [place] [address] [employer]

## 2023-06-27 NOTE — PROGRESS NOTES
Assessment and Plan:     Problem List Items Addressed This Visit        Endocrine    Hypothyroidism    Relevant Medications    levothyroxine (Euthyrox) 125 mcg tablet    Other Relevant Orders    TSH, 3rd generation with Free T4 reflex   Other Visit Diagnoses     Welcome to Medicare preventive visit    -  Primary    Coronary artery disease involving native coronary artery of native heart with angina pectoris ([facility] [address] )        Screening for cardiovascular condition        Relevant Orders    ECG 12 lead          Depression Screening and Follow-up Plan: Patient was screened for depression during today's encounter  They screened negative with a PHQ-2 score of 0  Falls Plan of Care: balance, strength, and gait training instructions were provided  Preventive health issues were discussed with patient, and age appropriate screening tests were ordered as noted in patient's After Visit Summary  Personalized health advice and appropriate referrals for health education or preventive services given if needed, as noted in patient's After Visit Summary       History of Present Illness:     Patient presents for a Medicare Wellness Visit    HPI   Patient Care Team:  HERMELINDA Gil as PCP - General (Family Medicine)     Review of Systems:     Review of Systems     Problem List:     Patient Active Problem List   Diagnosis   • Acute pain of right wrist   • Arthralgia of multiple joints   • Bilateral hip pain   • Primary hypertension   • Chronic low back pain   • Esophagitis, reflux   • Folliculitis   • Hyperlipidemia   • Hypothyroidism   • Myalgia   • Throat cancer (HCC)   • History of head and neck cancer   • Nonrheumatic aortic valve stenosis   • HOCM (hypertrophic obstructive cardiomyopathy) (HCC)   • Status post insertion of drug eluting coronary artery stent   • Coronary artery disease involving native coronary artery of native heart without angina pectoris   • Colon cancer screening   • Oropharyngeal dysphagia Past Medical and Surgical History:     Past Medical History:   Diagnosis Date   • Anxiety    • Cancer [facility])    • Chemotherapy follow-up examination    • History of radiation therapy 2006   • Hypertension      Past Surgical History:   Procedure Laterality Date   • ANKLE SURGERY Left    • CARDIAC CATHETERIZATION Left 10/07/2022    Procedure: Cardiac catheterization;  Surgeon: Rom Bailey MD;  Location: [facility]; Service: Cardiology   • CARDIAC CATHETERIZATION Left 10/07/2022    Procedure: Cardiac Left Heart Cath;  Surgeon: Rom Bailey MD;  Location: [facility]; Service: Cardiology   • CARDIAC CATHETERIZATION N/A 10/07/2022    Procedure: Cardiac Coronary Angiogram;  Surgeon: Rom Bailey MD;  Location: [facility]; Service: Cardiology   • CARDIAC CATHETERIZATION N/A 12/28/2022    Procedure: Cardiac pci;  Surgeon: Alveta Dakin, MD;  Location: [facility]; Service: Cardiology   • NECK SURGERY Right    • SHOULDER OPEN ROTATOR CUFF REPAIR Right    • SKIN BIOPSY        Family History:     Family History   Problem Relation Age of Onset   • Hypertension Mother    • Obesity Mother    • Diabetes Father    • Heart attack Father    • Stroke Father    • Heart disease Father    • Hyperlipidemia Sister    • Obesity Sister    • Hypertension Sister    • Diabetes Sister    • Kidney disease Sister    • Hyperlipidemia Brother    • Hypertension Brother    • Diabetes Brother    • Prostate cancer Brother    • Skin cancer Maternal Grandfather       Social History:     Social History     Socioeconomic History   • Marital status:      Spouse name: None   • Number of children: None   • Years of education: None   • Highest education level: None   Occupational History   • None   Tobacco Use   • Smoking status: Former   • Smokeless tobacco: Former   Vaping Use   • Vaping Use: Never used   Substance and Sexual Activity   • Alcohol use:  Yes     Alcohol/week: 4 0 - 6 0 standard drinks of alcohol     Types: 2 - 3 Cans of beer, 2 - 3 Shots of liquor per week     Comment: once a mnth   • Drug use: Yes     Types: Marijuana     Comment: occ last used 10/6   • Sexual activity: None   Other Topics Concern   • None   Social History Narrative   • None     Social Determinants of Health     Financial Resource Strain: Low Risk  (6/27/2023)    Overall Financial Resource Strain (CARDIA)    • Difficulty of Paying Living Expenses: Not hard at all   Food Insecurity: Not on file   Transportation Needs: No Transportation Needs (6/27/2023)    PRAPARE - Transportation    • Lack of Transportation (Medical): No    • Lack of Transportation (Non-Medical): No   Physical Activity: Inactive (7/26/2022)    Exercise Vital Sign    • Days of Exercise per Week: 0 days    • Minutes of Exercise per Session: 0 min   Stress: No Stress Concern Present (7/26/2022)    [address]    • Feeling of Stress :  Only a little   Social Connections: Not on file   Intimate Partner Violence: Not At Risk (10/14/2022)    Humiliation, Afraid, Rape, and Kick questionnaire    • Fear of Current or Ex-Partner: No    • Emotionally Abused: No    • Physically Abused: No    • Sexually Abused: No   Housing Stability: Not on file      Medications and Allergies:     Current Outpatient Medications   Medication Sig Dispense Refill   • amoxicillin (AMOXIL) 500 MG tablet Take 2 tablets (1,000 mg total) by mouth every 8 (eight) hours 180 tablet 2   • aspirin (ECOTRIN LOW STRENGTH) 81 mg EC tablet Take 1 tablet (81 mg total) by mouth daily 30 tablet 11   • bisoprolol (ZEBETA) 5 mg tablet Take 0 5 tablets (2 5 mg total) by mouth 2 (two) times a day 90 tablet 3   • chlorhexidine (PERIDEX) 0 12 % solution PLACE 15ML IN THE MOUTH BY MUCOUS MEMBRANE TWICE DAILY (AFTER MEALS) SWISH IN MOUTH FOR 30 SECONDS THEN SPIT OUT     • clopidogrel (PLAVIX) 75 mg tablet Take 1 tablet (75 mg total) by mouth daily Do not start before December 30, 2022  30 tablet 11   • famotidine (PEPCID) 20 mg tablet Take 1 tablet (20 mg total) by mouth in the morning 30 tablet 11   • levothyroxine (Euthyrox) 125 mcg tablet Take 1 tablet (125 mcg total) by mouth daily in the early morning 90 tablet 3   • mupirocin (BACTROBAN) 2 % ointment Apply topically 3 (three) times a day 22 g 2   • rosuvastatin (CRESTOR) 40 MG tablet Take 1 tablet (40 mg total) by mouth daily 30 tablet 11   • polyethylene glycol (GOLYTELY) 4000 mL solution Take 4,000 mL by mouth once for 1 dose 4000 mL 0     No current facility-administered medications for this visit  No Known Allergies   Immunizations:     Immunization History   Administered Date(s) Administered   • COVID-19 J&J (Tasha) vaccine 0 5 mL 12/20/2020, 03/18/2021, 12/08/2021   • Influenza, high dose seasonal 0 7 mL 10/14/2022   • Influenza, seasonal, injectable 10/14/2010, 12/09/2013   • Pneumococcal Conjugate Vaccine 20-valent (Pcv20), Polysace 08/26/2022   • Tdap 08/01/2014   • Zoster Vaccine Recombinant 08/26/2022, 11/14/2022      Health Maintenance:         Topic Date Due   • Hepatitis C Screening  Never done   • Colorectal Cancer Screening  Never done         Topic Date Due   • COVID-19 Vaccine (4 - Booster for Tasha series) 02/02/2022      Medicare Screening Tests and Risk Assessments:     Ronaldo Camacho is here for his Welcome to Medicare visit  Health Risk Assessment:   Patient rates overall health as fair  Patient feels that their physical health rating is slightly better  Patient is very satisfied with their life  Eyesight was rated as same  Hearing was rated as same  Patient feels that their emotional and mental health rating is same  Patients states they are never, rarely angry  Patient states they are never, rarely unusually tired/fatigued  Pain experienced in the last 7 days has been some  Patient's pain rating has been 3/10   Patient states that he has experienced no weight loss or gain in last 6 months  Depression Screening:   PHQ-2 Score: 0      Fall Risk Screening: In the past year, patient has experienced: no history of falling in past year      Home Safety:  Patient has trouble with stairs inside or outside of their home  Patient has working smoke alarms and has working carbon monoxide detector  Home safety hazards include: none  Nutrition:   Current diet is Regular  Medications:   Patient is currently taking over-the-counter supplements  OTC medications include: see medication list  Patient is able to manage medications  Activities of Daily Living (ADLs)/Instrumental Activities of Daily Living (IADLs):   Walk and transfer into and out of bed and chair?: Yes  Dress and groom yourself?: Yes    Bathe or shower yourself?: Yes    Feed yourself?  Yes  Do your laundry/housekeeping?: Yes  Manage your money, pay your bills and track your expenses?: Yes  Make your own meals?: Yes    Do your own shopping?: Yes    Previous Hospitalizations:   Any hospitalizations or ED visits within the last 12 months?: Yes      Hospitalization Comments: Cardiac visit - stent placement     Advance Care Planning:   Living will: No    Durable POA for healthcare: No    Advanced directive: No      Cognitive Screening:   Provider or family/friend/caregiver concerned regarding cognition?: No    PREVENTIVE SCREENINGS      Cardiovascular Screening:    General: Screening Not Indicated and History Lipid Disorder      Diabetes Screening:     General: Screening Current      Colorectal Cancer Screening:     General: Risks and Benefits Discussed and Screening Not Indicated      Prostate Cancer Screening:    General: Risks and Benefits Discussed    Due for: PSA      Osteoporosis Screening:    General: Risks and Benefits Discussed and Patient Declines      Abdominal Aortic Aneurysm (AAA) Screening:    Risk factors include: age between 73-69 yo and tobacco use        Lung Cancer Screening:     General: Screening Not Indicated "Hepatitis C Screening:    General: Risks and Benefits Discussed and Patient Declines    Screening, Brief Intervention, and Referral to Treatment (SBIRT)    Screening  Typical number of drinks in a day: 0  Typical number of drinks in a week: 4  Interpretation: Low risk drinking behavior  Other Counseling Topics:   Car/seat belt/driving safety  Vision Screening    Right eye Left eye Both eyes   Without correction      With correction 20/25 20/40 20/20   Comments: Wears glasses       Physical Exam:     /83 (BP Location: Right arm, Patient Position: Sitting, Cuff Size: Large)   Pulse 60   Temp 97 5 °F (36 4 °C) (Tympanic)   Resp 17   Ht 5' 11\" (1 803 m)   Wt 76 3 kg (168 lb 3 2 oz)   SpO2 100%   BMI 23 46 kg/m²     Physical Exam  Vitals and nursing note reviewed  Constitutional:       Appearance: Normal appearance  He is not ill-appearing  HENT:      Head: Normocephalic and atraumatic  Right Ear: Tympanic membrane, ear canal and external ear normal  There is no impacted cerumen  Left Ear: Tympanic membrane, ear canal and external ear normal  There is no impacted cerumen  Ears:      Comments: Dressing on left behind ear     Nose: Nose normal  No congestion  Mouth/Throat:      Mouth: Mucous membranes are moist       Pharynx: Oropharynx is clear  No oropharyngeal exudate or posterior oropharyngeal erythema  Eyes:      General:         Right eye: No discharge  Left eye: No discharge  Extraocular Movements: Extraocular movements intact  Conjunctiva/sclera: Conjunctivae normal    Neck:      Comments: Surgery on right side neck - no cervical nodes noted  Cardiovascular:      Rate and Rhythm: Normal rate and regular rhythm  Pulses: Normal pulses  Heart sounds: Normal heart sounds  No murmur heard  Pulmonary:      Effort: Pulmonary effort is normal       Breath sounds: Normal breath sounds     Abdominal:      General: Bowel sounds are normal       " Palpations: Abdomen is soft  Musculoskeletal:         General: Normal range of motion  Cervical back: Normal range of motion and neck supple  No tenderness  Lymphadenopathy:      Cervical: No cervical adenopathy  Skin:     General: Skin is warm and dry  Capillary Refill: Capillary refill takes less than 2 seconds  Neurological:      Mental Status: He is alert and oriented to person, place, and time  Psychiatric:         Mood and Affect: Mood normal          Behavior: Behavior normal          Thought Content:  Thought content normal          Judgment: Judgment normal           Harvis Notice, CRNP

## 2023-06-28 LAB
ATRIAL RATE: 55 BPM
P AXIS: 34 DEGREES
PR INTERVAL: 196 MS
QRS AXIS: 36 DEGREES
QRSD INTERVAL: 98 MS
QT INTERVAL: 420 MS
QTC INTERVAL: 401 MS
T WAVE AXIS: 52 DEGREES
VENTRICULAR RATE: 55 BPM

## 2023-06-28 PROCEDURE — 93010 ELECTROCARDIOGRAM REPORT: CPT | Performed by: NURSE PRACTITIONER

## 2023-07-07 ENCOUNTER — APPOINTMENT (OUTPATIENT)
Dept: LAB | Facility: CLINIC | Age: 68
End: 2023-07-07
Payer: COMMERCIAL

## 2023-07-07 DIAGNOSIS — M86.68 OTHER CHRONIC OSTEOMYELITIS, OTHER SITE (HCC): ICD-10-CM

## 2023-07-07 LAB
ANION GAP SERPL CALCULATED.3IONS-SCNC: 5 MMOL/L
BASOPHILS # BLD AUTO: 0.06 THOUSANDS/ÂΜL (ref 0–0.1)
BASOPHILS NFR BLD AUTO: 1 % (ref 0–1)
BUN SERPL-MCNC: 14 MG/DL (ref 5–25)
CALCIUM SERPL-MCNC: 9.2 MG/DL (ref 8.3–10.1)
CHLORIDE SERPL-SCNC: 105 MMOL/L (ref 96–108)
CO2 SERPL-SCNC: 30 MMOL/L (ref 21–32)
CREAT SERPL-MCNC: 0.73 MG/DL (ref 0.6–1.3)
EOSINOPHIL # BLD AUTO: 0.19 THOUSAND/ÂΜL (ref 0–0.61)
EOSINOPHIL NFR BLD AUTO: 2 % (ref 0–6)
ERYTHROCYTE [DISTWIDTH] IN BLOOD BY AUTOMATED COUNT: 15.4 % (ref 11.6–15.1)
GFR SERPL CREATININE-BSD FRML MDRD: 95 ML/MIN/1.73SQ M
GLUCOSE P FAST SERPL-MCNC: 114 MG/DL (ref 65–99)
HCT VFR BLD AUTO: 41.3 % (ref 36.5–49.3)
HGB BLD-MCNC: 12.6 G/DL (ref 12–17)
IMM GRANULOCYTES # BLD AUTO: 0.02 THOUSAND/UL (ref 0–0.2)
IMM GRANULOCYTES NFR BLD AUTO: 0 % (ref 0–2)
LYMPHOCYTES # BLD AUTO: 1.66 THOUSANDS/ÂΜL (ref 0.6–4.47)
LYMPHOCYTES NFR BLD AUTO: 19 % (ref 14–44)
MCH RBC QN AUTO: 29.4 PG (ref 26.8–34.3)
MCHC RBC AUTO-ENTMCNC: 30.5 G/DL (ref 31.4–37.4)
MCV RBC AUTO: 96 FL (ref 82–98)
MONOCYTES # BLD AUTO: 0.64 THOUSAND/ÂΜL (ref 0.17–1.22)
MONOCYTES NFR BLD AUTO: 7 % (ref 4–12)
NEUTROPHILS # BLD AUTO: 6.38 THOUSANDS/ÂΜL (ref 1.85–7.62)
NEUTS SEG NFR BLD AUTO: 71 % (ref 43–75)
NRBC BLD AUTO-RTO: 0 /100 WBCS
PLATELET # BLD AUTO: 256 THOUSANDS/UL (ref 149–390)
PMV BLD AUTO: 10.4 FL (ref 8.9–12.7)
POTASSIUM SERPL-SCNC: 4.1 MMOL/L (ref 3.5–5.3)
RBC # BLD AUTO: 4.29 MILLION/UL (ref 3.88–5.62)
SODIUM SERPL-SCNC: 140 MMOL/L (ref 135–147)
WBC # BLD AUTO: 8.95 THOUSAND/UL (ref 4.31–10.16)

## 2023-07-07 PROCEDURE — 80048 BASIC METABOLIC PNL TOTAL CA: CPT

## 2023-07-07 PROCEDURE — 85025 COMPLETE CBC W/AUTO DIFF WBC: CPT

## 2023-07-07 PROCEDURE — 36415 COLL VENOUS BLD VENIPUNCTURE: CPT

## 2023-07-14 ENCOUNTER — OFFICE VISIT (OUTPATIENT)
Dept: INFECTIOUS DISEASES | Facility: CLINIC | Age: 68
End: 2023-07-14
Payer: COMMERCIAL

## 2023-07-14 ENCOUNTER — TELEPHONE (OUTPATIENT)
Dept: INFECTIOUS DISEASES | Facility: CLINIC | Age: 68
End: 2023-07-14

## 2023-07-14 VITALS
RESPIRATION RATE: 17 BRPM | HEART RATE: 66 BPM | TEMPERATURE: 97 F | OXYGEN SATURATION: 99 % | DIASTOLIC BLOOD PRESSURE: 72 MMHG | HEIGHT: 71 IN | SYSTOLIC BLOOD PRESSURE: 148 MMHG | WEIGHT: 165 LBS | BODY MASS INDEX: 23.1 KG/M2

## 2023-07-14 DIAGNOSIS — M86.9 OSTEOMYELITIS (HCC): Primary | ICD-10-CM

## 2023-07-14 DIAGNOSIS — Z85.89 HISTORY OF HEAD AND NECK CANCER: ICD-10-CM

## 2023-07-14 DIAGNOSIS — R13.12 OROPHARYNGEAL DYSPHAGIA: ICD-10-CM

## 2023-07-14 PROBLEM — Z12.11 COLON CANCER SCREENING: Status: RESOLVED | Noted: 2023-05-15 | Resolved: 2023-07-14

## 2023-07-14 PROCEDURE — 99212 OFFICE O/P EST SF 10 MIN: CPT | Performed by: NURSE PRACTITIONER

## 2023-07-14 NOTE — TELEPHONE ENCOUNTER
[place] and spoke with Nate. I explained to her that we have yet to receive the records from this patient. Unfortunately, we have him set to finish antibiotic course in slightly less than two weeks and we would really like to be able to view them to determine whether extension is needed. Nate states that she will e-mail them to me directly to my work E-Mail. I am to call her in one hour if they are not received. Patient is also aware of this plan.

## 2023-07-14 NOTE — PATIENT INSTRUCTIONS
Infectious disease office will continue to request dental/oral surgery records for review. Until full review of records we will continue the plan below. When records are received and reviewed by infectious disease team we will call you to discuss any changes to the plan and if you need to follow up with us again. Continue oral Amoxicillin through 7/26/2023. Continue follow up with OMFS as needed. Return to the outpatient infectious disease office as needed for follow up.

## 2023-07-14 NOTE — PROGRESS NOTES
Outpatient Progress Note - [facility] Infectious Disease   Tete Qureshi 75 y.o. male MRN: [id number]  Encounter: [id number]     Impression/Plan:  1. Mandibular osteomyelitis. Although recent CT did not show definitive evidence of osteomyelitis, loose fragments of bone were removed during recent tooth extraction with bone culture positive for group F strep. Pathology showed fragments of bone with extensive bacterial colonization and evidence of peripheral acute inflammation. Despite multiple phone calls with his oral surgery office, we have not received any notes on his dental visits/procedures and only have pathology available to review. Per patient there was concern for ongoing infection during his last OMS assessment and he's been referred to the [address] [place] [address] for HBO evaluation and treatment. Patient continues on high-dose oral antibiotic treatment with oral Amoxicillin. He's been tolerating it without difficulty. I reviewed his most recent lab work from 7/7/2023 which showed a normalized WBC count = 8.95 and stable creatinine of 0.73. I will continue patient on the amoxicillin as planned through 7/26/2023. Will await records from dental team to evaluate need for possible ongoing treatment. CRNP to call patient next week to discuss record review and to discuss any recommended changes to treatment plan. -continue oral Amoxicillin through 7/26/2023  -await dental/oral surgery record review to determine need for any ongoing treatment or change in treatment  -continue follow up with OMS  -return to the outpatient  -no ID contraindication to proceeding with further tooth extractions or mandibular debridement, but would avoid other procedures such as implants, etc  -return to the outpatient infectious disease office as needed for follow up     2. Prior tongue cancer with lymph node involvement status post lymph node dissection, XRT 2006. Complicated by poor dentition.     3. Dysphagia.   In the setting #2. Patient has been able to swallow his antibiotic without difficulty. He is meeting with GI later this summer to see if he may be candidate for esophageal stenting.  -continue outpatient follow up with gastroenterology     Above plan was discussed in detail with patient in the exam room. Antibiotics:  Amoxicillin    Subjective:  Patient presents for follow up for ongoing antibiotic treatment of mandibular osteomyelitis. Patient is tolerating his oral amoxicillin without difficulty. He has no fever, chills, sweats, shakes; no nausea, vomiting, abdominal pain, diarrhea, or dysuria; no cough, shortness of breath, or chest pain. No new symptoms. He reports that he met with his oral surgery team last week and they saw an area concerning for ongoing infection. He reports he was also told there is still jaw exposure and that he's being referred to the wound center for HBO. Objective:  Vitals:  Temp 97  HR 66  RR 17  /72    Physical Exam:   General Appearance:  Alert, interactive, nontoxic, no acute distress. He appears comfortable sitting in the exam room. Throat: Oropharynx moist without lesions. Poor dentition with multiple gray/black teeth. L inner cheek and mandibular region with firm swelling, no purulence present during my exam.   Lungs:   Respirations unlabored on room air. Heart:  RRR; no murmur, rub or gallop. Skin: No new rashes or lesions noted on exposed skin.      Labs, Imaging, & Other studies:   All pertinent labs and imaging studies were personally reviewed by me including  Results from last 7 days   Lab Units 07/07/23  0817   WBC Thousand/uL 8.95   HEMOGLOBIN g/dL 12.6   PLATELETS Thousands/uL 256     Results from last 7 days   Lab Units 07/07/23  0817   POTASSIUM mmol/L 4.1   CHLORIDE mmol/L 105   CO2 mmol/L 30   BUN mg/dL 14   CREATININE mg/dL 0.73   EGFR ml/min/1.73sq m 95   CALCIUM mg/dL 9.2

## 2023-07-21 ENCOUNTER — APPOINTMENT (OUTPATIENT)
Dept: LAB | Facility: CLINIC | Age: 68
End: 2023-07-21
Payer: COMMERCIAL

## 2023-07-21 DIAGNOSIS — M86.9 OSTEOMYELITIS (HCC): ICD-10-CM

## 2023-07-21 DIAGNOSIS — E03.9 ACQUIRED HYPOTHYROIDISM: ICD-10-CM

## 2023-07-21 LAB
ANION GAP SERPL CALCULATED.3IONS-SCNC: 1 MMOL/L
BASOPHILS # BLD AUTO: 0.07 THOUSANDS/ÂΜL (ref 0–0.1)
BASOPHILS NFR BLD AUTO: 1 % (ref 0–1)
BUN SERPL-MCNC: 12 MG/DL (ref 5–25)
CALCIUM SERPL-MCNC: 9.2 MG/DL (ref 8.3–10.1)
CHLORIDE SERPL-SCNC: 106 MMOL/L (ref 96–108)
CO2 SERPL-SCNC: 32 MMOL/L (ref 21–32)
CREAT SERPL-MCNC: 0.75 MG/DL (ref 0.6–1.3)
EOSINOPHIL # BLD AUTO: 0.2 THOUSAND/ÂΜL (ref 0–0.61)
EOSINOPHIL NFR BLD AUTO: 3 % (ref 0–6)
ERYTHROCYTE [DISTWIDTH] IN BLOOD BY AUTOMATED COUNT: 15.4 % (ref 11.6–15.1)
GFR SERPL CREATININE-BSD FRML MDRD: 94 ML/MIN/1.73SQ M
GLUCOSE P FAST SERPL-MCNC: 100 MG/DL (ref 65–99)
HCT VFR BLD AUTO: 42 % (ref 36.5–49.3)
HGB BLD-MCNC: 13.3 G/DL (ref 12–17)
IMM GRANULOCYTES # BLD AUTO: 0.02 THOUSAND/UL (ref 0–0.2)
IMM GRANULOCYTES NFR BLD AUTO: 0 % (ref 0–2)
LYMPHOCYTES # BLD AUTO: 1.64 THOUSANDS/ÂΜL (ref 0.6–4.47)
LYMPHOCYTES NFR BLD AUTO: 22 % (ref 14–44)
MCH RBC QN AUTO: 30 PG (ref 26.8–34.3)
MCHC RBC AUTO-ENTMCNC: 31.7 G/DL (ref 31.4–37.4)
MCV RBC AUTO: 95 FL (ref 82–98)
MONOCYTES # BLD AUTO: 0.66 THOUSAND/ÂΜL (ref 0.17–1.22)
MONOCYTES NFR BLD AUTO: 9 % (ref 4–12)
NEUTROPHILS # BLD AUTO: 5.02 THOUSANDS/ÂΜL (ref 1.85–7.62)
NEUTS SEG NFR BLD AUTO: 65 % (ref 43–75)
NRBC BLD AUTO-RTO: 0 /100 WBCS
PLATELET # BLD AUTO: 195 THOUSANDS/UL (ref 149–390)
PMV BLD AUTO: 10.6 FL (ref 8.9–12.7)
POTASSIUM SERPL-SCNC: 4.2 MMOL/L (ref 3.5–5.3)
RBC # BLD AUTO: 4.43 MILLION/UL (ref 3.88–5.62)
SODIUM SERPL-SCNC: 139 MMOL/L (ref 135–147)
T4 FREE SERPL-MCNC: 0.86 NG/DL (ref 0.61–1.12)
TSH SERPL DL<=0.05 MIU/L-ACNC: 9.69 UIU/ML (ref 0.45–4.5)
WBC # BLD AUTO: 7.61 THOUSAND/UL (ref 4.31–10.16)

## 2023-07-21 PROCEDURE — 84443 ASSAY THYROID STIM HORMONE: CPT

## 2023-07-21 PROCEDURE — 80048 BASIC METABOLIC PNL TOTAL CA: CPT

## 2023-07-21 PROCEDURE — 84439 ASSAY OF FREE THYROXINE: CPT

## 2023-07-21 PROCEDURE — 36415 COLL VENOUS BLD VENIPUNCTURE: CPT

## 2023-07-21 PROCEDURE — 85025 COMPLETE CBC W/AUTO DIFF WBC: CPT

## 2023-07-25 ENCOUNTER — OFFICE VISIT (OUTPATIENT)
Dept: WOUND CARE | Facility: HOSPITAL | Age: 68
End: 2023-07-25
Payer: COMMERCIAL

## 2023-07-25 ENCOUNTER — APPOINTMENT (OUTPATIENT)
Dept: RADIOLOGY | Facility: CLINIC | Age: 68
End: 2023-07-25
Payer: COMMERCIAL

## 2023-07-25 VITALS
BODY MASS INDEX: 23.1 KG/M2 | WEIGHT: 165 LBS | DIASTOLIC BLOOD PRESSURE: 121 MMHG | HEART RATE: 78 BPM | TEMPERATURE: 97.9 F | SYSTOLIC BLOOD PRESSURE: 199 MMHG | HEIGHT: 71 IN | RESPIRATION RATE: 18 BRPM

## 2023-07-25 DIAGNOSIS — M27.2 OSTEORADIONECROSIS OF JAW: ICD-10-CM
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
## 2023-07-25 DIAGNOSIS — M27.2 OSTEORADIONECROSIS OF JAW: Primary | ICD-10-CM

## 2023-07-25 PROCEDURE — 99214 OFFICE O/P EST MOD 30 MIN: CPT | Performed by: NURSE PRACTITIONER

## 2023-07-25 PROCEDURE — 99213 OFFICE O/P EST LOW 20 MIN: CPT | Performed by: NURSE PRACTITIONER

## 2023-07-25 PROCEDURE — 71046 X-RAY EXAM CHEST 2 VIEWS: CPT

## 2023-07-25 NOTE — PROGRESS NOTES
Patient ID: Rayshawn rGaf is a 75 y.o. male Date of Birth 1955     Chief Complaint  Chief Complaint   Patient presents with   • HBO Consult     Consultation for ORN. Pt states that his blood pressure rises significantly when at Dr bob, he brought a log of his BP at home for the last 2 weeks - see media tab. Allergies  Patient has no known allergies. Assessment:     Diagnoses and all orders for this visit:    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  -     XR chest pa & lateral; Future  -     Hyperbaric oxygen thearpy; Standing          Plan:  1. Patient with a history of tongue cancer s/p lymph node dissection and XRT in 2006. 2.  Patient completed his radiation treatments from 11/20/2006-2/11/2007. Unable to obtain exact radiation records due to patient receiving radiation treatment 16 years ago. It is documented in patient's chart that patient received radiation of his head and neck exceeding 5000Gy. 3.  Patient recently diagnosed with mandibular osteomyelitis for which he is currently taking 3000 mg of amoxicillin every 8 hours for 6 weeks for which she is scheduled to complete tomorrow. This is currently being managed by infectious disease. 4. Patient has mixed radiopaque/radio lucent lesions to left side of mandible apical tooth #18 and multiple other mandibular regions of sclerotic bone present on x-ray  5. Patient is a candidate for HBO and would benefit from both pre and posttreatment with the goal of healing soft tissue and damaged bone. 6.  Patient recently had EKG on 6/27/2023. EKG showed sinus bradycardia with possible left atrial enlargement. Patient scheduled to see his cardiologist next in 2 weeks. We will request patient receive cardiac clearance prior to starting HBO due to patient's cardiac history. 7.  We will order a chest x-ray prior to patient starting HBO  8. See HBO orders below  9.   We will contact patient with start date once cardiac clearance is obtained, CXR is complete, and prior authorization from patient's insurance is confirmed. Wound 10/07/22 Head Left;Posterior; Lower (Active)   Date First Assessed/Time First Assessed: 10/07/22 0729   Pre-Existing Wound: Yes  Location: Head  Wound Location Orientation: Left;Posterior; Lower       Wound 10/07/22 Wrist Right (Active)   Date First Assessed/Time First Assessed: 10/07/22 0941   Pre-Existing Wound: No  Location: Wrist  Wound Location Orientation: Right  Dressing Status: Clean;Dry; Intact       Wound 12/28/22 Catheter entry/exit site Wrist Anterior;Distal;Right (Active)   Date First Assessed/Time First Assessed: 12/28/22 1329   Traumatic Wound Type: Catheter entry/exit site  Location: Wrist  Wound Location Orientation: Anterior;Distal;Right       Subjective:      . Patient is a 70-year-old male who is seen today for an HBO consult. Patient was referred by oral surgery. Patient has a history of tongue cancer with node involvement s/p lymph node dissection and XRT in 2006. Patient was treated with radiation and chemo (Erbitux). Patient received radiation from 11/20/2006-2/11/2007. Unable to obtain patient's exact radiation records due to patient receiving radiation 16 years ago, however it is documented in patient's chart that he has a history of radiation to his head and neck exceeding 5000Gy. Patient reports he previously completed 30 HBO treatments at [facility] back in 2013, however he has still been experiencing significant side effects from his radiation treatments. Patient reports he is experiencing dysphagia secondary to poor dentition and esophageal stenosis. He may require esophageal stenting to help treat this. Patient reports he was recently diagnosed with mandibular osteomyelitis. Per ID's note, loose fragments of bone were removed during recent tooth extraction which were cultured and came back positive for group F strep.  He is currently receiving high dose oral Amoxicillin x 6 weeks which he is scheduled to complete taking tomorrow. There is still concern for ongoing infection despite patient completing 6 weeks of Amoxicillin. ID is awaiting to receive patient's most recent dental/oral surgery records to determine need for ongoing treatment or change in treatment. Patient is scheduled to undergo extraction of all remaining teeth for his nonrestorable dentition after patient completes his preoperative HBO treatments. The following portions of the patient's history were reviewed and updated as appropriate:   He  has a past medical history of Anxiety, Cancer ([address]), Chemotherapy follow-up examination, History of radiation therapy (2006), and Hypertension. He   Patient Active Problem List    Diagnosis Date Noted   • Osteomyelitis ([address]) 07/14/2023   • Oropharyngeal dysphagia 05/15/2023   • Coronary artery disease involving native coronary artery of native heart without angina pectoris 01/17/2023   • Status post insertion of drug eluting coronary artery stent 12/28/2022   • HOCM (hypertrophic obstructive cardiomyopathy) ([address]) 10/14/2022   • Nonrheumatic aortic valve stenosis 09/22/2022   • Throat cancer ([address]) 06/23/2022   • History of head and neck cancer 06/23/2022   • Acute pain of right wrist 06/15/2022   • Arthralgia of multiple joints 02/29/2016   • Bilateral hip pain 02/29/2016   • Chronic low back pain 02/29/2016   • Myalgia 88/87/3888   • Folliculitis 69/97/7986   • Esophagitis, reflux 10/10/2012   • Hyperlipidemia 10/10/2012   • Primary hypertension 05/10/2012   • Hypothyroidism 05/10/2012     He  has a past surgical history that includes Neck surgery (Right); Shoulder open rotator cuff repair (Right); Ankle surgery (Left); Cardiac catheterization (Left, 10/07/2022); Cardiac catheterization (Left, 10/07/2022); Cardiac catheterization (N/A, 10/07/2022); Cardiac catheterization (N/A, 12/28/2022); and Skin biopsy. His family history includes Diabetes in his brother, father, and sister;  Heart attack in his father; Heart disease in his father; Hyperlipidemia in his brother and sister; Hypertension in his brother, mother, and sister; Kidney disease in his sister; Obesity in his mother and sister; Prostate cancer in his brother; Skin cancer in his maternal grandfather; Stroke in his father. He  reports that he has quit smoking. He has quit using smokeless tobacco. He reports current alcohol use of about 4.0 - 6.0 standard drinks of alcohol per week. He reports current drug use. Drug: Marijuana. Current Outpatient Medications   Medication Sig Dispense Refill   • aspirin (ECOTRIN LOW STRENGTH) 81 mg EC tablet Take 1 tablet (81 mg total) by mouth daily 30 tablet 11   • bisoprolol (ZEBETA) 5 mg tablet Take 0.5 tablets (2.5 mg total) by mouth 2 (two) times a day 90 tablet 3   • chlorhexidine (PERIDEX) 0.12 % solution PLACE 15ML IN THE MOUTH BY MUCOUS MEMBRANE TWICE DAILY (AFTER MEALS) SWISH IN MOUTH FOR 30 SECONDS THEN SPIT OUT     • clopidogrel (PLAVIX) 75 mg tablet Take 1 tablet (75 mg total) by mouth daily Do not start before December 30, 2022. 30 tablet 11   • famotidine (PEPCID) 20 mg tablet Take 1 tablet (20 mg total) by mouth in the morning 30 tablet 11   • levothyroxine (Euthyrox) 125 mcg tablet Take 1 tablet (125 mcg total) by mouth daily in the early morning 90 tablet 3   • mupirocin (BACTROBAN) 2 % ointment Apply topically 3 (three) times a day 22 g 2   • polyethylene glycol (GOLYTELY) 4000 mL solution Take 4,000 mL by mouth once for 1 dose 4000 mL 0   • rosuvastatin (CRESTOR) 40 MG tablet Take 1 tablet (40 mg total) by mouth daily 30 tablet 11     No current facility-administered medications for this visit. He has No Known Allergies. .    Review of Systems   Constitutional: Negative. HENT: Positive for dental problem and trouble swallowing. Negative for ear pain, hearing loss, sinus pressure and sinus pain. Eyes: Negative for photophobia, pain and visual disturbance.    Respiratory: Negative for chest tightness and shortness of breath. Cardiovascular: Negative for chest pain, palpitations and leg swelling. Gastrointestinal: Negative for diarrhea, nausea and vomiting. Genitourinary: Negative for dysuria. Musculoskeletal: Negative for gait problem. Skin: Negative for wound. Neurological: Negative for tremors, seizures and weakness. Psychiatric/Behavioral: Negative for behavioral problems, confusion and suicidal ideas. Willam presents today for a consultation for HBO treatment. HBO Indication    Osteoradionecrosis    Osteoradionecrosis location Osteoradionecrosis of mandible    The patient has/had cancer and location  Yes tongue cancer. Received radiation to head and neck    Date radiation treatments started 11/20/2006    Date radiation treatments completed 2/11/2007    Radiation treatments ended at least 6 months previous to consideration of HBO   Yes    Anatomical site of radiation therapy Head and neck    Radiation therapy records obtained No    The patient has a degenerative process of Bone    HBO is being used  Pre-operatively and post-operatively    There is a plan that includes pre and post-surgery HBO and HBO being used in adjunct to conventional Rx. Based on the information included herein, it is my determination that the patient has a medical necessity for HBOT and meets the conditions for the adjunctive RX wound care or comprehensive plan.   Yes    Based on the patient’s diagnosis of Osteoradionecrosis HBO is medically necessary      Brief history of co-morbidities that may affect HBO treatment    Previously treated with No past history of Adriamycin, Bleomycin, Antabuse, Cis-platinum or Sulfamylon    Patient reports s/s of No acute infections    Eyes    Patient has Cataracts    Ears    Patient has a history of No ear abnormalities or conditions    Ears assessed for tympanic membrane or middle are abnormalities  Ears clear bilaterally    Patient instructed on how to clear ears and demonstrate proper technique Yes    Right ear baseline TEEDS Grade 0    Left ear baseline TEEDS Grade 0    ENT consult for Myringotomy tube insertion due to No consult is needed    Cerumen removal performed N/A. ears clear of cerumen    Neurological    Patient jackson a history of seizures No    Cardiovascular    Patient has a history of Hypertension and Cardiomyopathy    EKG ordered No    Echocardiogram ordered No    Ejection fraction Greater than 50%    Cardiovascular consult ordered Yes, patient scheduled to see his Cardiologist in 2 weeks. Will have patient receive cardiac clearance then. Smoking  Social History     Tobacco Use   Smoking Status Former   Smokeless Tobacco Former       Respiratory    Patient has a history of pneumothorax No    Patient has a history of No respiratory conditions requiring further work-up prior to HBO    Lungs clear bilaterally Yes    Chest x-ray ordered Yes    Psychiatric    Patient has confinement anxiety No    Patient education and consent    I discussed with and educated the patient on the risks and benefits of HBO, different treatment options, potential adverse side effects and side effects, HBO procedure, smoking/drug/alcohol policy, and itesm not allowed in the hyperbaric chamber. Yes    Written consent for HBO obtained Yes    A trained emergency response team and ICU is available in this facility to assist with complications if required. Yes    HBO treatment goal    Elimination of bone infection, Osteogenesis and Angiogenesis    Objective:            BP (!) 199/121 Comment: see media log, pt has hypertension with appts  Pulse 78   Temp 97.9 °F (36.6 °C)   Resp 18   Ht 5' 11" (1.803 m)   Wt 74.8 kg (165 lb)   BMI 23.01 kg/m²     Physical Exam  Vitals and nursing note reviewed. Constitutional:       General: He is not in acute distress. Appearance: Normal appearance. He is normal weight.    HENT: Head: Normocephalic and atraumatic. Right Ear: Tympanic membrane, ear canal and external ear normal. There is no impacted cerumen. Left Ear: Tympanic membrane, ear canal and external ear normal. There is no impacted cerumen. Eyes:      General:         Right eye: No discharge. Left eye: No discharge. Cardiovascular:      Rate and Rhythm: Normal rate and regular rhythm. Heart sounds: Normal heart sounds. No murmur heard. No friction rub. No gallop. Pulmonary:      Effort: Pulmonary effort is normal. No respiratory distress. Breath sounds: Normal breath sounds. Musculoskeletal:         General: Normal range of motion. Cervical back: Normal range of motion and neck supple. No rigidity. Skin:     General: Skin is warm and dry. Findings: No erythema or wound. Neurological:      General: No focal deficit present. Mental Status: He is alert and oriented to person, place, and time. Mental status is at baseline. Psychiatric:         Mood and Affect: Mood normal.         Behavior: Behavior normal.         Thought Content:  Thought content normal.         Judgment: Judgment normal.           Wound Instructions:  Orders Placed This Encounter   Procedures   • Hyperbaric oxygen thearpy     Standing Status:   Standing     Number of Occurrences:   30     Standing Expiration Date:   7/25/2024     Order Specific Question:   FERDINAND Rate     Answer:   2.5 FERDINAND for 90 Minutes with 2 five minute air breaks; 100% oxygen     Order Specific Question:   Descent and ascent rate     Answer:   Descent and ascent rate of up to 2 psi/min depending upon the patient's ability to clear ears and comfort     Order Specific Question:   Frequency     Answer:   5 x week     Order Specific Question:   Total number of treatments     Answer:   30   • XR chest pa & lateral     CXR needed prior to patient starting HBOT     Standing Status:   Future     Number of Occurrences:   1     Standing Expiration Date:   7/25/2027     Scheduling Instructions:      Bring along any outside films relating to this procedure. Diagnosis ICD-10-CM Associated Orders   1.  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  M27.2 XR chest pa & lateral    Y84.2 Hyperbaric oxygen thearpy

## 2023-07-25 NOTE — PATIENT INSTRUCTIONS
The HBO technician is Zhen Flores. The phone number to the HBO room is [phone]. The number to the wound center is [phone]. Please use hydrogen peroxide in your ears twice a day or have your ears cleaned by PCP. Please have cardiac clearance for HBO therapy. Please have chest xray completed at any [facility] facility. No deoderant or hair products, no lotion, no cologne, avoid carbonated beverages but eat a meal prior to treatment.

## 2023-07-26 DIAGNOSIS — M86.9 OSTEOMYELITIS (HCC): Primary | ICD-10-CM

## 2023-08-02 NOTE — PROGRESS NOTES
[address] Follow-up Visit- Cardiology   Keli Green 75 y.o. male MRN: [id number]  Unit/Bed#:  Encounter: [id number]    Patient Active Problem List    Diagnosis Date Noted   • Osteomyelitis ([address]) 07/14/2023   • Oropharyngeal dysphagia 05/15/2023   • Coronary artery disease involving native coronary artery of native heart without angina pectoris 01/17/2023   • Status post insertion of drug eluting coronary artery stent 12/28/2022   • HOCM (hypertrophic obstructive cardiomyopathy) ([address]) 10/14/2022   • Nonrheumatic aortic valve stenosis 09/22/2022   • Throat cancer ([address]) 06/23/2022   • History of head and neck cancer 06/23/2022   • Acute pain of right wrist 06/15/2022   • Arthralgia of multiple joints 02/29/2016   • Bilateral hip pain 02/29/2016   • Chronic low back pain 02/29/2016   • Myalgia 10/13/1335   • Folliculitis 20/88/2870   • Esophagitis, reflux 10/10/2012   • Hyperlipidemia 10/10/2012   • Primary hypertension 05/10/2012   • Hypothyroidism 05/10/2012       Plan: Since his last office visit on 2- the patient has continued to follow with the Infectious Disease team and remains on high dose amoxicillin for left mandibular osteomyelitis. He is due to start hyperbaric oxygen treatment on 8- to prepare for oral surgery on 9- with removal of 17 teeth and lower left mandibular debridement for osteomyelitis. He will require cardiac clearance which will be provided and faxed to the appropriate offices. He may proceed with these procedures with no need for updated cardiac testing. Today the patient states he feels well from a cardiac standpoint. He is very active walking at least 2 miles every day and does 20 to 30 minutes of stretching/weightlifting several times a week. He can perform all of these activities with no complaints of chest discomfort, dyspnea on exertion or palpitations. He has no lower extremity edema or orthopnea.   He does note occasional lightheadedness with standing and I have encouraged adequate hydration and asked him to start performing leg pumping exercises prior to standing. His blood pressure is very elevated today at 176/110 but the patient has known whitecoat hypertension. At times if he is able to relax himself during an office visit his blood pressure will normalize. At recent PCP office visit in June his blood pressure was 134/83. He brings me a list of his blood pressures from home and his systolic blood pressure typically runs in the 878'C with diastolic blood pressure running from the 70s to 80s and heart rate in the 50s. He does follow a low-sodium diet and only drinks 1 cup of caffeine a day. His recent lipid panel from March 2023 does show that his LDL is now at goal which would be less than 70 in the setting of known CAD. Collins Xie His current LDL is 62. He did also have a recent CT of the neck in March of this year to evaluate for any malignancy. No malignancy was noted but there was evidence of bilateral carotid stenosis with there being less than 50% stenosis of the proximal right ICA and approximately 60% short segment stenosis of proximal left ICA. At this time I do not think we need any additional testing to further investigate. We do have risk factors for atherosclerotic disease under control and we can simply order an updated vascular study next year. At this time the patient is stable from a cardiac standpoint and will follow-up in [address] [place] [address] in 6 months. Physician Requesting Consult: Helga Alexis MD  Reason for Consult / Principal Problem: HOCM     History of Present Illness     HPI: Funmilayo Yates is a 75y.o. year old male with past medical history of mild aortic valve stenosis, hyperlipidemia, hypertension, and coronary artery disease (s/p PCI for  of LAD in December 2022), head and neck cancer in 2006 (s/p surgery and radiation) who is referred for evaluation after recent diagnosis of obstructive HCM.  He was seen by Dr Omid Parrish on 09/22/2022 for evaluation of a heart murmur and complaint of exertional chest tightness. Re-interpretation of an echocardiogram performed earlier (08/08/2022) indicated severe asymmetric septal hypertrophy 1.6-1.7 cm and LAD territory regional wall motion abnormalities. Further evaluation included nuclear stress test on 09/29/2022 that showed reversible perfusion defect in the mid to apical anterior and anterior septal segments. Cardiac catheterization subsequently (10-) indicated a 99% tubular mid LAD lesion with JUAN 1-2 flow. Significant LVOT obstruction was noted during catheterization with a resting gradient of 45 mmHg that increased to 125 mmHg after a PVC. He was started on metoprolol XL 25 mg and underwent PCI of the LAD lesion on 12/28/22 with placement of a 3.25x38 mm MENDY. A cardiac MRI on 11/18/22 showed wall thickness of 18 mm in the anteroseptum, SHANNON and LVOT obstruction, normal RV size and function, and small foci of interstitial fibrosis in the basal interventricular septum, involving less than 5% of the myocardium. His symptoms improved markedly after initiation of medical therapy and PCI.      Other work up has included:     48-hour ambulatory ECG monitor (11-4-2022): INDICATIONS: HOCM   FINDINGS:  1. A 48 hour holter monitor demonstrated normal sinus rhythm with an average rate of 66 BPM; a minimum rate of 46 BPM; and a maximum rate of 152 BPM.  2. There were no ventricular ectopic beats. 3. There were 56 supraventricular ectopic beats, including a 36 beat run of atrial tachycardia. 4. The longest R-R interval was 1.3 seconds. 5. The patient kept a diary during holter monitor.  It demonstrated episodes of chest tightness that did not correlate to any dysrhythmia.     IMPRESSION:  1. Abnormal 48 hour holter monitor. 2. Patient in normal sinus rhythm throughout holter monitoring. 3. No premature ventricular contractions. 4. 36 beat run of atrial tachycardia.   5. No significant pauses. Symptoms on diary did not correlate to any dysrhythmia.      Bicycle stress echo (2-6-2023):  •  Left Ventricle: Left ventricular cavity size is normal. Wall thickness is severely increased. There is severe asymmetric hypertrophy of the septal wall. maximum septal wall thickness was 21 mm. The left ventricular ejection fraction is 60%. Systolic function is normal. Global longitudinal strain is reduced at -11%. There is focal akinesis of the apical anteroseptal segment. Diastolic function is abnormal. There is  outflow tract dynamic obstruction at rest with a peak gradient of 27.0 mmHg. There is outflow tract dynamic obstruction with valsalva with a peak gradient of 42.0 mmHg. •  Aortic Valve: The aortic valve is trileaflet. The leaflets are moderately thickened. The leaflets are moderately calcified. There is mildly reduced mobility. •  Mitral Valve: There is mild annular calcification. There is systolic anterior motion of the anterior leaflet with late peaking gradient. •  Stress ECG: No ST deviation is noted. There were no arrhythmias during recovery. . The stress ECG is negative for ischemia after maximal exercise, without reproduction of symptoms. •  Post Stress Echo: Left ventricle cavity has normal reduction in size post-stress. The left ventricle systolic function is hyperdynamic post-stress. The post-stress echo showed unchanged wall motion abnormalities compared to baseline. •  Echo Post Impression: Study was negative for inducible myocardial ischemia after submaximal exercise. There was no inducible high outflow tract gradients at peak exercise. Borderline aortic valve stenosis was present.         2-: Mr. Carlos A Cardenas was seen and evaluated with cardiology fellow, Dr. Celine Osborne. He was recently diagnosed with obstructive HCM. After being initiated on medical therapy along undergoing PCI for CAD his cardiac symptoms improved markedly.  He has completed 15 cardiac rehab sessions on 2- with excellent progress. He walks daily for about an hour and works as a , which involves walking and taking stairs (2-3 flights), with no symptoms. Although his blood pressure is elevated at today's visit, he is known to have white coat syndrome. He reports BP being better controlled when checking at home: -130s, DBP 80-90s, and HR 70-80s. These readings are on metoprolol succinate 50 mg qd. We will change metoprolol to bisoprolol 5 mg qd for even better blood pressure control. He reports weight loss in the past few years due to dietary changes associated with difficulty swallowing along with loss of taste attributed to previous radical surgery and radiation therapy for cancer. He is also undergoing work up for jaw osteonecrosis with plan to have multiple teeth extraction once he is done with DAPT. Lipid panel from 9/2022 revealed elevated total cholesterol (207) and LDL (157) levels along with low HDL (30). He has been taking rosuvastatin 40 mg qd for lipid management and we will recheck lipid panel. Today we also will initiate genetic testing to evaluate for pathogenic mutations associted with HCM and phenocopies and to help with family screening. He does have a heart murmur on examination today that appears to be consistent with aortic valve sclerosis/ stenosis.  Mr. Todd Zhu will be seen in [address] [place] [address] in 6 month.        Risk stratification:  Nonsustained ventricular tachycardia - No  Severe left ventricular hypertrophy (>30 mm) - No  Family history of sudden death: No  Unexplained syncope: No  LVOT obstruction: yes  Atrial fibrillation and left atrial dilation: no  Age - 75  NYHA class 1  Myocardial fibrosis - 5%  LV systolic dysfunction - No  Apical aneurysm - apical akinesis due to CAD     Review of systems:  Denies chest pain, dyspnea on exertion and palpitations; denies lower extremity edema and orthopnea; notes occasional lightheadedness with standing     Family history: Father,  at age of 59, presumably from MI, was heavy smoker, had CABG at 64. Mother,  at age of 68 from GBS. He had 4 sisters, 2 brothers. 1 sister  at age of 54 from kidney failure and one brother  at age of 72 from prostate cancer. Living sisters' age ranges from 47-79, have HTN and diabetes. Living brother is 58 and has HLD, and HTN. Mr. Pamela Estrada has 2 sons: 28 and 45 yo, both have white coat syndrome related HTN. Denies history of sudden cardiac death in the close or distant family      Genetic testing:      Devices: none      Historical Information   Past Medical History:   Diagnosis Date   • Anxiety    • Cancer [facility])    • Chemotherapy follow-up examination    • History of radiation therapy    • Hypertension      Past Surgical History:   Procedure Laterality Date   • ANKLE SURGERY Left    • CARDIAC CATHETERIZATION Left 10/07/2022    Procedure: Cardiac catheterization;  Surgeon: Nick Simpson MD;  Location: [facility]; Service: Cardiology   • CARDIAC CATHETERIZATION Left 10/07/2022    Procedure: Cardiac Left Heart Cath;  Surgeon: Nick Simpson MD;  Location: [facility]; Service: Cardiology   • CARDIAC CATHETERIZATION N/A 10/07/2022    Procedure: Cardiac Coronary Angiogram;  Surgeon: Nick Simpson MD;  Location: [facility]; Service: Cardiology   • CARDIAC CATHETERIZATION N/A 2022    Procedure: Cardiac pci;  Surgeon: hSane Chatman MD;  Location: [facility];   Service: Cardiology   • NECK SURGERY Right    • SHOULDER OPEN ROTATOR CUFF REPAIR Right    • SKIN BIOPSY       Family History   Problem Relation Age of Onset   • Hypertension Mother    • Obesity Mother    • Diabetes Father    • Heart attack Father    • Stroke Father    • Heart disease Father    • Hyperlipidemia Sister    • Obesity Sister    • Hypertension Sister    • Diabetes Sister    • Kidney disease Sister    • Hyperlipidemia Brother    • Hypertension Brother    • Diabetes Brother    • Prostate cancer Brother    • Skin cancer Maternal Grandfather      Current Outpatient Medications on File Prior to Visit   Medication Sig Dispense Refill   • AMOXICILLIN PO Take 1,000 mg by mouth 2 (two) times a day     • aspirin (ECOTRIN LOW STRENGTH) 81 mg EC tablet Take 1 tablet (81 mg total) by mouth daily 30 tablet 11   • bisoprolol (ZEBETA) 5 mg tablet Take 0.5 tablets (2.5 mg total) by mouth 2 (two) times a day 90 tablet 3   • chlorhexidine (PERIDEX) 0.12 % solution PLACE 15ML IN THE MOUTH BY MUCOUS MEMBRANE TWICE DAILY (AFTER MEALS) SWISH IN MOUTH FOR 30 SECONDS THEN SPIT OUT     • clopidogrel (PLAVIX) 75 mg tablet Take 1 tablet (75 mg total) by mouth daily Do not start before December 30, 2022. 30 tablet 11   • famotidine (PEPCID) 20 mg tablet Take 1 tablet (20 mg total) by mouth in the morning 30 tablet 11   • levothyroxine (Euthyrox) 125 mcg tablet Take 1 tablet (125 mcg total) by mouth daily in the early morning 90 tablet 3   • rosuvastatin (CRESTOR) 40 MG tablet Take 1 tablet (40 mg total) by mouth daily 30 tablet 11   • mupirocin (BACTROBAN) 2 % ointment Apply topically 3 (three) times a day (Patient not taking: Reported on 8/7/2023) 22 g 2   • polyethylene glycol (GOLYTELY) 4000 mL solution Take 4,000 mL by mouth once for 1 dose 4000 mL 0     No current facility-administered medications on file prior to visit. No Known Allergies  Social History     Substance and Sexual Activity   Alcohol Use Yes   • Alcohol/week: 4.0 - 6.0 standard drinks of alcohol   • Types: 2 - 3 Cans of beer, 2 - 3 Shots of liquor per week    Comment: once a mnth     Social History     Substance and Sexual Activity   Drug Use Yes   • Types: Marijuana    Comment: occ last used 10/6     Social History     Tobacco Use   Smoking Status Former   Smokeless Tobacco Former         Objective   Vitals: Blood pressure (!) 176/110, pulse 71, height 5' 11" (1.803 m), weight 78 kg (172 lb), SpO2 93 %. , Body mass index is 23.99 kg/m². ,   Vitals: 08/07/23 0849   BP: (!) 176/110   Pulse: 71   SpO2: 93%   Weight: 78 kg (172 lb)   Height: 5' 11" (1.803 m)      Body mass index is 23.99 kg/m². Body surface area is 1.98 meters squared.       Invasive Devices     Peripheral Intravenous Line  Duration           Peripheral IV 12/28/22 Left Antecubital 221 days          Line  Duration           Arterial Sheath -- days                Physical Exam:  GEN: Hafsa Neumann appears well, alert and oriented x 3, pleasant and cooperative   HEENT: pupils equal, round, and reactive to light; extraocular muscles intact  NECK: supple, no carotid bruits   HEART: regular rhythm, normal S1 and S2, 3/6 murmur noted, no clicks, gallops or rubs   LUNGS: clear to auscultation bilaterally; no wheezes, rales, or rhonchi   ABDOMEN: normal bowel sounds, soft, no tenderness, no distention  EXTREMITIES: peripheral pulses normal; no clubbing, cyanosis, or edema  NEURO: no focal findings   SKIN: normal without suspicious lesions on exposed skin    Lab Results:   Lab Results   Component Value Date    WBC 6.45 08/05/2023    RBC 4.55 08/05/2023    HGB 13.8 08/05/2023    HCT 42.6 08/05/2023    MCV 94 08/05/2023     08/05/2023    RDW 14.7 08/05/2023     Lab Results   Component Value Date     02/29/2016    K 4.4 08/05/2023     08/05/2023    CO2 31 08/05/2023    BUN 15 08/05/2023    CREATININE 0.80 08/05/2023    EGFR 91 08/05/2023    GLUCOSE 112 (H) 02/29/2016    CALCIUM 9.1 08/05/2023    AST 24 12/17/2022    ALT 29 12/17/2022    ALKPHOS 71 12/17/2022    PROT 7.2 02/29/2016    BILITOT 0.5 02/29/2016     No results found for: "MG"  Lab Results   Component Value Date    CHOL 287 (H) 02/29/2016    HDL 30 (L) 03/04/2023    TRIG 54 03/04/2023    LDLCALC 62 03/04/2023     Lab Results   Component Value Date    RZZ4DGLQNNAS 9.692 (H) 07/21/2023    FREET4 0.86 07/21/2023       Imaging:   I have personally reviewed pertinent films in PACS  XR chest pa & lateral    Result Date: 2023  Narrative: CHEST INDICATION:   M27.2: Inflammatory conditions of jaws Y84.2: Radiological procedure and radiotherapy as the cause of abnormal reaction of the patient, or of later complication, without mention of misadventure at the time of the procedure. Prehyperbaric chamber evaluation. COMPARISON: CXR 2022. EXAM PERFORMED/VIEWS:  XR CHEST PA & LATERAL. FINDINGS: Cardiomediastinal silhouette normal. Coronary stent. Lungs clear. No effusion or pneumothorax. Upper abdomen normal. Bones normal for age. Impression: No acute cardiopulmonary disease. No pneumothorax or pneumomediastinum. Workstation performed: [id number]     Cardiac testin-6-2023    Interpretation Summary       •  Left Ventricle: Left ventricular cavity size is normal. Wall thickness is severely increased. There is severe asymmetric hypertrophy of the septal wall. maximum septal wall thickness was 21 mm. The left ventricular ejection fraction is 60%. Systolic function is normal. Global longitudinal strain is reduced at -11%. There is focal akinesis of the apical anteroseptal segment. Diastolic function is abnormal. There is  outflow tract dynamic obstruction at rest with a peak gradient of 27.0 mmHg. There is outflow tract dynamic obstruction with valsalva with a peak gradient of 42.0 mmHg. •  Aortic Valve: The aortic valve is trileaflet. The leaflets are moderately thickened. The leaflets are moderately calcified. There is mildly reduced mobility. •  Mitral Valve: There is mild annular calcification. There is systolic anterior motion of the anterior leaflet with late peaking gradient. •  Stress ECG: No ST deviation is noted. There were no arrhythmias during recovery. . The stress ECG is negative for ischemia after maximal exercise, without reproduction of symptoms. •  Post Stress Echo: Left ventricle cavity has normal reduction in size post-stress. The left ventricle systolic function is hyperdynamic post-stress.  The post-stress echo showed unchanged wall motion abnormalities compared to baseline. •  Echo Post Impression: Study was negative for inducible myocardial ischemia after submaximal exercise. There was no inducible high outflow tract gradients at peak exercise. Borderline aortic valve stenosis was present.     Strain was performed to quantify interventricular dyssynchrony and evaluate components of myocardial function due to HCM. Results from the utilization of Strain Analysis are listed in the report below.     Stress Findings    Stress Findings A bicycle protocol stress test was performed. Overall, the patient's exercise capacity was normal for their age. The patient reached stage 4.0 of the protocol after exercising for 7 min and 4 sec and had a maximal HR of 126 bpm (82 % of MPHR) and 5.4 METS. The patient experienced no angina during the test. The patient achieved their maximal exercise threshold. The physician requested the test to be stopped. The patient reported dyspnea and fatigue during the stress test. Symptoms began during stress and ended during recovery. Blood pressure demonstrated a hypertensive response and heart rate demonstrated a blunted response to stress. The patient's heart rate recovery was normal.        Findings    Left Ventricle Left ventricular cavity size is normal. Wall thickness is severely increased. There is severe asymmetric hypertrophy of the septal wall. maximum septal wall thickness was 21 mm. The left ventricular ejection fraction is 60%. Systolic function is normal. Global longitudinal strain is reduced at -11%. There is focal akinesis of the apical anteroseptal segment. Diastolic function is abnormal.  There is  outflow tract dynamic obstruction at rest with a peak gradient of 27.0 mmHg. There is outflow tract dynamic obstruction with valsalva with a peak gradient of 42.0 mmHg.       Wall Scoring Baseline     The left ventricular wall motion is normal.         Peak Stress     The left ventricular wall motion is globally hyperkinetic. Right Ventricle Systolic function is normal. Wall thickness is normal.      Left Atrium The atrium is normal in size. Right Atrium The atrium is normal in size. Aortic Valve The aortic valve was not well visualized. The aortic valve is trileaflet. The leaflets are moderately thickened. The leaflets are moderately calcified. There is mildly reduced mobility. Mitral Valve There is mild annular calcification. There is no evidence of regurgitation. There is no evidence of stenosis. There is systolic anterior motion of the anterior leaflet with late peaking gradient. Tricuspid Valve Tricuspid valve structure is normal. There is no evidence of regurgitation. There is no evidence of stenosis. Pulmonic Valve The pulmonic valve was not well visualized. Ascending Aorta The aortic root is normal in size. Pericardium There is no pericardial effusion. Stress Echo Findings    Study Impression Study was negative for inducible myocardial ischemia after submaximal exercise. There was no inducible high outflow tract gradients at peak exercise. Borderline aortic valve stenosis was present.         Stress Measurements    Baseline Vitals   Baseline HR 88 bpm         Baseline /100 mmHg         O2 sat rest 99 %         Peak Stress Vitals   Stress peak  bpm         Post peak  mmHg         O2 sat peak 98 %         Max HR Percent 82 %         Max  bpm         Recovery Vitals   Recovery HR 93 bpm         Recovery /104 mmHg         O2 sat recovery 98 %          Exercise Data   Max  bpm         Exercise duration (min) 7 min         Exercise duration (sec) 4 sec         Estimated workload 5.4 METS         Stress Stage Reached 4          Angina Index 0          Rate Pressure Product 23,112          Stress ECG   ST Depression (mm) 0 mm              Stage Data 2/6/23  9:07 AM--2/6/23 10:09 AM    Date/Time Stage BP HR O2 RPP Bicycle - Rodriguez Symptoms   02/06/23 0938 Baseline 162/100 88 bpm 97 % 22547 -- none   02/06/23 0943 Stage 1 170/110 104 bpm 99 % 94276 25 none   02/06/23 0945 Stage 2 210/110 111 bpm 98 % 15802 50 none   02/06/23 0947 Stage 3 212/110 117 bpm -- 72132 75 fatigue   02/06/23 0948 Stage 4 -- 126 bpm -- -- 100 --   02/06/23 0949 Immediate/Peak 214/100 108 bpm 98 % 93425 -- fatigue/sob   02/06/23 0951 Recovery 1 210/110 96 bpm 98 % 20160 -- fatigue/sob   02/06/23 0952 Recovery 2 196/110 97 bpm -- 36721 -- subsiding   02/06/23 0954 Recovery 3 150/14 93 bpm -- 00712 -- none     Left Ventricle Measurements    Strain   GLS -11 %          Other Measurements   Peak gradient (Rest) 27 mmHg         Peak Gradient (Valsalva) 42 mmHg              Aorta Measurements    Aortic Dimensions   Ao root 3.8 cm               Wall Scoring    Baseline Score Index: 1.00                    Peak Stress Score Index: 1.00                            Exam Details    Performed Procedure Technologist Supporting Staff Performing Physician   Echo stress test, exercise w/ strain Dolores Sharma, ANGELA Thakur, Elpidio Landau, MD         Appointment Date/Status Modality Department    2/6/2023     Completed BE STRESS 1 BE CAR NON INV           Begin Exam End Exam Begin Exam Questionnaires End Exam Questionnaires   2/6/2023  9:07 AM 2/6/2023 10:09 AM CV STRESS QUESTIONNAIRE PATIENT EDUCATION            All Reviewers List    Coleen Burnette MD on 2/8/2023 10:29 AM   HERMELINDA Davila on 2/7/2023  8:36 AM           Counseling / Coordination of Care  Total floor / unit time spent today 45 minutes. Greater than 50% of total time was spent with the patient and / or family counseling and / or coordination of care. A description of the counseling / coordination of care: Guicho Guo

## 2023-08-04 ENCOUNTER — TELEPHONE (OUTPATIENT)
Dept: WOUND CARE | Facility: HOSPITAL | Age: 68
End: 2023-08-04

## 2023-08-05 ENCOUNTER — APPOINTMENT (OUTPATIENT)
Dept: LAB | Facility: CLINIC | Age: 68
End: 2023-08-05
Payer: COMMERCIAL

## 2023-08-05 DIAGNOSIS — M86.9 OSTEOMYELITIS (HCC): ICD-10-CM

## 2023-08-05 LAB
ANION GAP SERPL CALCULATED.3IONS-SCNC: 2 MMOL/L
BASOPHILS # BLD AUTO: 0.06 THOUSANDS/ÂΜL (ref 0–0.1)
BASOPHILS NFR BLD AUTO: 1 % (ref 0–1)
BUN SERPL-MCNC: 15 MG/DL (ref 5–25)
CALCIUM SERPL-MCNC: 9.1 MG/DL (ref 8.3–10.1)
CHLORIDE SERPL-SCNC: 104 MMOL/L (ref 96–108)
CO2 SERPL-SCNC: 31 MMOL/L (ref 21–32)
CREAT SERPL-MCNC: 0.8 MG/DL (ref 0.6–1.3)
EOSINOPHIL # BLD AUTO: 0.15 THOUSAND/ÂΜL (ref 0–0.61)
EOSINOPHIL NFR BLD AUTO: 2 % (ref 0–6)
ERYTHROCYTE [DISTWIDTH] IN BLOOD BY AUTOMATED COUNT: 14.7 % (ref 11.6–15.1)
GFR SERPL CREATININE-BSD FRML MDRD: 91 ML/MIN/1.73SQ M
GLUCOSE P FAST SERPL-MCNC: 99 MG/DL (ref 65–99)
HCT VFR BLD AUTO: 42.6 % (ref 36.5–49.3)
HGB BLD-MCNC: 13.8 G/DL (ref 12–17)
IMM GRANULOCYTES # BLD AUTO: 0.02 THOUSAND/UL (ref 0–0.2)
IMM GRANULOCYTES NFR BLD AUTO: 0 % (ref 0–2)
LYMPHOCYTES # BLD AUTO: 1.34 THOUSANDS/ÂΜL (ref 0.6–4.47)
LYMPHOCYTES NFR BLD AUTO: 21 % (ref 14–44)
MCH RBC QN AUTO: 30.3 PG (ref 26.8–34.3)
MCHC RBC AUTO-ENTMCNC: 32.4 G/DL (ref 31.4–37.4)
MCV RBC AUTO: 94 FL (ref 82–98)
MONOCYTES # BLD AUTO: 0.6 THOUSAND/ÂΜL (ref 0.17–1.22)
MONOCYTES NFR BLD AUTO: 9 % (ref 4–12)
NEUTROPHILS # BLD AUTO: 4.28 THOUSANDS/ÂΜL (ref 1.85–7.62)
NEUTS SEG NFR BLD AUTO: 67 % (ref 43–75)
NRBC BLD AUTO-RTO: 0 /100 WBCS
PLATELET # BLD AUTO: 232 THOUSANDS/UL (ref 149–390)
PMV BLD AUTO: 10.5 FL (ref 8.9–12.7)
POTASSIUM SERPL-SCNC: 4.4 MMOL/L (ref 3.5–5.3)
RBC # BLD AUTO: 4.55 MILLION/UL (ref 3.88–5.62)
SODIUM SERPL-SCNC: 137 MMOL/L (ref 135–147)
WBC # BLD AUTO: 6.45 THOUSAND/UL (ref 4.31–10.16)

## 2023-08-05 PROCEDURE — 85025 COMPLETE CBC W/AUTO DIFF WBC: CPT

## 2023-08-05 PROCEDURE — 80048 BASIC METABOLIC PNL TOTAL CA: CPT

## 2023-08-05 PROCEDURE — 36415 COLL VENOUS BLD VENIPUNCTURE: CPT

## 2023-08-07 ENCOUNTER — LAB REQUISITION (OUTPATIENT)
Dept: LAB | Facility: HOSPITAL | Age: 68
End: 2023-08-07
Payer: COMMERCIAL

## 2023-08-07 ENCOUNTER — APPOINTMENT (OUTPATIENT)
Dept: LAB | Facility: CLINIC | Age: 68
End: 2023-08-07
Payer: COMMERCIAL

## 2023-08-07 ENCOUNTER — OFFICE VISIT (OUTPATIENT)
Dept: CARDIAC SURGERY | Facility: CLINIC | Age: 68
End: 2023-08-07
Payer: COMMERCIAL

## 2023-08-07 VITALS
DIASTOLIC BLOOD PRESSURE: 110 MMHG | HEIGHT: 71 IN | HEART RATE: 71 BPM | OXYGEN SATURATION: 93 % | SYSTOLIC BLOOD PRESSURE: 176 MMHG | BODY MASS INDEX: 24.08 KG/M2 | WEIGHT: 172 LBS

## 2023-08-07 DIAGNOSIS — I42.1 HOCM (HYPERTROPHIC OBSTRUCTIVE CARDIOMYOPATHY) (HCC): Primary | ICD-10-CM

## 2023-08-07 DIAGNOSIS — Z85.89 HISTORY OF HEAD AND NECK CANCER: ICD-10-CM

## 2023-08-07 DIAGNOSIS — Z01.812 PRE-OPERATIVE LABORATORY EXAMINATION: ICD-10-CM

## 2023-08-07 DIAGNOSIS — C14.0 THROAT CANCER (HCC): ICD-10-CM

## 2023-08-07 DIAGNOSIS — M86.9 OSTEOMYELITIS (HCC): ICD-10-CM

## 2023-08-07 DIAGNOSIS — Z01.812 ENCOUNTER FOR PREPROCEDURAL LABORATORY EXAMINATION: ICD-10-CM

## 2023-08-07 LAB
ABO GROUP BLD: NORMAL
ANION GAP SERPL CALCULATED.3IONS-SCNC: 1 MMOL/L
APTT PPP: 39 SECONDS (ref 23–37)
BASOPHILS # BLD AUTO: 0.04 THOUSANDS/ÂΜL (ref 0–0.1)
BASOPHILS NFR BLD AUTO: 1 % (ref 0–1)
BLD GP AB SCN SERPL QL: NEGATIVE
BUN SERPL-MCNC: 15 MG/DL (ref 5–25)
CALCIUM SERPL-MCNC: 9.2 MG/DL (ref 8.3–10.1)
CHLORIDE SERPL-SCNC: 107 MMOL/L (ref 96–108)
CO2 SERPL-SCNC: 31 MMOL/L (ref 21–32)
CREAT SERPL-MCNC: 0.78 MG/DL (ref 0.6–1.3)
EOSINOPHIL # BLD AUTO: 0.07 THOUSAND/ÂΜL (ref 0–0.61)
EOSINOPHIL NFR BLD AUTO: 1 % (ref 0–6)
ERYTHROCYTE [DISTWIDTH] IN BLOOD BY AUTOMATED COUNT: 15.1 % (ref 11.6–15.1)
GFR SERPL CREATININE-BSD FRML MDRD: 92 ML/MIN/1.73SQ M
GLUCOSE P FAST SERPL-MCNC: 101 MG/DL (ref 65–99)
HCT VFR BLD AUTO: 42.2 % (ref 36.5–49.3)
HGB BLD-MCNC: 13.2 G/DL (ref 12–17)
IMM GRANULOCYTES # BLD AUTO: 0.03 THOUSAND/UL (ref 0–0.2)
IMM GRANULOCYTES NFR BLD AUTO: 0 % (ref 0–2)
INR PPP: 0.97 (ref 0.84–1.19)
LYMPHOCYTES # BLD AUTO: 1.27 THOUSANDS/ÂΜL (ref 0.6–4.47)
LYMPHOCYTES NFR BLD AUTO: 15 % (ref 14–44)
MAGNESIUM SERPL-MCNC: 2.6 MG/DL (ref 1.6–2.6)
MCH RBC QN AUTO: 29.5 PG (ref 26.8–34.3)
MCHC RBC AUTO-ENTMCNC: 31.3 G/DL (ref 31.4–37.4)
MCV RBC AUTO: 94 FL (ref 82–98)
MONOCYTES # BLD AUTO: 0.58 THOUSAND/ÂΜL (ref 0.17–1.22)
MONOCYTES NFR BLD AUTO: 7 % (ref 4–12)
NEUTROPHILS # BLD AUTO: 6.44 THOUSANDS/ÂΜL (ref 1.85–7.62)
NEUTS SEG NFR BLD AUTO: 76 % (ref 43–75)
NRBC BLD AUTO-RTO: 0 /100 WBCS
PLATELET # BLD AUTO: 222 THOUSANDS/UL (ref 149–390)
PMV BLD AUTO: 10.8 FL (ref 8.9–12.7)
POTASSIUM SERPL-SCNC: 4.5 MMOL/L (ref 3.5–5.3)
PROTHROMBIN TIME: 13 SECONDS (ref 11.6–14.5)
RBC # BLD AUTO: 4.47 MILLION/UL (ref 3.88–5.62)
RH BLD: POSITIVE
SODIUM SERPL-SCNC: 139 MMOL/L (ref 135–147)
SPECIMEN EXPIRATION DATE: NORMAL
WBC # BLD AUTO: 8.43 THOUSAND/UL (ref 4.31–10.16)

## 2023-08-07 PROCEDURE — 86901 BLOOD TYPING SEROLOGIC RH(D): CPT | Performed by: NURSE PRACTITIONER

## 2023-08-07 PROCEDURE — 99214 OFFICE O/P EST MOD 30 MIN: CPT | Performed by: NURSE PRACTITIONER

## 2023-08-07 PROCEDURE — 86850 RBC ANTIBODY SCREEN: CPT | Performed by: NURSE PRACTITIONER

## 2023-08-07 PROCEDURE — 36415 COLL VENOUS BLD VENIPUNCTURE: CPT

## 2023-08-07 PROCEDURE — 80048 BASIC METABOLIC PNL TOTAL CA: CPT

## 2023-08-07 PROCEDURE — 83735 ASSAY OF MAGNESIUM: CPT

## 2023-08-07 PROCEDURE — 86900 BLOOD TYPING SEROLOGIC ABO: CPT | Performed by: NURSE PRACTITIONER

## 2023-08-07 PROCEDURE — 85730 THROMBOPLASTIN TIME PARTIAL: CPT

## 2023-08-07 PROCEDURE — 85610 PROTHROMBIN TIME: CPT

## 2023-08-07 PROCEDURE — 85025 COMPLETE CBC W/AUTO DIFF WBC: CPT

## 2023-08-07 NOTE — PATIENT INSTRUCTIONS
For your family screening, your sons should have an echo every 5 years to ensure there is not thickening of the heart wall. Your grandchildren should be screened once they are 15years old and should have an echo every 12-18 months until they are through puberty and then it can be every 5 years.

## 2023-08-08 DIAGNOSIS — E03.9 ACQUIRED HYPOTHYROIDISM: ICD-10-CM

## 2023-08-08 RX ORDER — LEVOTHYROXINE SODIUM 137 UG/1
137 TABLET ORAL
Qty: 90 TABLET | Refills: 0 | Status: SHIPPED | OUTPATIENT
Start: 2023-08-08

## 2023-08-11 ENCOUNTER — OFFICE VISIT (OUTPATIENT)
Dept: FAMILY MEDICINE CLINIC | Facility: CLINIC | Age: 68
End: 2023-08-11
Payer: COMMERCIAL

## 2023-08-11 VITALS
SYSTOLIC BLOOD PRESSURE: 136 MMHG | DIASTOLIC BLOOD PRESSURE: 86 MMHG | OXYGEN SATURATION: 98 % | RESPIRATION RATE: 15 BRPM | HEART RATE: 72 BPM | WEIGHT: 173.4 LBS | TEMPERATURE: 97.9 F | BODY MASS INDEX: 24.18 KG/M2

## 2023-08-11 DIAGNOSIS — H61.23 BILATERAL IMPACTED CERUMEN: Primary | ICD-10-CM

## 2023-08-11 PROCEDURE — 69210 REMOVE IMPACTED EAR WAX UNI: CPT | Performed by: PHYSICIAN ASSISTANT

## 2023-08-11 PROCEDURE — 99213 OFFICE O/P EST LOW 20 MIN: CPT | Performed by: PHYSICIAN ASSISTANT

## 2023-08-11 NOTE — PROGRESS NOTES
Name: Sharon Figueroa      : 1955      MRN: [id number]  Encounter Provider: Pastor Gregorio PA-C  Encounter Date: 2023   Encounter department: [facility]    Assessment & Plan     1. Bilateral impacted cerumen         Ear cerumen removal    Date/Time: 2023 10:45 AM    Performed by: Pastor Gregorio PA-C  Authorized by: Pastor Gregorio PA-C  [place] Protocol:  Procedure performed by:  Consent: Verbal consent obtained. Written consent not obtained. Risks and benefits: risks, benefits and alternatives were discussed  Consent given by: patient  Time out: Immediately prior to procedure a "time out" was called to verify the correct patient, procedure, equipment, support staff and site/side marked as required. Patient understanding: patient states understanding of the procedure being performed  Patient consent: the patient's understanding of the procedure matches consent given  Procedure consent: procedure consent matches procedure scheduled  Patient identity confirmed: verbally with patient      Patient location:  Clinic  Procedure details:     Location:  L ear and R ear    Procedure type: irrigation with instrumentation      Instrumentation: curette      Approach:  External  Post-procedure details:     Complication:  None    Hearing quality:  Normal    Patient tolerance of procedure: Tolerated well, no immediate complications  Comments:      Postprocedure no sign of TM perforation or infection. Patient states hearing has improved.       Subjective      HPI  Review of Systems    Current Outpatient Medications on File Prior to Visit   Medication Sig   • AMOXICILLIN PO Take 1,000 mg by mouth 2 (two) times a day   • aspirin (ECOTRIN LOW STRENGTH) 81 mg EC tablet Take 1 tablet (81 mg total) by mouth daily   • bisoprolol (ZEBETA) 5 mg tablet Take 0.5 tablets (2.5 mg total) by mouth 2 (two) times a day   • chlorhexidine (PERIDEX) 0.12 % solution PLACE 15ML IN THE MOUTH BY MUCOUS MEMBRANE TWICE DAILY (AFTER MEALS) SWISH IN MOUTH FOR 30 SECONDS THEN SPIT OUT   • clopidogrel (PLAVIX) 75 mg tablet Take 1 tablet (75 mg total) by mouth daily Do not start before December 30, 2022. • famotidine (PEPCID) 20 mg tablet Take 1 tablet (20 mg total) by mouth in the morning   • levothyroxine (Euthyrox) 137 mcg tablet Take 1 tablet (137 mcg total) by mouth daily in the early morning   • rosuvastatin (CRESTOR) 40 MG tablet Take 1 tablet (40 mg total) by mouth daily   • mupirocin (BACTROBAN) 2 % ointment Apply topically 3 (three) times a day (Patient not taking: Reported on 8/7/2023)   • polyethylene glycol (GOLYTELY) 4000 mL solution Take 4,000 mL by mouth once for 1 dose       Objective     /86   Pulse 72   Temp 97.9 °F (36.6 °C) (Tympanic)   Resp 15   Wt 78.7 kg (173 lb 6.4 oz)   SpO2 98%   BMI 24.18 kg/m²     Physical Exam  Vitals reviewed. Constitutional:       General: He is not in acute distress. Appearance: He is not ill-appearing, toxic-appearing or diaphoretic. HENT:      Head: Normocephalic. Right Ear: There is impacted cerumen. Left Ear: There is impacted cerumen. Cardiovascular:      Rate and Rhythm: Normal rate. Pulmonary:      Effort: Pulmonary effort is normal.   Neurological:      Mental Status: He is alert and oriented to person, place, and time.    Psychiatric:         Mood and Affect: Mood normal.       Staci Robertson PA-C

## 2023-08-15 NOTE — PATIENT INSTRUCTIONS
Continue oral Amoxicillin. Complete lab work (CBCD and creatinine) prior to next outpatient ID follow up visit. Return to the outpatient infectious disease office in 1 month for follow up.

## 2023-08-15 NOTE — PROGRESS NOTES
Outpatient Progress Note - [facility] Infectious Disease   Rayshawn Graf 75 y.o. male MRN: [id number]  Encounter: [id number]    Impression/Plan:  1.  Mandibular osteomyelitis.  Although recent CT did not show definitive evidence of osteomyelitis, loose fragments of bone were removed during recent tooth extraction with bone culture positive for group F strep.  Pathology showed fragments of bone with extensive bacterial colonization and evidence of peripheral acute inflammation. There has been concern for ongoing infection during recent OMS assessments and he's starting HBO treatment in preparation for dental extractions. HBO will start on 8/28/2023 and his dental surgery is scheduled for 9/26/2023. Patient continues on high-dose oral antibiotic treatment with oral Amoxicillin. He's been tolerating it without difficulty. I reviewed his most recent lab work from 8/07/2023 which showed a normalized WBC count = 8.43 and stable creatinine of 0.78. I will continue patient on the amoxicillin for now with plan to continue at least through surgery.  -continue oral Amoxicillin  -anticipate antibiotic treatment at least through surgery  -no ID contraindication to proceeding with further tooth extractions or mandibular debridement, but would avoid other procedures such as implants, etc  -continue follow up with OMS  -complete CBCD and BMP prior to next outpatient ID follow up visit  -return to the outpatient ID office in 1 month for follow up     2.  Prior tongue cancer with lymph node involvement status post lymph node dissection, XRT [id number].  Complicated by poor dentition.     3.  Dysphagia.  In the setting #2.  Patient has been able to swallow his antibiotic without difficulty. He is meeting with GI later this summer to see if he may be candidate for esophageal stenting.  -continue outpatient follow up with gastroenterology      Above plan was discussed in detail with patient in the exam room.     Antibiotics:  Amoxicillin Subjective:  Patient presents for follow up for ongoing treatment of mandibular osteomyelitis. Patient is getting ready to start HBO in preparation for his dental extractions in 9/2023. The patient has been taking oral Amoxicillin and is tolerating antibiotic without difficulty. He has no fever, chills, sweats, shakes; no nausea, vomiting, abdominal pain, diarrhea, or dysuria; no cough, shortness of breath, or chest pain. No new symptoms. Objective:  Vitals:  Temp 97.4  HR 87  RR 18  /75     Physical Exam:   General Appearance:  Alert, interactive, nontoxic, no acute distress. He appears comfortable sitting on the exam table. Mouth: Minimal swelling noted along the inner/outer left mandible, poor dentition, no pus noted from socket. Throat: Oropharynx moist without lesions. Lungs:   Respirations unlabored on room air. Extremities: No clubbing or cyanosis, no edema. Skin: No new rashes, lesions, or draining wounds noted on exposed skin. Labs, Imaging, & Other studies:   All pertinent labs and imaging studies were personally reviewed by me including CBCD and BMP collected on 8/07/2023.

## 2023-08-18 ENCOUNTER — OFFICE VISIT (OUTPATIENT)
Dept: INFECTIOUS DISEASES | Facility: CLINIC | Age: 68
End: 2023-08-18
Payer: COMMERCIAL

## 2023-08-18 VITALS
SYSTOLIC BLOOD PRESSURE: 148 MMHG | DIASTOLIC BLOOD PRESSURE: 75 MMHG | RESPIRATION RATE: 18 BRPM | HEIGHT: 71 IN | BODY MASS INDEX: 24.22 KG/M2 | OXYGEN SATURATION: 94 % | TEMPERATURE: 97.4 F | WEIGHT: 173 LBS | HEART RATE: 87 BPM

## 2023-08-18 DIAGNOSIS — M86.9 OSTEOMYELITIS (HCC): Primary | ICD-10-CM

## 2023-08-18 DIAGNOSIS — R13.12 OROPHARYNGEAL DYSPHAGIA: ICD-10-CM

## 2023-08-18 PROCEDURE — 99212 OFFICE O/P EST SF 10 MIN: CPT | Performed by: NURSE PRACTITIONER

## 2023-08-25 ENCOUNTER — TELEPHONE (OUTPATIENT)
Dept: WOUND CARE | Facility: HOSPITAL | Age: 68
End: 2023-08-25

## 2023-08-25 NOTE — TELEPHONE ENCOUNTER
Conflicting information found regarding need for insurance authorization for HBOT. [facility] investigated and patient does indeed need auth. Antonia submitted the auth this morning. I advised patient of same, stated we hope to get the auth back by Monday so that we could start treatment on Tuesday at 10am. Patient verbalized understanding.

## 2023-08-31 ENCOUNTER — OFFICE VISIT (OUTPATIENT)
Dept: WOUND CARE | Facility: HOSPITAL | Age: 68
End: 2023-08-31
Payer: COMMERCIAL
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
## 2023-08-31 DIAGNOSIS — M27.2 OSTEORADIONECROSIS OF JAW: ICD-10-CM

## 2023-08-31 PROCEDURE — G0277 HBOT, FULL BODY CHAMBER, 30M: HCPCS

## 2023-08-31 NOTE — PROGRESS NOTES
HBO Treatment Course Details       Treatment Notes:Patient with some right ear pressure and discomfort on descent, descent slowed and then able to equalize. Mild erythema noted AD post dive. Patient states he has a lot of mucus in his throat difficult to clear. Recommended steroid nasal spray daily to assist with equalizing. Will monitor  Pre and post HTN patient said is normal when at a physician office. Treatment Course Number: 1  Total Treatments Ordered:  30     Diagnosis:   1. 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  [facility]          [facility] Treatment Details:  In-Patient Visit: no  Treatment Length:90 Minutes(Minutes)  Chamber #: Hard sided Monoplace Chamber    Pre-Treatment details:  Pre-treatment protocol Treatment Protocol: 2.5 FERDINAND X 90 minutes w/ 100% oxygen, two 5 minute air breaks  Left ear clear?: yes  Right ear clear?: yes  Left ear intact?: yes  Right ear intact?: yes        PE Tubes present, Left ear?: no  PE Tubes present, Right ear?: no  Left ear irrigated?: no  Right ear irrigated?: no  Left ear TEED scale: Grade 0  Right ear TEED scale: Grade 0   Pretreatment heart and lung assessment: Pretreatment heart and lung auscltation unremarkable.  Patient cleared for HBOT     Treatment details:  FERDINAND Rate: 2.5  Started Compression: 1005  Reached Compression: 1028  Total Compression Time: 23 (Minutes)  Total Holding Time: 100 (Minutes)  Started Decompression: 1208  Reached Surface: 1218  Total Decompression Time: 10 (Minutes)  Total Airbreaks: 5 (Minutes)  Total Time of Treatment: 128 (Minutes)  Symptoms Noted During Treatment: None (Minutes)    Post treatment details:  Left ear clear?: yes  Right ear clear?: yes  Left ears intact?: yes  Right ears intact?: yes     Right ear color: Slight peripheral erythema, few air bubbles noted remains TEEDS 0  PE Tubes present, Left ear?: no  PE Tubes present, Right ear?: no        Left ear TEED scale: Grade 0  Right ear TEED scale: Grade 0  Post treatment heart and lung assessment: Post treatment heart and lung auscltation unremarkable. Patient cleared for discharge. Tolerated treatment well. Vital Signs:  HBO Glucose Reference Range: 100-350 mg/dl   Pre-Treatment Post-Treatment   Time vitals are taken: 0940 Time vitals are taken: 1218   Blood Pressure: (!) 176/100 Blood Pressure: (!) 160/100   Pulse: 76 Pulse: 88   Resp: 16 Resp: 16   Temp: 97.8 °F (36.6 °C) Temp: 97.8 °F (36.6 °C)             No Known Allergies  Patient Active Problem List    Diagnosis Date Noted   • Osteomyelitis ([address]) 07/14/2023   • Oropharyngeal dysphagia 05/15/2023   • Coronary artery disease involving native coronary artery of native heart without angina pectoris 01/17/2023   • Status post insertion of drug eluting coronary artery stent 12/28/2022   • HOCM (hypertrophic obstructive cardiomyopathy) ([address]) 10/14/2022   • Nonrheumatic aortic valve stenosis 09/22/2022   • Throat cancer ([address]) 06/23/2022   • History of head and neck cancer 06/23/2022   • Acute pain of right wrist 06/15/2022   • Arthralgia of multiple joints 02/29/2016   • Bilateral hip pain 02/29/2016   • Chronic low back pain 02/29/2016   • Myalgia 96/18/0228   • Folliculitis 75/96/5537   • Esophagitis, reflux 10/10/2012   • Hyperlipidemia 10/10/2012   • Primary hypertension 05/10/2012   • Hypothyroidism 05/10/2012     No orders of the defined types were placed in this encounter.

## 2023-08-31 NOTE — PROGRESS NOTES
HBO Treatment Course Details       Treatment Notes: Patient tolerated HBO tx well. Patient had no complaints prior to or post HBO tx. Treatment Course Number: 1  Total Treatments Ordered:  30     Diagnosis:   1.  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  Hyperbaric oxygen [facility]          HBO Treatment Details:  In-Patient Visit: no  Treatment Length:90 Minutes(Minutes)  Chamber #: Hard sided Monoplace Chamber    Pre-Treatment details:  Pre-treatment protocol Treatment Protocol: 2.5 FERDINAND X 90 minutes w/ 100% oxygen, two 5 minute air breaks                                             Treatment details:  FERDINAND Rate: 2.5  Started Compression: 1005  Reached Compression: 1028  Total Compression Time: 23 (Minutes)  Total Holding Time: 100 (Minutes)  Started Decompression: 1208  Reached Surface: 0636  Total Decompression Time: 10 (Minutes)  Total Airbreaks: 5 (Minutes)  Total Time of Treatment: 128 (Minutes)  Symptoms Noted During Treatment: None (Minutes)    Post treatment details:                                                    Vital Signs:  HBO Glucose Reference Range: 100-350 mg/dl   Pre-Treatment Post-Treatment   Time vitals are taken: 0940 Time vitals are taken: 1218   Blood Pressure: (!) 176/100 Blood Pressure: (!) 160/100   Pulse: 76 Pulse: 88   Resp: 16 Resp: 16   Temp: 97.8 °F (36.6 °C) Temp: 97.8 °F (36.6 °C)             No Known Allergies  Patient Active Problem List    Diagnosis Date Noted   • Osteomyelitis ([address]) 07/14/2023   • Oropharyngeal dysphagia 05/15/2023   • Coronary artery disease involving native coronary artery of native heart without angina pectoris 01/17/2023   • Status post insertion of drug eluting coronary artery stent 12/28/2022   • HOCM (hypertrophic obstructive cardiomyopathy) ([address]) 10/14/2022   • Nonrheumatic aortic valve stenosis 09/22/2022   • Throat cancer ([address]) 06/23/2022   • History of head and neck cancer 06/23/2022   • Acute pain of right wrist 06/15/2022   • Arthralgia of multiple joints 02/29/2016   • Bilateral hip pain 02/29/2016   • Chronic low back pain 02/29/2016   • Myalgia 10/57/8179   • Folliculitis 64/38/6802   • Esophagitis, reflux 10/10/2012   • Hyperlipidemia 10/10/2012   • Primary hypertension 05/10/2012   • Hypothyroidism 05/10/2012     No orders of the defined types were placed in this encounter.

## 2023-09-01 ENCOUNTER — OFFICE VISIT (OUTPATIENT)
Dept: WOUND CARE | Facility: HOSPITAL | Age: 68
End: 2023-09-01
Payer: COMMERCIAL

## 2023-09-01 DIAGNOSIS — M27.2 OSTEORADIONECROSIS OF JAW: ICD-10-CM
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
## 2023-09-01 PROCEDURE — G0277 HBOT, FULL BODY CHAMBER, 30M: HCPCS

## 2023-09-01 NOTE — PROGRESS NOTES
HBO Treatment Course Details       Treatment Notes: Patient tolerated HBO tx well. Patient had no complaints prior to or post HBO tx. Treatment Course Number: 2  Total Treatments Ordered:  30     Diagnosis:   1.  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  Hyperbaric oxygen [facility]          HBO Treatment Details:  In-Patient Visit: no  Treatment Length:90 Minutes(Minutes)  Chamber #: Hard sided Monoplace Chamber    Pre-Treatment details:  Pre-treatment protocol Treatment Protocol: 2.5 FERDINAND X 90 minutes w/ 100% oxygen, two 5 minute air breaks                                             Treatment details:  FERDINAND Rate: 2.5  Started Compression: 1000  Reached Compression: 1027  Total Compression Time: 27 (Minutes)  Total Holding Time: 100 (Minutes)  Started Decompression: 1207  Reached Surface: 1217  Total Decompression Time: 10 (Minutes)  Total Airbreaks: 5 (Minutes)  Total Time of Treatment: 132 (Minutes)  Symptoms Noted During Treatment: None (Minutes)    Post treatment details:                                                    Vital Signs:  HBO Glucose Reference Range: 100-350 mg/dl   Pre-Treatment Post-Treatment   Time vitals are taken: 0940     Blood Pressure: (!) 150/106     Pulse: 84     Resp: 16     Temp: 96.9 °F (36.1 °C)               No Known Allergies  Patient Active Problem List    Diagnosis Date Noted   • Osteomyelitis ([address]) 07/14/2023   • Oropharyngeal dysphagia 05/15/2023   • Coronary artery disease involving native coronary artery of native heart without angina pectoris 01/17/2023   • Status post insertion of drug eluting coronary artery stent 12/28/2022   • HOCM (hypertrophic obstructive cardiomyopathy) ([address]) 10/14/2022   • Nonrheumatic aortic valve stenosis 09/22/2022   • Throat cancer ([address]) 06/23/2022   • History of head and neck cancer 06/23/2022   • Acute pain of right wrist 06/15/2022   • Arthralgia of multiple joints 02/29/2016   • Bilateral hip pain 02/29/2016   • Chronic low back pain 02/29/2016   • Myalgia 08/95/7797   • Folliculitis 10/20/3734   • Esophagitis, reflux 10/10/2012   • Hyperlipidemia 10/10/2012   • Primary hypertension 05/10/2012   • Hypothyroidism 05/10/2012     No orders of the defined types were placed in this encounter.

## 2023-09-01 NOTE — PROGRESS NOTES
HBO Treatment Course Details       Treatment Notes:Slow descent, patient tolerated and equalized well. Holding on the Flonase until he speaks with his physician. BP improving as he gets comfortable. Treatment Course Number: 2  Total Treatments Ordered:  30     Diagnosis:   1. 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  [facility]          [facility] Treatment Details:  In-Patient Visit: no  Treatment Length:90 Minutes(Minutes)  Chamber #: Hard sided Monoplace Chamber    Pre-Treatment details:  Pre-treatment protocol Treatment Protocol: 2.5 FERDINAND X 90 minutes w/ 100% oxygen, two 5 minute air breaks  Left ear clear?: yes  Right ear clear?: yes  Left ear intact?: yes  Right ear intact?: yes     Right ear color: slight erythema peripherally improved from yesterday  PE Tubes present, Left ear?: no  PE Tubes present, Right ear?: no  Left ear irrigated?: no  Right ear irrigated?: no  Left ear TEED scale: Grade 0  Right ear TEED scale: Grade 0   Pretreatment heart and lung assessment: Pretreatment heart and lung auscltation unremarkable. Patient cleared for HBOT     Treatment details:  FERDINAND Rate: 2.5  Started Compression: 1000  Reached Compression: 1027  Total Compression Time: 27 (Minutes)  Total Holding Time: 100 (Minutes)  Started Decompression: 1207  Reached Surface: 1217  Total Decompression Time: 10 (Minutes)  Total Airbreaks: 5 (Minutes)  Total Time of Treatment: 132 (Minutes)  Symptoms Noted During Treatment: None (Minutes)    Post treatment details:  Left ear clear?: yes  Right ear clear?: yes  Left ears intact?: yes  Right ears intact?: yes     Right ear color: slight erythem with retraction, patient denies pain  PE Tubes present, Left ear?: no  PE Tubes present, Right ear?: no        Left ear TEED scale: Grade 0  Right ear TEED scale: Grade 0  Post treatment heart and lung assessment: Post treatment heart and lung auscltation unremarkable. Patient cleared for discharge. Tolerated treatment well.              Vital Signs:  HBO Glucose Reference Range: 100-350 mg/dl   Pre-Treatment Post-Treatment   Time vitals are taken: 0940 Time vitals are taken: 1217   Blood Pressure: (!) 150/106 Blood Pressure: 142/90   Pulse: 84 Pulse: 80   Resp: 16 Resp: 16   Temp: 96.9 °F (36.1 °C) Temp: 97.7 °F (36.5 °C)             No Known Allergies  Patient Active Problem List    Diagnosis Date Noted   • Osteomyelitis ([address]) 07/14/2023   • Oropharyngeal dysphagia 05/15/2023   • Coronary artery disease involving native coronary artery of native heart without angina pectoris 01/17/2023   • Status post insertion of drug eluting coronary artery stent 12/28/2022   • HOCM (hypertrophic obstructive cardiomyopathy) ([address]) 10/14/2022   • Nonrheumatic aortic valve stenosis 09/22/2022   • Throat cancer ([address]) 06/23/2022   • History of head and neck cancer 06/23/2022   • Acute pain of right wrist 06/15/2022   • Arthralgia of multiple joints 02/29/2016   • Bilateral hip pain 02/29/2016   • Chronic low back pain 02/29/2016   • Myalgia 46/80/1826   • Folliculitis 26/47/7937   • Esophagitis, reflux 10/10/2012   • Hyperlipidemia 10/10/2012   • Primary hypertension 05/10/2012   • Hypothyroidism 05/10/2012     No orders of the defined types were placed in this encounter.

## 2023-09-05 ENCOUNTER — OFFICE VISIT (OUTPATIENT)
Dept: WOUND CARE | Facility: HOSPITAL | Age: 68
End: 2023-09-05
Payer: COMMERCIAL

## 2023-09-05 DIAGNOSIS — M27.2 OSTEORADIONECROSIS OF JAW: ICD-10-CM

## 2023-09-05 DIAGNOSIS — M86.9 OSTEOMYELITIS (HCC): Primary | ICD-10-CM
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
## 2023-09-05 PROCEDURE — 99183 HYPERBARIC OXYGEN THERAPY: CPT | Performed by: NURSE PRACTITIONER

## 2023-09-05 PROCEDURE — G0277 HBOT, FULL BODY CHAMBER, 30M: HCPCS | Performed by: NURSE PRACTITIONER

## 2023-09-05 NOTE — PROGRESS NOTES
HBO Treatment Course Details       Treatment Notes: Patient tolerated treatment well     Treatment Course Number: 3  Total Treatments Ordered:  30     Diagnosis:   1. 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  [facility]          HBO Treatment Details:  In-Patient Visit: No  Treatment Length:90 Minutes(Minutes)  Chamber #: Hard sided Monoplace Chamber    Pre-Treatment details:  Pre-treatment protocol Treatment Protocol: 2.5 FERDINAND X 90 minutes w/ 100% oxygen, two 5 minute air breaks  Left ear clear?: yes  Right ear clear?: yes  Left ear intact?: yes  Right ear intact?: yes        PE Tubes present, Left ear?: no  PE Tubes present, Right ear?: no        Left ear TEED scale: Grade 0  Right ear TEED scale: Grade 0   Pretreatment heart and lung assessment: Pretreatment heart and lung auscltation unremarkable. Patient cleared for HBOT     Treatment details:  FERDINAND Rate: 2.5  Started Compression: 1014  Reached Compression: 1029  Total Compression Time: 15 (Minutes)  Total Holding Time: 100 (Minutes)  Started Decompression: 1209  Reached Surface: 6274  Total Decompression Time: 10 (Minutes)  Total Airbreaks: 5 (Minutes)  Total Time of Treatment: 120 (Minutes)  Symptoms Noted During Treatment: None (Minutes)    Post treatment details:  Left ear clear?: yes  Right ear clear?: yes  Left ears intact?: yes  Right ears intact?: yes        PE Tubes present, Left ear?: no  PE Tubes present, Right ear?: no        Left ear TEED scale: Grade 0  Right ear TEED scale: Grade 0  Post treatment heart and lung assessment: Post treatment heart and lung auscltation unremarkable. Patient cleared for discharge. Tolerated treatment well.              Vital Signs:  [facility] Glucose Reference Range: 100-350 mg/dl   Pre-Treatment Post-Treatment   Time vitals are taken: 1000 Time vitals are taken: 1225   Blood Pressure: (!) 150/102 Blood Pressure: 122/78   Pulse: 80 Pulse: 64   Resp: 16 Resp: 16   Temp: 98.4 °F (36.9 °C) Temp: 98.3 °F (36.8 °C)             No Known Allergies  Patient Active Problem List    Diagnosis Date Noted   • Osteomyelitis ([address]) 07/14/2023   • Oropharyngeal dysphagia 05/15/2023   • Coronary artery disease involving native coronary artery of native heart without angina pectoris 01/17/2023   • Status post insertion of drug eluting coronary artery stent 12/28/2022   • HOCM (hypertrophic obstructive cardiomyopathy) ([address]) 10/14/2022   • Nonrheumatic aortic valve stenosis 09/22/2022   • Throat cancer ([address]) 06/23/2022   • History of head and neck cancer 06/23/2022   • Acute pain of right wrist 06/15/2022   • Arthralgia of multiple joints 02/29/2016   • Bilateral hip pain 02/29/2016   • Chronic low back pain 02/29/2016   • Myalgia 59/29/8346   • Folliculitis 34/41/9937   • Esophagitis, reflux 10/10/2012   • Hyperlipidemia 10/10/2012   • Primary hypertension 05/10/2012   • Hypothyroidism 05/10/2012     No orders of the defined types were placed in this encounter.

## 2023-09-05 NOTE — PROGRESS NOTES
HBO Treatment Course Details       Treatment Notes: Patient tolerated HBO tx well. Patient had no complaints prior to or post HBO tx. Treatment Course Number: 3  Total Treatments Ordered:  30     Diagnosis:   1.  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  Hyperbaric oxygen [facility]          HBO Treatment Details:  In-Patient Visit: no  Treatment Length:90 Minutes(Minutes)  Chamber #: Hard sided Monoplace Chamber    Pre-Treatment details:  Pre-treatment protocol Treatment Protocol: 2.5 FERDINAND X 90 minutes w/ 100% oxygen, two 5 minute air breaks                                             Treatment details:  FERDINAND Rate: 2.5  Started Compression: 1014  Reached Compression: 1029  Total Compression Time: 15 (Minutes)  Total Holding Time: 100 (Minutes)  Started Decompression: 1209  Reached Surface: 8291  Total Decompression Time: 10 (Minutes)  Total Airbreaks: 5 (Minutes)  Total Time of Treatment: 120 (Minutes)  Symptoms Noted During Treatment: None (Minutes)    Post treatment details:                                                    Vital Signs:  HBO Glucose Reference Range: 100-350 mg/dl   Pre-Treatment Post-Treatment   Time vitals are taken: 1000 Time vitals are taken: 1225   Blood Pressure: (!) 150/102 Blood Pressure: 122/78   Pulse: 80 Pulse: 64   Resp: 16 Resp: 16   Temp: 98.4 °F (36.9 °C) Temp: 98.3 °F (36.8 °C)             No Known Allergies  Patient Active Problem List    Diagnosis Date Noted   • Osteomyelitis ([address]) 07/14/2023   • Oropharyngeal dysphagia 05/15/2023   • Coronary artery disease involving native coronary artery of native heart without angina pectoris 01/17/2023   • Status post insertion of drug eluting coronary artery stent 12/28/2022   • HOCM (hypertrophic obstructive cardiomyopathy) ([address]) 10/14/2022   • Nonrheumatic aortic valve stenosis 09/22/2022   • Throat cancer ([address]) 06/23/2022   • History of head and neck cancer 06/23/2022   • Acute pain of right wrist 06/15/2022   • Arthralgia of multiple joints 02/29/2016 • Bilateral hip pain 02/29/2016   • Chronic low back pain 02/29/2016   • Myalgia 60/57/5395   • Folliculitis 01/97/4980   • Esophagitis, reflux 10/10/2012   • Hyperlipidemia 10/10/2012   • Primary hypertension 05/10/2012   • Hypothyroidism 05/10/2012     No orders of the defined types were placed in this encounter.

## 2023-09-05 NOTE — TELEPHONE ENCOUNTER
Last Appointment:8/18    Next Appointment:9/19    Medication:amoxicillin     Message/Reason for call:pt is requesting refill on abx     Call Back #: [phone]

## 2023-09-06 ENCOUNTER — OFFICE VISIT (OUTPATIENT)
Dept: WOUND CARE | Facility: HOSPITAL | Age: 68
End: 2023-09-06
Payer: COMMERCIAL

## 2023-09-06 DIAGNOSIS — M27.2 OSTEORADIONECROSIS OF JAW: ICD-10-CM
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
## 2023-09-06 PROCEDURE — G0277 HBOT, FULL BODY CHAMBER, 30M: HCPCS | Performed by: NURSE PRACTITIONER

## 2023-09-06 PROCEDURE — 99183 HYPERBARIC OXYGEN THERAPY: CPT | Performed by: NURSE PRACTITIONER

## 2023-09-06 RX ORDER — AMOXICILLIN 500 MG/1
1000 TABLET, FILM COATED ORAL 2 TIMES DAILY
Qty: 120 TABLET | Refills: 0 | Status: SHIPPED | OUTPATIENT
Start: 2023-09-06 | End: 2023-09-19 | Stop reason: SDUPTHER

## 2023-09-06 NOTE — PROGRESS NOTES
HBO Treatment Course Details       Treatment Notes: Patient tolerated HBO tx well. Patient had no complaints prior to or post HBO tx. Treatment Course Number: 4  Total Treatments Ordered:  30     Diagnosis:   1.  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  Hyperbaric oxygen thearpy          HBO Treatment Details:  In-Patient Visit: no  Treatment Length:90 Minutes(Minutes)  Chamber #: Hard sided Monoplace Chamber    Pre-Treatment details:  Pre-treatment protocol Treatment Protocol: 2.5 FERDINAND X 90 minutes w/ 100% oxygen, two 5 minute air breaks                                             Treatment details:  FERDINAND Rate: 2.5  Started Compression: 1008  Reached Compression: 1023  Total Compression Time: 15 (Minutes)  Total Holding Time: 100 (Minutes)  Started Decompression: 1203  Reached Surface: 1213  Total Decompression Time: 10 (Minutes)  Total Airbreaks: 5 (Minutes)  Total Time of Treatment: 120 (Minutes)  Symptoms Noted During Treatment: None (Minutes)    Post treatment details:                                                    Vital Signs:  HBO Glucose Reference Range: 100-350 mg/dl   Pre-Treatment Post-Treatment   Time vitals are taken: 0935 Time vitals are taken: 1215   Blood Pressure: (!) 150/96 Blood Pressure: 126/66   Pulse: 60 Pulse: 68   Resp: 16 Resp: 16   Temp: 97.4 °F (36.3 °C) Temp: 98 °F (36.7 °C)             No Known Allergies  Patient Active Problem List    Diagnosis Date Noted   • Osteomyelitis ([address]) 07/14/2023   • Oropharyngeal dysphagia 05/15/2023   • Coronary artery disease involving native coronary artery of native heart without angina pectoris 01/17/2023   • Status post insertion of drug eluting coronary artery stent 12/28/2022   • HOCM (hypertrophic obstructive cardiomyopathy) ([address]) 10/14/2022   • Nonrheumatic aortic valve stenosis 09/22/2022   • Throat cancer ([address]) 06/23/2022   • History of head and neck cancer 06/23/2022   • Acute pain of right wrist 06/15/2022   • Arthralgia of multiple joints 02/29/2016   • Bilateral hip pain 02/29/2016   • Chronic low back pain 02/29/2016   • Myalgia 23/52/4318   • Folliculitis 40/25/6861   • Esophagitis, reflux 10/10/2012   • Hyperlipidemia 10/10/2012   • Primary hypertension 05/10/2012   • Hypothyroidism 05/10/2012     No orders of the defined types were placed in this encounter.

## 2023-09-06 NOTE — PROGRESS NOTES
HBO Treatment Course Details       Treatment Notes: Patient tolerated treatment well     Treatment Course Number: 4  Total Treatments Ordered:  30     Diagnosis:   1. 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  [facility]          [facility] Treatment Details:  In-Patient Visit: No  Treatment Length:90 Minutes(Minutes)  Chamber #: Hard sided Monoplace Chamber    Pre-Treatment details:  Pre-treatment protocol Treatment Protocol: 2.5 FERDINAND X 90 minutes w/ 100% oxygen, two 5 minute air breaks  Left ear clear?: yes  Right ear clear?: yes  Left ear intact?: yes  Right ear intact?: yes        PE Tubes present, Left ear?: no  PE Tubes present, Right ear?: no        Left ear TEED scale: Grade 0  Right ear TEED scale: Grade 0   Pretreatment heart and lung assessment: Pretreatment heart and lung auscltation unremarkable. Patient cleared for HBOT     Treatment details:  FERDINAND Rate: 2.5  Started Compression: 1008  Reached Compression: 1023  Total Compression Time: 15 (Minutes)  Total Holding Time: 100 (Minutes)  Started Decompression: 1203  Reached Surface: 1213  Total Decompression Time: 10 (Minutes)  Total Airbreaks: 5 (Minutes)  Total Time of Treatment: 120 (Minutes)  Symptoms Noted During Treatment: None (Minutes)    Post treatment details:  Left ear clear?: yes  Right ear clear?: yes  Left ears intact?: yes  Right ears intact?: yes        PE Tubes present, Left ear?: no  PE Tubes present, Right ear?: no        Left ear TEED scale: Grade 0  Right ear TEED scale: Grade 0  Post treatment heart and lung assessment: Post treatment heart and lung auscltation unremarkable. Patient cleared for discharge. Tolerated treatment well.              Vital Signs:  [facility] Glucose Reference Range: 100-350 mg/dl   Pre-Treatment Post-Treatment   Time vitals are taken: 0935 Time vitals are taken: 1215   Blood Pressure: (!) 150/96 Blood Pressure: 126/66   Pulse: 60 Pulse: 68   Resp: 16 Resp: 16   Temp: 97.4 °F (36.3 °C) Temp: 98 °F (36.7 °C)             No Known Allergies  Patient Active Problem List    Diagnosis Date Noted   • Osteomyelitis ([address]) 07/14/2023   • Oropharyngeal dysphagia 05/15/2023   • Coronary artery disease involving native coronary artery of native heart without angina pectoris 01/17/2023   • Status post insertion of drug eluting coronary artery stent 12/28/2022   • HOCM (hypertrophic obstructive cardiomyopathy) ([address]) 10/14/2022   • Nonrheumatic aortic valve stenosis 09/22/2022   • Throat cancer ([address]) 06/23/2022   • History of head and neck cancer 06/23/2022   • Acute pain of right wrist 06/15/2022   • Arthralgia of multiple joints 02/29/2016   • Bilateral hip pain 02/29/2016   • Chronic low back pain 02/29/2016   • Myalgia 49/85/4397   • Folliculitis 15/49/4432   • Esophagitis, reflux 10/10/2012   • Hyperlipidemia 10/10/2012   • Primary hypertension 05/10/2012   • Hypothyroidism 05/10/2012     No orders of the defined types were placed in this encounter.

## 2023-09-07 ENCOUNTER — OFFICE VISIT (OUTPATIENT)
Dept: WOUND CARE | Facility: HOSPITAL | Age: 68
End: 2023-09-07
Payer: COMMERCIAL
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
## 2023-09-07 DIAGNOSIS — M27.2 OSTEORADIONECROSIS OF JAW: ICD-10-CM

## 2023-09-07 PROCEDURE — 99183 HYPERBARIC OXYGEN THERAPY: CPT

## 2023-09-07 PROCEDURE — G0277 HBOT, FULL BODY CHAMBER, 30M: HCPCS

## 2023-09-07 NOTE — PROGRESS NOTES
HBO Treatment Course Details       Treatment Notes: Patient tolerated HBO tx well. Patient had no complaints prior to or post HBO tx. Treatment Course Number: 5  Total Treatments Ordered:  30     Diagnosis:   1.  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  Hyperbaric oxygen thearpy          HBO Treatment Details:  In-Patient Visit: no  Treatment Length:90 Minutes(Minutes)  Chamber #: Hard sided Monoplace Chamber    Pre-Treatment details:  Pre-treatment protocol Treatment Protocol: 2.5 FERDINAND X 90 minutes w/ 100% oxygen, two 5 minute air breaks                                             Treatment details:  FERDINAND Rate: 2.5  Started Compression: 0955  Reached Compression: 1010  Total Compression Time: 15 (Minutes)  Total Holding Time: 100 (Minutes)  Started Decompression: 1150  Reached Surface: 1200  Total Decompression Time: 10 (Minutes)  Total Airbreaks: 5 (Minutes)  Total Time of Treatment: 120 (Minutes)  Symptoms Noted During Treatment: None (Minutes)    Post treatment details:                                                    Vital Signs:  HBO Glucose Reference Range: 100-350 mg/dl   Pre-Treatment Post-Treatment   Time vitals are taken: 0945 Time vitals are taken: 1205   Blood Pressure: (!) 146/96 Blood Pressure: 116/66   Pulse: 76 Pulse: 80   Resp: 16 Resp: 16   Temp: 97.5 °F (36.4 °C) Temp: 98.8 °F (37.1 °C)             No Known Allergies  Patient Active Problem List    Diagnosis Date Noted   • Osteomyelitis ([address]) 07/14/2023   • Oropharyngeal dysphagia 05/15/2023   • Coronary artery disease involving native coronary artery of native heart without angina pectoris 01/17/2023   • Status post insertion of drug eluting coronary artery stent 12/28/2022   • HOCM (hypertrophic obstructive cardiomyopathy) ([address]) 10/14/2022   • Nonrheumatic aortic valve stenosis 09/22/2022   • Throat cancer ([address]) 06/23/2022   • History of head and neck cancer 06/23/2022   • Acute pain of right wrist 06/15/2022   • Arthralgia of multiple joints 02/29/2016 • Bilateral hip pain 02/29/2016   • Chronic low back pain 02/29/2016   • Myalgia 52/14/1598   • Folliculitis 81/20/2460   • Esophagitis, reflux 10/10/2012   • Hyperlipidemia 10/10/2012   • Primary hypertension 05/10/2012   • Hypothyroidism 05/10/2012     No orders of the defined types were placed in this encounter.

## 2023-09-07 NOTE — PROGRESS NOTES
HBO Treatment Course Details       Treatment Notes: Patient tolerated HBO treatment well, noting mild amount of pressure in right ear upon ascent and descent. Patient with some nonobstructing cerumen present in bilateral ears. Patient reports using Debrox approximately once a month, recommended patient switch to approximately once weekly. Treatment Course Number: 5  Total Treatments Ordered:  30     Diagnosis:   1. 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  [facility]          [facility] Treatment Details:  In-Patient Visit: no  Treatment Length:90 Minutes(Minutes)  Chamber #: Hard sided Monoplace Chamber    Pre-Treatment details:  Pre-treatment protocol Treatment Protocol: 2.5 FERDINAND X 90 minutes w/ 100% oxygen, two 5 minute air breaks  Left ear clear?: yes (Mild amount of nonobstructing cerumen)  Right ear clear?: yes (Mild amount of nonobstructing cerumen)  Left ear intact?: yes  Right ear intact?: yes        PE Tubes present, Left ear?: no  PE Tubes present, Right ear?: no  Left ear irrigated?: no  Right ear irrigated?: no  Left ear TEED scale: Grade 0  Right ear TEED scale: Grade 0   Pretreatment heart and lung assessment: Pretreatment heart and lung auscltation unremarkable. Patient cleared for HBOT (Patient with known, stable heart murmur.   Follows regularly with cardiology.)     Treatment details:  FERDINAND Rate: 2.5  Started Compression: 6439  Reached Compression: 1010  Total Compression Time: 15 (Minutes)  Total Holding Time: 100 (Minutes)  Started Decompression: 1150  Reached Surface: 1200  Total Decompression Time: 10 (Minutes)  Total Airbreaks: 5 (Minutes)  Total Time of Treatment: 120 (Minutes)  Symptoms Noted During Treatment: None (Minutes)    Post treatment details:  Left ear clear?: yes (Mild amount of nonobstructing cerumen)  Right ear clear?: yes (Mild amount of nonobstructing cerumen)  Left ears intact?: yes  Right ears intact?: yes     Right ear color: Mild erythema of TM  PE Tubes present, Left ear?: no  PE Tubes present, Right ear?: no        Left ear TEED scale: Grade 0  Right ear TEED scale: Grade 0  Post treatment heart and lung assessment: Post treatment heart and lung auscltation unremarkable. Patient cleared for discharge. Tolerated treatment well. (Patient with known, stable heart murmur. Follows with cardiology regularly.)             Vital Signs:  HBO Glucose Reference Range: 100-350 mg/dl   Pre-Treatment Post-Treatment   Time vitals are taken: 0945 Time vitals are taken: 1205   Blood Pressure: (!) 146/96 Blood Pressure: 116/66   Pulse: 76 Pulse: 80   Resp: 16 Resp: 16   Temp: 97.5 °F (36.4 °C) Temp: 98.8 °F (37.1 °C)             No Known Allergies  Patient Active Problem List    Diagnosis Date Noted   • Osteomyelitis ([address]) 07/14/2023   • Oropharyngeal dysphagia 05/15/2023   • Coronary artery disease involving native coronary artery of native heart without angina pectoris 01/17/2023   • Status post insertion of drug eluting coronary artery stent 12/28/2022   • HOCM (hypertrophic obstructive cardiomyopathy) ([address]) 10/14/2022   • Nonrheumatic aortic valve stenosis 09/22/2022   • Throat cancer ([address]) 06/23/2022   • History of head and neck cancer 06/23/2022   • Acute pain of right wrist 06/15/2022   • Arthralgia of multiple joints 02/29/2016   • Bilateral hip pain 02/29/2016   • Chronic low back pain 02/29/2016   • Myalgia 07/09/1284   • Folliculitis 88/60/2524   • Esophagitis, reflux 10/10/2012   • Hyperlipidemia 10/10/2012   • Primary hypertension 05/10/2012   • Hypothyroidism 05/10/2012     No orders of the defined types were placed in this encounter.

## 2023-09-08 ENCOUNTER — OFFICE VISIT (OUTPATIENT)
Dept: WOUND CARE | Facility: HOSPITAL | Age: 68
End: 2023-09-08
Payer: COMMERCIAL
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
## 2023-09-08 DIAGNOSIS — M27.2 OSTEORADIONECROSIS OF JAW: ICD-10-CM

## 2023-09-08 PROCEDURE — G0277 HBOT, FULL BODY CHAMBER, 30M: HCPCS | Performed by: NURSE PRACTITIONER

## 2023-09-08 PROCEDURE — 99183 HYPERBARIC OXYGEN THERAPY: CPT | Performed by: NURSE PRACTITIONER

## 2023-09-08 NOTE — PROGRESS NOTES
HBO Treatment Course Details       Treatment Notes: Patient tolerated HBO tx well. Patient had no complaints prior to or post HBO tx. Treatment Course Number: 6  Total Treatments Ordered:  30     Diagnosis:   1. 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  Hyperbaric oxygen thearpy          HBO Treatment Details:  In-Patient Visit: no  Treatment Length:90 Minutes(Minutes)  Chamber #: Hard sided Monoplace Chamber    Pre-Treatment details:  Pre-treatment protocol Treatment Protocol: 2.5 FERDINAND X 90 minutes w/ 100% oxygen, two 5 minute air breaks  Left ear clear?: yes  Right ear clear?: yes  Left ear intact?: yes  Right ear intact?: yes        PE Tubes present, Left ear?: no  PE Tubes present, Right ear?: no        Left ear TEED scale: Grade 0  Right ear TEED scale: Grade 0   Pretreatment heart and lung assessment: Pretreatment heart and lung auscltation unremarkable.  Patient cleared for HBOT     Treatment details:  FERDINAND Rate: 2.5  Started Compression: 1005  Reached Compression: 1020  Total Compression Time: 15 (Minutes)  Total Holding Time: 100 (Minutes)  Started Decompression: 1200  Reached Surface: 1210  Total Decompression Time: 10 (Minutes)  Total Airbreaks: 5 (Minutes)  Total Time of Treatment: 120 (Minutes)  Symptoms Noted During Treatment: None (Minutes)    Post treatment details:                                                    Vital Signs:  HBO Glucose Reference Range: 100-350 mg/dl   Pre-Treatment Post-Treatment   Time vitals are taken: 0935 Time vitals are taken: 1215   Blood Pressure: (!) 146/96 Blood Pressure: 142/78   Pulse: 72 Pulse: 60   Resp: 16 Resp: 16   Temp: 97.5 °F (36.4 °C) Temp: 98 °F (36.7 °C)             No Known Allergies  Patient Active Problem List    Diagnosis Date Noted   • Osteomyelitis ([address]) 07/14/2023   • Oropharyngeal dysphagia 05/15/2023   • Coronary artery disease involving native coronary artery of native heart without angina pectoris 01/17/2023   • Status post insertion of drug eluting coronary artery stent 12/28/2022   • HOCM (hypertrophic obstructive cardiomyopathy) ([address]) 10/14/2022   • Nonrheumatic aortic valve stenosis 09/22/2022   • Throat cancer ([address]) 06/23/2022   • History of head and neck cancer 06/23/2022   • Acute pain of right wrist 06/15/2022   • Arthralgia of multiple joints 02/29/2016   • Bilateral hip pain 02/29/2016   • Chronic low back pain 02/29/2016   • Myalgia 36/25/3777   • Folliculitis 77/83/5530   • Esophagitis, reflux 10/10/2012   • Hyperlipidemia 10/10/2012   • Primary hypertension 05/10/2012   • Hypothyroidism 05/10/2012     No orders of the defined types were placed in this encounter.

## 2023-09-08 NOTE — PROGRESS NOTES
HBO Treatment Course Details       Treatment Notes: Patient tolerated treatment well     Treatment Course Number: 6  Total Treatments Ordered:  30     Diagnosis:   1. 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  [facility]          [facility] Treatment Details:  In-Patient Visit: No  Treatment Length:90 Minutes(Minutes)  Chamber #: Hard sided Monoplace Chamber    Pre-Treatment details:  Pre-treatment protocol Treatment Protocol: 2.5 FERDINAND X 90 minutes w/ 100% oxygen, two 5 minute air breaks  Left ear clear?: yes  Right ear clear?: yes  Left ear intact?: yes  Right ear intact?: yes        PE Tubes present, Left ear?: no  PE Tubes present, Right ear?: no        Left ear TEED scale: Grade 0  Right ear TEED scale: Grade 0   Pretreatment heart and lung assessment: Pretreatment heart and lung auscltation unremarkable. Patient cleared for HBOT     Treatment details:  FERDINAND Rate: 2.5  Started Compression: 1005  Reached Compression: 1020  Total Compression Time: 15 (Minutes)  Total Holding Time: 100 (Minutes)  Started Decompression: 1200  Reached Surface: 1210  Total Decompression Time: 10 (Minutes)  Total Airbreaks: 5 (Minutes)  Total Time of Treatment: 120 (Minutes)  Symptoms Noted During Treatment: None (Minutes)    Post treatment details:  Left ear clear?: yes  Right ear clear?: yes  Left ears intact?: yes  Right ears intact?: yes        PE Tubes present, Left ear?: no  PE Tubes present, Right ear?: no        Left ear TEED scale: Grade 0  Right ear TEED scale: Grade 0  Post treatment heart and lung assessment: Post treatment heart and lung auscltation unremarkable. Patient cleared for discharge. Tolerated treatment well.              Vital Signs:  [facility] Glucose Reference Range: 100-350 mg/dl   Pre-Treatment Post-Treatment   Time vitals are taken: 0935 Time vitals are taken: 1215   Blood Pressure: (!) 146/96 Blood Pressure: 142/78   Pulse: 72 Pulse: 60   Resp: 16 Resp: 16   Temp: 97.5 °F (36.4 °C) Temp: 98 °F (36.7 °C)             No Known Allergies  Patient Active Problem List    Diagnosis Date Noted   • Osteomyelitis ([address]) 07/14/2023   • Oropharyngeal dysphagia 05/15/2023   • Coronary artery disease involving native coronary artery of native heart without angina pectoris 01/17/2023   • Status post insertion of drug eluting coronary artery stent 12/28/2022   • HOCM (hypertrophic obstructive cardiomyopathy) ([address]) 10/14/2022   • Nonrheumatic aortic valve stenosis 09/22/2022   • Throat cancer ([address]) 06/23/2022   • History of head and neck cancer 06/23/2022   • Acute pain of right wrist 06/15/2022   • Arthralgia of multiple joints 02/29/2016   • Bilateral hip pain 02/29/2016   • Chronic low back pain 02/29/2016   • Myalgia 02/56/1548   • Folliculitis 77/93/0787   • Esophagitis, reflux 10/10/2012   • Hyperlipidemia 10/10/2012   • Primary hypertension 05/10/2012   • Hypothyroidism 05/10/2012     No orders of the defined types were placed in this encounter.

## 2023-09-11 ENCOUNTER — OFFICE VISIT (OUTPATIENT)
Dept: WOUND CARE | Facility: HOSPITAL | Age: 68
End: 2023-09-11
Payer: COMMERCIAL

## 2023-09-11 DIAGNOSIS — M27.2 OSTEORADIONECROSIS OF JAW: ICD-10-CM
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
## 2023-09-11 PROCEDURE — G0277 HBOT, FULL BODY CHAMBER, 30M: HCPCS | Performed by: SURGERY

## 2023-09-11 PROCEDURE — 99183 HYPERBARIC OXYGEN THERAPY: CPT | Performed by: SURGERY

## 2023-09-11 NOTE — PROGRESS NOTES
HBO Treatment Course Details       Treatment Notes: Patient tolerated HBO tx well. Patient had no complaints prior to or post HBO tx. Treatment Course Number: 7  Total Treatments Ordered:  30     Diagnosis:   1.  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  Hyperbaric oxygen thearpy          HBO Treatment Details:  In-Patient Visit: no  Treatment Length:90 Minutes(Minutes)  Chamber #: Hard sided Monoplace Chamber    Pre-Treatment details:  Pre-treatment protocol Treatment Protocol: 2.5 FERDINAND X 90 minutes w/ 100% oxygen, two 5 minute air breaks      TM's clear  Lungs clear  Heart regular                                       Treatment details:  FERDINAND Rate: 2.5  Started Compression: 0959  Reached Compression: 1014  Total Compression Time: 15 (Minutes)  Total Holding Time: 100 (Minutes)  Started Decompression: 1154  Reached Surface: 3677  Total Decompression Time: 10 (Minutes)  Total Airbreaks: 5 (Minutes)  Total Time of Treatment: 120 (Minutes)  Symptoms Noted During Treatment: None (Minutes)    Post treatment details:         TM's clear  Lungs clear                                           Vital Signs:  HBO Glucose Reference Range: 100-350 mg/dl   Pre-Treatment Post-Treatment   Time vitals are taken: 0950 Time vitals are taken: 1204   Blood Pressure: 118/66 Blood Pressure: 122/72   Pulse: 72 Pulse: 84   Resp: 16 Resp: 16   Temp: 98 °F (36.7 °C) Temp: 97.9 °F (36.6 °C)             No Known Allergies  Patient Active Problem List    Diagnosis Date Noted   • Osteomyelitis ([address]) 07/14/2023   • Oropharyngeal dysphagia 05/15/2023   • Coronary artery disease involving native coronary artery of native heart without angina pectoris 01/17/2023   • Status post insertion of drug eluting coronary artery stent 12/28/2022   • HOCM (hypertrophic obstructive cardiomyopathy) ([address]) 10/14/2022   • Nonrheumatic aortic valve stenosis 09/22/2022   • Throat cancer ([address]) 06/23/2022   • History of head and neck cancer 06/23/2022   • Acute pain of right wrist 06/15/2022   • Arthralgia of multiple joints 02/29/2016   • Bilateral hip pain 02/29/2016   • Chronic low back pain 02/29/2016   • Myalgia 08/90/5418   • Folliculitis 47/43/2497   • Esophagitis, reflux 10/10/2012   • Hyperlipidemia 10/10/2012   • Primary hypertension 05/10/2012   • Hypothyroidism 05/10/2012     No orders of the defined types were placed in this encounter.

## 2023-09-12 ENCOUNTER — OFFICE VISIT (OUTPATIENT)
Dept: WOUND CARE | Facility: HOSPITAL | Age: 68
End: 2023-09-12
Payer: COMMERCIAL
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
## 2023-09-12 DIAGNOSIS — M27.2 OSTEORADIONECROSIS OF JAW: ICD-10-CM

## 2023-09-12 PROCEDURE — G0277 HBOT, FULL BODY CHAMBER, 30M: HCPCS | Performed by: NURSE PRACTITIONER

## 2023-09-12 PROCEDURE — 99183 HYPERBARIC OXYGEN THERAPY: CPT | Performed by: NURSE PRACTITIONER

## 2023-09-12 NOTE — PROGRESS NOTES
HBO Treatment Course Details       Treatment Notes: Patient tolerated HBO tx well. Patient had no complaints prior to or post HBO tx. Treatment Course Number: 8  Total Treatments Ordered:  30     Diagnosis:   1.  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  Hyperbaric oxygen thearpy          HBO Treatment Details:  In-Patient Visit: no  Treatment Length:90 Minutes(Minutes)  Chamber #: Hard sided Monoplace Chamber    Pre-Treatment details:  Pre-treatment protocol Treatment Protocol: 2.5 FERDINAND X 90 minutes w/ 100% oxygen, two 5 minute air breaks                                             Treatment details:  FERDINAND Rate: 2.5  Started Compression: 1004  Reached Compression: 1019  Total Compression Time: 15 (Minutes)  Total Holding Time: 100 (Minutes)  Started Decompression: 1159  Reached Surface: 5557  Total Decompression Time: 10 (Minutes)  Total Airbreaks: 5 (Minutes)  Total Time of Treatment: 120 (Minutes)  Symptoms Noted During Treatment: None (Minutes)    Post treatment details:                                                    Vital Signs:  HBO Glucose Reference Range: 100-350 mg/dl   Pre-Treatment Post-Treatment   Time vitals are taken: 0955 Time vitals are taken: 1213   Blood Pressure: 136/86 Blood Pressure: 132/78   Pulse: 72 Pulse: 76   Resp: 16 Resp: 16   Temp: 98.1 °F (36.7 °C) Temp: 97.7 °F (36.5 °C)             No Known Allergies  Patient Active Problem List    Diagnosis Date Noted   • Osteomyelitis ([address]) 07/14/2023   • Oropharyngeal dysphagia 05/15/2023   • Coronary artery disease involving native coronary artery of native heart without angina pectoris 01/17/2023   • Status post insertion of drug eluting coronary artery stent 12/28/2022   • HOCM (hypertrophic obstructive cardiomyopathy) ([address]) 10/14/2022   • Nonrheumatic aortic valve stenosis 09/22/2022   • Throat cancer ([address]) 06/23/2022   • History of head and neck cancer 06/23/2022   • Acute pain of right wrist 06/15/2022   • Arthralgia of multiple joints 02/29/2016   • Bilateral hip pain 02/29/2016   • Chronic low back pain 02/29/2016   • Myalgia 69/84/3015   • Folliculitis 42/40/2824   • Esophagitis, reflux 10/10/2012   • Hyperlipidemia 10/10/2012   • Primary hypertension 05/10/2012   • Hypothyroidism 05/10/2012     No orders of the defined types were placed in this encounter.

## 2023-09-12 NOTE — PROGRESS NOTES
HBO Treatment Course Details       Treatment Notes: Patient tolerated treatment well     Treatment Course Number: 8  Total Treatments Ordered:  30     Diagnosis:   1. 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  [facility]          HBO Treatment Details:  In-Patient Visit: No  Treatment Length:90 Minutes(Minutes)  Chamber #: Hard sided Monoplace Chamber    Pre-Treatment details:  Pre-treatment protocol Treatment Protocol: 2.5 FERDINAND X 90 minutes w/ 100% oxygen, two 5 minute air breaks  Left ear clear?: yes  Right ear clear?: yes  Left ear intact?: yes  Right ear intact?: yes        PE Tubes present, Left ear?: no  PE Tubes present, Right ear?: no        Left ear TEED scale: Grade 0  Right ear TEED scale: Grade 0   Pretreatment heart and lung assessment: Pretreatment heart and lung auscltation unremarkable. Patient cleared for HBOT     Treatment details:  FERDINAND Rate: 2.5  Started Compression: 1004  Reached Compression: 1019  Total Compression Time: 15 (Minutes)  Total Holding Time: 100 (Minutes)  Started Decompression: 1159  Reached Surface: 1209  Total Decompression Time: 10 (Minutes)  Total Airbreaks: 5 (Minutes)  Total Time of Treatment: 120 (Minutes)  Symptoms Noted During Treatment: None (Minutes)    Post treatment details:  Left ear clear?: yes  Right ear clear?: yes  Left ears intact?: yes  Right ears intact?: yes        PE Tubes present, Left ear?: no  PE Tubes present, Right ear?: no        Left ear TEED scale: Grade 0  Right ear TEED scale: Grade 0  Post treatment heart and lung assessment: Post treatment heart and lung auscltation unremarkable. Patient cleared for discharge. Tolerated treatment well.              Vital Signs:  [facility] Glucose Reference Range: 100-350 mg/dl   Pre-Treatment Post-Treatment   Time vitals are taken: 0955 Time vitals are taken: 1213   Blood Pressure: 136/86 Blood Pressure: 132/78   Pulse: 72 Pulse: 76   Resp: 16 Resp: 16   Temp: 98.1 °F (36.7 °C) Temp: 97.7 °F (36.5 °C)             No Known Allergies  Patient Active Problem List    Diagnosis Date Noted   • Osteomyelitis ([address]) 07/14/2023   • Oropharyngeal dysphagia 05/15/2023   • Coronary artery disease involving native coronary artery of native heart without angina pectoris 01/17/2023   • Status post insertion of drug eluting coronary artery stent 12/28/2022   • HOCM (hypertrophic obstructive cardiomyopathy) ([address]) 10/14/2022   • Nonrheumatic aortic valve stenosis 09/22/2022   • Throat cancer ([address]) 06/23/2022   • History of head and neck cancer 06/23/2022   • Acute pain of right wrist 06/15/2022   • Arthralgia of multiple joints 02/29/2016   • Bilateral hip pain 02/29/2016   • Chronic low back pain 02/29/2016   • Myalgia 71/44/9600   • Folliculitis 99/49/3019   • Esophagitis, reflux 10/10/2012   • Hyperlipidemia 10/10/2012   • Primary hypertension 05/10/2012   • Hypothyroidism 05/10/2012     No orders of the defined types were placed in this encounter.

## 2023-09-13 ENCOUNTER — OFFICE VISIT (OUTPATIENT)
Dept: WOUND CARE | Facility: HOSPITAL | Age: 68
End: 2023-09-13
Payer: COMMERCIAL

## 2023-09-13 DIAGNOSIS — M27.2 OSTEORADIONECROSIS OF JAW: ICD-10-CM
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
## 2023-09-13 PROCEDURE — 99183 HYPERBARIC OXYGEN THERAPY: CPT | Performed by: NURSE PRACTITIONER

## 2023-09-13 PROCEDURE — G0277 HBOT, FULL BODY CHAMBER, 30M: HCPCS | Performed by: NURSE PRACTITIONER

## 2023-09-13 NOTE — PROGRESS NOTES
HBO Treatment Course Details       Treatment Notes: Patient tolerated treatment well     Treatment Course Number: 9  Total Treatments Ordered:  30     Diagnosis:   1. 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  [facility]          [facility] Treatment Details:  In-Patient Visit: No  Treatment Length:90 Minutes(Minutes)  Chamber #: Hard sided Monoplace Chamber    Pre-Treatment details:  Pre-treatment protocol Treatment Protocol: 2.5 FERDINAND X 90 minutes w/ 100% oxygen, two 5 minute air breaks  Left ear clear?: yes  Right ear clear?: yes  Left ear intact?: yes  Right ear intact?: yes        PE Tubes present, Left ear?: no  PE Tubes present, Right ear?: no        Left ear TEED scale: Grade 0  Right ear TEED scale: Grade 0   Pretreatment heart and lung assessment: Pretreatment heart and lung auscltation unremarkable. Patient cleared for HBOT     Treatment details:  FERDINAND Rate: 2.5  Started Compression: 1007  Reached Compression: 1022  Total Compression Time: 15 (Minutes)  Total Holding Time: 100 (Minutes)  Started Decompression: 1202  Reached Surface: 7328  Total Decompression Time: 10 (Minutes)  Total Airbreaks: 5 (Minutes)  Total Time of Treatment: 120 (Minutes)  Symptoms Noted During Treatment: None (Minutes)    Post treatment details:  Left ear clear?: yes  Right ear clear?: yes  Left ears intact?: yes  Right ears intact?: yes        PE Tubes present, Left ear?: no  PE Tubes present, Right ear?: no        Left ear TEED scale: Grade 0  Right ear TEED scale: Grade 0  Post treatment heart and lung assessment: Post treatment heart and lung auscltation unremarkable. Patient cleared for discharge. Tolerated treatment well.              Vital Signs:  [facility] Glucose Reference Range: 100-350 mg/dl   Pre-Treatment Post-Treatment   Time vitals are taken: 0940 Time vitals are taken: 1215   Blood Pressure: 136/86 Blood Pressure: 148/70   Pulse: 84 Pulse: 76   Resp: 16 Resp: 16   Temp: 96.6 °F (35.9 °C) Temp: 97.6 °F (36.4 °C)             No Known Allergies  Patient Active Problem List    Diagnosis Date Noted   • Osteomyelitis ([address]) 07/14/2023   • Oropharyngeal dysphagia 05/15/2023   • Coronary artery disease involving native coronary artery of native heart without angina pectoris 01/17/2023   • Status post insertion of drug eluting coronary artery stent 12/28/2022   • HOCM (hypertrophic obstructive cardiomyopathy) ([address]) 10/14/2022   • Nonrheumatic aortic valve stenosis 09/22/2022   • Throat cancer ([address]) 06/23/2022   • History of head and neck cancer 06/23/2022   • Acute pain of right wrist 06/15/2022   • Arthralgia of multiple joints 02/29/2016   • Bilateral hip pain 02/29/2016   • Chronic low back pain 02/29/2016   • Myalgia 03/35/2731   • Folliculitis 08/61/4241   • Esophagitis, reflux 10/10/2012   • Hyperlipidemia 10/10/2012   • Primary hypertension 05/10/2012   • Hypothyroidism 05/10/2012     No orders of the defined types were placed in this encounter.

## 2023-09-13 NOTE — PROGRESS NOTES
HBO Treatment Course Details       Treatment Notes: Patient tolerated HBO tx well. Patient had no complaints prior to or post HBO tx. Treatment Course Number: 9  Total Treatments Ordered:  30     Diagnosis:   1.  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  Hyperbaric oxygen the[facility]py          HBO Treatment Details:  In-Patient Visit: no  Treatment Length:90 Minutes(Minutes)  Chamber #: Hard sided Monoplace Chamber    Pre-Treatment details:  Pre-treatment protocol Treatment Protocol: 2.5 FERDINAND X 90 minutes w/ 100% oxygen, two 5 minute air breaks                                             Treatment details:  FERDINAND Rate: 2.5  Started Compression: 1007  Reached Compression: 1022  Total Compression Time: 15 (Minutes)  Total Holding Time: 100 (Minutes)  Started Decompression: 1202  Reached Surface: 1212  Total Decompression Time: 10 (Minutes)  Total Airbreaks: 5 (Minutes)  Total Time of Treatment: 120 (Minutes)  Symptoms Noted During Treatment: None (Minutes)    Post treatment details:                                                    Vital Signs:  HBO Glucose Reference Range: 100-350 mg/dl   Pre-Treatment Post-Treatment   Time vitals are taken: 0940 Time vitals are taken: 1215   Blood Pressure: 136/86 Blood Pressure: 148/70   Pulse: 84 Pulse: 76   Resp: 16 Resp: 16   Temp: 96.6 °F (35.9 °C) Temp: 97.6 °F (36.4 °C)             No Known Allergies  Patient Active Problem List    Diagnosis Date Noted   • Osteomyelitis ([address]) 07/14/2023   • Oropharyngeal dysphagia 05/15/2023   • Coronary artery disease involving native coronary artery of native heart without angina pectoris 01/17/2023   • Status post insertion of drug eluting coronary artery stent 12/28/2022   • HOCM (hypertrophic obstructive cardiomyopathy) ([address]) 10/14/2022   • Nonrheumatic aortic valve stenosis 09/22/2022   • Throat cancer ([address]) 06/23/2022   • History of head and neck cancer 06/23/2022   • Acute pain of right wrist 06/15/2022   • Arthralgia of multiple joints 02/29/2016   • Bilateral hip pain 02/29/2016   • Chronic low back pain 02/29/2016   • Myalgia 22/91/5714   • Folliculitis 63/22/0794   • Esophagitis, reflux 10/10/2012   • Hyperlipidemia 10/10/2012   • Primary hypertension 05/10/2012   • Hypothyroidism 05/10/2012     No orders of the defined types were placed in this encounter.

## 2023-09-14 ENCOUNTER — TELEPHONE (OUTPATIENT)
Dept: INFECTIOUS DISEASES | Facility: CLINIC | Age: 68
End: 2023-09-14

## 2023-09-14 ENCOUNTER — OFFICE VISIT (OUTPATIENT)
Dept: WOUND CARE | Facility: HOSPITAL | Age: 68
End: 2023-09-14
Payer: COMMERCIAL

## 2023-09-14 DIAGNOSIS — M27.2 OSTEORADIONECROSIS OF JAW: ICD-10-CM
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
## 2023-09-14 PROCEDURE — G0277 HBOT, FULL BODY CHAMBER, 30M: HCPCS | Performed by: FAMILY MEDICINE

## 2023-09-14 PROCEDURE — 99183 HYPERBARIC OXYGEN THERAPY: CPT | Performed by: FAMILY MEDICINE

## 2023-09-14 NOTE — TELEPHONE ENCOUNTER
Called and lmom for patient today. Reminded patient of his upcoming appointment and to have labs done prior to appointment so they can be gone over. Informed patient if there are any further questions to call the office.

## 2023-09-14 NOTE — PROGRESS NOTES
HBO Treatment Course Details       Treatment Notes: Patient tolerated HBO tx well. Patient had no complaints prior to or post HBO tx. Treatment Course Number: 10  Total Treatments Ordered:  30     Diagnosis:   1. 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  Hyperbaric oxygen the[facility]py          Patient has a diagnosis of osteoradionecrosis for which hyperbaric oxygen therapy has been ordered. At the present time the patient has received 10 treatments. At the outset of hyperbaric therapy, the treatment goal was to resolve radiation symptoms and to heal soft tissue and damaged bone. In reviewing the patients medical record there were other treatments tried which included but were not limited to antibiotics, pain medication. To date the patient has some resolution of his pain but not total resolution yet. Therefore it is my recommendation that an additional 10 treatments be given. The ultimate goal of HBO therapy will be to to continue healing soft tissue damage bone along with improvement in postradiation pain. HBO Treatment Details:  In-Patient Visit: no  Treatment Length:90 Minutes(Minutes)  Chamber #: Hard sided Monoplace Chamber    Pre-Treatment details:  Pre-treatment protocol Treatment Protocol: 2.5 FERDINAND X 90 minutes w/ 100% oxygen, two 5 minute air breaks  Left ear clear?: yes  Right ear clear?: yes  Left ear intact?: yes  Right ear intact?: yes        PE Tubes present, Left ear?: no  PE Tubes present, Right ear?: no        Left ear TEED scale: Grade 0  Right ear TEED scale: Grade 0   Pretreatment heart and lung assessment: Pretreatment heart and lung auscltation unremarkable.  Patient cleared for HBOT     Treatment details:  FERDINAND Rate: 2.5  Started Compression: 1007  Reached Compression: 1022  Total Compression Time: 15 (Minutes)  Total Holding Time: 100 (Minutes)  Started Decompression: 1202  Reached Surface: 3118  Total Decompression Time: 10 (Minutes)  Total Airbreaks: 5 (Minutes)  Total Time of Treatment: 120 (Minutes)  Symptoms Noted During Treatment: None (Minutes)    Post treatment details:  Left ear clear?: yes  Right ear clear?: yes  Left ears intact?: yes  Right ears intact?: yes        PE Tubes present, Left ear?: no  PE Tubes present, Right ear?: no        Left ear TEED scale: Grade 0  Right ear TEED scale: Grade 0  Post treatment heart and lung assessment: Post treatment heart and lung auscltation unremarkable. Patient cleared for discharge. Tolerated treatment well. HBO Supervision: HBO supervised, pt tolerated treatment well. Pt with no complaints.           Vital Signs:  HBO Glucose Reference Range: 100-350 mg/dl   Pre-Treatment Post-Treatment   Time vitals are taken: 0945 Time vitals are taken: 1212   Blood Pressure: 150/86 Blood Pressure: 134/76   Pulse: 80 Pulse: 76   Resp: 16 Resp: 16   Temp: 97.6 °F (36.4 °C) Temp: 97.2 °F (36.2 °C)             No Known Allergies  Patient Active Problem List    Diagnosis Date Noted   • Osteomyelitis ([address]) 07/14/2023   • Oropharyngeal dysphagia 05/15/2023   • Coronary artery disease involving native coronary artery of native heart without angina pectoris 01/17/2023   • Status post insertion of drug eluting coronary artery stent 12/28/2022   • HOCM (hypertrophic obstructive cardiomyopathy) ([address]) 10/14/2022   • Nonrheumatic aortic valve stenosis 09/22/2022   • Throat cancer ([address]) 06/23/2022   • History of head and neck cancer 06/23/2022   • Acute pain of right wrist 06/15/2022   • Arthralgia of multiple joints 02/29/2016   • Bilateral hip pain 02/29/2016   • Chronic low back pain 02/29/2016   • Myalgia 62/26/7783   • Folliculitis 29/34/5523   • Esophagitis, reflux 10/10/2012   • Hyperlipidemia 10/10/2012   • Primary hypertension 05/10/2012   • Hypothyroidism 05/10/2012

## 2023-09-15 ENCOUNTER — OFFICE VISIT (OUTPATIENT)
Dept: WOUND CARE | Facility: HOSPITAL | Age: 68
End: 2023-09-15
Payer: COMMERCIAL

## 2023-09-15 DIAGNOSIS — M27.2 OSTEORADIONECROSIS OF JAW: ICD-10-CM
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
## 2023-09-15 PROCEDURE — G0277 HBOT, FULL BODY CHAMBER, 30M: HCPCS | Performed by: NURSE PRACTITIONER

## 2023-09-15 PROCEDURE — 99183 HYPERBARIC OXYGEN THERAPY: CPT | Performed by: NURSE PRACTITIONER

## 2023-09-15 NOTE — PROGRESS NOTES
HBO Treatment Course Details       Treatment Notes: Patient tolerated treatment well     Treatment Course Number: 11  Total Treatments Ordered:  30     Diagnosis:   1. 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  [facility]          H[facility] Treatment Details:  In-Patient Visit: No  Treatment Length:90 Minutes(Minutes)  Chamber #: Hard sided Monoplace Chamber    Pre-Treatment details:  Pre-treatment protocol Treatment Protocol: 2.5 FERDINAND X 90 minutes w/ 100% oxygen, two 5 minute air breaks  Left ear clear?: yes  Right ear clear?: yes  Left ear intact?: yes  Right ear intact?: yes        PE Tubes present, Left ear?: no  PE Tubes present, Right ear?: no        Left ear TEED scale: Grade 0  Right ear TEED scale: Grade 0   Pretreatment heart and lung assessment: Pretreatment heart and lung auscltation unremarkable. Patient cleared for HBOT     Treatment details:  FERDINAND Rate: 2.5  Started Compression: 1002  Reached Compression: 1017  Total Compression Time: 15 (Minutes)  Total Holding Time: 100 (Minutes)  Started Decompression: 1157  Reached Surface: 1991  Total Decompression Time: 10 (Minutes)  Total Airbreaks: 5 (Minutes)  Total Time of Treatment: 120 (Minutes)  Symptoms Noted During Treatment: None (Minutes)    Post treatment details:  Left ear clear?: yes  Right ear clear?: yes  Left ears intact?: yes  Right ears intact?: yes        PE Tubes present, Left ear?: no  PE Tubes present, Right ear?: no        Left ear TEED scale: Grade 0  Right ear TEED scale: Grade 0  Post treatment heart and lung assessment: Post treatment heart and lung auscltation unremarkable. Patient cleared for discharge. Tolerated treatment well.              Vital Signs:  [facility] Glucose Reference Range: 100-350 mg/dl   Pre-Treatment Post-Treatment   Time vitals are taken: 0940 Time vitals are taken: 1210   Blood Pressure: 126/72 Blood Pressure: 118/78   Pulse: 72 Pulse: 88   Resp: 16 Resp: 16   Temp: 96.9 °F (36.1 °C) Temp: 96.9 °F (36.1 °C)             No Known Allergies  Patient Active Problem List    Diagnosis Date Noted   • Osteomyelitis ([address]) 07/14/2023   • Oropharyngeal dysphagia 05/15/2023   • Coronary artery disease involving native coronary artery of native heart without angina pectoris 01/17/2023   • Status post insertion of drug eluting coronary artery stent 12/28/2022   • HOCM (hypertrophic obstructive cardiomyopathy) ([address]) 10/14/2022   • Nonrheumatic aortic valve stenosis 09/22/2022   • Throat cancer ([address]) 06/23/2022   • History of head and neck cancer 06/23/2022   • Acute pain of right wrist 06/15/2022   • Arthralgia of multiple joints 02/29/2016   • Bilateral hip pain 02/29/2016   • Chronic low back pain 02/29/2016   • Myalgia 65/42/7059   • Folliculitis 82/07/6634   • Esophagitis, reflux 10/10/2012   • Hyperlipidemia 10/10/2012   • Primary hypertension 05/10/2012   • Hypothyroidism 05/10/2012     No orders of the defined types were placed in this encounter.

## 2023-09-15 NOTE — PROGRESS NOTES
HBO Treatment Course Details       Treatment Notes: Patient tolerated HBO tx well. Patient had no complaints prior to or post HBO tx. Treatment Course Number: 11  Total Treatments Ordered:  30     Diagnosis:   1. 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  Hyperbaric oxygen thearpy          HBO Treatment Details:  In-Patient Visit: no  Treatment Length:90 Minutes(Minutes)  Chamber #: Hard sided Monoplace Chamber    Pre-Treatment details:  Pre-treatment protocol Treatment Protocol: 2.5 FERDINAND X 90 minutes w/ 100% oxygen, two 5 minute air breaks  Left ear clear?: yes  Right ear clear?: yes  Left ear intact?: yes  Right ear intact?: yes        PE Tubes present, Left ear?: no  PE Tubes present, Right ear?: no        Left ear TEED scale: Grade 0  Right ear TEED scale: Grade 0   Pretreatment heart and lung assessment: Pretreatment heart and lung auscltation unremarkable.  Patient cleared for HBOT     Treatment details:  FERDINAND Rate: 2.5  Started Compression: 1002  Reached Compression: 1017  Total Compression Time: 15 (Minutes)  Total Holding Time: 100 (Minutes)  Started Decompression: 1157  Reached Surface: 9454  Total Decompression Time: 10 (Minutes)  Total Airbreaks: 5 (Minutes)  Total Time of Treatment: 120 (Minutes)  Symptoms Noted During Treatment: None (Minutes)    Post treatment details:                                                    Vital Signs:  HBO Glucose Reference Range: 100-350 mg/dl   Pre-Treatment Post-Treatment   Time vitals are taken: 0940 Time vitals are taken: 1210   Blood Pressure: 126/72 Blood Pressure: 118/78   Pulse: 72 Pulse: 88   Resp: 16 Resp: 16   Temp: 96.9 °F (36.1 °C) Temp: 96.9 °F (36.1 °C)             No Known Allergies  Patient Active Problem List    Diagnosis Date Noted   • Osteomyelitis ([address]) 07/14/2023   • Oropharyngeal dysphagia 05/15/2023   • Coronary artery disease involving native coronary artery of native heart without angina pectoris 01/17/2023   • Status post insertion of drug eluting coronary artery stent 12/28/2022   • HOCM (hypertrophic obstructive cardiomyopathy) ([address]) 10/14/2022   • Nonrheumatic aortic valve stenosis 09/22/2022   • Throat cancer ([address]) 06/23/2022   • History of head and neck cancer 06/23/2022   • Acute pain of right wrist 06/15/2022   • Arthralgia of multiple joints 02/29/2016   • Bilateral hip pain 02/29/2016   • Chronic low back pain 02/29/2016   • Myalgia 40/69/4340   • Folliculitis 91/54/6503   • Esophagitis, reflux 10/10/2012   • Hyperlipidemia 10/10/2012   • Primary hypertension 05/10/2012   • Hypothyroidism 05/10/2012     No orders of the defined types were placed in this encounter.

## 2023-09-16 ENCOUNTER — APPOINTMENT (OUTPATIENT)
Dept: LAB | Facility: CLINIC | Age: 68
End: 2023-09-16
Payer: COMMERCIAL

## 2023-09-18 ENCOUNTER — OFFICE VISIT (OUTPATIENT)
Dept: WOUND CARE | Facility: HOSPITAL | Age: 68
End: 2023-09-18
Payer: COMMERCIAL
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
## 2023-09-18 DIAGNOSIS — M27.2 OSTEORADIONECROSIS OF JAW: ICD-10-CM

## 2023-09-18 PROCEDURE — 99183 HYPERBARIC OXYGEN THERAPY: CPT | Performed by: SURGERY

## 2023-09-18 PROCEDURE — G0277 HBOT, FULL BODY CHAMBER, 30M: HCPCS | Performed by: SURGERY

## 2023-09-18 NOTE — PROGRESS NOTES
HBO Treatment Course Details       Treatment Notes: Patient had no complaints prior to or post HBO tx. Patient tolerated HBO tx well. Treatment Course Number: 12  Total Treatments Ordered:  30     Diagnosis:   1.  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  Hyperbaric oxygen thearpy          HBO Treatment Details:  In-Patient Visit: no  Treatment Length:90 Minutes(Minutes)  Chamber #: Hard sided Monoplace Chamber    Pre-Treatment details:  Pre-treatment protocol Treatment Protocol: 2.5 FERDINAND X 90 minutes w/ 100% oxygen, two 5 minute air breaks      TM's clear  Lungs clear  Heart regular                                       Treatment details:  FERDINAND Rate: 2.5  Started Compression: 1005  Reached Compression: 1020  Total Compression Time: 15 (Minutes)  Total Holding Time: 100 (Minutes)  Started Decompression: 1200  Reached Surface: 1210  Total Decompression Time: 10 (Minutes)  Total Airbreaks: 5 (Minutes)  Total Time of Treatment: 120 (Minutes)  Symptoms Noted During Treatment: None (Minutes)    Post treatment details:            TM's clear  Lungs clear                                        Vital Signs:  HBO Glucose Reference Range: 100-350 mg/dl   Pre-Treatment Post-Treatment   Time vitals are taken: 0940 Time vitals are taken: 1215   Blood Pressure: 142/82 Blood Pressure: 116/64   Pulse: 72 Pulse: 68   Resp: 16 Resp: 16   Temp: 95.3 °F (35.2 °C) Temp: 95.3 °F (35.2 °C)             No Known Allergies  Patient Active Problem List    Diagnosis Date Noted   • Osteomyelitis ([address]) 07/14/2023   • Oropharyngeal dysphagia 05/15/2023   • Coronary artery disease involving native coronary artery of native heart without angina pectoris 01/17/2023   • Status post insertion of drug eluting coronary artery stent 12/28/2022   • HOCM (hypertrophic obstructive cardiomyopathy) ([address]) 10/14/2022   • Nonrheumatic aortic valve stenosis 09/22/2022   • Throat cancer ([address]) 06/23/2022   • History of head and neck cancer 06/23/2022   • Acute pain of right wrist 06/15/2022   • Arthralgia of multiple joints 02/29/2016   • Bilateral hip pain 02/29/2016   • Chronic low back pain 02/29/2016   • Myalgia 96/68/8269   • Folliculitis 62/01/8091   • Esophagitis, reflux 10/10/2012   • Hyperlipidemia 10/10/2012   • Primary hypertension 05/10/2012   • Hypothyroidism 05/10/2012     No orders of the defined types were placed in this encounter.

## 2023-09-19 ENCOUNTER — OFFICE VISIT (OUTPATIENT)
Dept: WOUND CARE | Facility: HOSPITAL | Age: 68
End: 2023-09-19
Payer: COMMERCIAL

## 2023-09-19 ENCOUNTER — OFFICE VISIT (OUTPATIENT)
Dept: INFECTIOUS DISEASES | Facility: CLINIC | Age: 68
End: 2023-09-19
Payer: COMMERCIAL

## 2023-09-19 VITALS
HEART RATE: 59 BPM | RESPIRATION RATE: 18 BRPM | SYSTOLIC BLOOD PRESSURE: 123 MMHG | BODY MASS INDEX: 24.22 KG/M2 | DIASTOLIC BLOOD PRESSURE: 84 MMHG | WEIGHT: 173 LBS | TEMPERATURE: 97.5 F | OXYGEN SATURATION: 99 % | HEIGHT: 71 IN

## 2023-09-19 DIAGNOSIS — C14.0 THROAT CANCER (HCC): ICD-10-CM

## 2023-09-19 DIAGNOSIS — M27.2 OSTEORADIONECROSIS OF JAW: ICD-10-CM

## 2023-09-19 DIAGNOSIS — M86.9 OSTEOMYELITIS (HCC): Primary | ICD-10-CM
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
## 2023-09-19 DIAGNOSIS — R13.12 OROPHARYNGEAL DYSPHAGIA: ICD-10-CM

## 2023-09-19 PROCEDURE — G0277 HBOT, FULL BODY CHAMBER, 30M: HCPCS | Performed by: NURSE PRACTITIONER

## 2023-09-19 PROCEDURE — 99212 OFFICE O/P EST SF 10 MIN: CPT | Performed by: NURSE PRACTITIONER

## 2023-09-19 PROCEDURE — 99183 HYPERBARIC OXYGEN THERAPY: CPT | Performed by: NURSE PRACTITIONER

## 2023-09-19 RX ORDER — AMOXICILLIN 500 MG/1
1000 TABLET, FILM COATED ORAL EVERY 8 HOURS
Qty: 180 TABLET | Refills: 1 | Status: SHIPPED | OUTPATIENT
Start: 2023-09-19 | End: 2023-10-19

## 2023-09-19 RX ORDER — FLUTICASONE FUROATE 27.5 UG/1
2 SPRAY, METERED NASAL DAILY
COMMUNITY
Start: 2023-09-02

## 2023-09-19 NOTE — PROGRESS NOTES
HBO Treatment Course Details       Treatment Notes: Patient tolerated treatment well     Treatment Course Number: 13  Total Treatments Ordered:  30     Diagnosis:   1. 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  [facility]          H[facility] Treatment Details:  In-Patient Visit: No  Treatment Length:90 Minutes(Minutes)  Chamber #: Hard sided Monoplace Chamber    Pre-Treatment details:  Pre-treatment protocol Treatment Protocol: 2.5 FERDINAND X 90 minutes w/ 100% oxygen, two 5 minute air breaks  Left ear clear?: yes  Right ear clear?: yes  Left ear intact?: yes  Right ear intact?: yes        PE Tubes present, Left ear?: no  PE Tubes present, Right ear?: no        Left ear TEED scale: Grade 0  Right ear TEED scale: Grade 0   Pretreatment heart and lung assessment: Pretreatment heart and lung auscltation unremarkable. Patient cleared for HBOT     Treatment details:  FERDINAND Rate: 2.5  Started Compression: 1007  Reached Compression: 1022  Total Compression Time: 15 (Minutes)  Total Holding Time: 100 (Minutes)  Started Decompression: 1202  Reached Surface: 2588  Total Decompression Time: 10 (Minutes)  Total Airbreaks: 5 (Minutes)  Total Time of Treatment: 120 (Minutes)  Symptoms Noted During Treatment: None (Minutes)    Post treatment details:  Left ear clear?: yes  Right ear clear?: yes  Left ears intact?: yes  Right ears intact?: yes        PE Tubes present, Left ear?: no  PE Tubes present, Right ear?: no        Left ear TEED scale: Grade 0  Right ear TEED scale: Grade 0  Post treatment heart and lung assessment: Post treatment heart and lung auscltation unremarkable. Patient cleared for discharge. Tolerated treatment well.              Vital Signs:  [facility] [place] Glucose Reference Range: 100-350 mg/dl   Pre-Treatment Post-Treatment   Time vitals are taken: 0955 Time vitals are taken: 1212   Blood Pressure: 124/72 Blood Pressure: 132/82   Pulse: 68 Pulse: 72   Resp: 16 Resp: 16   Temp: 95.8 °F (35.4 °C) Temp: 95.3 °F (35.2 °C)             No Known Allergies  Patient Active Problem List    Diagnosis Date Noted   • Osteomyelitis ([address]) 07/14/2023   • Oropharyngeal dysphagia 05/15/2023   • Coronary artery disease involving native coronary artery of native heart without angina pectoris 01/17/2023   • Status post insertion of drug eluting coronary artery stent 12/28/2022   • HOCM (hypertrophic obstructive cardiomyopathy) ([address]) 10/14/2022   • Nonrheumatic aortic valve stenosis 09/22/2022   • Throat cancer ([address]) 06/23/2022   • History of head and neck cancer 06/23/2022   • Acute pain of right wrist 06/15/2022   • Arthralgia of multiple joints 02/29/2016   • Bilateral hip pain 02/29/2016   • Chronic low back pain 02/29/2016   • Myalgia 82/06/3054   • Folliculitis 28/58/8951   • Esophagitis, reflux 10/10/2012   • Hyperlipidemia 10/10/2012   • Primary hypertension 05/10/2012   • Hypothyroidism 05/10/2012     No orders of the defined types were placed in this encounter. HBO Treatment Course Details       Treatment Notes: Patient tolerated treatment well     Treatment Course Number: 13  Total Treatments Ordered:  30     Diagnosis:   1. 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  [facility]          HBO Treatment Details:  In-Patient Visit: No  Treatment Length:90 Minutes(Minutes)  Chamber #: Hard sided Monoplace Chamber    Pre-Treatment details:  Pre-treatment protocol Treatment Protocol: 2.5 FERDINAND X 90 minutes w/ 100% oxygen, two 5 minute air breaks  Left ear clear?: yes  Right ear clear?: yes  Left ear intact?: yes  Right ear intact?: yes        PE Tubes present, Left ear?: no  PE Tubes present, Right ear?: no        Left ear TEED scale: Grade 0  Right ear TEED scale: Grade 0   Pretreatment heart and lung assessment: Pretreatment heart and lung auscltation unremarkable.  Patient cleared for HBOT     Treatment details:  FERDINAND Rate: 2.5  Started Compression: 1007  Reached Compression: 1022  Total Compression Time: 15 (Minutes)  Total Holding Time: 100 (Minutes)  Started Decompression: 1202  Reached Surface: 1212  Total Decompression Time: 10 (Minutes)  Total Airbreaks: 5 (Minutes)  Total Time of Treatment: 120 (Minutes)  Symptoms Noted During Treatment: None (Minutes)    Post treatment details:  Left ear clear?: yes  Right ear clear?: yes  Left ears intact?: yes  Right ears intact?: yes        PE Tubes present, Left ear?: no  PE Tubes present, Right ear?: no        Left ear TEED scale: Grade 0  Right ear TEED scale: Grade 0  Post treatment heart and lung assessment: Post treatment heart and lung auscltation unremarkable. Patient cleared for discharge. Tolerated treatment well. Vital Signs:  [facility] Glucose Reference Range: 100-350 mg/dl   Pre-Treatment Post-Treatment   Time vitals are taken: 0955 Time vitals are taken: 1212   Blood Pressure: 124/72 Blood Pressure: 132/82   Pulse: 68 Pulse: 72   Resp: 16 Resp: 16   Temp: 95.8 °F (35.4 °C) Temp: 95.3 °F (35.2 °C)             No Known Allergies  Patient Active Problem List    Diagnosis Date Noted   • Osteomyelitis ([address]) 07/14/2023   • Oropharyngeal dysphagia 05/15/2023   • Coronary artery disease involving native coronary artery of native heart without angina pectoris 01/17/2023   • Status post insertion of drug eluting coronary artery stent 12/28/2022   • HOCM (hypertrophic obstructive cardiomyopathy) ([address]) 10/14/2022   • Nonrheumatic aortic valve stenosis 09/22/2022   • Throat cancer ([address]) 06/23/2022   • History of head and neck cancer 06/23/2022   • Acute pain of right wrist 06/15/2022   • Arthralgia of multiple joints 02/29/2016   • Bilateral hip pain 02/29/2016   • Chronic low back pain 02/29/2016   • Myalgia 59/98/0378   • Folliculitis 16/41/6610   • Esophagitis, reflux 10/10/2012   • Hyperlipidemia 10/10/2012   • Primary hypertension 05/10/2012   • Hypothyroidism 05/10/2012     No orders of the defined types were placed in this encounter.

## 2023-09-19 NOTE — PATIENT INSTRUCTIONS
Continue oral Amoxicillin, 1g every 8 hours. Complete lab work (CBCD) next week. Complete lab work (CBCD and creatinine) prior to next outpatient ID appointment. Return to the outpatient ID office for follow up in 1 month. Patient should notify our outpatient office post-operatively if he is unable to tolerate oral medications. We can readjust treatment plan as needed and utilize IV antibiotics as needed. Patient verbalized understanding.

## 2023-09-19 NOTE — PROGRESS NOTES
Outpatient Progress Note - [facility] Infectious Disease   Batool Marfa 75 y.o. male MRN: [id number]  Encounter: [id number]    Impression/Plan:  1.  Mandibular osteomyelitis.  Although recent CT did not show definitive evidence of osteomyelitis, loose fragments of bone were removed during recent tooth extraction with bone culture positive for group F strep.  Pathology showed fragments of bone with extensive bacterial colonization and evidence of peripheral acute inflammation. He completed 6-weeks of oral Amoxicillin treatment. There was then concern for ongoing infection during follow up OMS assessments so he's remained on antibiotic and started HBO treatment in preparation for dental extractions and bone debridement. His dental surgery is tentatively scheduled for 10/3/2023. Patient continues on high-dose oral antibiotic treatment with oral Amoxicillin. He's been tolerating it without difficulty but was given q12 dosing on his refill. I reviewed his most recent lab work from 9/16/2023 which showed an increased WBC count = 17.28 and stable creatinine of 0.87. Suspect increased WBC count is due to underdosed Amoxicillin, will have patient return to q8 dosing immediately, extra medication sent to patient's pharmacy, [facility] in [facility]. Will have patient repeat his CBCD next week for re-assessment. I will continue patient on the amoxicillin for now with plan to continue for 6-weeks post-op, through 11/15/2023. If patient is unable to tolerate or swallow his antibiotic post-op, he should contact our office immediately so that alternative antibiotic plan can be coordinated.  Patient verbalized understanding.  -continue oral Amoxicillin, 1000mg q8 hours  -anticipate antibiotic treatment at least through 11/15/2023  -medication refill sent to [facility] in [facility]  -continue follow up with OMS  -complete CBCD next week  -complete CBCD and BMP prior to next outpatient ID follow up visit  -return to the outpatient ID office in 1 month for follow up  -patient to contact the outpatient ID office post-operatively if he is unable to tolerate or swallow his oral antibiotic so that we can make alternative plan for his 6-week post-op treatment course     2.  Prior tongue and throat cancer with lymph node involvement status post lymph node dissection, XRT 0091.  Complicated by poor dentition.     3.  Dysphagia.  In the setting #2.  Patient has been able to swallow his antibiotic without difficulty. He will eventually meet with GI to see if he may be candidate for esophageal stenting.  -continue outpatient follow up with gastroenterology     4. Leukocytosis. Suspect this may be secondary to under dosed amoxicillin. Also consider role of inflammation in his mouth as one of his other broken teeth has been problematic for the last week. No obvious new infections noted in oral cavity on exam today and teeth are scheduled for extraction on 10/3/2023.  -continue amoxicillin with q8 dosing as above  -repeat CBCD next week    Above plan was discussed in detail with patient in the exam room. Antibiotics:  Amoxicillin    Subjective:  Patient presents for follow up for ongoing treatment of mandibular osteomyelitis. He continues on Amoxicillin treatment which he's been tolerating without difficulty. He reports that last time he picked up his medication from pharmacy the dosing had changed to q12 and he's wondering if this is correct. Since his last visit he began HBO session in preparation for his upcoming oral surgery. He's been tolerating HBO without difficulty. Patient has recently noticed more inflammation in his mouth surrounding his front teeth, reports one of the broken areas has been more bothersome. He has no fever, chills, sweats, shakes; no nausea, vomiting, abdominal pain, diarrhea, or dysuria; no cough, shortness of breath, or chest pain. No new symptoms.     Objective:  Vitals:  Temp 97.5  HR 59  RR 18  BP 123/84    Physical Exam:   General Appearance:  Alert, interactive, nontoxic, no acute distress. He appears comfortable sitting in the exam chair. Throat: Poor dentition with extensive caries and broken teeth. L posterior inner cheek with some inflammation which extends down to mandibular region, no active bleeding/draiange. Gums with multiple areas of patchy erythema. Heart:  RRR; no murmur, rub or gallop. Extremities: No clubbing or cyanosis, no edema. Skin: No new rashes, lesions, or draining wounds noted on exposed skin.      Labs, Imaging, & Other studies:   All pertinent labs and imaging studies were personally reviewed by me including  Results from last 7 days   Lab Units 09/16/23  0938   WBC Thousand/uL 17.28*   HEMOGLOBIN g/dL 13.6   PLATELETS Thousands/uL 209     Results from last 7 days   Lab Units 09/16/23  0938   CREATININE mg/dL 0.87   EGFR ml/min/1.73sq m 88

## 2023-09-19 NOTE — PROGRESS NOTES
HBO Treatment Course Details       Treatment Notes: Patient tolerated HBO tx well. Patient had no complaints prior to or post HBO tx. Treatment Course Number: 13  Total Treatments Ordered:  30     Diagnosis:   1.  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  Hyperbaric oxygen the[facility]          HBO Treatment Details:  In-Patient Visit: no  Treatment Length:90 Minutes(Minutes)  Chamber #: Hard sided Monoplace Chamber    Pre-Treatment details:  Pre-treatment protocol Treatment Protocol: 2.5 FERDINAND X 90 minutes w/ 100% oxygen, two 5 minute air breaks                                             Treatment details:  FERDINAND Rate: 2.5  Started Compression: 1007  Reached Compression: 1022  Total Compression Time: 15 (Minutes)  Total Holding Time: 100 (Minutes)  Started Decompression: 1202  Reached Surface: 1212  Total Decompression Time: 10 (Minutes)  Total Airbreaks: 5 (Minutes)  Total Time of Treatment: 120 (Minutes)  Symptoms Noted During Treatment: None (Minutes)    Post treatment details:                                                    Vital Signs:  HBO Glucose Reference Range: 100-350 mg/dl   Pre-Treatment Post-Treatment   Time vitals are taken: 0955 Time vitals are taken: 1212   Blood Pressure: 124/72 Blood Pressure: 132/82   Pulse: 68 Pulse: 72   Resp: 16 Resp: 16   Temp: 95.8 °F (35.4 °C) Temp: 95.3 °F (35.2 °C)             No Known Allergies  Patient Active Problem List    Diagnosis Date Noted   • Osteomyelitis ([address]) 07/14/2023   • Oropharyngeal dysphagia 05/15/2023   • Coronary artery disease involving native coronary artery of native heart without angina pectoris 01/17/2023   • Status post insertion of drug eluting coronary artery stent 12/28/2022   • HOCM (hypertrophic obstructive cardiomyopathy) ([address]) 10/14/2022   • Nonrheumatic aortic valve stenosis 09/22/2022   • Throat cancer ([address]) 06/23/2022   • History of head and neck cancer 06/23/2022   • Acute pain of right wrist 06/15/2022   • Arthralgia of multiple joints 02/29/2016   • Bilateral hip pain 02/29/2016   • Chronic low back pain 02/29/2016   • Myalgia 84/87/4528   • Folliculitis 91/07/4301   • Esophagitis, reflux 10/10/2012   • Hyperlipidemia 10/10/2012   • Primary hypertension 05/10/2012   • Hypothyroidism 05/10/2012     No orders of the defined types were placed in this encounter.

## 2023-09-20 ENCOUNTER — OFFICE VISIT (OUTPATIENT)
Dept: WOUND CARE | Facility: HOSPITAL | Age: 68
End: 2023-09-20
Payer: COMMERCIAL
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
## 2023-09-20 DIAGNOSIS — M27.2 OSTEORADIONECROSIS OF JAW: ICD-10-CM

## 2023-09-20 PROCEDURE — G0277 HBOT, FULL BODY CHAMBER, 30M: HCPCS | Performed by: NURSE PRACTITIONER

## 2023-09-20 PROCEDURE — 99183 HYPERBARIC OXYGEN THERAPY: CPT | Performed by: NURSE PRACTITIONER

## 2023-09-20 NOTE — PROGRESS NOTES
HBO Treatment Course Details       Treatment Notes: Patient tolerated treatment well     Treatment Course Number: 14  Total Treatments Ordered:  30     Diagnosis:   1. 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  [facility]          [facility] Treatment Details:  In-Patient Visit: No  Treatment Length:90 Minutes(Minutes)  Chamber #: Hard sided Monoplace Chamber    Pre-Treatment details:  Pre-treatment protocol Treatment Protocol: 2.5 FERDINAND X 90 minutes w/ 100% oxygen, two 5 minute air breaks  Left ear clear?: yes  Right ear clear?: yes  Left ear intact?: yes  Right ear intact?: yes        PE Tubes present, Left ear?: no  PE Tubes present, Right ear?: no        Left ear TEED scale: Grade 0  Right ear TEED scale: Grade 0   Pretreatment heart and lung assessment: Pretreatment heart and lung auscltation unremarkable. Patient cleared for HBOT     Treatment details:  FERDINAND Rate: 2.5  Started Compression: 1009  Reached Compression: 1019  Total Compression Time: 10 (Minutes)  Total Holding Time: 100 (Minutes)  Started Decompression: 1159  Reached Surface: 1209  Total Decompression Time: 10 (Minutes)  Total Airbreaks: 5 (Minutes)  Total Time of Treatment: 115 (Minutes)  Symptoms Noted During Treatment: None (Minutes)    Post treatment details:  Left ear clear?: yes  Right ear clear?: yes  Left ears intact?: yes  Right ears intact?: yes        PE Tubes present, Left ear?: no  PE Tubes present, Right ear?: no        Left ear TEED scale: Grade 0  Right ear TEED scale: Grade 0  Post treatment heart and lung assessment: Post treatment heart and lung auscltation unremarkable. Patient cleared for discharge. Tolerated treatment well.              Vital Signs:  [facility] Glucose Reference Range: 100-350 mg/dl   Pre-Treatment Post-Treatment   Time vitals are taken: 1000 Time vitals are taken: 1209   Blood Pressure: 124/66 Blood Pressure: 124/66   Pulse: 76 Pulse: 60   Resp: 16 Resp: 16   Temp: 95.8 °F (35.4 °C) Temp: 95.9 °F (35.5 °C) Allergies   Allergen Reactions   • Dust Mite Extract Sneezing and Nasal Congestion     Tree, pollen, dust     Patient Active Problem List    Diagnosis Date Noted   • Osteomyelitis ([address]) 07/14/2023   • Oropharyngeal dysphagia 05/15/2023   • Coronary artery disease involving native coronary artery of native heart without angina pectoris 01/17/2023   • Status post insertion of drug eluting coronary artery stent 12/28/2022   • HOCM (hypertrophic obstructive cardiomyopathy) ([address]) 10/14/2022   • Nonrheumatic aortic valve stenosis 09/22/2022   • Throat cancer ([address]) 06/23/2022   • History of head and neck cancer 06/23/2022   • Acute pain of right wrist 06/15/2022   • Arthralgia of multiple joints 02/29/2016   • Bilateral hip pain 02/29/2016   • Chronic low back pain 02/29/2016   • Myalgia 79/39/6157   • Folliculitis 75/97/4770   • Esophagitis, reflux 10/10/2012   • Hyperlipidemia 10/10/2012   • Primary hypertension 05/10/2012   • Hypothyroidism 05/10/2012     No orders of the defined types were placed in this encounter. HBO Treatment Course Details       Treatment Notes: Patient tolerated treatment well     Treatment Course Number: 14  Total Treatments Ordered:  30     Diagnosis:   1. 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  [facility]          [facility] Treatment Details:  In-Patient Visit: No  Treatment Length:90 Minutes(Minutes)  Chamber #: Hard sided Monoplace Chamber    Pre-Treatment details:  Pre-treatment protocol Treatment Protocol: 2.5 FERDINAND X 90 minutes w/ 100% oxygen, two 5 minute air breaks  Left ear clear?: yes  Right ear clear?: yes  Left ear intact?: yes  Right ear intact?: yes        PE Tubes present, Left ear?: no  PE Tubes present, Right ear?: no        Left ear TEED scale: Grade 0  Right ear TEED scale: Grade 0   Pretreatment heart and lung assessment: Pretreatment heart and lung auscltation unremarkable.  Patient cleared for HBOT     Treatment details:  FERDINAND Rate: 2.5  Started Compression: 1009  Reached Compression: 1019  Total Compression Time: 10 (Minutes)  Total Holding Time: 100 (Minutes)  Started Decompression: 1159  Reached Surface: 8469  Total Decompression Time: 10 (Minutes)  Total Airbreaks: 5 (Minutes)  Total Time of Treatment: 115 (Minutes)  Symptoms Noted During Treatment: None (Minutes)    Post treatment details:  Left ear clear?: yes  Right ear clear?: yes  Left ears intact?: yes  Right ears intact?: yes        PE Tubes present, Left ear?: no  PE Tubes present, Right ear?: no        Left ear TEED scale: Grade 0  Right ear TEED scale: Grade 0  Post treatment heart and lung assessment: Post treatment heart and lung auscltation unremarkable. Patient cleared for discharge. Tolerated treatment well.              Vital Signs:  [facility]ospit[facility] Glucose Reference Range: 100-350 mg/dl   Pre-Treatment Post-Treatment   Time vitals are taken: 1000 Time vitals are taken: 1209   Blood Pressure: 124/66 Blood Pressure: 124/66   Pulse: 76 Pulse: 60   Resp: 16 Resp: 16   Temp: 95.8 °F (35.4 °C) Temp: 95.9 °F (35.5 °C)             Allergies   Allergen Reactions   • Dust Mite Extract Sneezing and Nasal Congestion     Tree, pollen, dust     Patient Active Problem List    Diagnosis Date Noted   • Osteomyelitis ([address]) 07/14/2023   • Oropharyngeal dysphagia 05/15/2023   • Coronary artery disease involving native coronary artery of native heart without angina pectoris 01/17/2023   • Status post insertion of drug eluting coronary artery stent 12/28/2022   • HOCM (hypertrophic obstructive cardiomyopathy) ([address]) 10/14/2022   • Nonrheumatic aortic valve stenosis 09/22/2022   • Throat cancer ([address]) 06/23/2022   • History of head and neck cancer 06/23/2022   • Acute pain of right wrist 06/15/2022   • Arthralgia of multiple joints 02/29/2016   • Bilateral hip pain 02/29/2016   • Chronic low back pain 02/29/2016   • Myalgia 93/91/5814   • Folliculitis 83/08/8736   • Esophagitis, reflux 10/10/2012   • Hyperlipidemia 10/10/2012   • Primary hypertension 05/10/2012   • Hypothyroidism 05/10/2012     No orders of the defined types were placed in this encounter. HBO Treatment Course Details       Treatment Notes: Patient tolerated treatment well     Treatment Course Number: 14  Total Treatments Ordered:  30     Diagnosis:   1. 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  [facility]          [facility] Details:  In-Patient Visit: No  Treatment Length:90 Minutes(Minutes)  Chamber #: Hard sided Monoplace Chamber    Pre-Treatment details:  Pre-treatment protocol Treatment Protocol: 2.5 FERDINAND X 90 minutes w/ 100% oxygen, two 5 minute air breaks  Left ear clear?: yes  Right ear clear?: yes  Left ear intact?: yes  Right ear intact?: yes        PE Tubes present, Left ear?: no  PE Tubes present, Right ear?: no        Left ear TEED scale: Grade 0  Right ear TEED scale: Grade 0   Pretreatment heart and lung assessment: Pretreatment heart and lung auscltation unremarkable. Patient cleared for HBOT     Treatment details:  FERDINAND Rate: 2.5  Started Compression: 1009  Reached Compression: 1019  Total Compression Time: 10 (Minutes)  Total Holding Time: 100 (Minutes)  Started Decompression: 1159  Reached Surface: 1209  Total Decompression Time: 10 (Minutes)  Total Airbreaks: 5 (Minutes)  Total Time of Treatment: 115 (Minutes)  Symptoms Noted During Treatment: None (Minutes)    Post treatment details:  Left ear clear?: yes  Right ear clear?: yes  Left ears intact?: yes  Right ears intact?: yes        PE Tubes present, Left ear?: no  PE Tubes present, Right ear?: no        Left ear TEED scale: Grade 0  Right ear TEED scale: Grade 0  Post treatment heart and lung assessment: Post treatment heart and lung auscltation unremarkable. Patient cleared for discharge. Tolerated treatment well.              Vital Signs:  HBO Glucose Reference Range: 100-350 mg/dl   Pre-Treatment Post-Treatment   Time vitals are taken: 1000 Time vitals are taken: 1209   Blood Pressure: 124/66 Blood Pressure: 124/66 Pulse: 76 Pulse: 60   Resp: 16 Resp: 16   Temp: 95.8 °F (35.4 °C) Temp: 95.9 °F (35.5 °C)             Allergies   Allergen Reactions   • Dust Mite Extract Sneezing and Nasal Congestion     Tree, pollen, dust     Patient Active Problem List    Diagnosis Date Noted   • Osteomyelitis ([address]) 07/14/2023   • Oropharyngeal dysphagia 05/15/2023   • Coronary artery disease involving native coronary artery of native heart without angina pectoris 01/17/2023   • Status post insertion of drug eluting coronary artery stent 12/28/2022   • HOCM (hypertrophic obstructive cardiomyopathy) ([address]) 10/14/2022   • Nonrheumatic aortic valve stenosis 09/22/2022   • Throat cancer ([address]) 06/23/2022   • History of head and neck cancer 06/23/2022   • Acute pain of right wrist 06/15/2022   • Arthralgia of multiple joints 02/29/2016   • Bilateral hip pain 02/29/2016   • Chronic low back pain 02/29/2016   • Myalgia 08/69/9257   • Folliculitis 09/42/1212   • Esophagitis, reflux 10/10/2012   • Hyperlipidemia 10/10/2012   • Primary hypertension 05/10/2012   • Hypothyroidism 05/10/2012     No orders of the defined types were placed in this encounter. HBO Treatment Course Details       Treatment Notes: Patient tolerated HBO tx well. Patient had no complaints prior to or post HBO tx. Treatment Course Number: 14  Total Treatments Ordered:  30     Diagnosis:   1.  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  [facility]          [facility] Treatment Details:  In-Patient Visit: no  Treatment Length:90 Minutes(Minutes)  Chamber #: Hard sided Monoplace Chamber    Pre-Treatment details:  Pre-treatment protocol Treatment Protocol: 2.5 FERDINAND X 90 minutes w/ 100% oxygen, two 5 minute air breaks  Left ear clear?: yes  Right ear clear?: yes  Left ear intact?: yes  Right ear intact?: yes        PE Tubes present, Left ear?: no  PE Tubes present, Right ear?: no        Left ear TEED scale: Grade 0  Right ear TEED scale: Grade 0   Pretreatment heart and lung assessment: Pretreatment heart and lung auscltation unremarkable. Patient cleared for HBOT     Treatment details:  FERDINAND Rate: 2.5  Started Compression: 1009  Reached Compression: 1019  Total Compression Time: 10 (Minutes)  Total Holding Time: 100 (Minutes)  Started Decompression: 1159  Reached Surface: 1209  Total Decompression Time: 10 (Minutes)  Total Airbreaks: 5 (Minutes)  Total Time of Treatment: 115 (Minutes)  Symptoms Noted During Treatment: None (Minutes)    Post treatment details:  Left ear clear?: yes  Right ear clear?: yes  Left ears intact?: yes  Right ears intact?: yes        PE Tubes present, Left ear?: no  PE Tubes present, Right ear?: no        Left ear TEED scale: Grade 0  Right ear TEED scale: Grade 0  Post treatment heart and lung assessment: Post treatment heart and lung auscltation unremarkable. Patient cleared for discharge. Tolerated treatment well.              Vital Signs:  HBO Glucose Reference Range: 100-350 mg/dl   Pre-Treatment Post-Treatment   Time vitals are taken: 1000 Time vitals are taken: 1209   Blood Pressure: 124/66 Blood Pressure: 124/66   Pulse: 76 Pulse: 60   Resp: 16 Resp: 16   Temp: 95.8 °F (35.4 °C) Temp: 95.9 °F (35.5 °C)             Allergies   Allergen Reactions   • Dust Mite Extract Sneezing and Nasal Congestion     Tree, pollen, dust     Patient Active Problem List    Diagnosis Date Noted   • Osteomyelitis ([address]) 07/14/2023   • Oropharyngeal dysphagia 05/15/2023   • Coronary artery disease involving native coronary artery of native heart without angina pectoris 01/17/2023   • Status post insertion of drug eluting coronary artery stent 12/28/2022   • HOCM (hypertrophic obstructive cardiomyopathy) ([address]) 10/14/2022   • Nonrheumatic aortic valve stenosis 09/22/2022   • Throat cancer ([address]) 06/23/2022   • History of head and neck cancer 06/23/2022   • Acute pain of right wrist 06/15/2022   • Arthralgia of multiple joints 02/29/2016   • Bilateral hip pain 02/29/2016   • Chronic low back pain 02/29/2016   • Myalgia 69/94/5201   • Folliculitis 32/00/7710   • Esophagitis, reflux 10/10/2012   • Hyperlipidemia 10/10/2012   • Primary hypertension 05/10/2012   • Hypothyroidism 05/10/2012     No orders of the defined types were placed in this encounter. HBO Treatment Course Details       Treatment Notes: Patient tolerated treatment well     Treatment Course Number: 14  Total Treatments Ordered:  30     Diagnosis:   1. 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  [facility]          [facility] Treatment Details:  In-Patient Visit: No  Treatment Length:90 Minutes(Minutes)  Chamber #: Hard sided Monoplace Chamber    Pre-Treatment details:  Pre-treatment protocol Treatment Protocol: 2.5 FERDINAND X 90 minutes w/ 100% oxygen, two 5 minute air breaks  Left ear clear?: yes  Right ear clear?: yes  Left ear intact?: yes  Right ear intact?: yes        PE Tubes present, Left ear?: no  PE Tubes present, Right ear?: no        Left ear TEED scale: Grade 0  Right ear TEED scale: Grade 0   Pretreatment heart and lung assessment: Pretreatment heart and lung auscltation unremarkable. Patient cleared for HBOT     Treatment details:  FERDINAND Rate: 2.5  Started Compression: 1009  Reached Compression: 1019  Total Compression Time: 10 (Minutes)  Total Holding Time: 100 (Minutes)  Started Decompression: 1159  Reached Surface: 1209  Total Decompression Time: 10 (Minutes)  Total Airbreaks: 5 (Minutes)  Total Time of Treatment: 115 (Minutes)  Symptoms Noted During Treatment: None (Minutes)    Post treatment details:  Left ear clear?: yes  Right ear clear?: yes  Left ears intact?: yes  Right ears intact?: yes        PE Tubes present, Left ear?: no  PE Tubes present, Right ear?: no        Left ear TEED scale: Grade 0  Right ear TEED scale: Grade 0  Post treatment heart and lung assessment: Post treatment heart and lung auscltation unremarkable. Patient cleared for discharge. Tolerated treatment well.              Vital Signs:  HBO Glucose Reference Range: 100-350 mg/dl   Pre-Treatment Post-Treatment   Time vitals are taken: 1000 Time vitals are taken: 1209   Blood Pressure: 124/66 Blood Pressure: 124/66   Pulse: 76 Pulse: 60   Resp: 16 Resp: 16   Temp: 95.8 °F (35.4 °C) Temp: 95.9 °F (35.5 °C)             Allergies   Allergen Reactions   • Dust Mite Extract Sneezing and Nasal Congestion     Tree, pollen, dust     Patient Active Problem List    Diagnosis Date Noted   • Osteomyelitis ([address]) 07/14/2023   • Oropharyngeal dysphagia 05/15/2023   • Coronary artery disease involving native coronary artery of native heart without angina pectoris 01/17/2023   • Status post insertion of drug eluting coronary artery stent 12/28/2022   • HOCM (hypertrophic obstructive cardiomyopathy) ([address]) 10/14/2022   • Nonrheumatic aortic valve stenosis 09/22/2022   • Throat cancer ([address]) 06/23/2022   • History of head and neck cancer 06/23/2022   • Acute pain of right wrist 06/15/2022   • Arthralgia of multiple joints 02/29/2016   • Bilateral hip pain 02/29/2016   • Chronic low back pain 02/29/2016   • Myalgia 98/18/1806   • Folliculitis 94/32/5549   • Esophagitis, reflux 10/10/2012   • Hyperlipidemia 10/10/2012   • Primary hypertension 05/10/2012   • Hypothyroidism 05/10/2012     No orders of the defined types were placed in this encounter.

## 2023-09-20 NOTE — PROGRESS NOTES
HBO Treatment Course Details       Treatment Notes: Patient tolerated treatment well     Treatment Course Number: 14  Total Treatments Ordered:  30     Diagnosis:   1. 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  [facility]          [facility] Details:  In-Patient Visit: No  Treatment Length:90 Minutes(Minutes)  Chamber #: Hard sided Monoplace Chamber    Pre-Treatment details:  Pre-treatment protocol Treatment Protocol: 2.5 FERDINAND X 90 minutes w/ 100% oxygen, two 5 minute air breaks  Left ear clear?: yes  Right ear clear?: yes  Left ear intact?: yes  Right ear intact?: yes        PE Tubes present, Left ear?: no  PE Tubes present, Right ear?: no        Left ear TEED scale: Grade 0  Right ear TEED scale: Grade 0   Pretreatment heart and lung assessment: Pretreatment heart and lung auscltation unremarkable. Patient cleared for HBOT     Treatment details:  FERDINAND Rate: 2.5  Started Compression: 1009  Reached Compression: 1019  Total Compression Time: 10 (Minutes)  Total Holding Time: 100 (Minutes)  Started Decompression: 1159  Reached Surface: 1209  Total Decompression Time: 10 (Minutes)  Total Airbreaks: 5 (Minutes)  Total Time of Treatment: 115 (Minutes)  Symptoms Noted During Treatment: None (Minutes)    Post treatment details:  Left ear clear?: yes  Right ear clear?: yes  Left ears intact?: yes  Right ears intact?: yes        PE Tubes present, Left ear?: no  PE Tubes present, Right ear?: no        Left ear TEED scale: Grade 0  Right ear TEED scale: Grade 0  Post treatment heart and lung assessment: Post treatment heart and lung auscltation unremarkable. Patient cleared for discharge. Tolerated treatment well.              Vital Signs:  [facility] [place] Glucose Reference Range: 100-350 mg/dl   Pre-Treatment Post-Treatment   Time vitals are taken: 1000 Time vitals are taken: 1209   Blood Pressure: 124/66 Blood Pressure: 124/66   Pulse: 76 Pulse: 60   Resp: 16 Resp: 16   Temp: 95.8 °F (35.4 °C) Temp: 95.9 °F (35.5 °C) Allergies   Allergen Reactions   • Dust Mite Extract Sneezing and Nasal Congestion     Tree, pollen, dust     Patient Active Problem List    Diagnosis Date Noted   • Osteomyelitis ([address]) 07/14/2023   • Oropharyngeal dysphagia 05/15/2023   • Coronary artery disease involving native coronary artery of native heart without angina pectoris 01/17/2023   • Status post insertion of drug eluting coronary artery stent 12/28/2022   • HOCM (hypertrophic obstructive cardiomyopathy) ([address]) 10/14/2022   • Nonrheumatic aortic valve stenosis 09/22/2022   • Throat cancer ([address]) 06/23/2022   • History of head and neck cancer 06/23/2022   • Acute pain of right wrist 06/15/2022   • Arthralgia of multiple joints 02/29/2016   • Bilateral hip pain 02/29/2016   • Chronic low back pain 02/29/2016   • Myalgia 64/25/5011   • Folliculitis 42/97/4794   • Esophagitis, reflux 10/10/2012   • Hyperlipidemia 10/10/2012   • Primary hypertension 05/10/2012   • Hypothyroidism 05/10/2012     No orders of the defined types were placed in this encounter.

## 2023-09-21 ENCOUNTER — OFFICE VISIT (OUTPATIENT)
Dept: WOUND CARE | Facility: HOSPITAL | Age: 68
End: 2023-09-21
Payer: COMMERCIAL
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
## 2023-09-21 DIAGNOSIS — M27.2 OSTEORADIONECROSIS OF JAW: ICD-10-CM

## 2023-09-21 PROCEDURE — G0277 HBOT, FULL BODY CHAMBER, 30M: HCPCS | Performed by: FAMILY MEDICINE

## 2023-09-21 PROCEDURE — 99183 HYPERBARIC OXYGEN THERAPY: CPT | Performed by: FAMILY MEDICINE

## 2023-09-21 NOTE — PROGRESS NOTES
HBO Treatment Course Details       Treatment Notes: Patient tolerated HBO tx well. Patient had no complaints prior to or post HBO tx. Treatment Course Number: 15  Total Treatments Ordered:  30     Diagnosis:   1. 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  [facility]          HBO Treatment Details:  In-Patient Visit: no  Treatment Length:90 Minutes(Minutes)  Chamber #: Hard sided Monoplace Chamber    Pre-Treatment details:  Pre-treatment protocol Treatment Protocol: 2.5 FERDINAND X 90 minutes w/ 100% oxygen, two 5 minute air breaks  Left ear clear?: yes  Right ear clear?: yes  Left ear intact?: yes  Right ear intact?: yes                    Left ear TEED scale: Grade 0  Right ear TEED scale: Grade 0   Pretreatment heart and lung assessment: Pretreatment heart and lung auscltation unremarkable. Patient cleared for HBOT     Treatment details:  FERDINAND Rate: 2.5  Started Compression: 1001  Reached Compression: 1011  Total Compression Time: 10 (Minutes)  Total Holding Time: 100 (Minutes)  Started Decompression: 1151  Reached Surface: 1201  Total Decompression Time: 10 (Minutes)  Total Airbreaks: 5 (Minutes)  Total Time of Treatment: 115 (Minutes)  Symptoms Noted During Treatment: None (Minutes)    Post treatment details:  Left ear clear?: yes  Right ear clear?: yes  Left ears intact?: yes  Right ears intact?: yes                    Left ear TEED scale: Grade 0  Right ear TEED scale: Grade 0  Post treatment heart and lung assessment: Post treatment heart and lung auscltation unremarkable. Patient cleared for discharge. Tolerated treatment well.              Vital Signs:  [facility] Glucose Reference Range: 100-350 mg/dl   Pre-Treatment Post-Treatment   Time vitals are taken: 0955 Time vitals are taken: 1201   Blood Pressure: 124/84 Blood Pressure: 126/84   Pulse: 76 Pulse: 68   Resp: 16 Resp: 16   Temp: 93.9 °F (34.4 °C) Temp: 95.3 °F (35.2 °C)             Allergies   Allergen Reactions   • Dust Mite Extract Sneezing and Nasal Congestion     Tree, pollen, dust     Patient Active Problem List    Diagnosis Date Noted   • Osteomyelitis ([address]) 07/14/2023   • Oropharyngeal dysphagia 05/15/2023   • Coronary artery disease involving native coronary artery of native heart without angina pectoris 01/17/2023   • Status post insertion of drug eluting coronary artery stent 12/28/2022   • HOCM (hypertrophic obstructive cardiomyopathy) ([address]) 10/14/2022   • Nonrheumatic aortic valve stenosis 09/22/2022   • Throat cancer ([address]) 06/23/2022   • History of head and neck cancer 06/23/2022   • Acute pain of right wrist 06/15/2022   • Arthralgia of multiple joints 02/29/2016   • Bilateral hip pain 02/29/2016   • Chronic low back pain 02/29/2016   • Myalgia 58/01/0937   • Folliculitis 44/00/0617   • Esophagitis, reflux 10/10/2012   • Hyperlipidemia 10/10/2012   • Primary hypertension 05/10/2012   • Hypothyroidism 05/10/2012     No orders of the defined types were placed in this encounter.

## 2023-09-22 ENCOUNTER — OFFICE VISIT (OUTPATIENT)
Dept: WOUND CARE | Facility: HOSPITAL | Age: 68
End: 2023-09-22
Payer: COMMERCIAL
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
## 2023-09-22 DIAGNOSIS — M27.2 OSTEORADIONECROSIS OF JAW: ICD-10-CM

## 2023-09-22 PROCEDURE — G0277 HBOT, FULL BODY CHAMBER, 30M: HCPCS | Performed by: NURSE PRACTITIONER

## 2023-09-22 PROCEDURE — 99183 HYPERBARIC OXYGEN THERAPY: CPT | Performed by: NURSE PRACTITIONER

## 2023-09-22 NOTE — PROGRESS NOTES
HBO Treatment Course Details       Treatment Notes: Patient tolerated HBO tx well. Patient had no complaints prior to or post HBO tx. Treatment Course Number: 16  Total Treatments Ordered:  40     Diagnosis:   1. 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  Hyperbaric oxygen thearpy          HBO Treatment Details:  In-Patient Visit: no  Treatment Length:90 Minutes(Minutes)  Chamber #: Hard sided Monoplace Chamber    Pre-Treatment details:  Pre-treatment protocol Treatment Protocol: 2.5 FERDINAND X 90 minutes w/ 100% oxygen, two 5 minute air breaks  Left ear clear?: yes  Right ear clear?: yes  Left ear intact?: yes  Right ear intact?: yes        PE Tubes present, Left ear?: no  PE Tubes present, Right ear?: no        Left ear TEED scale: Grade 0  Right ear TEED scale: Grade 0   Pretreatment heart and lung assessment: Pretreatment heart and lung auscltation unremarkable.  Patient cleared for HBOT     Treatment details:  FERDINAND Rate: 2.5  Started Compression: 1004  Reached Compression: 1014  Total Compression Time: 10 (Minutes)  Total Holding Time: 100 (Minutes)  Started Decompression: 1154  Reached Surface: 1823  Total Decompression Time: 10 (Minutes)  Total Airbreaks: 5 (Minutes)  Total Time of Treatment: 115 (Minutes)  Symptoms Noted During Treatment: None (Minutes)    Post treatment details:                                                    Vital Signs:  HBO Glucose Reference Range: 100-350 mg/dl   Pre-Treatment Post-Treatment   Time vitals are taken: 0955 Time vitals are taken: 1210   Blood Pressure: 112/80 Blood Pressure: 124/70   Pulse: 60 Pulse: 64   Resp: 16 Resp: 16   Temp: 94.4 °F (34.7 °C) Temp: 93.9 °F (34.4 °C)             Allergies   Allergen Reactions   • Dust Mite Extract Sneezing and Nasal Congestion     Tree, pollen, dust     Patient Active Problem List    Diagnosis Date Noted   • Osteomyelitis ([address]) 07/14/2023   • Oropharyngeal dysphagia 05/15/2023   • Coronary artery disease involving native coronary artery of native heart without angina pectoris 01/17/2023   • Status post insertion of drug eluting coronary artery stent 12/28/2022   • HOCM (hypertrophic obstructive cardiomyopathy) ([address]) 10/14/2022   • Nonrheumatic aortic valve stenosis 09/22/2022   • Throat cancer ([address]) 06/23/2022   • History of head and neck cancer 06/23/2022   • Acute pain of right wrist 06/15/2022   • Arthralgia of multiple joints 02/29/2016   • Bilateral hip pain 02/29/2016   • Chronic low back pain 02/29/2016   • Myalgia 77/71/3644   • Folliculitis 70/68/6809   • Esophagitis, reflux 10/10/2012   • Hyperlipidemia 10/10/2012   • Primary hypertension 05/10/2012   • Hypothyroidism 05/10/2012     No orders of the defined types were placed in this encounter.

## 2023-09-22 NOTE — PROGRESS NOTES
HBO Treatment Course Details       Treatment Notes: Patient tolerating treatment well     Treatment Course Number: 16  Total Treatments Ordered:  40     Diagnosis:   1. 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  [facility]          HBO Treatment Details:  In-Patient Visit: No  Treatment Length:90 Minutes(Minutes)  Chamber #: Hard sided Monoplace Chamber    Pre-Treatment details:  Pre-treatment protocol Treatment Protocol: 2.5 FERDINAND X 90 minutes w/ 100% oxygen, two 5 minute air breaks  Left ear clear?: yes  Right ear clear?: yes  Left ear intact?: yes  Right ear intact?: yes        PE Tubes present, Left ear?: no  PE Tubes present, Right ear?: no        Left ear TEED scale: Grade 0  Right ear TEED scale: Grade 0   Pretreatment heart and lung assessment: Pretreatment heart and lung auscltation unremarkable. Patient cleared for HBOT     Treatment details:  FERDINAND Rate: 2.5  Started Compression: 1004  Reached Compression: 1014  Total Compression Time: 10 (Minutes)  Total Holding Time: 100 (Minutes)  Started Decompression: 1154  Reached Surface: 4859  Total Decompression Time: 10 (Minutes)  Total Airbreaks: 5 (Minutes)  Total Time of Treatment: 115 (Minutes)  Symptoms Noted During Treatment: None (Minutes)    Post treatment details:  Left ear clear?: yes  Right ear clear?: yes  Left ears intact?: yes  Right ears intact?: yes        PE Tubes present, Left ear?: no  PE Tubes present, Right ear?: no        Left ear TEED scale: Grade 0  Right ear TEED scale: Grade 0  Post treatment heart and lung assessment: Post treatment heart and lung auscltation unremarkable. Patient cleared for discharge. Tolerated treatment well.              Vital Signs:  [facility] Glucose Reference Range: 100-350 mg/dl   Pre-Treatment Post-Treatment   Time vitals are taken: 0955 Time vitals are taken: 1210   Blood Pressure: 112/80 Blood Pressure: 124/70   Pulse: 60 Pulse: 64   Resp: 16 Resp: 16   Temp: 94.4 °F (34.7 °C) Temp: 93.9 °F (34.4 °C) Allergies   Allergen Reactions   • Dust Mite Extract Sneezing and Nasal Congestion     Tree, pollen, dust     Patient Active Problem List    Diagnosis Date Noted   • Osteomyelitis ([address]) 07/14/2023   • Oropharyngeal dysphagia 05/15/2023   • Coronary artery disease involving native coronary artery of native heart without angina pectoris 01/17/2023   • Status post insertion of drug eluting coronary artery stent 12/28/2022   • HOCM (hypertrophic obstructive cardiomyopathy) ([address]) 10/14/2022   • Nonrheumatic aortic valve stenosis 09/22/2022   • Throat cancer ([address]) 06/23/2022   • History of head and neck cancer 06/23/2022   • Acute pain of right wrist 06/15/2022   • Arthralgia of multiple joints 02/29/2016   • Bilateral hip pain 02/29/2016   • Chronic low back pain 02/29/2016   • Myalgia 74/85/6539   • Folliculitis 35/18/2595   • Esophagitis, reflux 10/10/2012   • Hyperlipidemia 10/10/2012   • Primary hypertension 05/10/2012   • Hypothyroidism 05/10/2012     No orders of the defined types were placed in this encounter.

## 2023-09-25 ENCOUNTER — OFFICE VISIT (OUTPATIENT)
Dept: WOUND CARE | Facility: HOSPITAL | Age: 68
End: 2023-09-25
Payer: COMMERCIAL
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
## 2023-09-25 DIAGNOSIS — M27.2 OSTEORADIONECROSIS OF JAW: ICD-10-CM

## 2023-09-25 PROCEDURE — 99183 HYPERBARIC OXYGEN THERAPY: CPT | Performed by: SURGERY

## 2023-09-25 PROCEDURE — G0277 HBOT, FULL BODY CHAMBER, 30M: HCPCS | Performed by: SURGERY

## 2023-09-25 NOTE — PROGRESS NOTES
HBO Treatment Course Details       Treatment Notes: Patient tolerated HBO tx well. Patient had no complaints prior to or post HBO tx. Treatment Course Number: 17  Total Treatments Ordered:  40     Diagnosis:   1.  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  Hyperbaric oxygen thearpy    Hyperbaric oxygen thearpy          HBO Treatment Details:  In-Patient Visit: no  Treatment Length:90 Minutes(Minutes)  Chamber #: Hard sided Monoplace Chamber    Pre-Treatment details:  Pre-treatment protocol Treatment Protocol: 2.5 FERDINAND X 90 minutes w/ 100% oxygen, two 5 minute air breaks      TM's clear  Heart regular  Lungs clear                                       Treatment details:  FERDINAND Rate: 2.5  Started Compression: 1000  Reached Compression: 1010  Total Compression Time: 10 (Minutes)  Total Holding Time: 100 (Minutes)  Started Decompression: 1150  Reached Surface: 1200  Total Decompression Time: 10 (Minutes)  Total Airbreaks: 5 (Minutes)  Total Time of Treatment: 115 (Minutes)  Symptoms Noted During Treatment: None (Minutes)    Post treatment details:            TM's clear  Lungs clear                                        Vital Signs:  HBO Glucose Reference Range: 100-350 mg/dl   Pre-Treatment Post-Treatment   Time vitals are taken: 0955 Time vitals are taken: 1200   Blood Pressure: 108/64 Blood Pressure: 122/70   Pulse: 76 Pulse: 56   Resp: 16 Resp: 16   Temp: 94.7 °F (34.8 °C) Temp: 94 °F (34.4 °C)             Allergies   Allergen Reactions   • Dust Mite Extract Sneezing and Nasal Congestion     Tree, pollen, dust     Patient Active Problem List    Diagnosis Date Noted   • Osteomyelitis ([address]) 07/14/2023   • Oropharyngeal dysphagia 05/15/2023   • Coronary artery disease involving native coronary artery of native heart without angina pectoris 01/17/2023   • Status post insertion of drug eluting coronary artery stent 12/28/2022   • HOCM (hypertrophic obstructive cardiomyopathy) ([address]) 10/14/2022   • Nonrheumatic aortic valve stenosis 09/22/2022   • Throat cancer ([address]) 06/23/2022   • History of head and neck cancer 06/23/2022   • Acute pain of right wrist 06/15/2022   • Arthralgia of multiple joints 02/29/2016   • Bilateral hip pain 02/29/2016   • Chronic low back pain 02/29/2016   • Myalgia 04/56/2302   • Folliculitis 84/06/0050   • Esophagitis, reflux 10/10/2012   • Hyperlipidemia 10/10/2012   • Primary hypertension 05/10/2012   • Hypothyroidism 05/10/2012     No orders of the defined types were placed in this encounter.

## 2023-09-26 ENCOUNTER — OFFICE VISIT (OUTPATIENT)
Dept: WOUND CARE | Facility: HOSPITAL | Age: 68
End: 2023-09-26
Payer: COMMERCIAL
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
## 2023-09-26 DIAGNOSIS — M27.2 OSTEORADIONECROSIS OF JAW: ICD-10-CM

## 2023-09-26 PROCEDURE — 99183 HYPERBARIC OXYGEN THERAPY: CPT | Performed by: FAMILY MEDICINE

## 2023-09-26 PROCEDURE — G0277 HBOT, FULL BODY CHAMBER, 30M: HCPCS | Performed by: FAMILY MEDICINE

## 2023-09-26 NOTE — PROGRESS NOTES
HBO Treatment Course Details       Treatment Notes: Patient tolerated HBO tx well. Patient had no complaints prior to or post HBO tx. Treatment Course Number: 18  Total Treatments Ordered:  30     Diagnosis:   1. 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  [place]          HBO Treatment Details:  In-Patient Visit: no  Treatment Length:90 Minutes(Minutes)  Chamber #: Hard sided Monoplace Chamber    Pre-Treatment details:  Pre-treatment protocol Treatment Protocol: 2.5 FERDINAND X 90 minutes w/ 100% oxygen, two 5 minute air breaks  Left ear clear?: yes  Right ear clear?: yes  Left ear intact?: yes  Right ear intact?: yes                    Left ear TEED scale: Grade 0  Right ear TEED scale: Grade 0   Pretreatment heart and lung assessment: Pretreatment heart and lung auscltation unremarkable. Patient cleared for HBOT     Treatment details:  FERDINAND Rate: 2.5  Started Compression: 1009  Reached Compression: 1019  Total Compression Time: 10 (Minutes)  Total Holding Time: 100 (Minutes)  Started Decompression: 1159  Reached Surface: 1209  Total Decompression Time: 10 (Minutes)  Total Airbreaks: 5 (Minutes)  Total Time of Treatment: 115 (Minutes)  Symptoms Noted During Treatment: None (Minutes)    Post treatment details:  Left ear clear?: yes  Right ear clear?: yes  Left ears intact?: yes  Right ears intact?: yes                    Left ear TEED scale: Grade 0  Right ear TEED scale: Grade 0  Post treatment heart and lung assessment: Post treatment heart and lung auscltation unremarkable. Patient cleared for discharge. Tolerated treatment well.              Vital Signs:  [facility] Glucose Reference Range: 100-350 mg/dl   Pre-Treatment Post-Treatment   Time vitals are taken: 1005 Time vitals are taken: 1209   Blood Pressure: 114/66 Blood Pressure: 128/82   Pulse: 68 Pulse: 64   Resp: 16 Resp: 16   Temp: 95 °F (35 °C) Temp: 95.4 °F (35.2 °C)             Allergies   Allergen Reactions   • Dust Mite Extract Sneezing and Nasal Congestion Tree, pollen, dust     Patient Active Problem List    Diagnosis Date Noted   • Osteomyelitis ([address]) 07/14/2023   • Oropharyngeal dysphagia 05/15/2023   • Coronary artery disease involving native coronary artery of native heart without angina pectoris 01/17/2023   • Status post insertion of drug eluting coronary artery stent 12/28/2022   • HOCM (hypertrophic obstructive cardiomyopathy) ([address]) 10/14/2022   • Nonrheumatic aortic valve stenosis 09/22/2022   • Throat cancer ([address]) 06/23/2022   • History of head and neck cancer 06/23/2022   • Acute pain of right wrist 06/15/2022   • Arthralgia of multiple joints 02/29/2016   • Bilateral hip pain 02/29/2016   • Chronic low back pain 02/29/2016   • Myalgia 01/48/0738   • Folliculitis 00/37/7667   • Esophagitis, reflux 10/10/2012   • Hyperlipidemia 10/10/2012   • Primary hypertension 05/10/2012   • Hypothyroidism 05/10/2012     No orders of the defined types were placed in this encounter.

## 2023-09-27 ENCOUNTER — OFFICE VISIT (OUTPATIENT)
Dept: WOUND CARE | Facility: HOSPITAL | Age: 68
End: 2023-09-27
Payer: COMMERCIAL

## 2023-09-27 DIAGNOSIS — M27.2 OSTEORADIONECROSIS OF JAW: ICD-10-CM
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
## 2023-09-27 PROCEDURE — G0277 HBOT, FULL BODY CHAMBER, 30M: HCPCS | Performed by: FAMILY MEDICINE

## 2023-09-27 PROCEDURE — 99183 HYPERBARIC OXYGEN THERAPY: CPT | Performed by: FAMILY MEDICINE

## 2023-09-27 NOTE — PROGRESS NOTES
HBO Treatment Course Details       Treatment Notes: Patient tolerated HBO tx well. Patient had no complaints prior to or post HBO tx. Treatment Course Number: 19  Total Treatments Ordered:  30     Diagnosis:   1. 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  [place]ic oxygen [place]          HBO Treatment Details:  In-Patient Visit: NO  Treateatment Length:90 Minutes(Minutes)  Chamber #: Hard sided Monoplace Chamber    Pre-Treatment details:  Pre-treatment protocol Treatment Protocol: 2.5 FERDINAND X 90 minutes w/ 100% oxygen, two 5 minute air breaks  Left ear clear?: yes  Right ear clear?: yes  Left ear intact?: yes  Right ear intact?: yes                    Left ear TEED scale: Grade 0  Right ear TEED scale: Grade 0   Pretreatment heart and lung assessment: Pretreatment heart and lung auscltation unremarkable. Patient cleared for HBOT     Treatment details:  FERDINAND Rate: 2.5  Started Compression: 1006  Reached Compression: 1016  Total Compression Time: 10 (Minutes)  Total Holding Time: 100 (Minutes)  Started Decompression: 1156  Reached Surface: 7323  Total Decompression Time: 10 (Minutes)  Total Airbreaks: 5 (Minutes)  Total Time of Treatment: 115 (Minutes)  Symptoms Noted During Treatment: None (Minutes)    Post treatment details:  Left ear clear?: yes  Right ear clear?: yes  Left ears intact?: yes  Right ears intact?: yes                    Left ear TEED scale: Grade 0  Right ear TEED scale: Grade 0  Post treatment heart and lung assessment: Post treatment heart and lung auscltation unremarkable. Patient cleared for discharge. Tolerated treatment well.              Vital Signs:  [facility] Glucose Reference Range: 100-350 mg/dl   Pre-Treatment Post-Treatment   Time vitals are taken: 0945 Time vitals are taken: 1206   Blood Pressure: 116/74 Blood Pressure: 124/68   Pulse: 68 Pulse: 64   Resp: 16 Resp: 16   Temp: 94.3 °F (34.6 °C) Temp: 95.6 °F (35.3 °C)             Allergies   Allergen Reactions   • Dust Mite Extract Sneezing and Nasal Congestion     Tree, pollen, dust     Patient Active Problem List    Diagnosis Date Noted   • Osteomyelitis ([address]) 07/14/2023   • Oropharyngeal dysphagia 05/15/2023   • Coronary artery disease involving native coronary artery of native heart without angina pectoris 01/17/2023   • Status post insertion of drug eluting coronary artery stent 12/28/2022   • HOCM (hypertrophic obstructive cardiomyopathy) ([address]) 10/14/2022   • Nonrheumatic aortic valve stenosis 09/22/2022   • Throat cancer ([address]) 06/23/2022   • History of head and neck cancer 06/23/2022   • Acute pain of right wrist 06/15/2022   • Arthralgia of multiple joints 02/29/2016   • Bilateral hip pain 02/29/2016   • Chronic low back pain 02/29/2016   • Myalgia 40/41/9829   • Folliculitis 13/23/2337   • Esophagitis, reflux 10/10/2012   • Hyperlipidemia 10/10/2012   • Primary hypertension 05/10/2012   • Hypothyroidism 05/10/2012     No orders of the defined types were placed in this encounter.

## 2023-09-28 ENCOUNTER — APPOINTMENT (OUTPATIENT)
Dept: LAB | Facility: CLINIC | Age: 68
End: 2023-09-28
Payer: COMMERCIAL

## 2023-09-28 ENCOUNTER — OFFICE VISIT (OUTPATIENT)
Dept: WOUND CARE | Facility: HOSPITAL | Age: 68
End: 2023-09-28
Payer: COMMERCIAL

## 2023-09-28 DIAGNOSIS — E03.9 ACQUIRED HYPOTHYROIDISM: ICD-10-CM

## 2023-09-28 DIAGNOSIS — M27.2 OSTEORADIONECROSIS OF JAW: ICD-10-CM

## 2023-09-28 DIAGNOSIS — M86.9 OSTEOMYELITIS (HCC): ICD-10-CM
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
## 2023-09-28 LAB
BASOPHILS # BLD AUTO: 0.05 THOUSANDS/ÂΜL (ref 0–0.1)
BASOPHILS NFR BLD AUTO: 1 % (ref 0–1)
EOSINOPHIL # BLD AUTO: 0.12 THOUSAND/ÂΜL (ref 0–0.61)
EOSINOPHIL NFR BLD AUTO: 2 % (ref 0–6)
ERYTHROCYTE [DISTWIDTH] IN BLOOD BY AUTOMATED COUNT: 14.8 % (ref 11.6–15.1)
HCT VFR BLD AUTO: 40.9 % (ref 36.5–49.3)
HGB BLD-MCNC: 12.9 G/DL (ref 12–17)
IMM GRANULOCYTES # BLD AUTO: 0.02 THOUSAND/UL (ref 0–0.2)
IMM GRANULOCYTES NFR BLD AUTO: 0 % (ref 0–2)
LYMPHOCYTES # BLD AUTO: 1.19 THOUSANDS/ÂΜL (ref 0.6–4.47)
LYMPHOCYTES NFR BLD AUTO: 16 % (ref 14–44)
MCH RBC QN AUTO: 29.8 PG (ref 26.8–34.3)
MCHC RBC AUTO-ENTMCNC: 31.5 G/DL (ref 31.4–37.4)
MCV RBC AUTO: 95 FL (ref 82–98)
MONOCYTES # BLD AUTO: 0.54 THOUSAND/ÂΜL (ref 0.17–1.22)
MONOCYTES NFR BLD AUTO: 7 % (ref 4–12)
NEUTROPHILS # BLD AUTO: 5.43 THOUSANDS/ÂΜL (ref 1.85–7.62)
NEUTS SEG NFR BLD AUTO: 74 % (ref 43–75)
NRBC BLD AUTO-RTO: 0 /100 WBCS
PLATELET # BLD AUTO: 234 THOUSANDS/UL (ref 149–390)
PMV BLD AUTO: 10.4 FL (ref 8.9–12.7)
RBC # BLD AUTO: 4.33 MILLION/UL (ref 3.88–5.62)
T4 FREE SERPL-MCNC: 0.9 NG/DL (ref 0.61–1.12)
TSH SERPL DL<=0.05 MIU/L-ACNC: 7.42 UIU/ML (ref 0.45–4.5)
WBC # BLD AUTO: 7.35 THOUSAND/UL (ref 4.31–10.16)

## 2023-09-28 PROCEDURE — 85025 COMPLETE CBC W/AUTO DIFF WBC: CPT

## 2023-09-28 PROCEDURE — G0277 HBOT, FULL BODY CHAMBER, 30M: HCPCS | Performed by: FAMILY MEDICINE

## 2023-09-28 PROCEDURE — 36415 COLL VENOUS BLD VENIPUNCTURE: CPT

## 2023-09-28 PROCEDURE — 84443 ASSAY THYROID STIM HORMONE: CPT

## 2023-09-28 PROCEDURE — 84439 ASSAY OF FREE THYROXINE: CPT

## 2023-09-28 PROCEDURE — 99183 HYPERBARIC OXYGEN THERAPY: CPT | Performed by: FAMILY MEDICINE

## 2023-09-28 NOTE — PROGRESS NOTES
HBO Treatment Course Details       Treatment Notes: Patient tolerated HBO tx well. Patient had no complaints prior to or post HBO tx. Patient has a diagnosis of osteoradionecrosis of the jaw for which hyperbaric oxygen therapy has been ordered. At the present time the patient has received 20 treatments. At the outset of hyperbaric therapy, the treatment goal was to improve blood flow to the irradiated site. Patient is scheduled for tooth extraction on 10/3/2023. It is my recommendation that an additional 10 treatments to be given. The ultimate goal of HBO therapy is to maximize blood flow to the site of previous radiation, maximizing healing potential postop and minimizing the risk of complications. Treatment Course Number: 20  Total Treatments Ordered:  30     Diagnosis:   1. 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  [facility]          H[facility] Treatment Details:  In-Patient Visit: no  Treatment Length:90 Minutes(Minutes)  Chamber #: Hard sided Monoplace Chamber    Pre-Treatment details:  Pre-treatment protocol Treatment Protocol: 2.5 FERDINAND X 90 minutes w/ 100% oxygen, two 5 minute air breaks  Left ear clear?: yes  Right ear clear?: yes  Left ear intact?: yes  Right ear intact?: yes                    Left ear TEED scale: Grade 0  Right ear TEED scale: Grade 0   Pretreatment heart and lung assessment: Pretreatment heart and lung auscltation unremarkable.  Patient cleared for HBOT     Treatment details:  FERDINAND Rate: 2.5  Started Compression: 1311  Reached Compression: 1321  Total Compression Time: 10 (Minutes)  Total Holding Time: 100 (Minutes)  Started Decompression: 1501  Reached Surface: 1511  Total Decompression Time: 10 (Minutes)  Total Airbreaks: 2 (Minutes)  Total Time of Treatment: 118 (Minutes)  Symptoms Noted During Treatment: None (Minutes)    Post treatment details:  Left ear clear?: yes  Right ear clear?: yes  Left ears intact?: yes  Right ears intact?: yes                    Left ear TEED scale: Grade 0  Right ear TEED scale: Grade 0  Post treatment heart and lung assessment: Post treatment heart and lung auscltation unremarkable. Patient cleared for discharge. Tolerated treatment well. Vital Signs:  HBO Glucose Reference Range: 100-350 mg/dl   Pre-Treatment Post-Treatment   Time vitals are taken: 1305 Time vitals are taken: 1511   Blood Pressure: 122/82 Blood Pressure: 124/74   Pulse: 64 Pulse: 64   Resp: 16 Resp: 16   Temp: 94.3 °F (34.6 °C) Temp: 94.5 °F (34.7 °C)             Allergies   Allergen Reactions   • Dust Mite Extract Sneezing and Nasal Congestion     Tree, pollen, dust     Patient Active Problem List    Diagnosis Date Noted   • Osteomyelitis ([address]) 07/14/2023   • Oropharyngeal dysphagia 05/15/2023   • Coronary artery disease involving native coronary artery of native heart without angina pectoris 01/17/2023   • Status post insertion of drug eluting coronary artery stent 12/28/2022   • HOCM (hypertrophic obstructive cardiomyopathy) ([address]) 10/14/2022   • Nonrheumatic aortic valve stenosis 09/22/2022   • Throat cancer ([address]) 06/23/2022   • History of head and neck cancer 06/23/2022   • Acute pain of right wrist 06/15/2022   • Arthralgia of multiple joints 02/29/2016   • Bilateral hip pain 02/29/2016   • Chronic low back pain 02/29/2016   • Myalgia 98/39/1549   • Folliculitis 00/29/0511   • Esophagitis, reflux 10/10/2012   • Hyperlipidemia 10/10/2012   • Primary hypertension 05/10/2012   • Hypothyroidism 05/10/2012     No orders of the defined types were placed in this encounter.

## 2023-09-29 DIAGNOSIS — E03.9 ACQUIRED HYPOTHYROIDISM: ICD-10-CM

## 2023-09-29 RX ORDER — LEVOTHYROXINE SODIUM 0.15 MG/1
150 TABLET ORAL DAILY
Qty: 90 TABLET | Refills: 1 | Status: SHIPPED | OUTPATIENT
Start: 2023-09-29

## 2023-10-03 NOTE — PRE-PROCEDURE INSTRUCTIONS
Pre-Surgery Instructions:   Medication Instructions   • amoxicillin (AMOXIL) 500 MG tablet Take day of surgery. • aspirin (ECOTRIN LOW STRENGTH) 81 mg EC tablet Take day of surgery. • bisoprolol (ZEBETA) 5 mg tablet Take day of surgery. • chlorhexidine (PERIDEX) 0.12 % solution Hold day of surgery. • clopidogrel (PLAVIX) 75 mg tablet Take day of surgery. • famotidine (PEPCID) 20 mg tablet Take day of surgery. • fluticasone (Flonase Sensimist) 27.5 MCG/SPRAY nasal spray Uses PRN- OK to take day of surgery   • levothyroxine (Euthyrox) 150 mcg tablet Take day of surgery. • rosuvastatin (CRESTOR) 40 MG tablet Take night before surgery    Medication instructions for day surgery reviewed. Please use only a sip of water to take your instructed medications. Avoid all over the counter vitamins, supplements and NSAIDS for one week prior to surgery per anesthesia guidelines. Tylenol is ok to take as needed. You will receive a call one business day prior to surgery with an arrival time and hospital directions. If your surgery is scheduled on a Monday, the hospital will be calling you on the Friday prior to your surgery. If you have not heard from anyone by 8pm, please call the hospital supervisor through the hospital  at [phone]. Prakash Lindsay [phone]). Do not eat or drink anything after midnight the night before your surgery, including candy, mints, lifesavers, or chewing gum. Do not drink alcohol 24hrs before your surgery. Try not to smoke at least 24hrs before your surgery. Follow the pre surgery showering instructions as listed in the [facility] Surgical Experience Booklet” or otherwise provided by your surgeon's office. Do not shave the surgical area 24 hours before surgery. Do not apply any lotions, creams, including makeup, cologne, deodorant, or perfumes after showering on the day of your surgery. No contact lenses, eye make-up, or artificial eyelashes.  Remove nail polish, including gel polish, and any artificial, gel, or acrylic nails if possible. Remove all jewelry including rings and body piercing jewelry. Wear causal clothing that is easy to take on and off. Consider your type of surgery. Keep any valuables, jewelry, piercings at home. Please bring any specially ordered equipment (sling, braces) if indicated. Arrange for a responsible person to drive you to and from the hospital on the day of your surgery. Visitor Guidelines discussed. Call the surgeon's office with any new illnesses, exposures, or additional questions prior to surgery. Please reference your [facility] Surgical Experience Booklet” for additional information to prepare for your upcoming surgery. Per CC note in letters pt is to continue plavix and aspirin prior to surgery. Pt instructed to stop vitamins, motrin, aleve and advil prior to surgery. Pt verbalized understanding of shower and med instructions.

## 2023-10-05 ENCOUNTER — HOSPITAL ENCOUNTER (OUTPATIENT)
Facility: HOSPITAL | Age: 68
Setting detail: OUTPATIENT SURGERY
Discharge: HOME | End: 2023-10-05
Attending: DENTIST | Admitting: DENTIST
Payer: COMMERCIAL

## 2023-10-05 ENCOUNTER — ANESTHESIA (OUTPATIENT)
Dept: PERIOP | Facility: HOSPITAL | Age: 68
End: 2023-10-05
Payer: COMMERCIAL

## 2023-10-05 ENCOUNTER — ANESTHESIA EVENT (OUTPATIENT)
Dept: PERIOP | Facility: HOSPITAL | Age: 68
End: 2023-10-05
Payer: COMMERCIAL

## 2023-10-05 VITALS
HEART RATE: 81 BPM | BODY MASS INDEX: 23.52 KG/M2 | SYSTOLIC BLOOD PRESSURE: 165 MMHG | DIASTOLIC BLOOD PRESSURE: 105 MMHG | TEMPERATURE: 97.7 F | HEIGHT: 71 IN | RESPIRATION RATE: 18 BRPM | WEIGHT: 168 LBS | OXYGEN SATURATION: 97 %

## 2023-10-05 DIAGNOSIS — K02.9 DENTAL CARIES, UNSPECIFIED: Primary | ICD-10-CM

## 2023-10-05 RX ORDER — PROPOFOL 10 MG/ML
INJECTION, EMULSION INTRAVENOUS AS NEEDED
Status: DISCONTINUED | OUTPATIENT
Start: 2023-10-05 | End: 2023-10-05

## 2023-10-05 RX ORDER — CEFAZOLIN SODIUM 1 G/3ML
INJECTION, POWDER, FOR SOLUTION INTRAMUSCULAR; INTRAVENOUS AS NEEDED
Status: DISCONTINUED | OUTPATIENT
Start: 2023-10-05 | End: 2023-10-05

## 2023-10-05 RX ORDER — FENTANYL CITRATE 50 UG/ML
INJECTION, SOLUTION INTRAMUSCULAR; INTRAVENOUS AS NEEDED
Status: DISCONTINUED | OUTPATIENT
Start: 2023-10-05 | End: 2023-10-05

## 2023-10-05 RX ORDER — DEXAMETHASONE SODIUM PHOSPHATE 10 MG/ML
INJECTION, SOLUTION INTRAMUSCULAR; INTRAVENOUS AS NEEDED
Status: DISCONTINUED | OUTPATIENT
Start: 2023-10-05 | End: 2023-10-05

## 2023-10-05 RX ORDER — PROPOFOL 10 MG/ML
INJECTION, EMULSION INTRAVENOUS CONTINUOUS PRN
Status: DISCONTINUED | OUTPATIENT
Start: 2023-10-05 | End: 2023-10-05

## 2023-10-05 RX ORDER — SODIUM CHLORIDE, SODIUM LACTATE, POTASSIUM CHLORIDE, CALCIUM CHLORIDE 600; 310; 30; 20 MG/100ML; MG/100ML; MG/100ML; MG/100ML
125 INJECTION, SOLUTION INTRAVENOUS CONTINUOUS
Status: DISCONTINUED | OUTPATIENT
Start: 2023-10-05 | End: 2023-10-05

## 2023-10-05 RX ORDER — OXYCODONE HYDROCHLORIDE AND ACETAMINOPHEN 5; 325 MG/1; MG/1
1 TABLET ORAL EVERY 4 HOURS PRN
Qty: 24 TABLET | Refills: 0 | Status: SHIPPED | OUTPATIENT
Start: 2023-10-05 | End: 2023-10-15

## 2023-10-05 RX ORDER — HYDROMORPHONE HCL IN WATER/PF 6 MG/30 ML
0.2 PATIENT CONTROLLED ANALGESIA SYRINGE INTRAVENOUS
Status: DISCONTINUED | OUTPATIENT
Start: 2023-10-05 | End: 2023-10-05 | Stop reason: HOSPADM

## 2023-10-05 RX ORDER — FENTANYL CITRATE/PF 50 MCG/ML
25 SYRINGE (ML) INJECTION
Status: DISCONTINUED | OUTPATIENT
Start: 2023-10-05 | End: 2023-10-05 | Stop reason: HOSPADM

## 2023-10-05 RX ORDER — ONDANSETRON 2 MG/ML
INJECTION INTRAMUSCULAR; INTRAVENOUS AS NEEDED
Status: DISCONTINUED | OUTPATIENT
Start: 2023-10-05 | End: 2023-10-05

## 2023-10-05 RX ORDER — SODIUM CHLORIDE, SODIUM LACTATE, POTASSIUM CHLORIDE, CALCIUM CHLORIDE 600; 310; 30; 20 MG/100ML; MG/100ML; MG/100ML; MG/100ML
INJECTION, SOLUTION INTRAVENOUS CONTINUOUS PRN
Status: DISCONTINUED | OUTPATIENT
Start: 2023-10-05 | End: 2023-10-05

## 2023-10-05 RX ORDER — LIDOCAINE HYDROCHLORIDE AND EPINEPHRINE 10; 10 MG/ML; UG/ML
INJECTION, SOLUTION INFILTRATION; PERINEURAL AS NEEDED
Status: DISCONTINUED | OUTPATIENT
Start: 2023-10-05 | End: 2023-10-05 | Stop reason: HOSPADM

## 2023-10-05 RX ORDER — SUCCINYLCHOLINE/SOD CL,ISO/PF 100 MG/5ML
SYRINGE (ML) INTRAVENOUS AS NEEDED
Status: DISCONTINUED | OUTPATIENT
Start: 2023-10-05 | End: 2023-10-05

## 2023-10-05 RX ORDER — OXYCODONE HYDROCHLORIDE AND ACETAMINOPHEN 5; 325 MG/1; MG/1
1 TABLET ORAL EVERY 4 HOURS PRN
Status: DISCONTINUED | OUTPATIENT
Start: 2023-10-05 | End: 2023-10-05 | Stop reason: HOSPADM

## 2023-10-05 RX ADMIN — SODIUM CHLORIDE, SODIUM LACTATE, POTASSIUM CHLORIDE, AND CALCIUM CHLORIDE: .6; .31; .03; .02 INJECTION, SOLUTION INTRAVENOUS at 13:13

## 2023-10-05 RX ADMIN — PROPOFOL 100 MG: 10 INJECTION, EMULSION INTRAVENOUS at 13:21

## 2023-10-05 RX ADMIN — PHENYLEPHRINE HYDROCHLORIDE 40 MCG/MIN: 10 INJECTION INTRAVENOUS at 13:21

## 2023-10-05 RX ADMIN — PROPOFOL 130 MCG/KG/MIN: 10 INJECTION, EMULSION INTRAVENOUS at 13:24

## 2023-10-05 RX ADMIN — FENTANYL CITRATE 50 MCG: 50 INJECTION, SOLUTION INTRAMUSCULAR; INTRAVENOUS at 13:32

## 2023-10-05 RX ADMIN — SODIUM CHLORIDE, SODIUM LACTATE, POTASSIUM CHLORIDE, AND CALCIUM CHLORIDE 999 ML/HR: .6; .31; .03; .02 INJECTION, SOLUTION INTRAVENOUS at 13:07

## 2023-10-05 RX ADMIN — DEXAMETHASONE SODIUM PHOSPHATE 10 MG: 10 INJECTION, SOLUTION INTRAMUSCULAR; INTRAVENOUS at 13:22

## 2023-10-05 RX ADMIN — FENTANYL CITRATE 50 MCG: 50 INJECTION, SOLUTION INTRAMUSCULAR; INTRAVENOUS at 13:17

## 2023-10-05 RX ADMIN — ONDANSETRON 4 MG: 2 INJECTION INTRAMUSCULAR; INTRAVENOUS at 13:31

## 2023-10-05 RX ADMIN — CEFAZOLIN 2000 MG: 1 INJECTION, POWDER, FOR SOLUTION INTRAMUSCULAR; INTRAVENOUS at 13:29

## 2023-10-05 RX ADMIN — SODIUM CHLORIDE, SODIUM LACTATE, POTASSIUM CHLORIDE, AND CALCIUM CHLORIDE: .6; .31; .03; .02 INJECTION, SOLUTION INTRAVENOUS at 14:02

## 2023-10-05 RX ADMIN — PROPOFOL 50 MG: 10 INJECTION, EMULSION INTRAVENOUS at 13:23

## 2023-10-05 RX ADMIN — OXYCODONE HYDROCHLORIDE AND ACETAMINOPHEN 1 TABLET: 5; 325 TABLET ORAL at 15:27

## 2023-10-05 RX ADMIN — PHENYLEPHRINE HYDROCHLORIDE 80 MCG/MIN: 10 INJECTION INTRAVENOUS at 13:30

## 2023-10-05 RX ADMIN — Medication 150 MG: at 13:22

## 2023-10-05 RX ADMIN — SODIUM CHLORIDE, SODIUM LACTATE, POTASSIUM CHLORIDE, AND CALCIUM CHLORIDE 999 ML/HR: .6; .31; .03; .02 INJECTION, SOLUTION INTRAVENOUS at 11:54

## 2023-10-05 NOTE — ANESTHESIA POSTPROCEDURE EVALUATION
Post-Op Assessment Note    CV Status:  Stable    Pain management: adequate     Mental Status:  Alert and awake   Hydration Status:  Euvolemic   PONV Controlled:  Controlled   Airway Patency:  Patent      Post Op Vitals Reviewed: Yes      Staff: Anesthesiologist, CRNA         No notable events documented.     BP   114/88   Temp      Pulse  62   Resp   15   SpO2   98

## 2023-10-05 NOTE — INTERVAL H&P NOTE
H&P reviewed. After examining the patient I find no changes in the patients condition since the H&P had been written. Plan is for extraction of all remaining teeth in the Main OR.   Monine Anuja Tonnyquinibeth    Vitals:    10/05/23 1128   BP: 149/97   Pulse: 72   Resp: 17   Temp: 97.7 °F (36.5 °C)   SpO2: 98%

## 2023-10-05 NOTE — OP NOTE
OPERATIVE REPORT  PATIENT NAME: Cyrus Carrera    :  1955  MRN: [id number]  Pt Location: [facility] OR ROOM 06    SURGERY DATE: 10/5/2023    Surgeon(s) and Role:     * Valerie Man, DMD - Primary    Preop Diagnosis:  Dental caries, unspecified [K02.9]    Post-Op Diagnosis Codes:     * Dental caries, unspecified [K02.9]    Procedure(s):  EXTRACTION TEETH MULTIPLE [id number]    Specimen(s):  * No specimens in log *    Estimated Blood Loss:   Minimal    Drains:  * No LDAs found *    Anesthesia Type:   General    Operative Indications:  Dental caries, unspecified [K02.9]  Patient has history of throat cancer and radiation, had 20 preoperative HBO dives. Operative Findings:  Necrotic, decayed teeth. Complications:   None    Procedure and Technique:  The patient was greeted at the bedside in the prep and hold area. I reviewed previously signed informed consent. All questions regarding extraction of all remaining teeth were answered. Patient amendable to treatment. The patient was brought in the operating room and placed in a supine position on the operating room table. A time out was performed with surgical, nursing, and anesthesia staff verifying patient procedure and laterality. Anesthesia placed appropriate monitors and intubated patient without issue. The patient was draped in the usual sterile fashion. A throat pack was moistened and placed in the oropharynx. 20 cc of 1% lidocaine 1:100,000 epinephrine was used to anesthetize bilateral inferior alveolar nerve, lingual nerve, buccal nerve, superior alveolar nerve, greater palatine nerve. Attention was first directed to the mandible. A 15 blade was used to make a sulcular incision around the facial of the teeth. A full thickness mucoperiosteal flap was reflected.   Bone removed around teeth, an elevator was used to luxate the teeth (19, 20, 21, 22, 23, 26, 27, 28, 29 and 30) and they were extracted with forceps. Alveolus recontoured using a rongeur and bone file, curettage of the sockets was performed. Copious normal saline irrigation of the flap and socket was performed. Gelfoam soaked in thrombin was placed in each extraction sockets and the flap was closed with 3-0 chromic gut sutures. Positive hemostasis was achieved. Attention was then directed to the maxilla. A 15 blade was used to make a sulcular incision around the facial of the teeth. A full thickness mucoperiosteal flap was reflected. Bone removed around teeth, an elevator was used to luxate the teeth (1, 2, 3, 4, 5, 6, 7, 8, 9, 10, 11, 12, 13 and 14) and they were extracted with forceps. There was a sinus exposure noted at extraction socket #14, the buccal fat pad was advanced and closed the sinus exposure with 3-0 chromic gut sutures. Nex the alveolus recontoured using a rongeur and bone file, curettage of the sockets was performed. Copious normal saline irrigation of the flap and sockets was performed. Gelfoam soaked in thrombin was placed in each extraction socket and the flap was closed with 3-0 chromic gut sutures. Positive hemostasis was achieved. The throat pack was removed and the oral cavity suctioned. Sterile drapes were removed and the face cleansed with normal saline and dried. All sponge and needle counts were correct and verified with the nursing staff. I was present for the entire procedure. and A qualified resident physician was not available.     Patient Disposition:  PACU  and extubated and stable        SIGNATURE: Mahesh Bullock DMD  DATE: October 5, 2023  TIME: 2:24 PM

## 2023-10-05 NOTE — ANESTHESIA PREPROCEDURE EVALUATION
Procedure:  EXTRACTION TEETH MULTIPLE 1,2,3,4,5,6,7,8,9,10,11,12,13,14,19,20,21,22,23,26,27,28,29,30 (Mouth)    Relevant Problems   CARDIO   (+) Coronary artery disease involving native coronary artery of native heart without angina pectoris   (+) Hyperlipidemia   (+) Nonrheumatic aortic valve stenosis   (+) Primary hypertension      ENDO   (+) Hypothyroidism      GI/HEPATIC   (+) Oropharyngeal dysphagia      MUSCULOSKELETAL   (+) Chronic low back pain      NEURO/PSYCH   (+) Chronic low back pain      Other   (+) Osteomyelitis (HCC)   Left Ventricle: Left ventricular cavity size is normal. Wall thickness is severely increased. There is severe asymmetric hypertrophy of the septal wall. maximum septal wall thickness was 21 mm. The left ventricular ejection fraction is 60%. Systolic function is normal. Global longitudinal strain is reduced at -11%. There is focal akinesis of the apical anteroseptal segment. Diastolic function is abnormal. There is  outflow tract dynamic obstruction at rest with a peak gradient of 27.0 mmHg. There is outflow tract dynamic obstruction with valsalva with a peak gradient of 42.0 mmHg. •  Aortic Valve: The aortic valve is trileaflet. The leaflets are moderately thickened. The leaflets are moderately calcified. There is mildly reduced mobility. •  Mitral Valve: There is mild annular calcification. There is systolic anterior motion of the anterior leaflet with late peaking gradient. •  Stress ECG: No ST deviation is noted. There were no arrhythmias during recovery. . The stress ECG is negative for ischemia after maximal exercise, without reproduction of symptoms. •  Post Stress Echo: Left ventricle cavity has normal reduction in size post-stress. The left ventricle systolic function is hyperdynamic post-stress. The post-stress echo showed unchanged wall motion abnormalities compared to baseline.   •  Echo Post Impression: Study was negative for inducible myocardial ischemia after submaximal exercise. There was no inducible high outflow tract gradients at peak exercise. Borderline aortic valve stenosis was present. ECHO 2/23          Physical Exam    Airway    Mallampati score: II  TM Distance: >3 FB  Neck ROM: limited     Dental       Cardiovascular  Cardiovascular exam normal    Pulmonary  Pulmonary exam normal     Other Findings        Anesthesia Plan  ASA Score- 4     Anesthesia Type- general with ASA Monitors. Additional Monitors:   Airway Plan: ETT. Comment: Hx DIFFICULT INTUBATION in past as per patient. Issues happened during surgery for throat cancer 2006. Was told he "resisted " tube. No further information available. He was never given a difficult intubation letter. [facility] Mehul Byrd in chart. Patient walks 2 miles a day/very active/asymptomatic. Continued Plavix/ASA as per Dr. Nadia Kothari recommendations. Last ECHO 2/23:   Patient aware of higher risks of anesthesia due to cardiac hx and hx difficult intubation  Plan: IV Propofol/phenylephrine gtt, Veras for intubation with LMA backup. Fluid bolus preop. .       Plan Factors-Exercise tolerance (METS): >4 METS. Chart reviewed. EKG reviewed. Imaging results reviewed. Existing labs reviewed. Patient summary reviewed. Patient is not a current smoker. Induction- intravenous. Postoperative Plan- Plan for postoperative opioid use. Planned trial extubation    Informed Consent- Anesthetic plan and risks discussed with patient. I personally reviewed this patient with the CRNA. Discussed and agreed on the Anesthesia Plan with the CRNA. Gayle Montano

## 2023-10-05 NOTE — DISCHARGE INSTR - AVS FIRST PAGE
POST OPERATIVE INSTRUCTIONS FOLLOWING ORAL SURGERY    Swelling: To reduce swelling, place ice bag on your face up to 12 hours following surgery. This is an important factor in keeping swelling to a minimum. Swelling is common and need not cause alarm. Rinsing: DO NOT RINSE for the first 12 hours after surgery. After 24 hours it is important to rinse, using warm salt water (not over the counter mouthwash) 3 to 4 times a day, particularly after eating. Brush areas of mouth not affected by the surgery starting tomorrow. Spitting: DO NOT SPIT OUT frequent spitting will cause bleeding to continue. Exercise Jaw: In some cases following oral surgery, it becomes difficult to open your mouth. Exercise your jaw frequently by attempting to open your mouth wide. You may experience discomfort at first, however, with continued exercise the discomfort is reduced. Diet: After having oral surgery it is recommended the patient maintain a semi-liquid diet for 24 hours. A regular diet should be resumed as soon as possible, avoiding peanuts, pretzels, and foods with seeds. INSTRUCTIONS FOR PATIENTS WITH SINUS COMMUNICATION OR POSSIBLE SINUS COMMUNICATION    After your fractures, there was a communication from the sinus to the mouth. Please take the following precautions to insure that the opening between your sinus and your mouth stays closed. 1. DO NOT BLOW YOUR NOSE. 2. SNEEZE WITH YOUR MOUTH OPEN. 3. DO NOT USE STRAWS TO DRINK. 4. DO NOT ENGAGE IN ANY AEROBIC-TYPE OF PHYSICAL EXERCISE, WEIGHT LIFTING, ETC.  5. LIGHT ACTIVITY IS BEST    Please follow the above instructions for a period of 2 weeks. Your sutures will be removed in approximately one to two weeks if they are none resorbable. You should take as prescribed the antibiotic and decongestants. Vomiting: Occasionally, patients will have nausea after surgery. Tea, ginger ale, and soup broth will help this complication.     Pain: A prescription for pain relieving drugs is given when surgery is extensive. For lesser surgical procedures, it is recommended the patient use Motrin or Advil. If you are in pain and the drug you are taking does not help, please contact us and we will try to remedy the situation. Bleeding: Bite on gauze for 30 minutes. If the bleeding continues, bite on new gauze for an additional 30 minutes. If bleeding continues, place a damp tea bag over the socket and continue to bite down for an additional 30 minutes. Frequent gauze changes allow bleeding to continue. Smoking: It is important you DO NOT SMOKE after surgery. Smoking caused dry socket, which is very painful. Concerns: May call [facility] at [phone]. Emergency: Go to the [address] at [facility] and ask for the Oral Surgeon on call. DO NOT WAIT until your post-operative appointment to consult us. [facility] [phone].

## 2023-10-09 ENCOUNTER — OFFICE VISIT (OUTPATIENT)
Dept: WOUND CARE | Facility: HOSPITAL | Age: 68
End: 2023-10-09
Payer: COMMERCIAL
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
## 2023-10-09 DIAGNOSIS — M27.2 OSTEORADIONECROSIS OF JAW: ICD-10-CM

## 2023-10-09 PROCEDURE — 99183 HYPERBARIC OXYGEN THERAPY: CPT | Performed by: SURGERY

## 2023-10-09 PROCEDURE — G0277 HBOT, FULL BODY CHAMBER, 30M: HCPCS | Performed by: SURGERY

## 2023-10-09 NOTE — PROGRESS NOTES
HBO Treatment Course Details       Treatment Notes: The patient tolerated treatment without issue. Treatment Course Number: 21  Total Treatments Ordered:  30     Diagnosis:   1.  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  Hyperbaric oxygen thearpy          HBO Treatment Details:  In-Patient Visit: no  Treatment Length:90 Minutes(Minutes)  Chamber #: Hard sided Monoplace Chamber    Pre-Treatment details:  Pre-treatment protocol Treatment Protocol: 2.5 FERDINAND X 90 minutes w/ 100% oxygen, two 5 minute air breaks         TM's clear  Heart regular  Lungs clear                                    Treatment details:  FERDINAND Rate: 2.5  Started Compression: 1022  Reached Compression: 1032  Total Compression Time: 10 (Minutes)  Total Holding Time: 100 (Minutes)  Started Decompression: 1212  Reached Surface: 2372  Total Decompression Time: 10 (Minutes)  Total Airbreaks: 2 (Minutes)  Total Time of Treatment: 118 (Minutes)  Symptoms Noted During Treatment: None (Minutes)    Post treatment details:            TM's clear  Lungs clear                                        Vital Signs:  HBO Glucose Reference Range: 100-350 mg/dl   Pre-Treatment Post-Treatment   Time vitals are taken: 1010 Time vitals are taken: 1225   Blood Pressure: 128/64 Blood Pressure: 115/74   Pulse: 80 Pulse: 60   Resp: 18 Resp: 16   Temp: 94.8 °F (34.9 °C) Temp: 94.9 °F (34.9 °C)             Allergies   Allergen Reactions   • Dust Mite Extract Sneezing and Nasal Congestion     Tree, pollen, dust     Patient Active Problem List    Diagnosis Date Noted   • Dental caries, unspecified 10/05/2023   • Osteomyelitis ([address]) 07/14/2023   • Oropharyngeal dysphagia 05/15/2023   • Coronary artery disease involving native coronary artery of native heart without angina pectoris 01/17/2023   • Status post insertion of drug eluting coronary artery stent 12/28/2022   • HOCM (hypertrophic obstructive cardiomyopathy) ([address]) 10/14/2022   • Nonrheumatic aortic valve stenosis 09/22/2022   • Throat cancer ([address]) 06/23/2022   • History of head and neck cancer 06/23/2022   • Acute pain of right wrist 06/15/2022   • Arthralgia of multiple joints 02/29/2016   • Bilateral hip pain 02/29/2016   • Chronic low back pain 02/29/2016   • Myalgia 12/33/7186   • Folliculitis 14/40/8024   • Esophagitis, reflux 10/10/2012   • Hyperlipidemia 10/10/2012   • Primary hypertension 05/10/2012   • Hypothyroidism 05/10/2012     No orders of the defined types were placed in this encounter.

## 2023-10-10 ENCOUNTER — OFFICE VISIT (OUTPATIENT)
Dept: WOUND CARE | Facility: HOSPITAL | Age: 68
End: 2023-10-10
Payer: COMMERCIAL
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
## 2023-10-10 DIAGNOSIS — M27.2 OSTEORADIONECROSIS OF JAW: ICD-10-CM

## 2023-10-10 PROCEDURE — G0277 HBOT, FULL BODY CHAMBER, 30M: HCPCS | Performed by: FAMILY MEDICINE

## 2023-10-10 PROCEDURE — 99183 HYPERBARIC OXYGEN THERAPY: CPT | Performed by: FAMILY MEDICINE

## 2023-10-10 NOTE — PROGRESS NOTES
HBO Treatment Course Details       Treatment Notes:  Patient tolerated HBO tx well. Patient had no complaints prior to or post HBO tx. Treatment Course Number: 22  Total Treatments Ordered:  30     Diagnosis:   1. 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  Hyperbaric oxygen [place]          HBO Treatment Details:  In-Patient Visit: no  Treatment Length:90 Minutes(Minutes)  Chamber #: Hard sided Monoplace Chamber    Pre-Treatment details:  Pre-treatment protocol Treatment Protocol: 2.5 FERDINAND X 90 minutes w/ 100% oxygen, two 5 minute air breaks  Left ear clear?: yes  Right ear clear?: yes  Left ear intact?: yes  Right ear intact?: yes                    Left ear TEED scale: Grade 0  Right ear TEED scale: Grade 0   Pretreatment heart and lung assessment: Pretreatment heart and lung auscltation unremarkable. Patient cleared for HBOT     Treatment details:  FERDINAND Rate: 2.5  Started Compression: 1005  Reached Compression: 1015  Total Compression Time: 10 (Minutes)  Total Holding Time: 100 (Minutes)  Started Decompression: 1155  Reached Surface: 8259  Total Decompression Time: 10 (Minutes)  Total Airbreaks: 5 (Minutes)  Total Time of Treatment: 115 (Minutes)  Symptoms Noted During Treatment: None (Minutes)    Post treatment details:  Left ear clear?: yes  Right ear clear?: yes  Left ears intact?: yes  Right ears intact?: yes                    Left ear TEED scale: Grade 0  Right ear TEED scale: Grade 0  Post treatment heart and lung assessment: Post treatment heart and lung auscltation unremarkable. Patient cleared for discharge. Tolerated treatment well.              Vital Signs:  [facility] Glucose Reference Range: 100-350 mg/dl   Pre-Treatment Post-Treatment   Time vitals are taken: 0955 Time vitals are taken: 1205   Blood Pressure: 122/60 Blood Pressure: 126/66   Pulse: 76 Pulse: 72   Resp: 16 Resp: 16   Temp: 95.6 °F (35.3 °C) Temp: 95.9 °F (35.5 °C)             Allergies   Allergen Reactions    Dust Mite Extract Sneezing and Nasal Congestion     Tree, pollen, dust     Patient Active Problem List    Diagnosis Date Noted    Dental caries, unspecified 10/05/2023    Osteomyelitis ([address]) 07/14/2023    Oropharyngeal dysphagia 05/15/2023    Coronary artery disease involving native coronary artery of native heart without angina pectoris 01/17/2023    Status post insertion of drug eluting coronary artery stent 12/28/2022    HOCM (hypertrophic obstructive cardiomyopathy) ([address]) 10/14/2022    Nonrheumatic aortic valve stenosis 09/22/2022    Throat cancer ([address]) 06/23/2022    History of head and neck cancer 06/23/2022    Acute pain of right wrist 06/15/2022    Arthralgia of multiple joints 02/29/2016    Bilateral hip pain 02/29/2016    Chronic low back pain 02/29/2016    Myalgia 13/54/4709    Folliculitis 40/40/0520    Esophagitis, reflux 10/10/2012    Hyperlipidemia 10/10/2012    Primary hypertension 05/10/2012    Hypothyroidism 05/10/2012     No orders of the defined types were placed in this encounter.

## 2023-10-11 ENCOUNTER — OFFICE VISIT (OUTPATIENT)
Dept: WOUND CARE | Facility: HOSPITAL | Age: 68
End: 2023-10-11
Payer: COMMERCIAL

## 2023-10-11 DIAGNOSIS — M27.2 OSTEORADIONECROSIS OF JAW: ICD-10-CM
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
## 2023-10-11 PROCEDURE — G0277 HBOT, FULL BODY CHAMBER, 30M: HCPCS | Performed by: FAMILY MEDICINE

## 2023-10-11 PROCEDURE — 99183 HYPERBARIC OXYGEN THERAPY: CPT | Performed by: FAMILY MEDICINE

## 2023-10-11 NOTE — PROGRESS NOTES
HBO Treatment Course Details       Treatment Notes: Patient arrived with blood in his mouth from the stitches in his mouth. Patient stated that the bleeding started the previous night and he had to change the gauze every 30-45 minutes. Bleeding had slowed down when patient arrived. Area was evaluated by Surgical PA and CHT. No open area was noticed. Patient was sent into the chamber with gauze pads to help control the bleeding. Bleeding stopped during HBO treatment. . Patient tolerated HBO tx well. Patient was instructed to notify his oral surgeon if bleeding recurs. He feels that the bleeding was likely secondary to trauma as he was using a brush to lightly scraped the area as he felt there was something stuck there. Again advised patient to contact his surgeon if he feels that sensation recur. Treatment Course Number: 23  Total Treatments Ordered:  30     Diagnosis:   1. 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  [facility]          HBO Treatment Details:  In-Patient Visit: no  Treatment Length:90 Minutes(Minutes)  Chamber #: Hard sided Monoplace Chamber    Pre-Treatment details:  Pre-treatment protocol Treatment Protocol: 2.5 FERDINAND X 90 minutes w/ 100% oxygen, two 5 minute air breaks  Left ear clear?: yes  Right ear clear?: yes  Left ear intact?: yes  Right ear intact?: yes                    Left ear TEED scale: Grade 0  Right ear TEED scale: Grade 0   Pretreatment heart and lung assessment: Pretreatment heart and lung auscltation unremarkable.  Patient cleared for HBOT     Treatment details:  FERDINAND Rate: 2.5  Started Compression: 0753  Reached Compression: 0803  Total Compression Time: 10 (Minutes)  Total Holding Time: 100 (Minutes)  Started Decompression: 0943  Reached Surface: 0953  Total Decompression Time: 10 (Minutes)  Total Airbreaks: 5 (Minutes)  Total Time of Treatment: 115 (Minutes)  Symptoms Noted During Treatment: None (Minutes)    Post treatment details:  Left ear clear?: yes  Right ear clear?: yes  Left ears intact?: yes  Right ears intact?: yes                    Left ear TEED scale: Grade 0  Right ear TEED scale: Grade 0  Post treatment heart and lung assessment: Post treatment heart and lung auscltation unremarkable. Patient cleared for discharge. Tolerated treatment well. Vital Signs:  HBO Glucose Reference Range: 100-350 mg/dl   Pre-Treatment Post-Treatment   Time vitals are taken: 0745 Time vitals are taken: 0955   Blood Pressure: 102/66 Blood Pressure: 116/70   Pulse: 64 Pulse: 68   Resp: 16 Resp: 16   Temp: 94.5 °F (34.7 °C) Temp: 94.9 °F (34.9 °C)             Allergies   Allergen Reactions    Dust Mite Extract Sneezing and Nasal Congestion     Tree, pollen, dust     Patient Active Problem List    Diagnosis Date Noted    Dental caries, unspecified 10/05/2023    Osteomyelitis ([address]) 07/14/2023    Oropharyngeal dysphagia 05/15/2023    Coronary artery disease involving native coronary artery of native heart without angina pectoris 01/17/2023    Status post insertion of drug eluting coronary artery stent 12/28/2022    HOCM (hypertrophic obstructive cardiomyopathy) ([address]) 10/14/2022    Nonrheumatic aortic valve stenosis 09/22/2022    Throat cancer ([address]) 06/23/2022    History of head and neck cancer 06/23/2022    Acute pain of right wrist 06/15/2022    Arthralgia of multiple joints 02/29/2016    Bilateral hip pain 02/29/2016    Chronic low back pain 02/29/2016    Myalgia 38/80/7601    Folliculitis 46/83/6375    Esophagitis, reflux 10/10/2012    Hyperlipidemia 10/10/2012    Primary hypertension 05/10/2012    Hypothyroidism 05/10/2012     No orders of the defined types were placed in this encounter.

## 2023-10-12 ENCOUNTER — OFFICE VISIT (OUTPATIENT)
Dept: WOUND CARE | Facility: HOSPITAL | Age: 68
End: 2023-10-12
Payer: COMMERCIAL

## 2023-10-12 DIAGNOSIS — M27.2 OSTEORADIONECROSIS OF JAW: ICD-10-CM
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
## 2023-10-12 PROCEDURE — 99183 HYPERBARIC OXYGEN THERAPY: CPT | Performed by: FAMILY MEDICINE

## 2023-10-12 PROCEDURE — G0277 HBOT, FULL BODY CHAMBER, 30M: HCPCS | Performed by: FAMILY MEDICINE

## 2023-10-12 NOTE — PROGRESS NOTES
HBO Treatment Course Details       Treatment Notes: Patient tolerated HBO tx well. Patient had no complaints prior to or post HBO tx. Treatment Course Number: 24  Total Treatments Ordered:  30     Diagnosis:   1. 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  Hyperbaric oxygen [place]          HBO Treatment Details:  In-Patient Visit: no  Treatment Length:90 Minutes(Minutes)  Chamber #: Hard sided Monoplace Chamber    Pre-Treatment details:  Pre-treatment protocol Treatment Protocol: 2.5 FERDINAND X 90 minutes w/ 100% oxygen, two 5 minute air breaks  Left ear clear?: yes  Right ear clear?: yes  Left ear intact?: yes  Right ear intact?: yes                    Left ear TEED scale: Grade 0  Right ear TEED scale: Grade 0   Pretreatment heart and lung assessment: Pretreatment heart and lung auscltation unremarkable. Patient cleared for HBOT     Treatment details:  FERDINAND Rate: 2.5  Started Compression: 0959  Reached Compression: 1009  Total Compression Time: 10 (Minutes)  Total Holding Time: 100 (Minutes)  Started Decompression: 1149  Reached Surface: 1159  Total Decompression Time: 10 (Minutes)  Total Airbreaks: 5 (Minutes)  Total Time of Treatment: 115 (Minutes)  Symptoms Noted During Treatment: None (Minutes)    Post treatment details:  Left ear clear?: yes  Right ear clear?: yes  Left ears intact?: yes  Right ears intact?: yes                    Left ear TEED scale: Grade 0  Right ear TEED scale: Grade 0  Post treatment heart and lung assessment: Post treatment heart and lung auscltation unremarkable. Patient cleared for discharge. Tolerated treatment well.              Vital Signs:  [facility] [place] Glucose Reference Range: 100-350 mg/dl   Pre-Treatment Post-Treatment   Time vitals are taken: 0935 Time vitals are taken: 1200   Blood Pressure: 114/74 Blood Pressure: 124/78   Pulse: 64 Pulse: 64   Resp: 16 Resp: 16   Temp: 96.4 °F (35.8 °C) Temp: 95.1 °F (35.1 °C)             Allergies   Allergen Reactions    Dust Mite Extract Sneezing and Nasal Congestion Tree, pollen, dust     Patient Active Problem List    Diagnosis Date Noted    Dental caries, unspecified 10/05/2023    Osteomyelitis ([address]) 07/14/2023    Oropharyngeal dysphagia 05/15/2023    Coronary artery disease involving native coronary artery of native heart without angina pectoris 01/17/2023    Status post insertion of drug eluting coronary artery stent 12/28/2022    HOCM (hypertrophic obstructive cardiomyopathy) ([address]) 10/14/2022    Nonrheumatic aortic valve stenosis 09/22/2022    Throat cancer ([address]) 06/23/2022    History of head and neck cancer 06/23/2022    Acute pain of right wrist 06/15/2022    Arthralgia of multiple joints 02/29/2016    Bilateral hip pain 02/29/2016    Chronic low back pain 02/29/2016    Myalgia 76/40/2813    Folliculitis 38/52/8540    Esophagitis, reflux 10/10/2012    Hyperlipidemia 10/10/2012    Primary hypertension 05/10/2012    Hypothyroidism 05/10/2012     No orders of the defined types were placed in this encounter.

## 2023-10-13 ENCOUNTER — OFFICE VISIT (OUTPATIENT)
Dept: WOUND CARE | Facility: HOSPITAL | Age: 68
End: 2023-10-13
Payer: COMMERCIAL
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
## 2023-10-13 DIAGNOSIS — M27.2 OSTEORADIONECROSIS OF JAW: ICD-10-CM

## 2023-10-13 PROCEDURE — 99183 HYPERBARIC OXYGEN THERAPY: CPT | Performed by: FAMILY MEDICINE

## 2023-10-13 PROCEDURE — G0277 HBOT, FULL BODY CHAMBER, 30M: HCPCS | Performed by: FAMILY MEDICINE

## 2023-10-13 NOTE — PROGRESS NOTES
HBO Treatment Course Details       Treatment Notes: Patient tolerated HBO tx well. Patient had no complaints prior to or post HBO tx. Treatment Course Number: 25  Total Treatments Ordered:  30     Diagnosis:   1. 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  [place]          HBO Treatment Details:  In-Patient Visit: no  Treatment Length:90 Minutes(Minutes)  Chamber #: Hard sided Monoplace Chamber    Pre-Treatment details:  Pre-treatment protocol Treatment Protocol: 2.5 FERDINAND X 90 minutes w/ 100% oxygen, two 5 minute air breaks  Left ear clear?: yes  Right ear clear?: yes  Left ear intact?: yes  Right ear intact?: yes                    Left ear TEED scale: Grade 0  Right ear TEED scale: Grade 0   Pretreatment heart and lung assessment: Pretreatment heart and lung auscltation unremarkable. Patient cleared for HBOT     Treatment details:  FERDINAND Rate: 2.5  Started Compression: 1000  Reached Compression: 1010  Total Compression Time: 10 (Minutes)  Total Holding Time: 100 (Minutes)  Started Decompression: 1150  Reached Surface: 1200  Total Decompression Time: 10 (Minutes)  Total Airbreaks: 5 (Minutes)  Total Time of Treatment: 115 (Minutes)  Symptoms Noted During Treatment: None (Minutes)    Post treatment details:  Left ear clear?: yes  Right ear clear?: yes  Left ears intact?: yes  Right ears intact?: yes                    Left ear TEED scale: Grade 0  Right ear TEED scale: Grade 0  Post treatment heart and lung assessment: Post treatment heart and lung auscltation unremarkable. Patient cleared for discharge. Tolerated treatment well.              Vital Signs:  [facility] Glucose Reference Range: 100-350 mg/dl   Pre-Treatment Post-Treatment   Time vitals are taken: 0950 Time vitals are taken: 1205   Blood Pressure: 122/72 Blood Pressure: 124/66   Pulse: 60 Pulse: 60   Resp: 16 Resp: 16   Temp: 95.1 °F (35.1 °C) Temp: 94.3 °F (34.6 °C)             Allergies   Allergen Reactions    Dust Mite Extract Sneezing and Nasal Congestion Tree, pollen, dust     Patient Active Problem List    Diagnosis Date Noted    Dental caries, unspecified 10/05/2023    Osteomyelitis ([address]) 07/14/2023    Oropharyngeal dysphagia 05/15/2023    Coronary artery disease involving native coronary artery of native heart without angina pectoris 01/17/2023    Status post insertion of drug eluting coronary artery stent 12/28/2022    HOCM (hypertrophic obstructive cardiomyopathy) ([address]) 10/14/2022    Nonrheumatic aortic valve stenosis 09/22/2022    Throat cancer ([address]) 06/23/2022    History of head and neck cancer 06/23/2022    Acute pain of right wrist 06/15/2022    Arthralgia of multiple joints 02/29/2016    Bilateral hip pain 02/29/2016    Chronic low back pain 02/29/2016    Myalgia 66/99/3026    Folliculitis 36/72/3363    Esophagitis, reflux 10/10/2012    Hyperlipidemia 10/10/2012    Primary hypertension 05/10/2012    Hypothyroidism 05/10/2012     No orders of the defined types were placed in this encounter.

## 2023-10-16 ENCOUNTER — OFFICE VISIT (OUTPATIENT)
Dept: WOUND CARE | Facility: HOSPITAL | Age: 68
End: 2023-10-16
Payer: COMMERCIAL

## 2023-10-16 DIAGNOSIS — M27.2 OSTEORADIONECROSIS OF JAW: Primary | ICD-10-CM
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
## 2023-10-16 PROCEDURE — G0277 HBOT, FULL BODY CHAMBER, 30M: HCPCS | Performed by: SURGERY

## 2023-10-16 NOTE — PROGRESS NOTES
HBO Treatment Course Details       Treatment Notes: Patient tolerated HBO tx well. Patient had no complaints prior to or post HBO tx. Treatment Course Number: 26  Total Treatments Ordered:  30     Diagnosis: No diagnosis found.     HBO Treatment Details:  In-Patient Visit: no  Treatment Length:90 Minutes(Minutes)  Chamber #: Hard sided Monoplace Chamber    Pre-Treatment details:  Pre-treatment protocol Treatment Protocol: 2.5 FERDINAND X 90 minutes w/ 100% oxygen, two 5 minute air breaks                                             Treatment details:  FERDINAND Rate: 2.5  Started Compression: 1005  Reached Compression: 1015  Total Compression Time: 10 (Minutes)  Total Holding Time: 100 (Minutes)  Started Decompression: 1155  Reached Surface: 7426  Total Decompression Time: 10 (Minutes)  Total Airbreaks: 5 (Minutes)  Total Time of Treatment: 115 (Minutes)  Symptoms Noted During Treatment: None (Minutes)    Post treatment details:                                                    Vital Signs:  HBO Glucose Reference Range: 100-350 mg/dl   Pre-Treatment Post-Treatment   Time vitals are taken: 0945 Time vitals are taken: 1205   Blood Pressure: 100/66 Blood Pressure: 138/76   Pulse: 68 Pulse: 64   Resp: 16 Resp: 16   Temp: 94.5 °F (34.7 °C) Temp: 95.4 °F (35.2 °C)             Allergies   Allergen Reactions    Dust Mite Extract Sneezing and Nasal Congestion     Tree, pollen, dust     Patient Active Problem List    Diagnosis Date Noted    Dental caries, unspecified 10/05/2023    Osteomyelitis ([address]) 07/14/2023    Oropharyngeal dysphagia 05/15/2023    Coronary artery disease involving native coronary artery of native heart without angina pectoris 01/17/2023    Status post insertion of drug eluting coronary artery stent 12/28/2022    HOCM (hypertrophic obstructive cardiomyopathy) ([address]) 10/14/2022    Nonrheumatic aortic valve stenosis 09/22/2022    Throat cancer ([address]) 06/23/2022    History of head and neck cancer 06/23/2022    Acute pain of right wrist 06/15/2022    Arthralgia of multiple joints 02/29/2016    Bilateral hip pain 02/29/2016    Chronic low back pain 02/29/2016    Myalgia 33/68/9237    Folliculitis 83/73/1330    Esophagitis, reflux 10/10/2012    Hyperlipidemia 10/10/2012    Primary hypertension 05/10/2012    Hypothyroidism 05/10/2012     No orders of the defined types were placed in this encounter.

## 2023-10-17 ENCOUNTER — OFFICE VISIT (OUTPATIENT)
Dept: WOUND CARE | Facility: HOSPITAL | Age: 68
End: 2023-10-17
Payer: COMMERCIAL
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
## 2023-10-17 DIAGNOSIS — M27.2 OSTEORADIONECROSIS OF JAW: ICD-10-CM

## 2023-10-17 PROCEDURE — G0277 HBOT, FULL BODY CHAMBER, 30M: HCPCS | Performed by: FAMILY MEDICINE

## 2023-10-17 PROCEDURE — 99183 HYPERBARIC OXYGEN THERAPY: CPT | Performed by: FAMILY MEDICINE

## 2023-10-17 NOTE — PROGRESS NOTES
HBO Treatment Course Details       Treatment Notes: Patient tolerated HBO tx well. Patient had no complaints prior to or post HBO tx. Treatment Course Number: 27  Total Treatments Ordered:  30     Diagnosis:   1. 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  [place] oxygen [place]    [place]          HBO Treatment Details:  In-Patient Visit: no  Treatment Length:90 Minutes(Minutes)  Chamber #: Hard sided Monoplace Chamber    Pre-Treatment details:  Pre-treatment protocol Treatment Protocol: 2.5 FERDINAND X 90 minutes w/ 100% oxygen, two 5 minute air breaks  Left ear clear?: yes  Right ear clear?: yes  Left ear intact?: yes  Right ear intact?: yes                    Left ear TEED scale: Grade 0  Right ear TEED scale: Grade 0   Pretreatment heart and lung assessment: Pretreatment heart and lung auscltation unremarkable. Patient cleared for HBOT     Treatment details:  FERDINAND Rate: 2.5  Started Compression: 1023  Reached Compression: 1033  Total Compression Time: 10 (Minutes)  Total Holding Time: 100 (Minutes)  Started Decompression: 1213  Reached Surface: 8643  Total Decompression Time: 10 (Minutes)  Total Airbreaks: 5 (Minutes)  Total Time of Treatment: 115 (Minutes)  Symptoms Noted During Treatment: None (Minutes)    Post treatment details:  Left ear clear?: yes  Right ear clear?: yes  Left ears intact?: yes  Right ears intact?: yes                    Left ear TEED scale: Grade 0  Right ear TEED scale: Grade 0  Post treatment heart and lung assessment: Post treatment heart and lung auscltation unremarkable. Patient cleared for discharge. Tolerated treatment well.              Vital Signs:  [facility] Glucose Reference Range: 100-350 mg/dl   Pre-Treatment Post-Treatment   Time vitals are taken: 1000 Time vitals are taken: 1223   Blood Pressure: 112/66 Blood Pressure: 126/74   Pulse: 68 Pulse: 60   Resp: 16 Resp: 16   Temp: 94.6 °F (34.8 °C) Temp: 94.7 °F (34.8 °C)             Allergies   Allergen Reactions    Dust Mite Extract Sneezing and Nasal Congestion     Tree, pollen, dust     Patient Active Problem List    Diagnosis Date Noted    Dental caries, unspecified 10/05/2023    Osteomyelitis ([address]) 07/14/2023    Oropharyngeal dysphagia 05/15/2023    Coronary artery disease involving native coronary artery of native heart without angina pectoris 01/17/2023    Status post insertion of drug eluting coronary artery stent 12/28/2022    HOCM (hypertrophic obstructive cardiomyopathy) ([address]) 10/14/2022    Nonrheumatic aortic valve stenosis 09/22/2022    Throat cancer ([address]) 06/23/2022    History of head and neck cancer 06/23/2022    Acute pain of right wrist 06/15/2022    Arthralgia of multiple joints 02/29/2016    Bilateral hip pain 02/29/2016    Chronic low back pain 02/29/2016    Myalgia 34/95/8988    Folliculitis 75/86/9180    Esophagitis, reflux 10/10/2012    Hyperlipidemia 10/10/2012    Primary hypertension 05/10/2012    Hypothyroidism 05/10/2012     No orders of the defined types were placed in this encounter.

## 2023-10-18 ENCOUNTER — OFFICE VISIT (OUTPATIENT)
Dept: WOUND CARE | Facility: HOSPITAL | Age: 68
End: 2023-10-18
Payer: COMMERCIAL
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
## 2023-10-18 DIAGNOSIS — M27.2 OSTEORADIONECROSIS OF JAW: ICD-10-CM

## 2023-10-18 PROCEDURE — G0277 HBOT, FULL BODY CHAMBER, 30M: HCPCS | Performed by: FAMILY MEDICINE

## 2023-10-18 NOTE — PROGRESS NOTES
HBO Treatment Course Details       Treatment Notes: Patient tolerated HBO tx well. Patient had no complaints prior to or post HBO tx. Treatment Course Number: 28  Total Treatments Ordered:  30     Diagnosis:   1. 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  [place]          HBO Treatment Details:  In-Patient Visit: no  Treatment Length:90 Minutes(Minutes)  Chamber #: Hard sided Monoplace Chamber    Pre-Treatment details:  Pre-treatment protocol Treatment Protocol: 2.5 FERDINAND X 90 minutes w/ 100% oxygen, two 5 minute air breaks  Left ear clear?: yes  Right ear clear?: yes  Left ear intact?: yes  Right ear intact?: yes                    Left ear TEED scale: Grade 0  Right ear TEED scale: Grade 0   Pretreatment heart and lung assessment: Pretreatment heart and lung auscltation unremarkable. Patient cleared for HBOT     Treatment details:  FERDINAND Rate: 2.5  Started Compression: 1018  Reached Compression: 1028  Total Compression Time: 10 (Minutes)  Total Holding Time: 100 (Minutes)  Started Decompression: 1208  Reached Surface: 9227  Total Decompression Time: 10 (Minutes)  Total Airbreaks: 5 (Minutes)  Total Time of Treatment: 115 (Minutes)  Symptoms Noted During Treatment: None (Minutes)    Post treatment details:  Left ear clear?: yes  Right ear clear?: yes  Left ears intact?: yes  Right ears intact?: yes                    Left ear TEED scale: Grade 0  Right ear TEED scale: Grade 0  Post treatment heart and lung assessment: Post treatment heart and lung auscltation unremarkable. Patient cleared for discharge. Tolerated treatment well.              Vital Signs:  [facility] Glucose Reference Range: 100-350 mg/dl   Pre-Treatment Post-Treatment   Time vitals are taken: 1005 Time vitals are taken: 1218   Blood Pressure: 116/78 Blood Pressure: 126/70   Pulse: 64 Pulse: 72   Resp: 16 Resp: 16   Temp: 94.5 °F (34.7 °C) Temp: 94.5 °F (34.7 °C)             Allergies   Allergen Reactions    Dust Mite Extract Sneezing and Nasal Congestion Tree, pollen, dust     Patient Active Problem List    Diagnosis Date Noted    Dental caries, unspecified 10/05/2023    Osteomyelitis ([address]) 07/14/2023    Oropharyngeal dysphagia 05/15/2023    Coronary artery disease involving native coronary artery of native heart without angina pectoris 01/17/2023    Status post insertion of drug eluting coronary artery stent 12/28/2022    HOCM (hypertrophic obstructive cardiomyopathy) ([address]) 10/14/2022    Nonrheumatic aortic valve stenosis 09/22/2022    Throat cancer ([address]) 06/23/2022    History of head and neck cancer 06/23/2022    Acute pain of right wrist 06/15/2022    Arthralgia of multiple joints 02/29/2016    Bilateral hip pain 02/29/2016    Chronic low back pain 02/29/2016    Myalgia 97/44/0643    Folliculitis 31/28/2949    Esophagitis, reflux 10/10/2012    Hyperlipidemia 10/10/2012    Primary hypertension 05/10/2012    Hypothyroidism 05/10/2012     No orders of the defined types were placed in this encounter.

## 2023-10-19 ENCOUNTER — OFFICE VISIT (OUTPATIENT)
Dept: WOUND CARE | Facility: HOSPITAL | Age: 68
End: 2023-10-19
Payer: COMMERCIAL

## 2023-10-19 DIAGNOSIS — M27.2 OSTEORADIONECROSIS OF JAW: Primary | ICD-10-CM
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
## 2023-10-19 PROCEDURE — G0277 HBOT, FULL BODY CHAMBER, 30M: HCPCS | Performed by: FAMILY MEDICINE

## 2023-10-19 NOTE — PROGRESS NOTES
HBO Treatment Course Details       Treatment Notes: Patient tolerated HBO tx well. Patient had no complaints prior to or post HBO tx. Treatment Course Number: 29  Total Treatments Ordered:  30     Diagnosis:   1. 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            HBO Treatment Details:  In-Patient Visit: no  Treatment Length:90 Minutes(Minutes)  Chamber #: Hard sided Monoplace Chamber    Pre-Treatment details:  Pre-treatment protocol Treatment Protocol: 2.5 FERDINAND X 90 minutes w/ 100% oxygen, two 5 minute air breaks  Left ear clear?: yes  Right ear clear?: yes  Left ear intact?: yes  Right ear intact?: yes                    Left ear TEED scale: Grade 0  Right ear TEED scale: Grade 0   Pretreatment heart and lung assessment: Pretreatment heart and lung auscltation unremarkable. Patient cleared for HBOT     Treatment details:  FERDINAND Rate: 2.5  Started Compression: 1003  Reached Compression: 1013  Total Compression Time: 10 (Minutes)  Total Holding Time: 100 (Minutes)  Started Decompression: 1153  Reached Surface: 4142  Total Decompression Time: 10 (Minutes)  Total Airbreaks: 5 (Minutes)  Total Time of Treatment: 115 (Minutes)  Symptoms Noted During Treatment: None (Minutes)    Post treatment details:  Left ear clear?: yes  Right ear clear?: yes  Left ears intact?: yes  Right ears intact?: yes                    Left ear TEED scale: Grade 0  Right ear TEED scale: Grade 0  Post treatment heart and lung assessment: Post treatment heart and lung auscltation unremarkable. Patient cleared for discharge. Tolerated treatment well.              Vital Signs:  HBO Glucose Reference Range: 100-350 mg/dl   Pre-Treatment Post-Treatment   Time vitals are taken: 0950 Time vitals are taken: 1203   Blood Pressure: 110/62 Blood Pressure: 124/80   Pulse: 64 Pulse: 60   Resp: 16 Resp: 16   Temp: 93.9 °F (34.4 °C) Temp: 94.5 °F (34.7 °C)             Allergies   Allergen Reactions    Dust Mite Extract Sneezing and Nasal Congestion     Tree, pollen, dust Patient Active Problem List    Diagnosis Date Noted    Dental caries, unspecified 10/05/2023    Osteomyelitis ([address]) 07/14/2023    Oropharyngeal dysphagia 05/15/2023    Coronary artery disease involving native coronary artery of native heart without angina pectoris 01/17/2023    Status post insertion of drug eluting coronary artery stent 12/28/2022    HOCM (hypertrophic obstructive cardiomyopathy) ([address]) 10/14/2022    Nonrheumatic aortic valve stenosis 09/22/2022    Throat cancer ([address]) 06/23/2022    History of head and neck cancer 06/23/2022    Acute pain of right wrist 06/15/2022    Arthralgia of multiple joints 02/29/2016    Bilateral hip pain 02/29/2016    Chronic low back pain 02/29/2016    Myalgia 07/16/9957    Folliculitis 40/43/5507    Esophagitis, reflux 10/10/2012    Hyperlipidemia 10/10/2012    Primary hypertension 05/10/2012    Hypothyroidism 05/10/2012     No orders of the defined types were placed in this encounter.

## 2023-10-20 ENCOUNTER — APPOINTMENT (OUTPATIENT)
Dept: LAB | Facility: CLINIC | Age: 68
End: 2023-10-20
Payer: COMMERCIAL

## 2023-10-20 ENCOUNTER — OFFICE VISIT (OUTPATIENT)
Dept: WOUND CARE | Facility: HOSPITAL | Age: 68
End: 2023-10-20
Payer: COMMERCIAL

## 2023-10-20 DIAGNOSIS — M86.9 OSTEOMYELITIS (HCC): ICD-10-CM

## 2023-10-20 DIAGNOSIS — M27.2 OSTEORADIONECROSIS OF JAW: ICD-10-CM
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
## 2023-10-20 LAB
BASOPHILS # BLD AUTO: 0.04 THOUSANDS/ÂΜL (ref 0–0.1)
BASOPHILS NFR BLD AUTO: 0 % (ref 0–1)
CREAT SERPL-MCNC: 0.74 MG/DL (ref 0.6–1.3)
EOSINOPHIL # BLD AUTO: 0.13 THOUSAND/ÂΜL (ref 0–0.61)
EOSINOPHIL NFR BLD AUTO: 1 % (ref 0–6)
ERYTHROCYTE [DISTWIDTH] IN BLOOD BY AUTOMATED COUNT: 14.5 % (ref 11.6–15.1)
GFR SERPL CREATININE-BSD FRML MDRD: 94 ML/MIN/1.73SQ M
HCT VFR BLD AUTO: 37.4 % (ref 36.5–49.3)
HGB BLD-MCNC: 12.2 G/DL (ref 12–17)
IMM GRANULOCYTES # BLD AUTO: 0.04 THOUSAND/UL (ref 0–0.2)
IMM GRANULOCYTES NFR BLD AUTO: 0 % (ref 0–2)
LYMPHOCYTES # BLD AUTO: 1.67 THOUSANDS/ÂΜL (ref 0.6–4.47)
LYMPHOCYTES NFR BLD AUTO: 17 % (ref 14–44)
MCH RBC QN AUTO: 30.7 PG (ref 26.8–34.3)
MCHC RBC AUTO-ENTMCNC: 32.6 G/DL (ref 31.4–37.4)
MCV RBC AUTO: 94 FL (ref 82–98)
MONOCYTES # BLD AUTO: 0.59 THOUSAND/ÂΜL (ref 0.17–1.22)
MONOCYTES NFR BLD AUTO: 6 % (ref 4–12)
NEUTROPHILS # BLD AUTO: 7.28 THOUSANDS/ÂΜL (ref 1.85–7.62)
NEUTS SEG NFR BLD AUTO: 76 % (ref 43–75)
NRBC BLD AUTO-RTO: 0 /100 WBCS
PLATELET # BLD AUTO: 263 THOUSANDS/UL (ref 149–390)
PMV BLD AUTO: 10.2 FL (ref 8.9–12.7)
RBC # BLD AUTO: 3.98 MILLION/UL (ref 3.88–5.62)
WBC # BLD AUTO: 9.75 THOUSAND/UL (ref 4.31–10.16)

## 2023-10-20 PROCEDURE — 85025 COMPLETE CBC W/AUTO DIFF WBC: CPT

## 2023-10-20 PROCEDURE — 36415 COLL VENOUS BLD VENIPUNCTURE: CPT

## 2023-10-20 PROCEDURE — 82565 ASSAY OF CREATININE: CPT

## 2023-10-20 PROCEDURE — G0277 HBOT, FULL BODY CHAMBER, 30M: HCPCS | Performed by: FAMILY MEDICINE

## 2023-10-20 NOTE — PROGRESS NOTES
HBO Treatment Course Details       Treatment Notes: Patient tolerated HBO tx well. Patient had no complaints prior to or post HBO tx. Patient completed 30/30 dives today. Treatment Course Number: 30  Total Treatments Ordered:  30     Diagnosis:   1. 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  [facility]          HBO Treatment Details:  In-Patient Visit: no  Treatment Length:90 Minutes(Minutes)  Chamber #: Hard sided Monoplace Chamber    Pre-Treatment details:  Pre-treatment protocol Treatment Protocol: 2.5 FERDINAND X 90 minutes w/ 100% oxygen, two 5 minute air breaks  Left ear clear?: yes  Right ear clear?: yes  Left ear intact?: yes  Right ear intact?: yes                    Left ear TEED scale: Grade 0  Right ear TEED scale: Grade 0   Pretreatment heart and lung assessment: Pretreatment heart and lung auscltation unremarkable. Patient cleared for HBOT     Treatment details:  FERDINAND Rate: 2.5  Started Compression: 1019  Reached Compression: 1029  Total Compression Time: 10 (Minutes)  Total Holding Time: 100 (Minutes)  Started Decompression: 1209  Reached Surface: 1519  Total Decompression Time: 10 (Minutes)  Total Airbreaks: 5 (Minutes)  Total Time of Treatment: 115 (Minutes)  Symptoms Noted During Treatment: None (Minutes)    Post treatment details:  Left ear clear?: yes  Right ear clear?: yes  Left ears intact?: yes  Right ears intact?: yes                    Left ear TEED scale: Grade 0  Right ear TEED scale: Grade 0  Post treatment heart and lung assessment: Post treatment heart and lung auscltation unremarkable. Patient cleared for discharge. Tolerated treatment well.              Vital Signs:  [facility] Glucose Reference Range: 100-350 mg/dl   Pre-Treatment Post-Treatment   Time vitals are taken: 0955 Time vitals are taken: 1219   Blood Pressure: 100/64 Blood Pressure: 130/78   Pulse: 64 Pulse: 64   Resp: 16 Resp: 16   Temp: 95.1 °F (35.1 °C) Temp: 94.8 °F (34.9 °C)             Allergies   Allergen Reactions    Dust Mite Extract Sneezing and Nasal Congestion     Tree, pollen, dust     Patient Active Problem List    Diagnosis Date Noted    Dental caries, unspecified 10/05/2023    Osteomyelitis ([address]) 07/14/2023    Oropharyngeal dysphagia 05/15/2023    Coronary artery disease involving native coronary artery of native heart without angina pectoris 01/17/2023    Status post insertion of drug eluting coronary artery stent 12/28/2022    HOCM (hypertrophic obstructive cardiomyopathy) ([address]) 10/14/2022    Nonrheumatic aortic valve stenosis 09/22/2022    Throat cancer ([address]) 06/23/2022    History of head and neck cancer 06/23/2022    Acute pain of right wrist 06/15/2022    Arthralgia of multiple joints 02/29/2016    Bilateral hip pain 02/29/2016    Chronic low back pain 02/29/2016    Myalgia 71/09/5772    Folliculitis 11/08/3591    Esophagitis, reflux 10/10/2012    Hyperlipidemia 10/10/2012    Primary hypertension 05/10/2012    Hypothyroidism 05/10/2012     No orders of the defined types were placed in this encounter.

## 2023-10-24 ENCOUNTER — OFFICE VISIT (OUTPATIENT)
Dept: INFECTIOUS DISEASES | Facility: CLINIC | Age: 68
End: 2023-10-24
Payer: COMMERCIAL

## 2023-10-24 ENCOUNTER — TELEPHONE (OUTPATIENT)
Dept: INFECTIOUS DISEASES | Facility: CLINIC | Age: 68
End: 2023-10-24

## 2023-10-24 VITALS
DIASTOLIC BLOOD PRESSURE: 73 MMHG | OXYGEN SATURATION: 95 % | HEIGHT: 71 IN | HEART RATE: 76 BPM | TEMPERATURE: 96.6 F | WEIGHT: 167.99 LBS | BODY MASS INDEX: 23.52 KG/M2 | SYSTOLIC BLOOD PRESSURE: 114 MMHG | RESPIRATION RATE: 18 BRPM

## 2023-10-24 DIAGNOSIS — C14.0 THROAT CANCER (HCC): ICD-10-CM

## 2023-10-24 DIAGNOSIS — M86.9 OSTEOMYELITIS (HCC): Primary | ICD-10-CM

## 2023-10-24 DIAGNOSIS — R13.12 OROPHARYNGEAL DYSPHAGIA: ICD-10-CM

## 2023-10-24 PROCEDURE — 99212 OFFICE O/P EST SF 10 MIN: CPT | Performed by: NURSE PRACTITIONER

## 2023-10-24 RX ORDER — AMOXICILLIN 500 MG/1
1000 TABLET, FILM COATED ORAL 3 TIMES DAILY
COMMUNITY

## 2023-10-24 NOTE — PATIENT INSTRUCTIONS
Continue oral Amoxicillin, 1g every 8 hours, through 11/15/2023. Continue follow up with [facility]. Return to the outpatient infectious disease office as needed for follow up.

## 2023-10-24 NOTE — TELEPHONE ENCOUNTER
Spoke with Ingrid Jorgensen at [facility] today to see if new notes since last time we saw patient. Patient had multiple dental extractions on October 5th. They followed up with him yesterday. They are going to fax us the note.

## 2023-12-04 ENCOUNTER — APPOINTMENT (OUTPATIENT)
Dept: LAB | Facility: CLINIC | Age: 68
End: 2023-12-04
Payer: COMMERCIAL

## 2023-12-04 DIAGNOSIS — E03.9 ACQUIRED HYPOTHYROIDISM: ICD-10-CM

## 2023-12-04 DIAGNOSIS — Z95.5 STATUS POST INSERTION OF DRUG ELUTING CORONARY ARTERY STENT: ICD-10-CM

## 2023-12-04 LAB — TSH SERPL DL<=0.05 MIU/L-ACNC: 2.06 UIU/ML (ref 0.45–4.5)

## 2023-12-04 PROCEDURE — 84443 ASSAY THYROID STIM HORMONE: CPT

## 2023-12-04 PROCEDURE — 36415 COLL VENOUS BLD VENIPUNCTURE: CPT

## 2023-12-04 RX ORDER — CLOPIDOGREL BISULFATE 75 MG/1
75 TABLET ORAL DAILY
Qty: 30 TABLET | Refills: 11 | Status: SHIPPED | OUTPATIENT
Start: 2023-12-04

## 2023-12-26 DIAGNOSIS — E78.5 HYPERLIPIDEMIA, UNSPECIFIED HYPERLIPIDEMIA TYPE: ICD-10-CM

## 2023-12-27 RX ORDER — ROSUVASTATIN CALCIUM 40 MG/1
40 TABLET, COATED ORAL DAILY
Qty: 30 TABLET | Refills: 3 | Status: SHIPPED | OUTPATIENT
Start: 2023-12-27

## 2024-01-03 ENCOUNTER — OFFICE VISIT (OUTPATIENT)
Dept: FAMILY MEDICINE CLINIC | Facility: CLINIC | Age: 69
End: 2024-01-03
Payer: COMMERCIAL

## 2024-01-03 VITALS
HEART RATE: 62 BPM | BODY MASS INDEX: 24.02 KG/M2 | HEIGHT: 71 IN | DIASTOLIC BLOOD PRESSURE: 88 MMHG | OXYGEN SATURATION: 98 % | SYSTOLIC BLOOD PRESSURE: 138 MMHG | TEMPERATURE: 97.9 F | RESPIRATION RATE: 16 BRPM | WEIGHT: 171.6 LBS

## 2024-01-03 DIAGNOSIS — R35.0 FREQUENT URINATION: ICD-10-CM

## 2024-01-03 DIAGNOSIS — R10.9 FLANK PAIN: Primary | ICD-10-CM

## 2024-01-03 PROCEDURE — 99213 OFFICE O/P EST LOW 20 MIN: CPT | Performed by: NURSE PRACTITIONER

## 2024-01-03 NOTE — PROGRESS NOTES
Assessment/Plan   Problem List Items Addressed This Visit    None  Visit Diagnoses       Flank pain    -  Primary    Relevant Orders    Comprehensive metabolic panel    CBC and differential    UA (URINE) with reflex to Scope    Urine culture    PSA, total and free    US kidney and bladder    Frequent urination        Relevant Orders    UA (URINE) with reflex to Scope    Urine culture    PSA, total and free    US kidney and bladder                  Chief Complaint   Patient presents with    Follow-up     6 month . Concerned about kidneys, lower back pain, pee all the time, about 6months now       Subjective   Patient ID: Simeon Robbins is a 68 y.o. male.    Vitals:    01/03/24 1005   BP: 138/88   Pulse: 62   Resp: 16   Temp: 97.9 °F (36.6 °C)   SpO2: 98%     Frequent urination x's 6 month to a year   Unable to void currently   Reports bilateral flank when needs to void - resolves with urination  Denies blood in urine   Will run diagnostics - and US of bladder and kidney- will order CT if necessary     Back Pain  This is a new problem. The current episode started more than 1 month ago (6 months to a year). Pain location: bilateral flank pain. The quality of the pain is described as stabbing. The pain does not radiate. The pain is at a severity of 8/10 (resolves with urination). The pain is severe. Pertinent negatives include no abdominal pain, headaches, leg pain, weakness or weight loss. Risk factors include history of cancer. He has tried nothing for the symptoms. The treatment provided no relief.       The following portions of the patient's history were reviewed and updated as appropriate: allergies, past family history, past medical history, past social history, past surgical history, and problem list.    Review of Systems   Constitutional: Negative.  Negative for weight loss.   HENT: Negative.     Eyes: Negative.    Respiratory: Negative.     Cardiovascular: Negative.    Gastrointestinal: Negative.  Negative  for abdominal pain.   Endocrine: Negative.    Genitourinary: Negative.    Musculoskeletal:  Positive for back pain.   Skin: Negative.    Allergic/Immunologic: Negative.    Neurological: Negative.  Negative for weakness and headaches.   Hematological: Negative.    Psychiatric/Behavioral: Negative.         Objective     Physical Exam  Vitals and nursing note reviewed.   Constitutional:       Appearance: He is not ill-appearing.   HENT:      Head: Normocephalic and atraumatic.      Nose: Nose normal. No congestion.   Eyes:      Extraocular Movements: Extraocular movements intact.      Conjunctiva/sclera: Conjunctivae normal.   Cardiovascular:      Rate and Rhythm: Normal rate and regular rhythm.      Heart sounds: Murmur (known sees cardiology) heard.   Pulmonary:      Effort: Pulmonary effort is normal. No respiratory distress.      Breath sounds: Normal breath sounds.   Abdominal:      Tenderness: There is right CVA tenderness and left CVA tenderness.   Musculoskeletal:         General: Normal range of motion.      Cervical back: Normal range of motion and neck supple.   Skin:     General: Skin is warm and dry.   Neurological:      Mental Status: He is alert and oriented to person, place, and time.   Psychiatric:         Mood and Affect: Mood normal.         Behavior: Behavior normal.         Thought Content: Thought content normal.         Judgment: Judgment normal.

## 2024-01-04 ENCOUNTER — APPOINTMENT (OUTPATIENT)
Dept: LAB | Facility: CLINIC | Age: 69
End: 2024-01-04
Payer: COMMERCIAL

## 2024-01-04 DIAGNOSIS — R10.9 FLANK PAIN: ICD-10-CM

## 2024-01-04 DIAGNOSIS — R35.0 FREQUENT URINATION: ICD-10-CM

## 2024-01-04 LAB
ALBUMIN SERPL BCP-MCNC: 4.1 G/DL (ref 3.5–5)
ALP SERPL-CCNC: 58 U/L (ref 34–104)
ALT SERPL W P-5'-P-CCNC: 19 U/L (ref 7–52)
ANION GAP SERPL CALCULATED.3IONS-SCNC: 8 MMOL/L
AST SERPL W P-5'-P-CCNC: 30 U/L (ref 13–39)
BACTERIA UR QL AUTO: NORMAL /HPF
BASOPHILS # BLD AUTO: 0.05 THOUSANDS/ÂΜL (ref 0–0.1)
BASOPHILS NFR BLD AUTO: 1 % (ref 0–1)
BILIRUB SERPL-MCNC: 0.38 MG/DL (ref 0.2–1)
BILIRUB UR QL STRIP: NEGATIVE
BUN SERPL-MCNC: 20 MG/DL (ref 5–25)
CALCIUM SERPL-MCNC: 9.9 MG/DL (ref 8.4–10.2)
CHLORIDE SERPL-SCNC: 97 MMOL/L (ref 96–108)
CLARITY UR: CLEAR
CO2 SERPL-SCNC: 33 MMOL/L (ref 21–32)
COLOR UR: ABNORMAL
CREAT SERPL-MCNC: 0.82 MG/DL (ref 0.6–1.3)
EOSINOPHIL # BLD AUTO: 0.13 THOUSAND/ÂΜL (ref 0–0.61)
EOSINOPHIL NFR BLD AUTO: 2 % (ref 0–6)
ERYTHROCYTE [DISTWIDTH] IN BLOOD BY AUTOMATED COUNT: 14.6 % (ref 11.6–15.1)
GFR SERPL CREATININE-BSD FRML MDRD: 90 ML/MIN/1.73SQ M
GLUCOSE P FAST SERPL-MCNC: 89 MG/DL (ref 65–99)
GLUCOSE UR STRIP-MCNC: NEGATIVE MG/DL
HCT VFR BLD AUTO: 44.8 % (ref 36.5–49.3)
HGB BLD-MCNC: 13.9 G/DL (ref 12–17)
HGB UR QL STRIP.AUTO: NEGATIVE
IMM GRANULOCYTES # BLD AUTO: 0.02 THOUSAND/UL (ref 0–0.2)
IMM GRANULOCYTES NFR BLD AUTO: 0 % (ref 0–2)
KETONES UR STRIP-MCNC: NEGATIVE MG/DL
LEUKOCYTE ESTERASE UR QL STRIP: NEGATIVE
LYMPHOCYTES # BLD AUTO: 1.58 THOUSANDS/ÂΜL (ref 0.6–4.47)
LYMPHOCYTES NFR BLD AUTO: 20 % (ref 14–44)
MCH RBC QN AUTO: 29.8 PG (ref 26.8–34.3)
MCHC RBC AUTO-ENTMCNC: 31 G/DL (ref 31.4–37.4)
MCV RBC AUTO: 96 FL (ref 82–98)
MONOCYTES # BLD AUTO: 0.86 THOUSAND/ÂΜL (ref 0.17–1.22)
MONOCYTES NFR BLD AUTO: 11 % (ref 4–12)
NEUTROPHILS # BLD AUTO: 5.24 THOUSANDS/ÂΜL (ref 1.85–7.62)
NEUTS SEG NFR BLD AUTO: 66 % (ref 43–75)
NITRITE UR QL STRIP: NEGATIVE
NON-SQ EPI CELLS URNS QL MICRO: NORMAL /HPF
NRBC BLD AUTO-RTO: 0 /100 WBCS
PH UR STRIP.AUTO: 7 [PH]
PLATELET # BLD AUTO: 224 THOUSANDS/UL (ref 149–390)
PMV BLD AUTO: 10.6 FL (ref 8.9–12.7)
POTASSIUM SERPL-SCNC: 4.5 MMOL/L (ref 3.5–5.3)
PROT SERPL-MCNC: 6.6 G/DL (ref 6.4–8.4)
PROT UR STRIP-MCNC: ABNORMAL MG/DL
RBC # BLD AUTO: 4.66 MILLION/UL (ref 3.88–5.62)
RBC #/AREA URNS AUTO: NORMAL /HPF
SODIUM SERPL-SCNC: 138 MMOL/L (ref 135–147)
SP GR UR STRIP.AUTO: 1.01 (ref 1–1.03)
UROBILINOGEN UR STRIP-ACNC: <2 MG/DL
WBC # BLD AUTO: 7.88 THOUSAND/UL (ref 4.31–10.16)
WBC #/AREA URNS AUTO: NORMAL /HPF

## 2024-01-04 PROCEDURE — 84154 ASSAY OF PSA FREE: CPT | Performed by: NURSE PRACTITIONER

## 2024-01-04 PROCEDURE — 85025 COMPLETE CBC W/AUTO DIFF WBC: CPT | Performed by: NURSE PRACTITIONER

## 2024-01-04 PROCEDURE — 80053 COMPREHEN METABOLIC PANEL: CPT

## 2024-01-04 PROCEDURE — 84153 ASSAY OF PSA TOTAL: CPT | Performed by: NURSE PRACTITIONER

## 2024-01-04 PROCEDURE — 81001 URINALYSIS AUTO W/SCOPE: CPT

## 2024-01-04 PROCEDURE — 36415 COLL VENOUS BLD VENIPUNCTURE: CPT | Performed by: NURSE PRACTITIONER

## 2024-01-04 PROCEDURE — 87086 URINE CULTURE/COLONY COUNT: CPT

## 2024-01-05 LAB
BACTERIA UR CULT: NORMAL
PSA FREE MFR SERPL: 13.5 %
PSA FREE SERPL-MCNC: 0.35 NG/ML
PSA SERPL-MCNC: 2.6 NG/ML (ref 0–4)

## 2024-01-06 DIAGNOSIS — I42.1 HOCM (HYPERTROPHIC OBSTRUCTIVE CARDIOMYOPATHY) (HCC): ICD-10-CM

## 2024-01-06 RX ORDER — BISOPROLOL FUMARATE 5 MG/1
2.5 TABLET, FILM COATED ORAL 2 TIMES DAILY
Qty: 90 TABLET | Refills: 0 | Status: SHIPPED | OUTPATIENT
Start: 2024-01-06

## 2024-01-08 NOTE — PROGRESS NOTES
"HCM Clinic Follow-up Visit - Cardiology   Simeon Robbins 68 y.o. male MRN: [id number]  Unit/Bed#:  Encounter: [id number]    Patient Active Problem List    Diagnosis Date Noted    Dental caries, unspecified 10/05/2023    Osteomyelitis ([facility]) 07/14/2023    Oropharyngeal dysphagia 05/15/2023    Coronary artery disease involving native coronary artery of native heart without angina pectoris 01/17/2023    Status post insertion of drug eluting coronary artery stent 12/28/2022    HOCM (hypertrophic obstructive cardiomyopathy) ([facility]) 10/14/2022    Nonrheumatic aortic valve stenosis 09/22/2022    Throat cancer ([facility]) 06/23/2022    History of head and neck cancer 06/23/2022    Acute pain of right wrist 06/15/2022    Arthralgia of multiple joints 02/29/2016    Bilateral hip pain 02/29/2016    Chronic low back pain 02/29/2016    Myalgia 02/29/2016    Folliculitis 08/01/2014    Esophagitis, reflux 10/10/2012    Hyperlipidemia 10/10/2012    Primary hypertension 05/10/2012    Hypothyroidism 05/10/2012     Plan: Since his last office visit on 8/7/2023 he has had several hyperbaric oxygen treatments in the setting of chronic mandibular osteomyelitis (group F strep). He underwent multiple dental extractions with mandibular bone debridement on 10/5/2023. He was continued on amoxicillin until mid-November which has now been discontinued and infectious disease feels he is currently stable from an osteomyelitis standpoint. He is active walking two miles a day and at times hiking with no cardiac complaints. He also performs stretching and strength exercises for ~20 minutes every day. He does have chronic intrascapular back pain which can be provoked at times with movement and standing for long periods of time. Stretching exercises do help with this pain at times. He will have occasional palpitations if he \"overindulges\" in alcohol or caffeine but on a daily basis he does not note these symptoms. He denies dyspnea, edema and orthopnea. He " will occasionally have lightheadedness with standing and he has been encouraged to remain well hydrated. He had a CAT scan of the neck in March 2023 which revealed less than 50% right ICA stenosis and approximately 60% left ICA stenosis which we will investigate further with carotid duplex. His Lipid panel from March 2023 shows LDL of 62 and he will remain on rosuvastatin. He is limited on the foods he can eat due to dysphagia from the prior radiation to the neck, he sticks with softer foods. His blood pressure is elevated today but he historically has white coat hypertension and his readings from home show systolics have been in the 110's-120's. EPIC review shows his BP has been controlled during recent ID and PCP office visit. An echocardiogram performed today showed:      Left Ventricle: Wall thickness is severely increased. There is severe asymmetric hypertrophy of the septal wall. The left ventricular ejection fraction is 64%. Systolic function is normal. Global longitudinal strain is normal at -18%. Wall motion is normal. Diastolic function is mildly abnormal, consistent with grade I (abnormal) relaxation. There is no LV dynamic obstruction with a peak gradient of 22.0 mmHg at rest and 31.0 mmHg with Valsalva.    Left Atrium: The atrium is mildly dilated.    Aortic Valve: The aortic valve is trileaflet. The leaflets are moderately thickened. The leaflets are moderately calcified. There is mildly reduced mobility. There is mild stenosis. The aortic valve mean gradient is 15 mmHg. The dimensionless velocity index is 0.66. The aortic valve area is 2.50 cm2.    Mitral Valve: There is mild annular calcification. There is systolic anterior motion of the anterior leaflet without late peaking gradient.    Aorta: The aortic root is mildly dilated. The ascending aorta is mildly dilated. The aortic root is 4.10 cm. The ascending aorta is 4.1 cm.    Low sodium diet reinforced. Other issues were discussed including  scheduling colonoscopy and upper endoscopy (radiation-related esophageal stricture) for which antiplatelets could be stopped for 2 days prior. He will follow up in our office in 6 months.    Physician Requesting Consult: Dylan Gaona MD  Reason for Consult / Principal Problem: HOCM       HPI: Simeon Robbins is a 68 year old male with past medical history of mild aortic valve stenosis, hyperlipidemia, hypertension, and coronary artery disease (s/p PCI for  of LAD in December 2022), head and neck cancer in 2006 (s/p surgery and radiation) who is referred for evaluation after recent diagnosis of obstructive HCM. He was seen by Dr Gaona on 09/22/2022 for evaluation of a heart murmur and complaint of exertional chest tightness. Re-interpretation of an echocardiogram performed earlier (08/08/2022) indicated severe asymmetric septal hypertrophy 1.6-1.7 cm and LAD territory regional wall motion abnormalities. Further evaluation included nuclear stress test on 09/29/2022 that showed reversible perfusion defect in the mid to apical anterior and anterior septal segments. Cardiac catheterization subsequently (10-) indicated a 99% tubular mid LAD lesion with JUAN 1-2 flow. Significant LVOT obstruction was noted during catheterization with a resting gradient of 45 mmHg that increased to 125 mmHg after a PVC. He was started on metoprolol XL 25 mg and underwent PCI of the LAD lesion on 12/28/22 with placement of a 3.25x38 mm MENDY. A cardiac MRI on 11/18/22 showed wall thickness of 18 mm in the anteroseptum, SHANNON and LVOT obstruction, normal RV size and function, and small foci of interstitial fibrosis in the basal interventricular septum, involving less than 5% of the myocardium. His symptoms improved markedly after initiation of medical therapy and PCI.      Other work up has included:     48-hour ambulatory ECG monitor (11-4-2022):  INDICATIONS: HOCM   FINDINGS:  1. A 48 hour holter monitor demonstrated normal  sinus rhythm with an average rate of 66 BPM; a minimum rate of 46 BPM; and a maximum rate of 152 BPM.  2. There were no ventricular ectopic beats.  3. There were 56 supraventricular ectopic beats, including a 36 beat run of atrial tachycardia.  4. The longest R-R interval was 1.3 seconds.  5. The patient kept a diary during holter monitor.  It demonstrated episodes of chest tightness that did not correlate to any dysrhythmia.     IMPRESSION:  Abnormal 48 hour holter monitor.   Patient in normal sinus rhythm throughout holter monitoring.  No premature ventricular contractions.  36 beat run of atrial tachycardia.  No significant pauses.  Symptoms on diary did not correlate to any dysrhythmia.      Bicycle stress echo (2-6-2023):    Left Ventricle: Left ventricular cavity size is normal. Wall thickness is severely increased. There is severe asymmetric hypertrophy of the septal wall. maximum septal wall thickness was 21 mm. The left ventricular ejection fraction is 60%. Systolic function is normal. Global longitudinal strain is reduced at -11%. There is focal akinesis of the apical anteroseptal segment. Diastolic function is abnormal. There is  outflow tract dynamic obstruction at rest with a peak gradient of 27.0 mmHg. There is outflow tract dynamic obstruction with valsalva with a peak gradient of 42.0 mmHg.    Aortic Valve: The aortic valve is trileaflet. The leaflets are moderately thickened. The leaflets are moderately calcified. There is mildly reduced mobility.    Mitral Valve: There is mild annular calcification. There is systolic anterior motion of the anterior leaflet with late peaking gradient.    Stress ECG: No ST deviation is noted. There were no arrhythmias during recovery. . The stress ECG is negative for ischemia after maximal exercise, without reproduction of symptoms.    Post Stress Echo: Left ventricle cavity has normal reduction in size post-stress. The left ventricle systolic function is  hyperdynamic post-stress. The post-stress echo showed unchanged wall motion abnormalities compared to baseline.    Echo Post Impression: Study was negative for inducible myocardial ischemia after submaximal exercise. There was no inducible high outflow tract gradients at peak exercise. Borderline aortic valve stenosis was present.          2-: Mr. Robbins was seen and evaluated with cardiology fellow, Dr. Magana. He was recently diagnosed with obstructive HCM. After being initiated on medical therapy along undergoing PCI for CAD his cardiac symptoms improved markedly. He has completed 15 cardiac rehab sessions on 2- with excellent progress. He walks daily for about an hour and works as a , which involves walking and taking stairs (2-3 flights), with no symptoms. Although his blood pressure is elevated at today's visit, he is known to have white coat syndrome.  He reports BP being better controlled when checking at home: -130s, DBP 80-90s, and HR 70-80s. These readings are on metoprolol succinate 50 mg qd. We will change metoprolol to bisoprolol 5 mg qd for even better blood pressure control. He reports weight loss in the past few years due to dietary changes associated with difficulty swallowing along with loss of taste attributed to previous radical surgery and radiation therapy for cancer. He is also undergoing work up for jaw osteonecrosis with plan to have multiple teeth extraction once he is done with DAPT. Lipid panel from 9/2022 revealed elevated total cholesterol (207) and LDL (157) levels along with low HDL (30). He has been taking rosuvastatin 40 mg qd for lipid management and we will recheck lipid panel. Today we also will initiate genetic testing to evaluate for pathogenic mutations associted with HCM and phenocopies and to help with family screening. He does have a heart murmur on examination today that appears to be consistent with aortic valve sclerosis/  stenosis. Mr. Robbins will be seen in HCM clinic in 6 month.     8-7-2023:  Since his last office visit on 2- the patient has continued to follow with the Infectious Disease team and remains on high dose amoxicillin for left mandibular osteomyelitis. He is due to start hyperbaric oxygen treatment on 8- to prepare for oral surgery on 9- with removal of 17 teeth and lower left mandibular debridement for osteomyelitis. He will require cardiac clearance which will be provided and faxed to the appropriate offices. He may proceed with these procedures with no need for updated cardiac testing.   Today the patient states he feels well from a cardiac standpoint.  He is very active walking at least 2 miles every day and does 20 to 30 minutes of stretching/weightlifting several times a week.  He can perform all of these activities with no complaints of chest discomfort, dyspnea on exertion or palpitations.  He has no lower extremity edema or orthopnea.  He does note occasional lightheadedness with standing and I have encouraged adequate hydration and asked him to start performing leg pumping exercises prior to standing.  His blood pressure is very elevated today at 176/110 but the patient has known whitecoat hypertension.  At times if he is able to relax himself during an office visit his blood pressure will normalize.  At recent PCP office visit in June his blood pressure was 134/83.  He brings me a list of his blood pressures from home and his systolic blood pressure typically runs in the 110's with diastolic blood pressure running from the 70s to 80s and heart rate in the 50s.  He does follow a low-sodium diet and only drinks 1 cup of caffeine a day.  His recent lipid panel from March 2023 does show that his LDL is now at goal which would be less than 70 in the setting of known CAD..  His current LDL is 62.  He did also have a recent CT of the neck in March of this year to evaluate for any malignancy.  No  malignancy was noted but there was evidence of bilateral carotid stenosis with there being less than 50% stenosis of the proximal right ICA and approximately 60% short segment stenosis of proximal left ICA.  At this time I do not think we need any additional testing to further investigate.  We do have risk factors for atherosclerotic disease under control and we can simply order an updated vascular study next year.  At this time the patient is stable from a cardiac standpoint and will follow-up in HCM clinic in 6 months.     Risk stratification:  Nonsustained ventricular tachycardia - No  Severe left ventricular hypertrophy (>30 mm) - No  Family history of sudden death: No  Unexplained syncope: No  LVOT obstruction: yes  Atrial fibrillation and left atrial dilation: no  Age - 67  NYHA class 1  Myocardial fibrosis - 5%  LV systolic dysfunction - No  Apical aneurysm - apical akinesis due to CAD     Review of systems:  Denies chest pain, dyspnea on exertion and palpitations; denies lower extremity edema and orthopnea; notes occasional lightheadedness with standing     Family history: Father,  at age of 64, presumably from MI, was heavy smoker, had CABG at 56. Mother,  at age of 73 from GBS. He had 4 sisters, 2 brothers. 1 sister  at age of 55 from kidney failure and one brother  at age of 65 from prostate cancer. Living sisters' age ranges from 50-67, have HTN and diabetes. Living brother is 62 and has HLD, and HTN. Mr. Robbins has 2 sons: 32 and 38 yo, both have white coat syndrome related HTN.  Denies history of sudden cardiac death in the close or distant family      Genetic testing:       Devices: none      Historical Information   Past Medical History:   Diagnosis Date    Anxiety     Cancer (HCC)     Chemotherapy follow-up examination     History of radiation therapy 2006    Hypertension      Past Surgical History:   Procedure Laterality Date    ANKLE SURGERY Left     CARDIAC CATHETERIZATION Left  10/07/2022    Procedure: Cardiac catheterization;  Surgeon: Simeon Pastor MD;  Location: [facility];  Service: Cardiology    CARDIAC CATHETERIZATION Left 10/07/2022    Procedure: Cardiac Left Heart Cath;  Surgeon: Simeon Pastor MD;  Location: [facility];  Service: Cardiology    CARDIAC CATHETERIZATION N/A 10/07/2022    Procedure: Cardiac Coronary Angiogram;  Surgeon: Simeon Pastor MD;  Location: [facility];  Service: Cardiology    CARDIAC CATHETERIZATION N/A 12/28/2022    Procedure: Cardiac pci;  Surgeon: Dada Berrios MD;  Location: [facility];  Service: Cardiology    MULTIPLE TOOTH EXTRACTIONS N/A 10/5/2023    Procedure: EXTRACTION TEETH MULTIPLE 1,2,3,4,5,6,7,8,9,10,11,12,13,14,19,20,21,22,23,26,27,28,29,30;  Surgeon: Kusum Esquivel DMD;  Location: [facility];  Service: Maxillofacial    NECK SURGERY Right     SHOULDER OPEN ROTATOR CUFF REPAIR Right     SKIN BIOPSY       Family History   Problem Relation Age of Onset    Hypertension Mother     Obesity Mother     Diabetes Father     Heart attack Father     Stroke Father     Heart disease Father     Hyperlipidemia Sister     Obesity Sister     Hypertension Sister     Diabetes Sister     Kidney disease Sister     Hyperlipidemia Brother     Hypertension Brother     Diabetes Brother     Prostate cancer Brother     Skin cancer Maternal Grandfather      Current Outpatient Medications on File Prior to Visit   Medication Sig Dispense Refill    aspirin (ECOTRIN LOW STRENGTH) 81 mg EC tablet Take 1 tablet (81 mg total) by mouth daily 30 tablet 11    bisoprolol (ZEBETA) 5 mg tablet Take 1/2 (one-half) tablet by mouth twice daily 90 tablet 0    clopidogrel (PLAVIX) 75 mg tablet Take 1 tablet (75 mg total) by mouth daily 30 tablet 11    famotidine (PEPCID) 20 mg tablet Take 1 tablet (20 mg total) by mouth in the morning 30 tablet 11    levothyroxine (Euthyrox) 150 mcg tablet Take 1 tablet (150 mcg total) by mouth daily 90 tablet 1  "   polyethylene glycol (GOLYTELY) 4000 mL solution Take 4,000 mL by mouth once for 1 dose (Patient not taking: Reported on 10/24/2023) 4000 mL 0    rosuvastatin (CRESTOR) 40 MG tablet Take 1 tablet (40 mg total) by mouth daily 30 tablet 3     No current facility-administered medications on file prior to visit.     No Known Allergies  Social History     Substance and Sexual Activity   Alcohol Use Not Currently     Social History     Substance and Sexual Activity   Drug Use Not Currently    Types: Marijuana     Social History     Tobacco Use   Smoking Status Former    Passive exposure: Never   Smokeless Tobacco Former     Objective   Vitals: Visit Vitals  BP (!) 177/119 (BP Location: Right arm, Patient Position: Sitting, Cuff Size: Standard)   Pulse 63   Ht 5' 11\" (1.803 m)   Wt 77.1 kg (170 lb)   SpO2 100%   BMI 23.71 kg/m²   Smoking Status Former   BSA 1.97 m²     Invasive Devices       Line  Duration             Arterial Sheath -- days                  Physical Exam:  GEN: Simeon Robbins appears well, alert and oriented x 3, pleasant and cooperative   HEENT: pupils equal, round, and reactive to light; extraocular muscles intact  NECK: supple, no carotid bruits   HEART: regular rhythm, normal S1 and S2, 3/6 systolic murmur noted with radiation to carotids, no clicks, gallops or rubs   LUNGS: clear to auscultation bilaterally; no wheezes, rales, or rhonchi   ABDOMEN: normal bowel sounds, soft, no tenderness, no distention  EXTREMITIES: peripheral pulses normal; no clubbing, cyanosis, or edema  NEURO: no focal findings   SKIN: normal without suspicious lesions on exposed skin    Lab Results:   Lab Results   Component Value Date    WBC 7.88 01/04/2024    RBC 4.66 01/04/2024    HGB 13.9 01/04/2024    HCT 44.8 01/04/2024    MCV 96 01/04/2024     01/04/2024    RDW 14.6 01/04/2024     Lab Results   Component Value Date     02/29/2016    K 4.5 01/04/2024    CL 97 01/04/2024    CO2 33 (H) 01/04/2024    BUN 20 " "2024    CREATININE 0.82 2024    EGFR 90 2024    GLUCOSE 112 (H) 2016    CALCIUM 9.9 2024    AST 30 2024    ALT 19 2024    ALKPHOS 58 2024    PROT 7.2 2016    BILITOT 0.5 2016     Lab Results   Component Value Date    MG 2.6 2023     Lab Results   Component Value Date    CHOL 287 (H) 2016    HDL 30 (L) 2023    TRIG 54 2023    LDLCALC 62 2023     Lab Results   Component Value Date    WQV2RZTXYXOO 2.055 2023    FREET4 0.90 2023     Imaging:   I have personally reviewed pertinent films in PACS    Cardiac testing:     Name: Simeon Robbins                       : 1955  MRN: [id number]                       Age: 68 y.o.  Patient Status: Outpatient          Gender: male  Echo stress test, exercise w/ strain    Height: 5' 11\" (1.803 m)   Weight: 79.7 kg (175 lb 12.8 oz)   BSA: 2 m²   Blood Pressure: Not recorded    Date of Study: 23   Ordering Provider: HERMELINDA Gómez   Clinical Indications: Cardiomyopathy - HOCM       Interpreting Physicians  Performing Staff   DO Claude Angelo MD Tech: Dolores Sharma RN   Support Staff: MOHINDER Zhao        Height & Weight    Height Weight BSA (Calculated - m2)          PACS Images     Show images for Echo stress test, exercise  Study Details    Study quality was adequate. The apical and parasternal views were obtained.     History    Hocm. Cad, stesnts 2022     Interpretation Summary         Left Ventricle: Left ventricular cavity size is normal. Wall thickness is severely increased. There is severe asymmetric hypertrophy of the septal wall. maximum septal wall thickness was 21 mm. The left ventricular ejection fraction is 60%. Systolic function is normal. Global longitudinal strain is reduced at -11%. There is focal akinesis of the apical anteroseptal segment. Diastolic function is abnormal. There is  outflow tract dynamic obstruction at " rest with a peak gradient of 27.0 mmHg. There is outflow tract dynamic obstruction with valsalva with a peak gradient of 42.0 mmHg.    Aortic Valve: The aortic valve is trileaflet. The leaflets are moderately thickened. The leaflets are moderately calcified. There is mildly reduced mobility.    Mitral Valve: There is mild annular calcification. There is systolic anterior motion of the anterior leaflet with late peaking gradient.    Stress ECG: No ST deviation is noted. There were no arrhythmias during recovery. . The stress ECG is negative for ischemia after maximal exercise, without reproduction of symptoms.    Post Stress Echo: Left ventricle cavity has normal reduction in size post-stress. The left ventricle systolic function is hyperdynamic post-stress. The post-stress echo showed unchanged wall motion abnormalities compared to baseline.    Echo Post Impression: Study was negative for inducible myocardial ischemia after submaximal exercise. There was no inducible high outflow tract gradients at peak exercise. Borderline aortic valve stenosis was present.     Strain was performed to quantify interventricular dyssynchrony and evaluate components of myocardial function due to HCM. Results from the utilization of Strain Analysis are listed in the report below.     Stress Findings    Stress Findings A bicycle protocol stress test was performed. Overall, the patient's exercise capacity was normal for their age. The patient reached stage 4.0 of the protocol after exercising for 7 min and 4 sec and had a maximal HR of 126 bpm (82 % of MPHR) and 5.4 METS. The patient experienced no angina during the test. The patient achieved their maximal exercise threshold. The physician requested the test to be stopped. The patient reported dyspnea and fatigue during the stress test. Symptoms began during stress and ended during recovery. Blood pressure demonstrated a hypertensive response and heart rate demonstrated a blunted  response to stress. The patient's heart rate recovery was normal.     Findings    Left Ventricle Left ventricular cavity size is normal. Wall thickness is severely increased. There is severe asymmetric hypertrophy of the septal wall. maximum septal wall thickness was 21 mm. The left ventricular ejection fraction is 60%. Systolic function is normal. Global longitudinal strain is reduced at -11%.  There is focal akinesis of the apical anteroseptal segment. Diastolic function is abnormal.  There is  outflow tract dynamic obstruction at rest with a peak gradient of 27.0 mmHg. There is outflow tract dynamic obstruction with valsalva with a peak gradient of 42.0 mmHg.   Wall Scoring Baseline     The left ventricular wall motion is normal.         Peak Stress     The left ventricular wall motion is globally hyperkinetic.            Right Ventricle Systolic function is normal. Wall thickness is normal.   Left Atrium The atrium is normal in size.   Right Atrium The atrium is normal in size.   Aortic Valve The aortic valve was not well visualized. The aortic valve is trileaflet. The leaflets are moderately thickened. The leaflets are moderately calcified. There is mildly reduced mobility.   Mitral Valve There is mild annular calcification. There is no evidence of regurgitation. There is no evidence of stenosis. There is systolic anterior motion of the anterior leaflet with late peaking gradient.   Tricuspid Valve Tricuspid valve structure is normal. There is no evidence of regurgitation. There is no evidence of stenosis.   Pulmonic Valve The pulmonic valve was not well visualized.   Ascending Aorta The aortic root is normal in size.   Pericardium There is no pericardial effusion.     Stress Echo Findings    Study Impression Study was negative for inducible myocardial ischemia after submaximal exercise. There was no inducible high outflow tract gradients at peak exercise. Borderline aortic valve stenosis was present.      Stress Measurements    Baseline Vitals   Baseline HR 88 bpm         Baseline /100 mmHg         O2 sat rest 99 %         Peak Stress Vitals   Stress peak  bpm         Post peak  mmHg         O2 sat peak 98 %         Max HR Percent 82 %         Max  bpm         Recovery Vitals   Recovery HR 93 bpm         Recovery /104 mmHg         O2 sat recovery 98 %          Exercise Data   Max  bpm         Exercise duration (min) 7 min         Exercise duration (sec) 4 sec         Estimated workload 5.4 METS         Stress Stage Reached 4         Angina Index 0         Rate Pressure Product 23,112         Stress ECG   ST Depression (mm) 0 mm              Stage Data 2/6/23  9:07 AM--2/6/23 10:09 AM    Date/Time BP HR O2 RPP Bicycle - Rodriguez Symptoms   02/06/23 0938 162/100 88 bpm 97 % 69577 -- none   02/06/23 0943 170/110 104 bpm 99 % 49860 25 none   02/06/23 0945 210/110 111 bpm 98 % 18962 50 none   02/06/23 0947 212/110 117 bpm -- 64684 75 fatigue   02/06/23 0948 -- 126 bpm -- -- 100 --   02/06/23 0949 214/100 108 bpm 98 % 96350 -- fatigue/sob   02/06/23 0951 210/110 96 bpm 98 % 20160 -- fatigue/sob   02/06/23 0952 196/110 97 bpm -- 00091 -- subsiding   02/06/23 0954 150/14 93 bpm -- 44247 -- none     Left Ventricle Measurements    Strain   GLS -11 %          Other Measurements   Peak gradient (Rest) 27 mmHg         Peak Gradient (Valsalva) 42 mmHg              Aorta Measurements    Aortic Dimensions   Ao root 3.8 cm               Exam Details    Performed Procedure Technologist Supporting Staff Performing Physician   Echo stress test, exercise w/ strain Dolores Sharma, ANGELA Schwab, RS Claude Sher MD         Appointment Date/Status Modality Department    2/6/2023     Completed [facility] STRESS 1 BE CAR NON INV           Begin Exam End Exam Begin Exam Questionnaires End Exam Questionnaires   2/6/2023  9:07 AM 2/6/2023 10:09 AM CV STRESS QUESTIONNAIRE PATIENT EDUCATION            All  Reviewers List    Dylan Gaona MD on 2/8/2023 10:29 AM   HERMELINDA Gómez on 2/7/2023  8:36 AM     Signed    Electronically signed by Myron Glasgow DO on 2/6/23 at 1159 EST   Electronically signed by Claude Sher MD on 2/6/23 at 1635 EST       Counseling / Coordination of Care  Total floor / unit time spent today 40 minutes.  Greater than 50% of total time was spent with the patient and / or family counseling and / or coordination of care.

## 2024-01-09 ENCOUNTER — HOSPITAL ENCOUNTER (OUTPATIENT)
Dept: NON INVASIVE DIAGNOSTICS | Facility: HOSPITAL | Age: 69
Discharge: HOME | End: 2024-01-09
Payer: COMMERCIAL

## 2024-01-09 ENCOUNTER — OFFICE VISIT (OUTPATIENT)
Dept: CARDIAC SURGERY | Facility: CLINIC | Age: 69
End: 2024-01-09
Payer: COMMERCIAL

## 2024-01-09 VITALS
BODY MASS INDEX: 23.94 KG/M2 | HEART RATE: 63 BPM | SYSTOLIC BLOOD PRESSURE: 138 MMHG | WEIGHT: 171 LBS | HEIGHT: 71 IN | DIASTOLIC BLOOD PRESSURE: 88 MMHG

## 2024-01-09 VITALS
WEIGHT: 170 LBS | DIASTOLIC BLOOD PRESSURE: 119 MMHG | HEART RATE: 63 BPM | SYSTOLIC BLOOD PRESSURE: 177 MMHG | BODY MASS INDEX: 23.8 KG/M2 | OXYGEN SATURATION: 100 % | HEIGHT: 71 IN

## 2024-01-09 DIAGNOSIS — I25.10 CORONARY ARTERY DISEASE INVOLVING NATIVE CORONARY ARTERY OF NATIVE HEART WITHOUT ANGINA PECTORIS: ICD-10-CM

## 2024-01-09 DIAGNOSIS — I25.119 CORONARY ARTERY DISEASE INVOLVING NATIVE CORONARY ARTERY OF NATIVE HEART WITH ANGINA PECTORIS (HCC): ICD-10-CM

## 2024-01-09 DIAGNOSIS — I42.1 HOCM (HYPERTROPHIC OBSTRUCTIVE CARDIOMYOPATHY) (HCC): Primary | ICD-10-CM

## 2024-01-09 DIAGNOSIS — I65.23 BILATERAL CAROTID ARTERY STENOSIS: ICD-10-CM

## 2024-01-09 DIAGNOSIS — E78.5 HYPERLIPIDEMIA, UNSPECIFIED HYPERLIPIDEMIA TYPE: ICD-10-CM

## 2024-01-09 DIAGNOSIS — I42.1 HOCM (HYPERTROPHIC OBSTRUCTIVE CARDIOMYOPATHY) (HCC): ICD-10-CM

## 2024-01-09 PROCEDURE — 99215 OFFICE O/P EST HI 40 MIN: CPT | Performed by: INTERNAL MEDICINE

## 2024-01-09 PROCEDURE — 93306 TTE W/DOPPLER COMPLETE: CPT

## 2024-01-09 PROCEDURE — 93306 TTE W/DOPPLER COMPLETE: CPT | Performed by: INTERNAL MEDICINE

## 2024-01-09 PROCEDURE — 93356 MYOCRD STRAIN IMG SPCKL TRCK: CPT | Performed by: INTERNAL MEDICINE

## 2024-01-09 PROCEDURE — 93356 MYOCRD STRAIN IMG SPCKL TRCK: CPT

## 2024-01-10 ENCOUNTER — HOSPITAL ENCOUNTER (OUTPATIENT)
Dept: ULTRASOUND IMAGING | Facility: HOSPITAL | Age: 69
Discharge: HOME | End: 2024-01-10
Payer: COMMERCIAL

## 2024-01-10 DIAGNOSIS — R35.0 FREQUENT URINATION: ICD-10-CM

## 2024-01-10 DIAGNOSIS — R10.9 FLANK PAIN: ICD-10-CM

## 2024-01-10 LAB
AORTIC ROOT: 4.1 CM
AORTIC VALVE MEAN VELOCITY: 18.4 M/S
APICAL FOUR CHAMBER EJECTION FRACTION: 64 %
ASCENDING AORTA: 4.1 CM
AV AREA BY CONTINUOUS VTI: 2.5 CM2
AV AREA PEAK VELOCITY: 2.5 CM2
AV LVOT MEAN GRADIENT: 7 MMHG
AV LVOT PEAK GRADIENT: 12 MMHG
AV MEAN GRADIENT: 15 MMHG
AV PEAK GRADIENT: 26 MMHG
AV VALVE AREA: 2.5 CM2
AV VELOCITY RATIO: 0.66
BSA FOR ECHO PROCEDURE: 1.97 M2
DOP CALC AO PEAK VEL: 2.57 M/S
DOP CALC AO VTI: 58.79 CM
DOP CALC LVOT AREA: 3.8 CM2
DOP CALC LVOT CARDIAC INDEX: 4.24 L/MIN/M2
DOP CALC LVOT CARDIAC OUTPUT: 8.39 L/MIN
DOP CALC LVOT DIAMETER: 2.2 CM
DOP CALC LVOT PEAK VEL VTI: 38.72 CM
DOP CALC LVOT PEAK VEL: 1.7 M/S
DOP CALC LVOT STROKE INDEX: 72.2 ML/M2
DOP CALC LVOT STROKE VOLUME: 147.11
E WAVE DECELERATION TIME: 211 MS
E/A RATIO: 0.53
FRACTIONAL SHORTENING: 31 (ref 28–44)
GLOBAL LONGITUIDAL STRAIN: -18 %
INTERVENTRICULAR SEPTUM IN DIASTOLE (PARASTERNAL SHORT AXIS VIEW): 1.7 CM
INTERVENTRICULAR SEPTUM: 1.7 CM (ref 0.6–1.1)
LAAS-AP2: 24.5 CM2
LAAS-AP4: 18.1 CM2
LEFT ATRIUM SIZE: 3.4 CM
LEFT ATRIUM VOLUME (MOD BIPLANE): 62 ML
LEFT ATRIUM VOLUME INDEX (MOD BIPLANE): 31.3 ML/M2
LEFT INTERNAL DIMENSION IN SYSTOLE: 2.4 CM (ref 2.1–4)
LEFT VENTRICULAR INTERNAL DIMENSION IN DIASTOLE: 3.5 CM (ref 3.5–6)
LEFT VENTRICULAR POSTERIOR WALL IN END DIASTOLE: 1.1 CM
LEFT VENTRICULAR STROKE VOLUME: 32 ML
LVSV (TEICH): 32 ML
MV E'TISSUE VEL-SEP: 5 CM/S
MV PEAK A VEL: 1.19 M/S
MV PEAK E VEL: 63 CM/S
MV STENOSIS PRESSURE HALF TIME: 61 MS
MV VALVE AREA P 1/2 METHOD: 3.61
RIGHT ATRIUM AREA SYSTOLE A4C: 12.9 CM2
RIGHT VENTRICLE ID DIMENSION: 3.9 CM
SINOTUBULAR JUNCTION: 3.2 CM
SL CV ECHO LV DYNAMIC OBSTRUCTION PEAK GRADIENT (REST): 22 MMHG
SL CV ECHO LV DYNAMIC OBSTRUCTION PEAK GRADIENT (VALSAL: 31 MMHG
SL CV LEFT ATRIUM LENGTH A2C: 6.2 CM
SL CV LV EF: 64
SL CV PED ECHO LEFT VENTRICLE DIASTOLIC VOLUME (MOD BIPLANE) 2D: 51 ML
SL CV PED ECHO LEFT VENTRICLE SYSTOLIC VOLUME (MOD BIPLANE) 2D: 19 ML
SL CV SINUS OF VALSALVA 2D: 4.1 CM
STJ: 3.2 CM
TR MAX PG: 25 MMHG
TR PEAK VELOCITY: 2.5 M/S
TRICUSPID ANNULAR PLANE SYSTOLIC EXCURSION: 2.3 CM
TRICUSPID VALVE PEAK REGURGITATION VELOCITY: 2.5 M/S

## 2024-01-10 PROCEDURE — 76775 US EXAM ABDO BACK WALL LIM: CPT

## 2024-01-18 DIAGNOSIS — N40.0 ENLARGED PROSTATE: Primary | ICD-10-CM

## 2024-01-19 ENCOUNTER — TELEPHONE (OUTPATIENT)
Age: 69
End: 2024-01-19

## 2024-02-01 ENCOUNTER — HOSPITAL ENCOUNTER (OUTPATIENT)
Dept: NON INVASIVE DIAGNOSTICS | Facility: HOSPITAL | Age: 69
Discharge: HOME | End: 2024-02-01
Payer: COMMERCIAL

## 2024-02-01 DIAGNOSIS — I65.23 BILATERAL CAROTID ARTERY STENOSIS: ICD-10-CM

## 2024-02-01 PROCEDURE — 93880 EXTRACRANIAL BILAT STUDY: CPT | Performed by: SURGERY

## 2024-02-01 PROCEDURE — 93880 EXTRACRANIAL BILAT STUDY: CPT

## 2024-02-09 ENCOUNTER — TELEPHONE (OUTPATIENT)
Dept: CARDIOLOGY CLINIC | Facility: CLINIC | Age: 69
End: 2024-02-09

## 2024-02-09 ENCOUNTER — TELEPHONE (OUTPATIENT)
Dept: GASTROENTEROLOGY | Facility: CLINIC | Age: 69
End: 2024-02-09

## 2024-02-09 ENCOUNTER — OFFICE VISIT (OUTPATIENT)
Dept: GASTROENTEROLOGY | Facility: CLINIC | Age: 69
End: 2024-02-09
Payer: COMMERCIAL

## 2024-02-09 VITALS
HEIGHT: 71 IN | BODY MASS INDEX: 24.22 KG/M2 | TEMPERATURE: 97.1 F | SYSTOLIC BLOOD PRESSURE: 116 MMHG | DIASTOLIC BLOOD PRESSURE: 70 MMHG | WEIGHT: 173 LBS

## 2024-02-09 DIAGNOSIS — Z12.11 COLON CANCER SCREENING: ICD-10-CM

## 2024-02-09 DIAGNOSIS — K21.00 GASTROESOPHAGEAL REFLUX DISEASE WITH ESOPHAGITIS WITHOUT HEMORRHAGE: ICD-10-CM

## 2024-02-09 DIAGNOSIS — R13.12 OROPHARYNGEAL DYSPHAGIA: Primary | ICD-10-CM

## 2024-02-09 PROCEDURE — 99214 OFFICE O/P EST MOD 30 MIN: CPT | Performed by: INTERNAL MEDICINE

## 2024-02-09 NOTE — PATIENT INSTRUCTIONS
Scheduled date of EGD/colonoscopy (as of today):04.18.24  Physician performing EGD/colonoscopy:DR NUNEZ  Location of EGD/colonoscopy:[facility]  Desired bowel prep reviewed with patient:DEANGELO  Instructions reviewed with patient by:YAMILE  Clearances:  PLAVIX

## 2024-02-09 NOTE — PROGRESS NOTES
[facility] - Outpatient Follow-up Note  Simeon Robbins 68 y.o. male MRN: [id number]  Encounter: [id number]          ASSESSMENT AND PLAN:      1. Oropharyngeal dysphagia  Likely secondary to radiation related injury to esophageal muscle rule out stricture  There is also component of dysphagia due to inability to chew because of lack of denture.  -I will schedule him for EGD with possible dilation    2. Gastroesophageal reflux disease with esophagitis without hemorrhage  He is on famotidine 20 mg daily.   I reviewed diet and lifestyle precautions. This includes limiting coffee, soda, tomatoes, citrus, fatty and spicy foods.  I recommend waiting 3 hours after dinner to lie down. I recommend eating small frequent meals as well as sleeping with head of bed elevated at night.  EGD to assess for esophagitis and Fall's esophagus    3. Colon cancer screening  Last colonoscopy was 10 years ago.  Reportedly normal  He is due for screening colonoscopy.    4.  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  Has follow-up with [facility] surgeon       Needs cardiology clearance to hold Plavix for 5 days before EGD and colonoscopy.      ______________________________________________________________________    SUBJECTIVE: 67-year-old male with history of throat cancer diagnosed in 2006 status post radiation here for follow-up visit for evaluation of dysphagia and reflux symptoms.  He has been experiencing dysphagia and reports progressive weight loss.  He attributes his dysphagia due to lack of denture as a result of osteonecrosis of jaw.  He completed treatment with hyperbaric oxygen and also follows with [facility] surgeon.    Cardiology note reviewed.  He has CAD status post PCI in December 2022-will double check with cardiology if Plavix can be held for 5 days prior to procedure      REVIEW OF SYSTEMS IS OTHERWISE  NEGATIVE.      Historical Information   Past Medical History:   Diagnosis Date    Anxiety     Cancer (HCC)     Chemotherapy follow-up examination     History of radiation therapy 2006    Hypertension      Past Surgical History:   Procedure Laterality Date    ANKLE SURGERY Left     CARDIAC CATHETERIZATION Left 10/07/2022    Procedure: Cardiac catheterization;  Surgeon: Simeon Pastor MD;  [facility];  Service: Cardiology    CARDIAC CATHETERIZATION Left 10/07/2022    Procedure: Cardiac Left Heart Cath;  Surgeon: Simeon Pastor MD;  [facility];  Service: Cardiology    CARDIAC CATHETERIZATION N/A 10/07/2022    Procedure: Cardiac Coronary Angiogram;  Surgeon: Simeon Pastor MD;  [facility];  Service: Cardiology    CARDIAC CATHETERIZATION N/A 12/28/2022    Procedure: Cardiac pci;  Surgeon: Dada Berrios MD;  [facility];  Service: Cardiology    MULTIPLE TOOTH EXTRACTIONS N/A 10/5/2023    Procedure: EXTRACTION TEETH MULTIPLE 1,2,3,4,5,6,7,8,9,10,11,12,13,14,19,20,21,22,23,26,27,28,29,30;  Surgeon: Kusum Esquivel DMD;  Location: [facility];  Service: Maxillofacial    NECK SURGERY Right     SHOULDER OPEN ROTATOR CUFF REPAIR Right     SKIN BIOPSY       Social History   Social History     Substance and Sexual Activity   Alcohol Use Not Currently     Social History     Substance and Sexual Activity   Drug Use Not Currently    Types: Marijuana     Social History     Tobacco Use   Smoking Status Former    Passive exposure: Never   Smokeless Tobacco Former     Family History   Problem Relation Age of Onset    Hypertension Mother     Obesity Mother     Diabetes Father     Heart attack Father     Stroke Father     Heart disease Father     Hyperlipidemia Sister     Obesity Sister     Hypertension Sister     Diabetes Sister     Kidney disease Sister     Hyperlipidemia Brother     Hypertension Brother     Diabetes Brother     Prostate cancer Brother      "Skin cancer Maternal Grandfather        Meds/Allergies       Current Outpatient Medications:     aspirin (ECOTRIN LOW STRENGTH) 81 mg EC tablet    bisoprolol (ZEBETA) 5 mg tablet    clopidogrel (PLAVIX) 75 mg tablet    famotidine (PEPCID) 20 mg tablet    levothyroxine (Euthyrox) 150 mcg tablet    rosuvastatin (CRESTOR) 40 MG tablet    polyethylene glycol (GOLYTELY) 4000 mL solution    No Known Allergies        Objective     Blood pressure 116/70, temperature (!) 97.1 °F (36.2 °C), temperature source Tympanic, height 5' 11\" (1.803 m), weight 78.5 kg (173 lb). Body mass index is 24.13 kg/m².      PHYSICAL EXAM:      General Appearance:   Alert, cooperative, no distress   HEENT:   Normocephalic, atraumatic, anicteric.     Neck:  Supple, symmetrical, trachea midline   Lungs:   Clear to auscultation bilaterally; no rales, rhonchi or wheezing; respirations unlabored    Heart::   Regular rate and rhythm; no murmur, rub, or gallop.   Abdomen:   Soft, non-tender, non-distended; normal bowel sounds; no masses, no organomegaly    Genitalia:   Deferred    Rectal:   Deferred    Extremities:  No cyanosis, clubbing or edema    Pulses:  2+ and symmetric    Skin:  No jaundice, rashes, or lesions    Lymph nodes:  No palpable cervical lymphadenopathy        Lab Results:   No visits with results within 1 Day(s) from this visit.   Latest known visit with results is:   Hospital Outpatient Visit on 01/09/2024   Component Date Value    BSA 01/09/2024 1.97     A4C EF 01/09/2024 64     LVOT stroke volume 01/09/2024 147.11     LVOT stroke volume index 01/09/2024 72.20     LVOT Cardiac Output 01/09/2024 8.39     LVOT Cardiac Index 01/09/2024 4.24     LVIDd 01/09/2024 3.50     LVIDS 01/09/2024 2.40     IVSd 01/09/2024 1.70     LVPWd 01/09/2024 1.10     LVOT diameter 01/09/2024 2.2     LVOT peak VTI 01/09/2024 38.72     FS 01/09/2024 31     MV E' Tissue Velocity Se* 01/09/2024 5     LA Volume Index (BP) 01/09/2024 31.3     E/A ratio 01/09/2024 " 0.53     E wave deceleration time 01/09/2024 211     MV Peak E Kirk 01/09/2024 63     MV Peak A Kirk 01/09/2024 1.19     AV LVOT peak gradient 01/09/2024 12     LVOT peak kirk 01/09/2024 1.7     RVID d 01/09/2024 3.9     Tricuspid annular plane * 01/09/2024 2.30     LA size 01/09/2024 3.4     LA length (A2C) 01/09/2024 6.20     LA volume (BP) 01/09/2024 62     RAA A4C 01/09/2024 12.9     Aortic valve peak veloci* 01/09/2024 2.57     Ao VTI 01/09/2024 58.79     AV mean gradient 01/09/2024 15     LVOT mn grad 01/09/2024 7.0     AV peak gradient 01/09/2024 26     AV area by cont VTI 01/09/2024 2.5     AV area peak kirk 01/09/2024 2.5     MV stenosis pressure 1/2* 01/09/2024 61     MV valve area p 1/2 meth* 01/09/2024 3.61     TR Peak Kirk 01/09/2024 2.5     Triscuspid Valve Regurgi* 01/09/2024 25.0     Ao root 01/09/2024 4.10     STJ 01/09/2024 3.2     Asc Ao 01/09/2024 4.1     Sinus of Valsalva, 2D 01/09/2024 4.1     Aortic valve mean veloci* 01/09/2024 18.40     Tricuspid valve peak reg* 01/09/2024 2.50     Left ventricular stroke * 01/09/2024 32.00     Ao STJ 01/09/2024 3.20     IVS 01/09/2024 1.7     LEFT VENTRICLE SYSTOLIC * 01/09/2024 19     LV DIASTOLIC VOLUME (MOD* 01/09/2024 51     Left Atrium Area-systoli* 01/09/2024 18.1     Left Atrium Area-systoli* 01/09/2024 24.5     LVSV, 2D 01/09/2024 32     Peak gradient (Rest) 01/09/2024 22.0     Peak Gradient (Valsalva) 01/09/2024 31.0     LVOT area 01/09/2024 3.80     DVI 01/09/2024 0.66     AV valve area 01/09/2024 2.50     GLS 01/09/2024 -18     LV EF 01/09/2024 64          Radiology Results:   VAS carotid complete study    Result Date: 2/1/2024  Narrative:  [facility] REPORT CLINICAL: Indications:  Plaque was noted in the carotid arteries during a CTA and physician would like them further evaluated.  Patient is asymptomatic from a cerebrovascular standpoint. Operative History: no reported cardiovascular surgeries Risk Factors The patient has history of HTN,  Hyperlipidemia and previous smoking (quit >10yrs ago). Clinical Right Pressure:  162/90 mm Hg, Left Pressure:  154/85 mm Hg.  FINDINGS:  Right        Impression  PSV  EDV (cm/s)  Direction of Flow  Ratio  Dist. ICA                 49          23                      0.63  Mid. ICA                  59          25                      0.76  Prox. ICA    1 - 49%      47          18                      0.61  Dist CCA                  90          33                            Mid CCA                   77          31                      1.32  Prox CCA                  58          21                            Ext Carotid              102          32                      1.33  Prox Vert                 42          16  Antegrade                 Subclavian                77           9                             Left         Impression  PSV  EDV (cm/s)  Direction of Flow  Ratio  Dist. ICA                 50          20                      0.71  Mid. ICA                  88          38                      1.26  Prox. ICA    1 - 49%      62          27                      0.88  Dist CCA                  66          30                            Mid CCA                   70          33                      1.10  Prox CCA                  64          27                            Ext Carotid              176          83                      2.53  Prox Vert                 42          14  Antegrade                 Subclavian                86           0                               CONCLUSION: Impression RIGHT: There is <50% stenosis noted in the internal carotid artery. Plaque is heterogenous and irregular. Vertebral artery flow is antegrade. There is no significant subclavian artery disease. LEFT: There is <50% stenosis noted in the internal carotid artery. Plaque is calcified and irregular. Vertebral artery flow is antegrade. There is no significant subclavian artery disease.  There is no previous study for comparison.   Technically difficult/limited study. Some segments may be poorly visualized on today's exam.  SIGNATURE: Electronically Signed by: CARMEN BHAKTA DO on 2024-02-01 06:06:38 PM

## 2024-02-09 NOTE — TELEPHONE ENCOUNTER
Pt has not been seen in over 1 year. Sent message on teams to the schedulers to set him up with appt.

## 2024-02-09 NOTE — TELEPHONE ENCOUNTER
Medication refill requested. Please authorize medication if appropriate.      Shamar Barajask53 minutes ago (11:59 AM)     MB  Our mutual patient is scheduled for procedure: COLONOSCOPY/EGD     On: 04/18/24      With: Dr. NUNEZ     He/She is taking the following blood thinner:       PLAVIX                                        Can this be stopped ___5___ days prior to the procedure?

## 2024-02-09 NOTE — TELEPHONE ENCOUNTER
Our mutual patient is scheduled for procedure: COLONOSCOPY/EGD    On: 04/18/24     With: Dr. NUNEZ    He/She is taking the following blood thinner:   PLAVIX       Can this be stopped ___5___ days prior to the procedure?      Physician Approving clearance: ________________________

## 2024-02-16 ENCOUNTER — OFFICE VISIT (OUTPATIENT)
Dept: FAMILY MEDICINE CLINIC | Facility: CLINIC | Age: 69
End: 2024-02-16
Payer: COMMERCIAL

## 2024-02-16 VITALS
HEART RATE: 72 BPM | TEMPERATURE: 97.7 F | HEIGHT: 71 IN | DIASTOLIC BLOOD PRESSURE: 90 MMHG | RESPIRATION RATE: 18 BRPM | OXYGEN SATURATION: 98 % | BODY MASS INDEX: 24.27 KG/M2 | WEIGHT: 173.4 LBS | SYSTOLIC BLOOD PRESSURE: 146 MMHG

## 2024-02-16 DIAGNOSIS — I10 PRIMARY HYPERTENSION: Primary | ICD-10-CM

## 2024-02-16 DIAGNOSIS — C14.0 THROAT CANCER (HCC): ICD-10-CM

## 2024-02-16 DIAGNOSIS — I25.119 CORONARY ARTERY DISEASE INVOLVING NATIVE CORONARY ARTERY OF NATIVE HEART WITH ANGINA PECTORIS (HCC): ICD-10-CM

## 2024-02-16 DIAGNOSIS — N40.1 BENIGN PROSTATIC HYPERPLASIA WITH NOCTURIA: ICD-10-CM

## 2024-02-16 DIAGNOSIS — R35.1 BENIGN PROSTATIC HYPERPLASIA WITH NOCTURIA: ICD-10-CM

## 2024-02-16 DIAGNOSIS — R80.8 OTHER PROTEINURIA: ICD-10-CM

## 2024-02-16 DIAGNOSIS — I42.1 HOCM (HYPERTROPHIC OBSTRUCTIVE CARDIOMYOPATHY) (HCC): ICD-10-CM

## 2024-02-16 DIAGNOSIS — E03.9 HYPOTHYROIDISM, UNSPECIFIED TYPE: ICD-10-CM

## 2024-02-16 PROBLEM — M86.9 OSTEOMYELITIS (HCC): Status: RESOLVED | Noted: 2023-07-14 | Resolved: 2024-02-16

## 2024-02-16 PROCEDURE — 99214 OFFICE O/P EST MOD 30 MIN: CPT | Performed by: FAMILY MEDICINE

## 2024-02-16 NOTE — PROGRESS NOTES
Simeon Robbins 1955 male MRN: [id number]    Family Medicine Follow-up Visit    ASSESSMENT/PLAN  Problem List Items Addressed This Visit          Endocrine    Hypothyroidism     Stable and controlled on levothyroxine 150 mcg daily continue as prescribed             Cardiovascular and Mediastinum    Primary hypertension - Primary     Elevated in the office today but patients home log was reviewed and was normal. Will continue medication and follow up with cardiology as scheduled          HOCM (hypertrophic obstructive cardiomyopathy) (HCC)     Follows with cardiology continue medications as prescribed         Coronary artery disease involving native coronary artery of native heart with angina pectoris (HCC)     As above, no acute changes today            Other    Throat cancer (HCC)     Treated in 2006, s/p surgery, radiation and chemo  Will be having GI follow up due to swallowing issues         Benign prostatic hyperplasia with nocturia     Aside from protein UA was clear  Ultrasound of kidney and bladder was reassuring   Will be seeing urology in 2 weeks.          Other proteinuria                Future Appointments   Date Time Provider Department Center   2/28/2024  1:30 PM Torey Morgan MD [facility]   4/18/2024 11:15 AM Caleb Ibanez MD [facility]   7/5/2024 10:00 AM DO LARISSA Gracia  [facility]          SUBJECTIVE  CC: Follow-up (6 wk, Flank pain ,patient is seeing Urologist in 2wks, Patient had US of kidneys and Prostate recently to go over)      HPI:  Simeon Robbins is a 68 y.o. male who presents for follow up.    Previously saw Tegan. Last month was ordered testing and here for review today.  Reports increased urinary frequency, nocturia, flank pain.  Does have an apt with urology in a few weeks.           HPI    Review of Systems   Constitutional:  Negative for chills and fever.   HENT:  Negative for congestion, postnasal drip, rhinorrhea and sinus pain.     Eyes:  Negative for photophobia and visual disturbance.   Respiratory:  Negative for cough and shortness of breath.    Cardiovascular:  Negative for chest pain, palpitations and leg swelling.   Gastrointestinal:  Negative for abdominal pain, constipation, diarrhea, nausea and vomiting.   Endocrine: Positive for polyuria.   Genitourinary:  Positive for frequency. Negative for difficulty urinating and dysuria.   Musculoskeletal:  Negative for arthralgias and myalgias.   Skin:  Negative for rash.   Neurological:  Negative for dizziness and syncope.       Historical Information   The patient history was reviewed as follows:    Past Medical History:   Diagnosis Date    Anxiety     Cancer (HCC)     Chemotherapy follow-up examination     History of radiation therapy 2006    Hypertension      Past Surgical History:   Procedure Laterality Date    ANKLE SURGERY Left     CARDIAC CATHETERIZATION Left 10/07/2022    Procedure: Cardiac catheterization;  Surgeon: Simeon Pastor MD;  [facility];  Service: Cardiology    CARDIAC CATHETERIZATION Left 10/07/2022    Procedure: Cardiac Left Heart Cath;  Surgeon: Simeon Pastor MD;  [facility];  Service: Cardiology    CARDIAC CATHETERIZATION N/A 10/07/2022    Procedure: Cardiac Coronary Angiogram;  Surgeon: Simeon Pastor MD;  [facility];  Service: Cardiology    CARDIAC CATHETERIZATION N/A 12/28/2022    Procedure: Cardiac pci;  Surgeon: Dada Berrios MD;  [facility];  Service: Cardiology    MULTIPLE TOOTH EXTRACTIONS N/A 10/5/2023    Procedure: EXTRACTION TEETH MULTIPLE 1,2,3,4,5,6,7,8,9,10,11,12,13,14,19,20,21,22,23,26,27,28,29,30;  Surgeon: Kusum Esquivel DMD;  Location: [facility];  Service: Maxillofacial    NECK SURGERY Right     SHOULDER OPEN ROTATOR CUFF REPAIR Right     SKIN BIOPSY       Family History   Problem Relation Age of Onset    Hypertension Mother     Obesity Mother     Diabetes Father      "Heart attack Father     Stroke Father     Heart disease Father     Hyperlipidemia Sister     Obesity Sister     Hypertension Sister     Diabetes Sister     Kidney disease Sister     Hyperlipidemia Brother     Hypertension Brother     Diabetes Brother     Prostate cancer Brother     Skin cancer Maternal Grandfather       Social History   Social History     Substance and Sexual Activity   Alcohol Use Not Currently     Social History     Substance and Sexual Activity   Drug Use Not Currently    Types: Marijuana     Social History     Tobacco Use   Smoking Status Former    Passive exposure: Never   Smokeless Tobacco Former   Tobacco Comments    Quit 35 yrs ago       Medications:     Current Outpatient Medications:     aspirin (ECOTRIN LOW STRENGTH) 81 mg EC tablet, Take 1 tablet (81 mg total) by mouth daily, Disp: 30 tablet, Rfl: 11    bisoprolol (ZEBETA) 5 mg tablet, Take 1/2 (one-half) tablet by mouth twice daily, Disp: 90 tablet, Rfl: 0    clopidogrel (PLAVIX) 75 mg tablet, Take 1 tablet (75 mg total) by mouth daily, Disp: 30 tablet, Rfl: 11    famotidine (PEPCID) 20 mg tablet, Take 1 tablet (20 mg total) by mouth in the morning, Disp: 30 tablet, Rfl: 11    levothyroxine (Euthyrox) 150 mcg tablet, Take 1 tablet (150 mcg total) by mouth daily, Disp: 90 tablet, Rfl: 1    polyethylene glycol (GOLYTELY) 4000 mL solution, Take 4,000 mL by mouth once for 1 dose (Patient taking differently: Take 4 L by mouth once Using in April), Disp: 4000 mL, Rfl: 0    rosuvastatin (CRESTOR) 40 MG tablet, Take 1 tablet (40 mg total) by mouth daily, Disp: 30 tablet, Rfl: 3  No Known Allergies    OBJECTIVE    Vitals:   Vitals:    02/16/24 1000 02/16/24 1033   BP: (!) 172/76 146/90   BP Location: Right arm    Patient Position: Sitting    Cuff Size: Standard    Pulse: 72    Resp: 18    Temp: 97.7 °F (36.5 °C)    TempSrc: Tympanic    SpO2: 98%    Weight: 78.7 kg (173 lb 6.4 oz)    Height: 5' 11\" (1.803 m)            Physical " Exam  Constitutional:       Appearance: He is well-developed.   HENT:      Head: Normocephalic and atraumatic.      Right Ear: External ear normal.      Left Ear: External ear normal.   Eyes:      Conjunctiva/sclera: Conjunctivae normal.   Cardiovascular:      Rate and Rhythm: Normal rate and regular rhythm.      Heart sounds: Murmur heard.   Pulmonary:      Effort: Pulmonary effort is normal. No respiratory distress.      Breath sounds: Normal breath sounds. No wheezing.   Abdominal:      General: There is no distension.      Palpations: Abdomen is soft.   Musculoskeletal:         General: Normal range of motion.      Cervical back: Normal range of motion and neck supple.   Skin:     General: Skin is warm and dry.   Neurological:      Mental Status: He is alert and oriented to person, place, and time.   Psychiatric:         Behavior: Behavior normal.            Labs:        Heather Gillespie DO    2/16/2024

## 2024-02-16 NOTE — ASSESSMENT & PLAN NOTE
Elevated in the office today but patients home log was reviewed and was normal. Will continue medication and follow up with cardiology as scheduled

## 2024-02-16 NOTE — ASSESSMENT & PLAN NOTE
Treated in 2006, s/p surgery, radiation and chemo  Will be having GI follow up due to swallowing issues

## 2024-02-16 NOTE — ASSESSMENT & PLAN NOTE
Aside from protein UA was clear  Ultrasound of kidney and bladder was reassuring   Will be seeing urology in 2 weeks.

## 2024-02-21 PROBLEM — Z12.11 COLON CANCER SCREENING: Status: RESOLVED | Noted: 2023-05-15 | Resolved: 2024-02-21

## 2024-02-28 ENCOUNTER — OFFICE VISIT (OUTPATIENT)
Dept: UROLOGY | Facility: AMBULATORY SURGERY CENTER | Age: 69
End: 2024-02-28
Payer: COMMERCIAL

## 2024-02-28 VITALS
SYSTOLIC BLOOD PRESSURE: 164 MMHG | HEART RATE: 57 BPM | WEIGHT: 172 LBS | DIASTOLIC BLOOD PRESSURE: 88 MMHG | OXYGEN SATURATION: 100 % | BODY MASS INDEX: 23.99 KG/M2

## 2024-02-28 DIAGNOSIS — N40.0 ENLARGED PROSTATE: Primary | ICD-10-CM

## 2024-02-28 LAB — POST-VOID RESIDUAL VOLUME, ML POC: 230 ML

## 2024-02-28 PROCEDURE — 51798 US URINE CAPACITY MEASURE: CPT | Performed by: UROLOGY

## 2024-02-28 PROCEDURE — 99204 OFFICE O/P NEW MOD 45 MIN: CPT | Performed by: UROLOGY

## 2024-02-28 RX ORDER — TAMSULOSIN HYDROCHLORIDE 0.4 MG/1
0.4 CAPSULE ORAL
Qty: 30 CAPSULE | Refills: 11 | Status: SHIPPED | OUTPATIENT
Start: 2024-02-28 | End: 2025-02-22

## 2024-02-28 NOTE — PROGRESS NOTES
2/28/2024    Simeon Robbins  1955  [id number]        Assessment  Significant lower urinary tract symptoms    Plan  Discussed potential bladder outlet obstruction.  Recommendations tamsulosin trial.  Risk, benefits, side effects reviewed.  He should try this and follow-up in about 1 month with PVR.  If doing well continue medication.  If still with elevated PVR or symptoms not improved, would plan cystoscopy and transrectal ultrasound to determine if he is a candidate for minimally invasive therapy.  He is on Plavix.    All questions answered and he agrees with the plan.      History of Present Illness  Simeon is a 68 y.o. male c/o frequency, nocturia up to 6 times per night, occasional low back pains when hasn't voided for a while. Weak stream for the past few years.  Occasionally has to double void because he feels he is not empty after about 10 to 15 minutes.  Denies any prior urologic evaluations.  No prior medications for this.  Reports history of cardiomyopathy and coronary stent placement.  He is on Plavix.    PSA 2.6.    AUA SYMPTOM SCORE      Flowsheet Row Most Recent Value   AUA SYMPTOM SCORE    How often have you had a sensation of not emptying your bladder completely after you finished urinating? 2 (P)    How often have you had to urinate again less than two hours after you finished urinating? 5 (P)    How often have you found you stopped and started again several times when you urinate? 0 (P)    How often have you found it difficult to postpone urination? 5 (P)    How often have you had a weak urinary stream? 3 (P)    How often have you had to push or strain to begin urination? 0 (P)    How many times did you most typically get up to urinate from the time you went to bed at night until the time you got up in the morning? 5 (P)    Quality of Life: If you were to spend the rest of your life with your urinary condition just the way it is now, how would you feel about that? 4 (P)    AUA SYMPTOM SCORE  20 (P)             Review of Systems  Review of Systems   Constitutional: Negative.    HENT: Negative.     Respiratory: Negative.     Cardiovascular: Negative.    Gastrointestinal: Negative.    Genitourinary:         As per HPI   Musculoskeletal: Negative.    Skin: Negative.    Neurological: Negative.    Hematological: Negative.          Past Medical History  Past Medical History:   Diagnosis Date    Anxiety     Cancer (HCC)     Chemotherapy follow-up examination     History of radiation therapy 2006    Hypertension        Past Social History  Past Surgical History:   Procedure Laterality Date    ANKLE SURGERY Left     CARDIAC CATHETERIZATION Left 10/07/2022    Procedure: Cardiac catheterization;  Surgeon: Simeon Pastor MD;  [facility];  Service: Cardiology    CARDIAC CATHETERIZATION Left 10/07/2022    Procedure: Cardiac Left Heart Cath;  Surgeon: Simeon Pastor MD;  [facility];  Service: Cardiology    CARDIAC CATHETERIZATION N/A 10/07/2022    Procedure: Cardiac Coronary Angiogram;  Surgeon: Simeon Pastor MD;  [facility];  Service: Cardiology    CARDIAC CATHETERIZATION N/A 12/28/2022    Procedure: Cardiac pci;  Surgeon: Dada Berrios MD;  [facility];  Service: Cardiology    MULTIPLE TOOTH EXTRACTIONS N/A 10/5/2023    Procedure: EXTRACTION TEETH MULTIPLE 1,2,3,4,5,6,7,8,9,10,11,12,13,14,19,20,21,22,23,26,27,28,29,30;  Surgeon: Kusum Esquivel DMD;  [facility];  Service: Maxillofacial    NECK SURGERY Right     SHOULDER OPEN ROTATOR CUFF REPAIR Right     SKIN BIOPSY         Past Family History  Family History   Problem Relation Age of Onset    Hypertension Mother     Obesity Mother     Diabetes Father     Heart attack Father     Stroke Father     Heart disease Father     Hyperlipidemia Sister     Obesity Sister     Hypertension Sister     Diabetes Sister     Kidney disease Sister     Hyperlipidemia Brother     Hypertension  Brother     Diabetes Brother     Prostate cancer Brother     Skin cancer Maternal Grandfather        Past Social history  Social History     Socioeconomic History    Marital status:      Spouse name: Not on file    Number of children: Not on file    Years of education: Not on file    Highest education level: Not on file   Occupational History    Not on file   Tobacco Use    Smoking status: Former     Passive exposure: Never    Smokeless tobacco: Former    Tobacco comments:     Quit 35 yrs ago   Vaping Use    Vaping status: Never Used   Substance and Sexual Activity    Alcohol use: Not Currently    Drug use: Not Currently     Types: Marijuana    Sexual activity: Not Currently   Other Topics Concern    Not on file   Social History Narrative    Not on file     Social Determinants of Health     Financial Resource Strain: Low Risk  (6/27/2023)    Overall Financial Resource Strain (CARDIA)     Difficulty of Paying Living Expenses: Not hard at all   Food Insecurity: Not on file   Transportation Needs: No Transportation Needs (6/27/2023)    PRAPARE - Transportation     Lack of Transportation (Medical): No     Lack of Transportation (Non-Medical): No   Physical Activity: Inactive (7/26/2022)    Exercise Vital Sign     Days of Exercise per Week: 0 days     Minutes of Exercise per Session: 0 min   Stress: No Stress Concern Present (7/26/2022)    [employer] - Occupational Stress Questionnaire     Feeling of Stress : Only a little   Social Connections: Not on file   Intimate Partner Violence: Not At Risk (2/16/2024)    Humiliation, Afraid, Rape, and Kick questionnaire     Fear of Current or Ex-Partner: No     Emotionally Abused: No     Physically Abused: No     Sexually Abused: No   Housing Stability: Not on file     Social History     Tobacco Use   Smoking Status Former    Passive exposure: Never   Smokeless Tobacco Former   Tobacco Comments    Quit 35 yrs ago       Current  Medications  Current Outpatient Medications   Medication Sig Dispense Refill    aspirin (ECOTRIN LOW STRENGTH) 81 mg EC tablet Take 1 tablet (81 mg total) by mouth daily 30 tablet 11    bisoprolol (ZEBETA) 5 mg tablet Take 1/2 (one-half) tablet by mouth twice daily 90 tablet 0    clopidogrel (PLAVIX) 75 mg tablet Take 1 tablet (75 mg total) by mouth daily 30 tablet 11    famotidine (PEPCID) 20 mg tablet Take 1 tablet (20 mg total) by mouth in the morning 30 tablet 11    levothyroxine (Euthyrox) 150 mcg tablet Take 1 tablet (150 mcg total) by mouth daily 90 tablet 1    rosuvastatin (CRESTOR) 40 MG tablet Take 1 tablet (40 mg total) by mouth daily 30 tablet 3    polyethylene glycol (GOLYTELY) 4000 mL solution Take 4,000 mL by mouth once for 1 dose (Patient taking differently: Take 4 L by mouth once Using in April) 4000 mL 0     No current facility-administered medications for this visit.       Allergies  No Known Allergies    Past Medical History, Social History, Family History, medications and allergies were reviewed.    Vitals  Vitals:    02/28/24 1331   BP: 164/88   BP Location: Left arm   Patient Position: Sitting   Cuff Size: Adult   Pulse: 57   SpO2: 100%   Weight: 78 kg (172 lb)       Physical Exam  Physical Exam  Vitals reviewed.   Constitutional:       Appearance: He is well-developed.   HENT:      Head: Normocephalic and atraumatic.   Eyes:      Conjunctiva/sclera: Conjunctivae normal.   Cardiovascular:      Rate and Rhythm: Normal rate.   Pulmonary:      Effort: Pulmonary effort is normal.   Abdominal:      Palpations: Abdomen is soft.   Genitourinary:     Comments: Prostate about 30 g, smooth, no palpable nodules.  Musculoskeletal:         General: Normal range of motion.   Skin:     General: Skin is warm and dry.   Neurological:      Mental Status: He is alert and oriented to person, place, and time.   Psychiatric:         Mood and Affect: Mood normal.           Results  Lab Results   Component Value Date     PSA 2.6 01/04/2024    PSA 2.7 02/29/2016     Lab Results   Component Value Date    GLUCOSE 112 (H) 02/29/2016    CALCIUM 9.9 01/04/2024     02/29/2016    K 4.5 01/04/2024    CO2 33 (H) 01/04/2024    CL 97 01/04/2024    BUN 20 01/04/2024    CREATININE 0.82 01/04/2024     Lab Results   Component Value Date    WBC 7.88 01/04/2024    HGB 13.9 01/04/2024    HCT 44.8 01/04/2024    MCV 96 01/04/2024     01/04/2024

## 2024-03-19 DIAGNOSIS — E03.9 ACQUIRED HYPOTHYROIDISM: ICD-10-CM

## 2024-03-19 RX ORDER — LEVOTHYROXINE SODIUM 0.15 MG/1
150 TABLET ORAL DAILY
Qty: 90 TABLET | Refills: 1 | Status: SHIPPED | OUTPATIENT
Start: 2024-03-19

## 2024-03-21 ENCOUNTER — TELEPHONE (OUTPATIENT)
Dept: CARDIOLOGY CLINIC | Facility: CLINIC | Age: 69
End: 2024-03-21

## 2024-03-21 NOTE — TELEPHONE ENCOUNTER
Shamar Barajask1 month ago     MB  Our mutual patient is scheduled for procedure: COLONOSCOPY/EGD     On: 04/18/24      With: Dr. NUNEZ     He/She is taking the following blood thinner:       PLAVIX                                        Can this be stopped ___5___ days prior to the procedure?

## 2024-03-28 ENCOUNTER — OFFICE VISIT (OUTPATIENT)
Dept: UROLOGY | Facility: AMBULATORY SURGERY CENTER | Age: 69
End: 2024-03-28
Payer: COMMERCIAL

## 2024-03-28 VITALS
HEART RATE: 55 BPM | BODY MASS INDEX: 24.69 KG/M2 | SYSTOLIC BLOOD PRESSURE: 142 MMHG | DIASTOLIC BLOOD PRESSURE: 98 MMHG | WEIGHT: 177 LBS | OXYGEN SATURATION: 98 %

## 2024-03-28 DIAGNOSIS — Z12.5 SCREENING FOR PROSTATE CANCER: ICD-10-CM

## 2024-03-28 DIAGNOSIS — N13.8 BPH WITH OBSTRUCTION/LOWER URINARY TRACT SYMPTOMS: Primary | ICD-10-CM

## 2024-03-28 DIAGNOSIS — N40.0 ENLARGED PROSTATE: ICD-10-CM

## 2024-03-28 DIAGNOSIS — N40.1 BPH WITH OBSTRUCTION/LOWER URINARY TRACT SYMPTOMS: Primary | ICD-10-CM

## 2024-03-28 LAB — POST-VOID RESIDUAL VOLUME, ML POC: 118 ML

## 2024-03-28 PROCEDURE — 99213 OFFICE O/P EST LOW 20 MIN: CPT

## 2024-03-28 PROCEDURE — 51798 US URINE CAPACITY MEASURE: CPT

## 2024-03-28 NOTE — PROGRESS NOTES
"  Assessment and plan:     BPH with LUTS  -Patient started on 0.4 mg Flomax at last visit  -PVR in office today 118 mL  -Symptoms have significantly improved  -Provided instructions to take Flomax right before bedtime to decrease chance of having orthostatic hypotension episodes  -Follow-up in 1 year to discuss urinary symptoms and PVR    2.  Prostate cancer screening  -PSA on 1/4/2024 2.6 previously 2.7 on 2/29/2016  -SCOTT at last visit revealed prostate that was smooth at and no palpable nodules, approximately 30 g  -Follow-up in 1 year with PSA and SCOTT      History of Present Illness     Simeon Robbins is a 68 y.o. male who presents today for follow-up with significant lower urinary tract symptoms related to BPH.  At the last visit he reported frequency, nocturia up to 6 times per night, weak stream for the past few years, occasional double voiding.    Today in the office he reports that his urinary symptoms have significantly improved.  He  has nocturia 1-2 times per night, urgency has significantly improved and is not bothersome to him anymore, and reports that his urinary flow and stream are much stronger.  His only complaint is that he has some dizziness when he goes from sitting to standing about 30 minutes to an hour after taking the medication he reported that he takes the Flomax around 5 PM.  I instructed him to take it right before going to bed as this will help decrease the chance of having orthostatic hypotension episodes.  He also reports that he takes a beta-blocker for his blood pressure and he sometimes experiences dizziness and lightheadedness after he takes that as well.  He does not take Flomax and the beta-blocker at the same time.  Instructed him to go from sitting to standing or lying to standing slowly to help decrease the chance of falls.      Laboratory     Lab Results   Component Value Date    BUN 20 01/04/2024    CREATININE 0.82 01/04/2024       No components found for: \"GFR\"    Lab " Results   Component Value Date    GLUCOSE 112 (H) 02/29/2016    CALCIUM 9.9 01/04/2024     02/29/2016    K 4.5 01/04/2024    CO2 33 (H) 01/04/2024    CL 97 01/04/2024       Lab Results   Component Value Date    WBC 7.88 01/04/2024    HGB 13.9 01/04/2024    HCT 44.8 01/04/2024    MCV 96 01/04/2024     01/04/2024       Lab Results   Component Value Date    PSA 2.6 01/04/2024    PSA 2.7 02/29/2016       Recent Results (from the past 1 hour(s))   POCT Measure PVR    Collection Time: 03/28/24 10:02 AM   Result Value Ref Range    POST-VOID RESIDUAL VOLUME, ML  mL       Review of Systems     Review of Systems   Constitutional:  Negative for chills and fever.   Respiratory: Negative.  Negative for cough and shortness of breath.    Cardiovascular: Negative.  Negative for chest pain.   Gastrointestinal: Negative.  Negative for abdominal distention, abdominal pain, nausea and vomiting.   Genitourinary:  Positive for urgency (significantly improved from last visit). Negative for decreased urine volume, difficulty urinating, dysuria, flank pain, frequency, hematuria, penile discharge, penile pain, penile swelling, scrotal swelling and testicular pain.   Skin: Negative.  Negative for rash.   Neurological: Negative.    Hematological:  Negative for adenopathy. Does not bruise/bleed easily.         AUA SYMPTOM SCORE      Flowsheet Row Most Recent Value   AUA SYMPTOM SCORE    How often have you had a sensation of not emptying your bladder completely after you finished urinating? 0 (P)    How often have you had to urinate again less than two hours after you finished urinating? 1 (P)    How often have you found you stopped and started again several times when you urinate? 0 (P)    How often have you found it difficult to postpone urination? 1 (P)    How often have you had a weak urinary stream? 1 (P)    How often have you had to push or strain to begin urination? 0 (P)    How many times did you most typically get up  to urinate from the time you went to bed at night until the time you got up in the morning? 1 (P)    Quality of Life: If you were to spend the rest of your life with your urinary condition just the way it is now, how would you feel about that? 2 (P)    AUA SYMPTOM SCORE 4 (P)                   Allergies     No Known Allergies    Physical Exam     Physical Exam  Vitals reviewed.   Constitutional:       Appearance: Normal appearance.   HENT:      Head: Normocephalic and atraumatic.   Eyes:      Pupils: Pupils are equal, round, and reactive to light.   Cardiovascular:      Rate and Rhythm: Normal rate.   Pulmonary:      Effort: Pulmonary effort is normal.   Musculoskeletal:      Cervical back: Normal range of motion.   Skin:     General: Skin is warm and dry.   Neurological:      General: No focal deficit present.      Mental Status: He is alert and oriented to person, place, and time.   Psychiatric:         Mood and Affect: Mood normal.         Behavior: Behavior normal.         Thought Content: Thought content normal.         Judgment: Judgment normal.         Vital Signs     Vitals:    03/28/24 0958   BP: 142/98   BP Location: Left arm   Patient Position: Sitting   Cuff Size: Adult   Pulse: 55   SpO2: 98%   Weight: 80.3 kg (177 lb)       Current Medications       Current Outpatient Medications:     aspirin (ECOTRIN LOW STRENGTH) 81 mg EC tablet, Take 1 tablet (81 mg total) by mouth daily, Disp: 30 tablet, Rfl: 11    bisoprolol (ZEBETA) 5 mg tablet, Take 1/2 (one-half) tablet by mouth twice daily, Disp: 90 tablet, Rfl: 0    clopidogrel (PLAVIX) 75 mg tablet, Take 1 tablet (75 mg total) by mouth daily, Disp: 30 tablet, Rfl: 11    famotidine (PEPCID) 20 mg tablet, Take 1 tablet (20 mg total) by mouth in the morning, Disp: 30 tablet, Rfl: 11    levothyroxine 150 mcg tablet, Take 1 tablet by mouth once daily, Disp: 90 tablet, Rfl: 1    polyethylene glycol (GOLYTELY) 4000 mL solution, Take 4,000 mL by mouth once for 1  dose (Patient taking differently: Take 4 L by mouth once Using in April), Disp: 4000 mL, Rfl: 0    rosuvastatin (CRESTOR) 40 MG tablet, Take 1 tablet (40 mg total) by mouth daily, Disp: 30 tablet, Rfl: 3    tamsulosin (FLOMAX) 0.4 mg, Take 1 capsule (0.4 mg total) by mouth daily with dinner, Disp: 30 capsule, Rfl: 11    Active Problems     Patient Active Problem List   Diagnosis    Acute pain of right wrist    Arthralgia of multiple joints    Bilateral hip pain    Primary hypertension    Chronic low back pain    Esophagitis, reflux    Folliculitis    Hyperlipidemia    Hypothyroidism    Myalgia    Throat cancer (HCC)    History of head and neck cancer    Nonrheumatic aortic valve stenosis    HOCM (hypertrophic obstructive cardiomyopathy) (HCC)    Status post insertion of drug eluting coronary artery stent    Coronary artery disease involving native coronary artery of native heart with angina pectoris (HCC)    Oropharyngeal dysphagia    Dental caries, unspecified    BPH with obstruction/lower urinary tract symptoms    Other proteinuria       Past Medical History     Past Medical History:   Diagnosis Date    Anxiety     Cancer (HCC)     Chemotherapy follow-up examination     History of radiation therapy 2006    Hypertension        Surgical History     Past Surgical History:   Procedure Laterality Date    ANKLE SURGERY Left     CARDIAC CATHETERIZATION Left 10/07/2022    Procedure: Cardiac catheterization;  Surgeon: Simeon Pastor MD;  [facility];  Service: Cardiology    CARDIAC CATHETERIZATION Left 10/07/2022    Procedure: Cardiac Left Heart Cath;  Surgeon: Simeon Pastor MD;  [facility];  Service: Cardiology    CARDIAC CATHETERIZATION N/A 10/07/2022    Procedure: Cardiac Coronary Angiogram;  Surgeon: Simeon Pastor MD;  [facility];  Service: Cardiology    CARDIAC CATHETERIZATION N/A 12/28/2022    Procedure: Cardiac pci;  Surgeon: Dada Berrios MD;  [facility];  Service: Cardiology    MULTIPLE TOOTH EXTRACTIONS N/A 10/5/2023    Procedure: EXTRACTION TEETH MULTIPLE 1,2,3,4,5,6,7,8,9,10,11,12,13,14,19,20,21,22,23,26,27,28,29,30;  Surgeon: Kusum Esquivel DMD;  [facility];  Service: Maxillofacial    NECK SURGERY Right     SHOULDER OPEN ROTATOR CUFF REPAIR Right     SKIN BIOPSY         Family History     Family History   Problem Relation Age of Onset    Hypertension Mother     Obesity Mother     Diabetes Father     Heart attack Father     Stroke Father     Heart disease Father     Hyperlipidemia Sister     Obesity Sister     Hypertension Sister     Diabetes Sister     Kidney disease Sister     Hyperlipidemia Brother     Hypertension Brother     Diabetes Brother     Prostate cancer Brother     Skin cancer Maternal Grandfather        Social History     Social History     Social History     Tobacco Use   Smoking Status Former    Passive exposure: Never   Smokeless Tobacco Former   Tobacco Comments    Quit 35 yrs ago       Past Surgical History:   Procedure Laterality Date    ANKLE SURGERY Left     CARDIAC CATHETERIZATION Left 10/07/2022    Procedure: Cardiac catheterization;  Surgeon: Simeon Pastor MD;  [facility];  Service: Cardiology    CARDIAC CATHETERIZATION Left 10/07/2022    Procedure: Cardiac Left Heart Cath;  Surgeon: Simeon Pastor MD;  [facility];  Service: Cardiology    CARDIAC CATHETERIZATION N/A 10/07/2022    Procedure: Cardiac Coronary Angiogram;  Surgeon: Simeon Pastor MD;  [facility];  Service: Cardiology    CARDIAC CATHETERIZATION N/A 12/28/2022    Procedure: Cardiac pci;  Surgeon: Dada Berrios MD;  [facility];  Service: Cardiology    MULTIPLE TOOTH EXTRACTIONS N/A 10/5/2023    Procedure: EXTRACTION TEETH MULTIPLE 1,2,3,4,5,6,7,8,9,10,11,12,13,14,19,20,21,22,23,26,27,28,29,30;  Surgeon: Kusum Esquivel DMD;  [facility];  Service:  Maxillofacial    NECK SURGERY Right     SHOULDER OPEN ROTATOR CUFF REPAIR Right     SKIN BIOPSY           The following portions of the patient's history were reviewed and updated as appropriate: allergies, current medications, past family history, past medical history, past social history, past surgical history and problem list    Please note :  Voice dictation software has been used to create this document.  There may be inadvertent transcription errors.    HERMELINDA Nj

## 2024-04-02 DIAGNOSIS — I42.1 HOCM (HYPERTROPHIC OBSTRUCTIVE CARDIOMYOPATHY) (HCC): ICD-10-CM

## 2024-04-03 RX ORDER — BISOPROLOL FUMARATE 5 MG/1
2.5 TABLET, FILM COATED ORAL 2 TIMES DAILY
Qty: 90 TABLET | Refills: 0 | Status: SHIPPED | OUTPATIENT
Start: 2024-04-03

## 2024-04-16 DIAGNOSIS — E78.5 HYPERLIPIDEMIA, UNSPECIFIED HYPERLIPIDEMIA TYPE: ICD-10-CM

## 2024-04-16 RX ORDER — ROSUVASTATIN CALCIUM 40 MG/1
40 TABLET, COATED ORAL DAILY
Qty: 30 TABLET | Refills: 0 | Status: SHIPPED | OUTPATIENT
Start: 2024-04-16

## 2024-04-18 ENCOUNTER — ANESTHESIA EVENT (OUTPATIENT)
Dept: GASTROENTEROLOGY | Facility: HOSPITAL | Age: 69
End: 2024-04-18

## 2024-04-18 ENCOUNTER — ANESTHESIA (OUTPATIENT)
Dept: GASTROENTEROLOGY | Facility: HOSPITAL | Age: 69
End: 2024-04-18

## 2024-04-18 ENCOUNTER — PREP FOR PROCEDURE (OUTPATIENT)
Dept: GASTROENTEROLOGY | Facility: CLINIC | Age: 69
End: 2024-04-18

## 2024-04-18 ENCOUNTER — HOSPITAL ENCOUNTER (OUTPATIENT)
Dept: GASTROENTEROLOGY | Facility: HOSPITAL | Age: 69
Setting detail: OUTPATIENT SURGERY
Discharge: HOME | End: 2024-04-18
Attending: INTERNAL MEDICINE | Admitting: INTERNAL MEDICINE
Payer: COMMERCIAL

## 2024-04-18 VITALS
BODY MASS INDEX: 24.5 KG/M2 | HEIGHT: 71 IN | RESPIRATION RATE: 17 BRPM | HEART RATE: 71 BPM | OXYGEN SATURATION: 100 % | SYSTOLIC BLOOD PRESSURE: 99 MMHG | DIASTOLIC BLOOD PRESSURE: 69 MMHG | TEMPERATURE: 96.7 F | WEIGHT: 175 LBS

## 2024-04-18 DIAGNOSIS — Z12.11 COLON CANCER SCREENING: ICD-10-CM

## 2024-04-18 DIAGNOSIS — R13.12 OROPHARYNGEAL DYSPHAGIA: ICD-10-CM

## 2024-04-18 DIAGNOSIS — K21.9 GASTROESOPHAGEAL REFLUX DISEASE WITHOUT ESOPHAGITIS: Primary | ICD-10-CM

## 2024-04-18 DIAGNOSIS — K21.01 GASTROESOPHAGEAL REFLUX DISEASE WITH ESOPHAGITIS AND HEMORRHAGE: ICD-10-CM

## 2024-04-18 PROCEDURE — 88305 TISSUE EXAM BY PATHOLOGIST: CPT | Performed by: PATHOLOGY

## 2024-04-18 PROCEDURE — G0121 COLON CA SCRN NOT HI RSK IND: HCPCS | Performed by: INTERNAL MEDICINE

## 2024-04-18 PROCEDURE — 43239 EGD BIOPSY SINGLE/MULTIPLE: CPT | Performed by: INTERNAL MEDICINE

## 2024-04-18 RX ORDER — OMEPRAZOLE 40 MG/1
40 CAPSULE, DELAYED RELEASE ORAL 2 TIMES DAILY
Qty: 60 CAPSULE | Refills: 3 | Status: SHIPPED | OUTPATIENT
Start: 2024-04-18

## 2024-04-18 RX ORDER — SODIUM CHLORIDE 9 MG/ML
INJECTION, SOLUTION INTRAVENOUS CONTINUOUS PRN
Status: DISCONTINUED | OUTPATIENT
Start: 2024-04-18 | End: 2024-04-18

## 2024-04-18 RX ORDER — LIDOCAINE HYDROCHLORIDE 10 MG/ML
INJECTION, SOLUTION EPIDURAL; INFILTRATION; INTRACAUDAL; PERINEURAL AS NEEDED
Status: DISCONTINUED | OUTPATIENT
Start: 2024-04-18 | End: 2024-04-18

## 2024-04-18 RX ORDER — EPHEDRINE SULFATE 50 MG/ML
INJECTION INTRAVENOUS AS NEEDED
Status: DISCONTINUED | OUTPATIENT
Start: 2024-04-18 | End: 2024-04-18

## 2024-04-18 RX ORDER — PROPOFOL 10 MG/ML
INJECTION, EMULSION INTRAVENOUS CONTINUOUS PRN
Status: DISCONTINUED | OUTPATIENT
Start: 2024-04-18 | End: 2024-04-18

## 2024-04-18 RX ORDER — PROPOFOL 10 MG/ML
INJECTION, EMULSION INTRAVENOUS AS NEEDED
Status: DISCONTINUED | OUTPATIENT
Start: 2024-04-18 | End: 2024-04-18

## 2024-04-18 RX ORDER — PHENYLEPHRINE HCL IN 0.9% NACL 1 MG/10 ML
SYRINGE (ML) INTRAVENOUS AS NEEDED
Status: DISCONTINUED | OUTPATIENT
Start: 2024-04-18 | End: 2024-04-18

## 2024-04-18 RX ADMIN — PROPOFOL 100 MG: 10 INJECTION, EMULSION INTRAVENOUS at 12:49

## 2024-04-18 RX ADMIN — EPHEDRINE SULFATE 10 MG: 50 INJECTION, SOLUTION INTRAVENOUS at 13:01

## 2024-04-18 RX ADMIN — SODIUM CHLORIDE: 0.9 INJECTION, SOLUTION INTRAVENOUS at 12:43

## 2024-04-18 RX ADMIN — LIDOCAINE HYDROCHLORIDE 50 MG: 10 INJECTION, SOLUTION EPIDURAL; INFILTRATION; INTRACAUDAL; PERINEURAL at 12:49

## 2024-04-18 RX ADMIN — Medication 100 MCG: at 12:58

## 2024-04-18 RX ADMIN — Medication 100 MCG: at 12:49

## 2024-04-18 RX ADMIN — Medication 40 MG: at 13:03

## 2024-04-18 RX ADMIN — Medication 200 MCG: at 13:10

## 2024-04-18 RX ADMIN — Medication 100 MCG: at 13:01

## 2024-04-18 RX ADMIN — PROPOFOL 120 MCG/KG/MIN: 10 INJECTION, EMULSION INTRAVENOUS at 12:50

## 2024-04-18 NOTE — ANESTHESIA PREPROCEDURE EVALUATION
Procedure:  COLONOSCOPY  EGD    Relevant Problems   CARDIO   (+) Coronary artery disease involving native coronary artery of native heart with angina pectoris (HCC)   (+) Hyperlipidemia   (+) Nonrheumatic aortic valve stenosis   (+) Primary hypertension      ENDO   (+) Hypothyroidism      GI/HEPATIC   (+) Oropharyngeal dysphagia      /RENAL   (+) BPH with obstruction/lower urinary tract symptoms      MUSCULOSKELETAL   (+) Chronic low back pain      NEURO/PSYCH   (+) Chronic low back pain   Left Ventricle: Wall thickness is severely increased. There is severe asymmetric hypertrophy of the septal wall. The left ventricular ejection fraction is 64%. Systolic function is normal. Global longitudinal strain is normal at -18%. Wall motion is normal. Diastolic function is mildly abnormal, consistent with grade I (abnormal) relaxation. There is no LV dynamic obstruction with a peak gradient of 22.0 mmHg at rest and 31.0 mmHg with Valsalva.    Left Atrium: The atrium is mildly dilated.    Aortic Valve: The aortic valve is trileaflet. The leaflets are moderately thickened. The leaflets are moderately calcified. There is mildly reduced mobility. There is mild stenosis. The aortic valve mean gradient is 15 mmHg. The dimensionless velocity index is 0.66. The aortic valve area is 2.50 cm2.    Mitral Valve: There is mild annular calcification. There is systolic anterior motion of the anterior leaflet without late peaking gradient.    Aorta: The aortic root is mildly dilated. The ascending aorta is mildly dilated. The aortic root is 4.10 cm. The ascending aorta is 4.1 cm.     Physical Exam    Airway    Mallampati score: II  TM Distance: >3 FB  Neck ROM: full     Dental       Cardiovascular  Rhythm: regular, No weak pulses    Pulmonary   No stridor    Other Findings        Anesthesia Plan  ASA Score- 3     Anesthesia Type- IV sedation with anesthesia with ASA Monitors.         Additional Monitors:     Airway Plan:             Plan Factors-    Chart reviewed.   Existing labs reviewed. Patient summary reviewed.                  Induction- intravenous.    Postoperative Plan-     Informed Consent- Anesthetic plan and risks discussed with patient.  I personally reviewed this patient with the CRNA. Discussed and agreed on the Anesthesia Plan with the CRNA..

## 2024-04-18 NOTE — H&P
History and Physical - [facility]  Simeon Robbins 68 y.o. male MRN: [id number]    HPI: Simeon Robbins is a 68 y.o. year old male who presents for egd and colonoscopy for evaluation of dysphagia and colon cancer screening       Review of Systems    Historical Information   Past Medical History:   Diagnosis Date    Anxiety     Cancer (HCC)     Chemotherapy follow-up examination     History of radiation therapy 2006    Hypertension      Past Surgical History:   Procedure Laterality Date    ANKLE SURGERY Left     CARDIAC CATHETERIZATION Left 10/07/2022    Procedure: Cardiac catheterization;  Surgeon: Simeon Pastor MD;  [facility];  Service: Cardiology    CARDIAC CATHETERIZATION Left 10/07/2022    Procedure: Cardiac Left Heart Cath;  Surgeon: Simeon Pastor MD;  [facility];  Service: Cardiology    CARDIAC CATHETERIZATION N/A 10/07/2022    Procedure: Cardiac Coronary Angiogram;  Surgeon: Simeon Pastor MD;  [facility];  Service: Cardiology    CARDIAC CATHETERIZATION N/A 12/28/2022    Procedure: Cardiac pci;  Surgeon: Dada Berrios MD;  [facility];  Service: Cardiology    MULTIPLE TOOTH EXTRACTIONS N/A 10/5/2023    Procedure: EXTRACTION TEETH MULTIPLE 1,2,3,4,5,6,7,8,9,10,11,12,13,14,19,20,21,22,23,26,27,28,29,30;  Surgeon: Kusum Esquivel DMD;  [facility];  Service: Maxillofacial    NECK SURGERY Right     SHOULDER OPEN ROTATOR CUFF REPAIR Right     SKIN BIOPSY       Social History   Social History     Substance and Sexual Activity   Alcohol Use Not Currently     Social History     Substance and Sexual Activity   Drug Use Not Currently    Types: Marijuana     Social History     Tobacco Use   Smoking Status Former    Passive exposure: Never   Smokeless Tobacco Former   Tobacco Comments    Quit 35 yrs ago     Family History   Problem Relation Age of Onset    Hypertension Mother     Obesity Mother     Diabetes  Father     Heart attack Father     Stroke Father     Heart disease Father     Hyperlipidemia Sister     Obesity Sister     Hypertension Sister     Diabetes Sister     Kidney disease Sister     Hyperlipidemia Brother     Hypertension Brother     Diabetes Brother     Prostate cancer Brother     Skin cancer Maternal Grandfather        Meds/Allergies     (Not in a hospital admission)      No Known Allergies    Objective     There were no vitals taken for this visit.      PHYSICAL EXAM    Gen: NAD  CV: RRR  CHEST: Clear  ABD: soft, NT/ND  EXT: no edema  Neuro: AAO      ASSESSMENT/PLAN:  This is a 68 y.o. year old male here for egd and colonoscopy for evaluation of dysphagia and colon cancer screening       PLAN:   Procedure: egd and colonoscopy for evaluation of dysphagia and colon cancer screening

## 2024-04-18 NOTE — ANESTHESIA POSTPROCEDURE EVALUATION
Post-Op Assessment Note    CV Status:  Stable  Pain Score: 0    Pain management: adequate       Mental Status:  Sleepy and arousable   Hydration Status:  Euvolemic   PONV Controlled:  Controlled   Airway Patency:  Patent     Post Op Vitals Reviewed: Yes    No anethesia notable event occurred.    Staff: Anesthesiologist, CRNA   Comments: Report given to recovering RN, VSS. Pt resting comfortably            /60 (04/18/24 1322)    Temp (!) 96.7 °F (35.9 °C) (04/18/24 1322)    Pulse 60 (04/18/24 1322)   Resp 16 (04/18/24 1322)    SpO2 98 % (04/18/24 1322)

## 2024-04-22 ENCOUNTER — TELEPHONE (OUTPATIENT)
Dept: GASTROENTEROLOGY | Facility: CLINIC | Age: 69
End: 2024-04-22

## 2024-04-22 NOTE — TELEPHONE ENCOUNTER
Phone call to pt, stating ok to take omeprazole, per cardiologist, pt aware, states needs recall EGD in 2-3 months.

## 2024-04-22 NOTE — TELEPHONE ENCOUNTER
----- Message from Caleb Ibanez MD sent at 4/22/2024  8:13 AM EDT -----  Please let him know I reached out to his cardiologist and he said it's safe to continue omeprazole for erosive esophagitis. He is not concerned about drug interaction.    Thank you    Caleb   ----- Message -----  From: Claude Sher MD  Sent: 4/19/2024   3:31 PM EDT  To: Caleb Ibanez MD    Hi  No need to be concerned about the interaction  Please go ahead and treat him  Thanks  ----- Message -----  From: Caleb Ibanez MD  Sent: 4/18/2024   2:39 PM EDT  To: Claude Sher MD    Hi Dr. Sher,    We have this mutual patient and he has erosive esophagitis I want to treat him with a PPI, but I know he's on plavix and there was concern for drug interaction. I don't believe the interaction is clinically significant but I want to hear your input. I know some studies showed less interaction with Protonix.   Do you have any recommendations?    Thank you,  Caleb

## 2024-04-23 PROCEDURE — 88305 TISSUE EXAM BY PATHOLOGIST: CPT | Performed by: PATHOLOGY

## 2024-04-23 NOTE — TELEPHONE ENCOUNTER
Scheduled date of EGD(as of today): 7/25/24    Physician performing EGD: Dr. Ibanez    Location of EGD: [facility]

## 2024-04-24 ENCOUNTER — TELEPHONE (OUTPATIENT)
Dept: GASTROENTEROLOGY | Facility: CLINIC | Age: 69
End: 2024-04-24

## 2024-04-24 NOTE — TELEPHONE ENCOUNTER
Our mutual patient is scheduled for procedure:     On: __07__/_ 25   _/_ 24   _      With: Dr. Orr________    He/She is taking the following blood thinner:   Plavix       Can this be stopped ___5___ days prior to the procedure?      Physician Approving clearance: ________________________

## 2024-04-29 ENCOUNTER — TELEPHONE (OUTPATIENT)
Dept: GASTROENTEROLOGY | Facility: CLINIC | Age: 69
End: 2024-04-29

## 2024-04-29 NOTE — TELEPHONE ENCOUNTER
Our mutual patient is scheduled for procedure: EGD    On: _07___/_25    _/_24    _      With: Dr. Orr________    He/She is taking the following blood thinner:  Plavix         Can this be stopped __5__ days prior to the procedure?      Physician Approving clearance: ________________________

## 2024-04-30 NOTE — TELEPHONE ENCOUNTER
Caleb Ibanez MD with  GI requesting that patient may hold Plavix 5 days prior to EGD scheduled 7/25/24.    Are you okay with Plavix 5 day hold?    Please advise.

## 2024-05-01 NOTE — TELEPHONE ENCOUNTER
Called patient & communicated Dr Gaona's orders for Plavix hold & continue taking ASA.    Patient verbalized understanding.    Advised patient of need to schedule a F/U OV with Dr Gaona.

## 2024-05-09 NOTE — TELEPHONE ENCOUNTER
Called patient and he said he spoke to someone at [facility] & he does not need to see Dr. Gaona and will call if he needs us in the future.

## 2024-05-17 DIAGNOSIS — E78.5 HYPERLIPIDEMIA, UNSPECIFIED HYPERLIPIDEMIA TYPE: ICD-10-CM

## 2024-05-17 RX ORDER — ROSUVASTATIN CALCIUM 40 MG/1
40 TABLET, COATED ORAL DAILY
Qty: 30 TABLET | Refills: 0 | Status: SHIPPED | OUTPATIENT
Start: 2024-05-17

## 2024-06-17 DIAGNOSIS — E78.5 HYPERLIPIDEMIA, UNSPECIFIED HYPERLIPIDEMIA TYPE: ICD-10-CM

## 2024-06-17 RX ORDER — ROSUVASTATIN CALCIUM 40 MG/1
40 TABLET, COATED ORAL DAILY
Qty: 30 TABLET | Refills: 0 | Status: SHIPPED | OUTPATIENT
Start: 2024-06-17

## 2024-06-27 ENCOUNTER — RA CDI HCC (OUTPATIENT)
Dept: OTHER | Facility: HOSPITAL | Age: 69
End: 2024-06-27

## 2024-06-27 PROBLEM — M25.531 ACUTE PAIN OF RIGHT WRIST: Status: RESOLVED | Noted: 2022-06-15 | Resolved: 2024-06-27

## 2024-06-29 DIAGNOSIS — I42.1 HOCM (HYPERTROPHIC OBSTRUCTIVE CARDIOMYOPATHY) (HCC): ICD-10-CM

## 2024-06-29 RX ORDER — BISOPROLOL FUMARATE 5 MG/1
2.5 TABLET, FILM COATED ORAL 2 TIMES DAILY
Qty: 30 TABLET | Refills: 0 | Status: SHIPPED | OUTPATIENT
Start: 2024-06-29

## 2024-07-01 ENCOUNTER — TELEPHONE (OUTPATIENT)
Age: 69
End: 2024-07-01

## 2024-07-01 NOTE — TELEPHONE ENCOUNTER
Pt checking to see if he had any outstanding blood work to complete before his next appt. Informed pt he did not.

## 2024-07-02 DIAGNOSIS — E78.5 HYPERLIPIDEMIA, UNSPECIFIED HYPERLIPIDEMIA TYPE: ICD-10-CM

## 2024-07-03 RX ORDER — ROSUVASTATIN CALCIUM 40 MG/1
40 TABLET, COATED ORAL DAILY
Qty: 30 TABLET | Refills: 0 | Status: SHIPPED | OUTPATIENT
Start: 2024-07-03

## 2024-07-05 ENCOUNTER — OFFICE VISIT (OUTPATIENT)
Dept: FAMILY MEDICINE CLINIC | Facility: CLINIC | Age: 69
End: 2024-07-05
Payer: COMMERCIAL

## 2024-07-05 VITALS
HEIGHT: 71 IN | DIASTOLIC BLOOD PRESSURE: 70 MMHG | SYSTOLIC BLOOD PRESSURE: 118 MMHG | BODY MASS INDEX: 24.42 KG/M2 | OXYGEN SATURATION: 99 % | RESPIRATION RATE: 17 BRPM | HEART RATE: 75 BPM | TEMPERATURE: 96.6 F | WEIGHT: 174.4 LBS

## 2024-07-05 DIAGNOSIS — Z00.00 MEDICARE ANNUAL WELLNESS VISIT, SUBSEQUENT: Primary | ICD-10-CM

## 2024-07-05 DIAGNOSIS — I10 PRIMARY HYPERTENSION: ICD-10-CM

## 2024-07-05 PROCEDURE — G0439 PPPS, SUBSEQ VISIT: HCPCS | Performed by: FAMILY MEDICINE

## 2024-07-05 NOTE — PROGRESS NOTES
Ambulatory Visit  Name: Simeon Robbins      : 1955      MRN: [id number]  Encounter Provider: Heather Gillespie DO  Encounter Date: 2024   Encounter department: [facility]    Assessment & Plan   1. Medicare annual wellness visit, subsequent  -     Lipid panel; Future  -     Comprehensive metabolic panel; Future  -     CBC and differential; Future  -     TSH, 3rd generation with Free T4 reflex; Future  2. Primary hypertension  -     Lipid panel; Future  -     Comprehensive metabolic panel; Future  -     CBC and differential; Future  -     TSH, 3rd generation with Free T4 reflex; Future     Preventive health issues were discussed with patient, and age appropriate screening tests were ordered as noted in patient's After Visit Summary. Personalized health advice and appropriate referrals for health education or preventive services given if needed, as noted in patient's After Visit Summary.    History of Present Illness     HPI   Patient Care Team:  Heather Gillespie DO as PCP - General (Family Medicine)    Review of Systems   Constitutional:  Negative for chills and fever.   HENT:  Negative for congestion, postnasal drip, rhinorrhea and sinus pain.    Eyes:  Negative for photophobia and visual disturbance.   Respiratory:  Negative for cough and shortness of breath.    Cardiovascular:  Negative for chest pain, palpitations and leg swelling.   Gastrointestinal:  Negative for abdominal pain, constipation, diarrhea, nausea and vomiting.   Genitourinary:  Negative for difficulty urinating and dysuria.   Musculoskeletal:  Negative for arthralgias and myalgias.   Skin:  Negative for rash.   Neurological:  Negative for dizziness and syncope.     Medical History Reviewed by provider this encounter:       Annual Wellness Visit Questionnaire   Simeon is here for his Subsequent Wellness visit. Last Medicare Wellness visit information reviewed, patient interviewed, no change since last  AWV.     Health Risk Assessment:   Patient rates overall health as good. Patient feels that their physical health rating is same. Patient is satisfied with their life. Eyesight was rated as same. Hearing was rated as same. Patient feels that their emotional and mental health rating is same. Patients states they are never, rarely angry. Patient states they are never, rarely unusually tired/fatigued. Pain experienced in the last 7 days has been none. Patient states that he has experienced no weight loss or gain in last 6 months.     Depression Screening:   PHQ-2 Score: 0      Fall Risk Screening:   In the past year, patient has experienced: history of falling in past year    Number of falls: 1  Injured during fall?: No    Feels unsteady when standing or walking?: No    Worried about falling?: No      Home Safety:  Patient does not have trouble with stairs inside or outside of their home. Patient has working smoke alarms and has working carbon monoxide detector. Home safety hazards include: none.     Nutrition:   Current diet is Regular.     Medications:   Patient is not currently taking any over-the-counter supplements. Patient is able to manage medications.     Activities of Daily Living (ADLs)/Instrumental Activities of Daily Living (IADLs):   Walk and transfer into and out of bed and chair?: Yes  Dress and groom yourself?: Yes    Bathe or shower yourself?: Yes    Feed yourself? Yes  Do your laundry/housekeeping?: Yes  Manage your money, pay your bills and track your expenses?: Yes  Make your own meals?: Yes    Do your own shopping?: Yes    Previous Hospitalizations:   Any hospitalizations or ED visits within the last 12 months?: Yes    How many hospitalizations have you had in the last year?: 1-2    Advance Care Planning:   Living will: No    Advanced directive counseling given: Yes      Cognitive Screening:   Provider or family/friend/caregiver concerned regarding cognition?: No    PREVENTIVE SCREENINGS       "Cardiovascular Screening:    General: Screening Not Indicated, History Lipid Disorder and Risks and Benefits Discussed    Due for: Lipid Panel      Diabetes Screening:     General: Screening Current      Colorectal Cancer Screening:     General: Screening Current      Prostate Cancer Screening:    General: Screening Current      Abdominal Aortic Aneurysm (AAA) Screening:    Risk factors include: age between 65-76 yo and tobacco use        Lung Cancer Screening:     General: Screening Not Indicated      Hepatitis C Screening:    General: Screening Current    Screening, Brief Intervention, and Referral to Treatment (SBIRT)    Screening  Typical number of drinks in a day: 0  Typical number of drinks in a week: 0  Interpretation: Low risk drinking behavior.    Single Item Drug Screening:  How often have you used an illegal drug (including marijuana) or a prescription medication for non-medical reasons in the past year? never    Single Item Drug Screen Score: 0  Interpretation: Negative screen for possible drug use disorder    Social Determinants of Health     Financial Resource Strain: Low Risk  (6/27/2023)    Overall Financial Resource Strain (CARDIA)     Difficulty of Paying Living Expenses: Not hard at all   Transportation Needs: No Transportation Needs (6/27/2023)    PRAPARE - Transportation     Lack of Transportation (Medical): No     Lack of Transportation (Non-Medical): No     No results found.    Objective     /70   Pulse 75   Temp (!) 96.6 °F (35.9 °C) (Tympanic)   Resp 17   Ht 5' 11\" (1.803 m)   Wt 79.1 kg (174 lb 6.4 oz)   SpO2 99%   BMI 24.32 kg/m²     Physical Exam  Constitutional:       General: He is not in acute distress.     Appearance: Normal appearance. He is not ill-appearing, toxic-appearing or diaphoretic.   HENT:      Head: Normocephalic and atraumatic.      Right Ear: Tympanic membrane and ear canal normal.      Left Ear: Tympanic membrane and ear canal normal.      Nose: Nose " normal. No congestion.      Mouth/Throat:      Mouth: Mucous membranes are moist.      Pharynx: Oropharynx is clear. No oropharyngeal exudate.   Eyes:      Extraocular Movements: Extraocular movements intact.      Conjunctiva/sclera: Conjunctivae normal.      Pupils: Pupils are equal, round, and reactive to light.   Cardiovascular:      Rate and Rhythm: Normal rate and regular rhythm.      Pulses: Normal pulses.      Heart sounds: No murmur heard.  Pulmonary:      Effort: Pulmonary effort is normal.      Breath sounds: Normal breath sounds. No wheezing, rhonchi or rales.   Abdominal:      General: Bowel sounds are normal. There is no distension.      Palpations: Abdomen is soft.      Tenderness: There is no abdominal tenderness.   Musculoskeletal:         General: No swelling or tenderness. Normal range of motion.      Cervical back: Normal range of motion and neck supple.   Skin:     General: Skin is warm and dry.      Capillary Refill: Capillary refill takes less than 2 seconds.   Neurological:      General: No focal deficit present.      Mental Status: He is alert and oriented to person, place, and time.      Cranial Nerves: No cranial nerve deficit.   Psychiatric:         Mood and Affect: Mood normal.         Behavior: Behavior normal.         Thought Content: Thought content normal.

## 2024-07-05 NOTE — PATIENT INSTRUCTIONS
Medicare Preventive Visit Patient Instructions  Thank you for completing your Welcome to Medicare Visit or Medicare Annual Wellness Visit today. Your next wellness visit will be due in one year (7/6/2025).  The screening/preventive services that you may require over the next 5-10 years are detailed below. Some tests may not apply to you based off risk factors and/or age. Screening tests ordered at today's visit but not completed yet may show as past due. Also, please note that scanned in results may not display below.  Preventive Screenings:  Service Recommendations Previous Testing/Comments   Colorectal Cancer Screening  Colonoscopy    Fecal Occult Blood Test (FOBT)/Fecal Immunochemical Test (FIT)  Fecal DNA/Cologuard Test  Flexible Sigmoidoscopy Age: 45-75 years old   Colonoscopy: every 10 years (May be performed more frequently if at higher risk)  OR  FOBT/FIT: every 1 year  OR  Cologuard: every 3 years  OR  Sigmoidoscopy: every 5 years  Screening may be recommended earlier than age 45 if at higher risk for colorectal cancer. Also, an individualized decision between you and your healthcare provider will decide whether screening between the ages of 76-85 would be appropriate. Colonoscopy: 04/18/2024  FOBT/FIT: Not on file  Cologuard: Not on file  Sigmoidoscopy: Not on file          Prostate Cancer Screening Individualized decision between patient and health care provider in men between ages of 55-69   Medicare will cover every 12 months beginning on the day after your 50th birthday PSA: 2.6 ng/mL           Hepatitis C Screening Once for adults born between 1945 and 1965  More frequently in patients at high risk for Hepatitis C Hep C Antibody: Not on file        Diabetes Screening 1-2 times per year if you're at risk for diabetes or have pre-diabetes Fasting glucose: 89 mg/dL (1/4/2024)  A1C: No results in last 5 years (No results in last 5 years)      Cholesterol Screening Once every 5 years if you don't have a  lipid disorder. May order more often based on risk factors. Lipid panel: 03/04/2023         Other Preventive Screenings Covered by Medicare:  Abdominal Aortic Aneurysm (AAA) Screening: covered once if your at risk. You're considered to be at risk if you have a family history of AAA or a male between the age of 65-75 who smoking at least 100 cigarettes in your lifetime.  Lung Cancer Screening: covers low dose CT scan once per year if you meet all of the following conditions: (1) Age 55-77; (2) No signs or symptoms of lung cancer; (3) Current smoker or have quit smoking within the last 15 years; (4) You have a tobacco smoking history of at least 20 pack years (packs per day x number of years you smoked); (5) You get a written order from a healthcare provider.  Glaucoma Screening: covered annually if you're considered high risk: (1) You have diabetes OR (2) Family history of glaucoma OR (3)  aged 50 and older OR (4)  American aged 65 and older  Osteoporosis Screening: covered every 2 years if you meet one of the following conditions: (1) Have a vertebral abnormality; (2) On glucocorticoid therapy for more than 3 months; (3) Have primary hyperparathyroidism; (4) On osteoporosis medications and need to assess response to drug therapy.  HIV Screening: covered annually if you're between the age of 15-65. Also covered annually if you are younger than 15 and older than 65 with risk factors for HIV infection. For pregnant patients, it is covered up to 3 times per pregnancy.    Immunizations:  Immunization Recommendations   Influenza Vaccine Annual influenza vaccination during flu season is recommended for all persons aged >= 6 months who do not have contraindications   Pneumococcal Vaccine   * Pneumococcal conjugate vaccine = PCV13 (Prevnar 13), PCV15 (Vaxneuvance), PCV20 (Prevnar 20)  * Pneumococcal polysaccharide vaccine = PPSV23 (Pneumovax) Adults 19-63 yo with certain risk factors or if 65+ yo  If  never received any pneumonia vaccine: recommend Prevnar 20 (PCV20)  Give PCV20 if previously received 1 dose of PCV13 or PPSV23   Hepatitis B Vaccine 3 dose series if at intermediate or high risk (ex: diabetes, end stage renal disease, liver disease)   Respiratory syncytial virus (RSV) Vaccine - COVERED BY MEDICARE PART D  * RSVPreF3 (Arexvy) CDC recommends that adults 60 years of age and older may receive a single dose of RSV vaccine using shared clinical decision-making (SCDM)   Tetanus (Td) Vaccine - COST NOT COVERED BY MEDICARE PART B Following completion of primary series, a booster dose should be given every 10 years to maintain immunity against tetanus. Td may also be given as tetanus wound prophylaxis.   Tdap Vaccine - COST NOT COVERED BY MEDICARE PART B Recommended at least once for all adults. For pregnant patients, recommended with each pregnancy.   Shingles Vaccine (Shingrix) - COST NOT COVERED BY MEDICARE PART B  2 shot series recommended in those 19 years and older who have or will have weakened immune systems or those 50 years and older     Health Maintenance Due:      Topic Date Due   • Hepatitis C Screening  09/11/2025 (Originally 1955)   • Colorectal Cancer Screening  04/16/2034     Immunizations Due:      Topic Date Due   • Influenza Vaccine (1) 09/01/2024     Advance Directives   What are advance directives?  Advance directives are legal documents that state your wishes and plans for medical care. These plans are made ahead of time in case you lose your ability to make decisions for yourself. Advance directives can apply to any medical decision, such as the treatments you want, and if you want to donate organs.   What are the types of advance directives?  There are many types of advance directives, and each state has rules about how to use them. You may choose a combination of any of the following:  Living will:  This is a written record of the treatment you want. You can also choose which  treatments you do not want, which to limit, and which to stop at a certain time. This includes surgery, medicine, IV fluid, and tube feedings.   Durable power of  for healthcare (DPAHC):  This is a written record that states who you want to make healthcare choices for you when you are unable to make them for yourself. This person, called a proxy, is usually a family member or a friend. You may choose more than 1 proxy.  Do not resuscitate (DNR) order:  A DNR order is used in case your heart stops beating or you stop breathing. It is a request not to have certain forms of treatment, such as CPR. A DNR order may be included in other types of advance directives.  Medical directive:  This covers the care that you want if you are in a coma, near death, or unable to make decisions for yourself. You can list the treatments you want for each condition. Treatment may include pain medicine, surgery, blood transfusions, dialysis, IV or tube feedings, and a ventilator (breathing machine).  Values history:  This document has questions about your views, beliefs, and how you feel and think about life. This information can help others choose the care that you would choose.  Why are advance directives important?  An advance directive helps you control your care. Although spoken wishes may be used, it is better to have your wishes written down. Spoken wishes can be misunderstood, or not followed. Treatments may be given even if you do not want them. An advance directive may make it easier for your family to make difficult choices about your care.       © Copyright [employer] 2018 Information is for End User's use only and may not be sold, redistributed or otherwise used for commercial purposes. All illustrations and images included in CareNotes® are the copyrighted property of [employer]. or [employer]

## 2024-07-06 ENCOUNTER — APPOINTMENT (OUTPATIENT)
Dept: LAB | Facility: CLINIC | Age: 69
End: 2024-07-06
Payer: COMMERCIAL

## 2024-07-06 DIAGNOSIS — I10 PRIMARY HYPERTENSION: ICD-10-CM

## 2024-07-06 DIAGNOSIS — Z00.00 MEDICARE ANNUAL WELLNESS VISIT, SUBSEQUENT: ICD-10-CM

## 2024-07-06 LAB
ALBUMIN SERPL BCG-MCNC: 3.8 G/DL (ref 3.5–5)
ALP SERPL-CCNC: 60 U/L (ref 34–104)
ALT SERPL W P-5'-P-CCNC: 16 U/L (ref 7–52)
ANION GAP SERPL CALCULATED.3IONS-SCNC: 7 MMOL/L (ref 4–13)
AST SERPL W P-5'-P-CCNC: 20 U/L (ref 13–39)
BASOPHILS # BLD AUTO: 0.03 THOUSANDS/ÂΜL (ref 0–0.1)
BASOPHILS NFR BLD AUTO: 0 % (ref 0–1)
BILIRUB SERPL-MCNC: 0.43 MG/DL (ref 0.2–1)
BUN SERPL-MCNC: 12 MG/DL (ref 5–25)
CALCIUM SERPL-MCNC: 9.2 MG/DL (ref 8.4–10.2)
CHLORIDE SERPL-SCNC: 101 MMOL/L (ref 96–108)
CHOLEST SERPL-MCNC: 131 MG/DL
CO2 SERPL-SCNC: 31 MMOL/L (ref 21–32)
CREAT SERPL-MCNC: 0.67 MG/DL (ref 0.6–1.3)
EOSINOPHIL # BLD AUTO: 0.1 THOUSAND/ÂΜL (ref 0–0.61)
EOSINOPHIL NFR BLD AUTO: 1 % (ref 0–6)
ERYTHROCYTE [DISTWIDTH] IN BLOOD BY AUTOMATED COUNT: 14.4 % (ref 11.6–15.1)
GFR SERPL CREATININE-BSD FRML MDRD: 98 ML/MIN/1.73SQ M
GLUCOSE P FAST SERPL-MCNC: 98 MG/DL (ref 65–99)
HCT VFR BLD AUTO: 45.4 % (ref 36.5–49.3)
HDLC SERPL-MCNC: 27 MG/DL
HGB BLD-MCNC: 14.3 G/DL (ref 12–17)
IMM GRANULOCYTES # BLD AUTO: 0.02 THOUSAND/UL (ref 0–0.2)
IMM GRANULOCYTES NFR BLD AUTO: 0 % (ref 0–2)
LDLC SERPL CALC-MCNC: 84 MG/DL (ref 0–100)
LYMPHOCYTES # BLD AUTO: 1.5 THOUSANDS/ÂΜL (ref 0.6–4.47)
LYMPHOCYTES NFR BLD AUTO: 16 % (ref 14–44)
MCH RBC QN AUTO: 29.9 PG (ref 26.8–34.3)
MCHC RBC AUTO-ENTMCNC: 31.5 G/DL (ref 31.4–37.4)
MCV RBC AUTO: 95 FL (ref 82–98)
MONOCYTES # BLD AUTO: 0.63 THOUSAND/ÂΜL (ref 0.17–1.22)
MONOCYTES NFR BLD AUTO: 7 % (ref 4–12)
NEUTROPHILS # BLD AUTO: 6.88 THOUSANDS/ÂΜL (ref 1.85–7.62)
NEUTS SEG NFR BLD AUTO: 76 % (ref 43–75)
NONHDLC SERPL-MCNC: 104 MG/DL
NRBC BLD AUTO-RTO: 0 /100 WBCS
PLATELET # BLD AUTO: 205 THOUSANDS/UL (ref 149–390)
PMV BLD AUTO: 10.5 FL (ref 8.9–12.7)
POTASSIUM SERPL-SCNC: 4.3 MMOL/L (ref 3.5–5.3)
PROT SERPL-MCNC: 6.6 G/DL (ref 6.4–8.4)
RBC # BLD AUTO: 4.78 MILLION/UL (ref 3.88–5.62)
SODIUM SERPL-SCNC: 139 MMOL/L (ref 135–147)
TRIGL SERPL-MCNC: 102 MG/DL
TSH SERPL DL<=0.05 MIU/L-ACNC: 3.7 UIU/ML (ref 0.45–4.5)
WBC # BLD AUTO: 9.16 THOUSAND/UL (ref 4.31–10.16)

## 2024-07-06 PROCEDURE — 80061 LIPID PANEL: CPT

## 2024-07-06 PROCEDURE — 80053 COMPREHEN METABOLIC PANEL: CPT

## 2024-07-06 PROCEDURE — 85025 COMPLETE CBC W/AUTO DIFF WBC: CPT

## 2024-07-06 PROCEDURE — 84443 ASSAY THYROID STIM HORMONE: CPT

## 2024-07-06 PROCEDURE — 36415 COLL VENOUS BLD VENIPUNCTURE: CPT

## 2024-07-15 ENCOUNTER — TELEPHONE (OUTPATIENT)
Age: 69
End: 2024-07-15

## 2024-07-15 NOTE — TELEPHONE ENCOUNTER
Pt was returning missed call from office from Friday 7/12/24 stated he deleted msg that was left and there is no msg in system to refer to. States he was in an area with not service and would like a call back prior to making an appt. Please call [phone]

## 2024-07-16 ENCOUNTER — PREP FOR PROCEDURE (OUTPATIENT)
Dept: GASTROENTEROLOGY | Facility: CLINIC | Age: 69
End: 2024-07-16

## 2024-07-24 ENCOUNTER — TELEPHONE (OUTPATIENT)
Dept: GASTROENTEROLOGY | Facility: HOSPITAL | Age: 69
End: 2024-07-24

## 2024-07-24 DIAGNOSIS — I42.1 HOCM (HYPERTROPHIC OBSTRUCTIVE CARDIOMYOPATHY) (HCC): ICD-10-CM

## 2024-07-24 RX ORDER — BISOPROLOL FUMARATE 5 MG/1
2.5 TABLET, FILM COATED ORAL 2 TIMES DAILY
Qty: 30 TABLET | Refills: 5 | Status: SHIPPED | OUTPATIENT
Start: 2024-07-24

## 2024-07-25 ENCOUNTER — ANESTHESIA (OUTPATIENT)
Dept: GASTROENTEROLOGY | Facility: HOSPITAL | Age: 69
End: 2024-07-25

## 2024-07-25 ENCOUNTER — ANESTHESIA EVENT (OUTPATIENT)
Dept: GASTROENTEROLOGY | Facility: HOSPITAL | Age: 69
End: 2024-07-25

## 2024-07-25 ENCOUNTER — HOSPITAL ENCOUNTER (OUTPATIENT)
Dept: GASTROENTEROLOGY | Facility: HOSPITAL | Age: 69
Setting detail: OUTPATIENT SURGERY
End: 2024-07-25
Attending: INTERNAL MEDICINE
Payer: COMMERCIAL

## 2024-07-25 VITALS
DIASTOLIC BLOOD PRESSURE: 68 MMHG | SYSTOLIC BLOOD PRESSURE: 98 MMHG | HEART RATE: 72 BPM | TEMPERATURE: 96.7 F | RESPIRATION RATE: 16 BRPM | OXYGEN SATURATION: 98 %

## 2024-07-25 DIAGNOSIS — K21.9 GASTROESOPHAGEAL REFLUX DISEASE WITHOUT ESOPHAGITIS: ICD-10-CM

## 2024-07-25 PROCEDURE — 88305 TISSUE EXAM BY PATHOLOGIST: CPT | Performed by: STUDENT IN AN ORGANIZED HEALTH CARE EDUCATION/TRAINING PROGRAM

## 2024-07-25 PROCEDURE — 43239 EGD BIOPSY SINGLE/MULTIPLE: CPT | Performed by: INTERNAL MEDICINE

## 2024-07-25 RX ORDER — SODIUM CHLORIDE, SODIUM LACTATE, POTASSIUM CHLORIDE, CALCIUM CHLORIDE 600; 310; 30; 20 MG/100ML; MG/100ML; MG/100ML; MG/100ML
125 INJECTION, SOLUTION INTRAVENOUS CONTINUOUS
Status: CANCELLED | OUTPATIENT
Start: 2024-07-25

## 2024-07-25 RX ORDER — LIDOCAINE HYDROCHLORIDE 10 MG/ML
0.5 INJECTION, SOLUTION EPIDURAL; INFILTRATION; INTRACAUDAL; PERINEURAL ONCE AS NEEDED
Status: CANCELLED | OUTPATIENT
Start: 2024-07-25

## 2024-07-25 RX ORDER — LIDOCAINE HYDROCHLORIDE 20 MG/ML
INJECTION, SOLUTION EPIDURAL; INFILTRATION; INTRACAUDAL; PERINEURAL AS NEEDED
Status: DISCONTINUED | OUTPATIENT
Start: 2024-07-25 | End: 2024-07-25

## 2024-07-25 RX ORDER — EPHEDRINE SULFATE 50 MG/ML
INJECTION INTRAVENOUS AS NEEDED
Status: DISCONTINUED | OUTPATIENT
Start: 2024-07-25 | End: 2024-07-25

## 2024-07-25 RX ORDER — SODIUM CHLORIDE 9 MG/ML
INJECTION, SOLUTION INTRAVENOUS CONTINUOUS PRN
Status: DISCONTINUED | OUTPATIENT
Start: 2024-07-25 | End: 2024-07-25

## 2024-07-25 RX ORDER — PROPOFOL 10 MG/ML
INJECTION, EMULSION INTRAVENOUS AS NEEDED
Status: DISCONTINUED | OUTPATIENT
Start: 2024-07-25 | End: 2024-07-25

## 2024-07-25 RX ORDER — PROPOFOL 10 MG/ML
INJECTION, EMULSION INTRAVENOUS CONTINUOUS PRN
Status: DISCONTINUED | OUTPATIENT
Start: 2024-07-25 | End: 2024-07-25

## 2024-07-25 RX ADMIN — PROPOFOL 120 MCG/KG/MIN: 10 INJECTION, EMULSION INTRAVENOUS at 14:59

## 2024-07-25 RX ADMIN — EPHEDRINE SULFATE 10 MG: 50 INJECTION, SOLUTION INTRAVENOUS at 15:08

## 2024-07-25 RX ADMIN — PROPOFOL 100 MG: 10 INJECTION, EMULSION INTRAVENOUS at 14:59

## 2024-07-25 RX ADMIN — SODIUM CHLORIDE: 0.9 INJECTION, SOLUTION INTRAVENOUS at 14:52

## 2024-07-25 RX ADMIN — LIDOCAINE HYDROCHLORIDE 100 MG: 20 INJECTION, SOLUTION EPIDURAL; INFILTRATION; INTRACAUDAL; PERINEURAL at 14:59

## 2024-07-25 RX ADMIN — SODIUM CHLORIDE: 0.9 INJECTION, SOLUTION INTRAVENOUS at 15:13

## 2024-07-25 NOTE — H&P
History and Physical - [facility]  Simeon Robbins 68 y.o. male MRN: [id number]                  HPI: Simeon Robbins is a 68 y.o. year old male on plavix holding since 7/20 who presents for EGD for evaluation of GERD. Patient underwent EGD April 2024 which showed concerns for Fall's, however, he also had active esophagitis. He presents today for repeat EGD to r/o Fall's esophagus and for reevaluation of esophagitis.       REVIEW OF SYSTEMS: Per the HPI, and otherwise unremarkable.    Historical Information   Past Medical History:   Diagnosis Date    Anxiety     Cancer (HCC)     Chemotherapy follow-up examination     History of radiation therapy 2006    Hypertension      Past Surgical History:   Procedure Laterality Date    ANKLE SURGERY Left     CARDIAC CATHETERIZATION Left 10/07/2022    Procedure: Cardiac catheterization;  Surgeon: Simeon Pastor MD;  Location: [facility];  Service: Cardiology    CARDIAC CATHETERIZATION Left 10/07/2022    Procedure: Cardiac Left Heart Cath;  Surgeon: Simeon Pastor MD;  Location: [facility];  Service: Cardiology    CARDIAC CATHETERIZATION N/A 10/07/2022    Procedure: Cardiac Coronary Angiogram;  Surgeon: Simeon Pastor MD;  Location: [facility];  Service: Cardiology    CARDIAC CATHETERIZATION N/A 12/28/2022    Procedure: Cardiac pci;  Surgeon: Dada Berrios MD;  Location: [facility];  Service: Cardiology    MULTIPLE TOOTH EXTRACTIONS N/A 10/5/2023    Procedure: EXTRACTION TEETH MULTIPLE 1,2,3,4,5,6,7,8,9,10,11,12,13,14,19,20,21,22,23,26,27,28,29,30;  Surgeon: Kusum Esquivel DMD;  Location: [facility];  Service: Maxillofacial    NECK SURGERY Right     SHOULDER OPEN ROTATOR CUFF REPAIR Right     SKIN BIOPSY       Social History   Social History     Substance and Sexual Activity   Alcohol Use Not Currently     Social History     Substance and Sexual Activity   Drug Use Not Currently    Types: Marijuana     Social  History     Tobacco Use   Smoking Status Former    Passive exposure: Never   Smokeless Tobacco Former   Tobacco Comments    Quit 35 yrs ago     Family History   Problem Relation Age of Onset    Hypertension Mother     Obesity Mother     Diabetes Father     Heart attack Father     Stroke Father     Heart disease Father     Hyperlipidemia Sister     Obesity Sister     Hypertension Sister     Diabetes Sister     Kidney disease Sister     Hyperlipidemia Brother     Hypertension Brother     Diabetes Brother     Prostate cancer Brother     Skin cancer Maternal Grandfather        Meds/Allergies       Current Outpatient Medications:     aspirin (ECOTRIN LOW STRENGTH) 81 mg EC tablet    bisoprolol (ZEBETA) 5 mg tablet    levothyroxine 150 mcg tablet    omeprazole (PriLOSEC) 40 MG capsule    rosuvastatin (CRESTOR) 40 MG tablet    tamsulosin (FLOMAX) 0.4 mg    clopidogrel (PLAVIX) 75 mg tablet    famotidine (PEPCID) 20 mg tablet    No Known Allergies    Objective     /94   Pulse 65   Temp (!) 97.4 °F (36.3 °C) (Tympanic)   Resp 16   SpO2 100%       PHYSICAL EXAM    Gen: NAD  Head: NCAT  CV: RRR  CHEST: Clear  ABD: soft, NT/ND  EXT: no edema      ASSESSMENT/PLAN:  This is a 68 y.o. year old male here for EGD, and he is stable and optimized for his procedure.

## 2024-07-25 NOTE — ANESTHESIA PREPROCEDURE EVALUATION
Procedure:  EGD    Relevant Problems   CARDIO   (+) Coronary artery disease involving native coronary artery of native heart with angina pectoris (HCC)   (+) Hyperlipidemia   (+) Nonrheumatic aortic valve stenosis   (+) Primary hypertension      ENDO   (+) Hypothyroidism      GI/HEPATIC   (+) Oropharyngeal dysphagia      /RENAL   (+) BPH with obstruction/lower urinary tract symptoms      MUSCULOSKELETAL   (+) Chronic low back pain      NEURO/PSYCH   (+) Chronic low back pain        Physical Exam    Airway    Mallampati score: II         Dental   No notable dental hx     Cardiovascular      Pulmonary      Other Findings        Anesthesia Plan  ASA Score- 3     Anesthesia Type- IV sedation with anesthesia with ASA Monitors.         Additional Monitors:     Airway Plan:     Comment: I, Dr. Wilson, the attending physician, have personally seen and evaluated the patient prior to anesthetic care.  I have reviewed the pre-anesthetic record, and other medical records if appropriate to the anesthetic care.  If a CRNA is involved in the case, I have reviewed the CRNA assessment, if present, and agree.  The patient is in a suitable condition to proceed with my formulated anesthetic plan.  .       Plan Factors-    Chart reviewed.                      Induction- intravenous.    Postoperative Plan-         Informed Consent- Anesthetic plan and risks discussed with patient.  I personally reviewed this patient with the CRNA. Discussed and agreed on the Anesthesia Plan with the CRNA..

## 2024-07-25 NOTE — ANESTHESIA POSTPROCEDURE EVALUATION
Post-Op Assessment Note    CV Status:  Stable  Pain Score: 0    Pain management: adequate       Mental Status:  Sleepy   Hydration Status:  Euvolemic   PONV Controlled:  Controlled   Airway Patency:  Patent     Post Op Vitals Reviewed: Yes    No anethesia notable event occurred.    Staff: CRNA               /73 (07/25/24 1519)    Temp (!) 96.7 °F (35.9 °C) (07/25/24 1519)    Pulse 63 (07/25/24 1519)   Resp 16 (07/25/24 1519)    SpO2 98 % (07/25/24 1519)

## 2024-07-25 NOTE — ANESTHESIA POSTPROCEDURE EVALUATION
Post-Op Assessment Note    CV Status:  Stable  Pain Score: 0    Pain management: adequate       Mental Status:  Alert and awake   Hydration Status:  Euvolemic   PONV Controlled:  Controlled   Airway Patency:  Patent     Post Op Vitals Reviewed: Yes    No anethesia notable event occurred.    Staff: Anesthesiologist               /73 (07/25/24 1519)    Temp (!) 96.7 °F (35.9 °C) (07/25/24 1519)    Pulse 63 (07/25/24 1519)   Resp 16 (07/25/24 1519)    SpO2 98 % (07/25/24 1519)

## 2024-07-26 PROCEDURE — 88305 TISSUE EXAM BY PATHOLOGIST: CPT | Performed by: STUDENT IN AN ORGANIZED HEALTH CARE EDUCATION/TRAINING PROGRAM

## 2024-07-29 DIAGNOSIS — E78.5 HYPERLIPIDEMIA, UNSPECIFIED HYPERLIPIDEMIA TYPE: ICD-10-CM

## 2024-07-29 RX ORDER — ROSUVASTATIN CALCIUM 40 MG/1
40 TABLET, COATED ORAL DAILY
Qty: 30 TABLET | Refills: 0 | Status: SHIPPED | OUTPATIENT
Start: 2024-07-29

## 2024-08-16 ENCOUNTER — TELEPHONE (OUTPATIENT)
Age: 69
End: 2024-08-16

## 2024-08-16 DIAGNOSIS — R13.12 OROPHARYNGEAL DYSPHAGIA: Primary | ICD-10-CM

## 2024-08-16 NOTE — TELEPHONE ENCOUNTER
Please advise     Pt calling in, reports he has additional questions regarding plan of care and EGD results.   I schedule pt for follow up but soonest was 1/7/25    On 1st EGD- 4/18 showed LA grade C esophagitis and possible underlying Valdes's esophagus. Biopsy taken. NO esophageal stricture.   - pt asking if that inflammation/ esophagitis got better on EGD on 7/25/24  ?    2. EGD on 4/18 and 7/25 showed possible valdes's during EGD but biopsies were negative.   - Pt asking where he stands with valdes's if he still needs surveillance EGD?     3. If both EGD showed no strictures in esophagus he would like to know next plan of care for his swallowing issues. Other testings?     4. If all biopsies were negative and inflammation is better can he reduce PPI to 40 mg once a day?

## 2024-08-28 DIAGNOSIS — E78.5 HYPERLIPIDEMIA, UNSPECIFIED HYPERLIPIDEMIA TYPE: ICD-10-CM

## 2024-08-29 ENCOUNTER — HOSPITAL ENCOUNTER (OUTPATIENT)
Dept: RADIOLOGY | Facility: HOSPITAL | Age: 69
End: 2024-08-29
Payer: COMMERCIAL

## 2024-08-29 DIAGNOSIS — R13.12 OROPHARYNGEAL DYSPHAGIA: ICD-10-CM

## 2024-08-29 PROCEDURE — 74220 X-RAY XM ESOPHAGUS 1CNTRST: CPT

## 2024-08-29 RX ORDER — ROSUVASTATIN CALCIUM 40 MG/1
40 TABLET, COATED ORAL DAILY
Qty: 30 TABLET | Refills: 5 | Status: SHIPPED | OUTPATIENT
Start: 2024-08-29

## 2024-08-30 ENCOUNTER — TELEPHONE (OUTPATIENT)
Age: 69
End: 2024-08-30

## 2024-08-30 DIAGNOSIS — R13.12 OROPHARYNGEAL DYSPHAGIA: Primary | ICD-10-CM

## 2024-08-30 NOTE — TELEPHONE ENCOUNTER
Pt. Called central scheduling to schedule FL Barium swallow video with speech , pt. Was under the impression he was having a different test done as he did not tolerate the barium swallow study yesterday, please verify if this is the correct imaging the patient should be having

## 2024-09-08 DIAGNOSIS — E03.9 ACQUIRED HYPOTHYROIDISM: ICD-10-CM

## 2024-09-08 RX ORDER — LEVOTHYROXINE SODIUM 150 UG/1
150 TABLET ORAL DAILY
Qty: 90 TABLET | Refills: 0 | Status: SHIPPED | OUTPATIENT
Start: 2024-09-08

## 2024-09-10 NOTE — PROCEDURES
Video Swallow Study      Patient Name: Simeon Robbins  Today's Date: 9/10/2024        Past Medical History  Past Medical History:   Diagnosis Date    Anxiety     Cancer (HCC)     Chemotherapy follow-up examination     History of radiation therapy 2006    Hypertension         Past Surgical History  Past Surgical History:   Procedure Laterality Date    ANKLE SURGERY Left     CARDIAC CATHETERIZATION Left 10/07/2022    Procedure: Cardiac catheterization;  Surgeon: Simeon Pastor MD;  Location: [facility];  Service: Cardiology    CARDIAC CATHETERIZATION Left 10/07/2022    Procedure: Cardiac Left Heart Cath;  Surgeon: Simeon Pastor MD;  Location: [facility];  Service: Cardiology    CARDIAC CATHETERIZATION N/A 10/07/2022    Procedure: Cardiac Coronary Angiogram;  Surgeon: Simeon Pastor MD;  Location: [facility];  Service: Cardiology    CARDIAC CATHETERIZATION N/A 12/28/2022    Procedure: Cardiac pci;  Surgeon: Dada Berrios MD;  Location: [facility];  Service: Cardiology    MULTIPLE TOOTH EXTRACTIONS N/A 10/5/2023    Procedure: EXTRACTION TEETH MULTIPLE 1,2,3,4,5,6,7,8,9,10,11,12,13,14,19,20,21,22,23,26,27,28,29,30;  Surgeon: Kusum Esquivel DMD;  Location: [facility];  Service: Maxillofacial    NECK SURGERY Right     SHOULDER OPEN ROTATOR CUFF REPAIR Right     SKIN BIOPSY         Modified (Video) Barium Swallow Study    Summary:  Images are on PACS for review.     Pt presents w/ mod-severe oropharyngeal dysphagia.     Slowed bolus transfer. Reduced bolus control w/ premature spill over BOT. Reduced BOT retraction resulting in mild oral residue on BOT. Delayed swallow initiation w/ bolus head in the pyriforms w/ majority of trials. Reduced laryngeal elevation, anterior hyoid excursion, vestibule closure, and incomplete epiglottic inversion. Penetration noted across all trials w/ epiglottic undercoating noted w/ majority of trials, thin and  nectar thick liquids contacting vocal folds. Throat clear, cough and strategies not effective in reducing/eliminating penetration. No aspiration visualized on today's study. See below for detailed trials. Reduced pharyngeal stripping wave and reduced UES opening resulting in mild pharyngeal residue of thin liquids, ultimately worsens w/ thickened liquids and puree textures placing pt at an increased risk of aspiration from residual spillover.     Pt remains at a high risk of aspiration 2/2 PMHx, dysphagia, and known radiation tx, however pt reports no recent PNA or respiratory infections. If pt is dx w/ recurrent/persistent PNA or upper respiratory infections consider GOC and possible need for alternate means of nutrition. MBS findings thoroughly reviewed w/ pt  w/ use of images to aid in understanding. Education provided on anatomy/physiology of the swallow and pt specific identified impairments including reduced airway protection, residual and reduced UES opening. Further education provided on risks of aspiration w/ each administered consistency and options for diet modifications to potentially reduce aspiration. Further education provided on risks/benefits of liquid/solid texture modifications (reduced aspiration risk and subsequent medical implications, increased retention, potential dehydration, etc.) vs risks/benefits of continuing baseline diet w/ use of strategies to optimize swallow safety and understanding/acceptance of any potential risks. Pt verbalized understanding of options and associated risks/benefits. Physician notified of MBS report and results. Education provided on the importance of frequent/thorough oral care and s/s worsening dysphagia/aspiration to notify medical team of should they arise. Pt demonstrated understanding and denied questions/concerns at this time.       Recommendations:  Diet: Puree textures w/ thin liquid wash   Liquids: Thin liquids   Meds: crushed in puree or liquid form    Strategies: alternate solids/liquids, small bites/sips, upright posture   Frequent oral care  Upright position  F/u ST tx: OP ST   Therapy Prognosis: fair/guarded  Prognosis considerations: age, PMHx, known radiation tx  Aspiration Precautions  Results reviewed with: pt, physician  Aspiration precautions posted.  Repeat MBS as necessary  If a dedicated assessment of the esophagus is desired, consider esophagram/barium swallow or EGD.      H&P/pertinent provider notes: (PMH noted above)  Dale Robbins is a 68 y.o male w/ a PMHx significant for but not limited to HTN, esophagitis, HLD, HOCM, head and neck cancer 2006, presenting to [facility] for [facility]. Per chart review pt noted to have signs of oropharyngeal dysphagia per MD Ibanez in May of 2023 w/ subsequent EGD, pt most recently received EGD revealing laryngeal penetration to the level of the vocal cords, see below.     Special Studies:  8/29/24 [place] barium swallow/esophagram:  Laryngeal penetration to the level of the vocal cords.  Evaluation with speech therapy recommended.  The study was marked in EPIC for immediate notification.    Previous VBS:  N/A     Does the pt have pain? No  If yes, was nursing made aware/was it addressed? N/A     Swallow Mechanism Exam  Facial: symmetrical  Labial: WFL  Lingual: WFL  Velum: decreased ROM b/l   Mandible: adequate ROM  Dentition: full dentures- states he recent obtained them and is still getting adjusted to wearing them, prefers to drink w/o them   Vocal quality:hoarse/wet  Volitional Cough: weak   Respiratory Status: on RA      Swallow Information   Current Diet/Baseline Diet: pt reports softer thing, sometimes having to have puree textures thinned out a bit, thin liquids       Consistencies Administered:  Pt was viewed sitting upright in the lateral and AP positions. Trials administered were comparable to MBSImP Validated Protocol: tsp thin liquid x2, cup sip thin, sequential swallow cup sip thin, tsp nectar thick, cup sip  nectar thick, sequential swallow cup sip nectar thick, tsp Honey thick, tsp pudding.. Pt was also given thin liquids by straw. Barium tablet deferred d/t patient safety.       Oral Phase:  Lip Closure: Adequate   Tongue Control During Bolus Hold: reduced w/ premature spill over BOT   Bolus Preparation/Mastication: Timely oral manipulation   Bolus Transport/Lingual Motion: Slowed   Oral Residue: Mild on BOT   Initiation of the Pharyngeal Swallow: Delayed w/ bolus head in the pyriforms w/ majority of trials     Pharyngeal Phase:  Soft Palate Elevation: Adequate   Laryngeal Elevation: Reduced   Anterior Hyoid Excursion: Reduced   Epiglottic Movement: Incomplete, at times butting against PPW   Laryngeal Vestibular Closure: Incomplete   Pharyngeal Stripping Wave: Reduced   PES Opening: Reduced, ?prominence near UES, ?CP bar   Tongue Base Retraction: Reduced   Pharyngeal Residue: Mild w/ thin liquids, worsens- mod w/ thickened liquids and puree       Penetration/Aspiration:  Thin: tsp, penetration noted during the swallow w/ epiglottic undercoating (PAS-3) cup- penetration noted during the swallow w/ epiglottic undercoating and eventual contact w/ vocal folds (PAS-5), straw sip- penetration during the swallow w/ spontaneous redirection (PAS-2) of note spillover from retention after the swallow w/ epiglottic undercoating (PAS-3), chin tuck- penetration during the swallow w/ spontaneous redirection (PAS-2), head turn R- penetration noted before the swallow coating the vocal folds (PAS-5), head turn L- penetration noted after the swallow w/ epiglottic undercoating (PAS-3)  Nectar: tsp- penetration noted before the swallow w/ epiglottic undercoating (PAS-3) cup- penetration noted during the swallow, contacts the vocal folds w/ epiglottic undercoating (PAS-5), sequential- penetration noted during the swallow w/ epiglottic undercoating (PAS-3)  Honey: penetration noted after the swallow d/t residual in the pyriforms w/  epiglottic undercoating ((PAS-3)   Puree: initially no penetration or aspiration visualized (PAS-1) though no PO passed below UES, of note, pt required thin liquid wash for clearance of PO- penetration noted during the swallow w/ epiglottic undercoating (PAS-3)  Solid: did not administer for pt safety   Response to Aspiration: aspiration not visualized, throat clear noted when PO contacts vocal folds   Strategies/Efficacy: not effective in eliminating/reduced penetration     8-Point Penetration-Aspiration Scale   1 Material does not enter the airway   2 Material enters the airway, remains above the vocal folds, and is ejected  from the  airway    3 Material enters the airway, remains above the vocal folds, and is not ejected from the airway   4 Material enters the airway, contacts the vocal folds, and is ejected from the airway   5 Material enters the airway, contacts the vocal folds, and is not ejected from the airway    6 Material enters the airway, passes below the vocal folds and is ejected into the larynx or out of the airway    7 Material enters the airway, passes below the vocal folds, and is not ejected from the trachea despite effort    8 Material enters the airway, passes below the vocal folds, and no effort is made to eject         Screening of Esophageal Phase  Esophageal Clearance: did not visualized, upper esophagus noted throughout evaluation w/ complete clearance

## 2024-09-11 ENCOUNTER — HOSPITAL ENCOUNTER (OUTPATIENT)
Dept: RADIOLOGY | Facility: HOSPITAL | Age: 69
Discharge: HOME | End: 2024-09-11
Payer: COMMERCIAL

## 2024-09-11 DIAGNOSIS — R13.12 OROPHARYNGEAL DYSPHAGIA: ICD-10-CM

## 2024-09-11 PROCEDURE — 92611 MOTION FLUOROSCOPY/SWALLOW: CPT

## 2024-09-11 PROCEDURE — 74230 X-RAY XM SWLNG FUNCJ C+: CPT

## 2024-09-30 NOTE — PROGRESS NOTES
[facility] Follow-up Visit - Cardiology   Simeon Robbins 68 y.o. male MRN: [id number]  Unit/Bed#:  Encounter: [id number]    Patient Active Problem List    Diagnosis Date Noted    BPH with obstruction/lower urinary tract symptoms 02/16/2024    Other proteinuria 02/16/2024    Dental caries, unspecified 10/05/2023    Oropharyngeal dysphagia 05/15/2023    Coronary artery disease involving native coronary artery of native heart with angina pectoris (Formerly [facility]) 01/17/2023    Status post insertion of drug eluting coronary artery stent 12/28/2022    HOCM (hypertrophic obstructive cardiomyopathy) (Formerly [facility]) 10/14/2022    Nonrheumatic aortic valve stenosis 09/22/2022    History of head and neck cancer 06/23/2022    Arthralgia of multiple joints 02/29/2016    Bilateral hip pain 02/29/2016    Chronic low back pain 02/29/2016    Myalgia 02/29/2016    Folliculitis 08/01/2014    Esophagitis, reflux 10/10/2012    Hyperlipidemia 10/10/2012    Primary hypertension 05/10/2012    Hypothyroidism 05/10/2012     Plan: Mr Robbins was last seen in the HCM Clinic on 1-9-2024. His blood pressure is elevated at 184/110 in the office today. He states he is very nervous when he comes to the office as he is worried we will find something wrong. His blood pressure at home runs 102-126/60-79 on multiple occasions in recent days. Repeat blood pressure after talking with patient was 142/80. His jaw is healed well now and he has dentures. He has had difficulty with swallowing. He had a barium swallow study which was abnormal. He was recommended to eat a pureed diet and will be working with speech therapy. He bikes 45 mins to an hour almost everyday, but at least 5 days a week. He does stretching and strengthening exercises at home as well. He does not get short of breath with this, but does note a stronger heart beat. He has not had any more dizziness since adjusting to the tamsulosin. He did have a fall when first starting tamsulosin. He got up quickly and went  to adjust the stereo and he lost consciousness. He now takes it right before bed and has not had any significant issues. He still has back pain when he walks or stands too long. The pain is in between his shoulders. He does stretching exercises for that. He is drinking 2-3, 8 ounce cups of coffee a day, mostly half caffeine. He has not been drinking alcohol. He had a carotid ultrasound on 2/1/2024 with <50% stenosis bilaterally. Echo from 1/2024 demonstrated mild aortic stenosis. We discussed this with the patient and we will continue to monitor this. He remains on rosuvastatin 40 mg daily with most recent LDL of 84 as of 7/2024. He remains on clopidogrel and aspirin for coronary artery disease. He remains on bisoprolol 2.5 mg twice daily without side effects. He eats about 2,000 calories daily and restricts saturated fat in his diet. No medication changes today. Follow up in 6 months with echo to be done on the same day. His 2 sons, a brother and a sister have been informed about the diagnosis of HCM but patient is unsure if they followed up with their physicians for echocardiographic screening. He has a history of ascending aortic dilation (41 mm) that will be followed echocardiographically along with his aortic valve for now. TSH, CBC and BMP were normal on 7-6-2024.    Physician Requesting Consult: Dylan Gaona MD  Reason for Consult / Principal Problem: HOCM       HPI: Simeon Robbins is a 68 year old male with past medical history of mild aortic valve stenosis, hyperlipidemia, hypertension, and coronary artery disease (s/p PCI for  of LAD in December 2022), head and neck cancer in 2006 (s/p surgery and radiation) who is referred for evaluation after recent diagnosis of obstructive HCM. He was seen by Dr Gaona on 09/22/2022 for evaluation of a heart murmur and complaint of exertional chest tightness. Re-interpretation of an echocardiogram performed earlier (08/08/2022) indicated severe asymmetric  septal hypertrophy 1.6-1.7 cm and LAD territory regional wall motion abnormalities. Further evaluation included nuclear stress test on 09/29/2022 that showed reversible perfusion defect in the mid to apical anterior and anterior septal segments. Cardiac catheterization subsequently (10-) indicated a 99% tubular mid LAD lesion with JUAN 1-2 flow. Significant LVOT obstruction was noted during catheterization with a resting gradient of 45 mmHg that increased to 125 mmHg after a PVC. He was started on metoprolol XL 25 mg and underwent PCI of the LAD lesion on 12/28/22 with placement of a 3.25x38 mm MENDY. A cardiac MRI on 11/18/22 showed wall thickness of 18 mm in the anteroseptum, SHANNON and LVOT obstruction, normal RV size and function, and small foci of interstitial fibrosis in the basal interventricular septum, involving less than 5% of the myocardium. His symptoms improved markedly after initiation of medical therapy and PCI.      Other work up has included:     48-hour ambulatory ECG monitor (11-4-2022):  INDICATIONS: HOCM   FINDINGS:  1. A 48 hour holter monitor demonstrated normal sinus rhythm with an average rate of 66 BPM; a minimum rate of 46 BPM; and a maximum rate of 152 BPM.  2. There were no ventricular ectopic beats.  3. There were 56 supraventricular ectopic beats, including a 36 beat run of atrial tachycardia.  4. The longest R-R interval was 1.3 seconds.  5. The patient kept a diary during holter monitor.  It demonstrated episodes of chest tightness that did not correlate to any dysrhythmia.     IMPRESSION:  Abnormal 48 hour holter monitor.   Patient in normal sinus rhythm throughout holter monitoring.  No premature ventricular contractions.  36 beat run of atrial tachycardia.  No significant pauses.  Symptoms on diary did not correlate to any dysrhythmia.      Bicycle stress echo (2-6-2023):    Left Ventricle: Left ventricular cavity size is normal. Wall thickness is severely increased. There is  severe asymmetric hypertrophy of the septal wall. maximum septal wall thickness was 21 mm. The left ventricular ejection fraction is 60%. Systolic function is normal. Global longitudinal strain is reduced at -11%. There is focal akinesis of the apical anteroseptal segment. Diastolic function is abnormal. There is  outflow tract dynamic obstruction at rest with a peak gradient of 27.0 mmHg. There is outflow tract dynamic obstruction with valsalva with a peak gradient of 42.0 mmHg.    Aortic Valve: The aortic valve is trileaflet. The leaflets are moderately thickened. The leaflets are moderately calcified. There is mildly reduced mobility.    Mitral Valve: There is mild annular calcification. There is systolic anterior motion of the anterior leaflet with late peaking gradient.    Stress ECG: No ST deviation is noted. There were no arrhythmias during recovery. . The stress ECG is negative for ischemia after maximal exercise, without reproduction of symptoms.    Post Stress Echo: Left ventricle cavity has normal reduction in size post-stress. The left ventricle systolic function is hyperdynamic post-stress. The post-stress echo showed unchanged wall motion abnormalities compared to baseline.    Echo Post Impression: Study was negative for inducible myocardial ischemia after submaximal exercise. There was no inducible high outflow tract gradients at peak exercise. Borderline aortic valve stenosis was present.          2-: Mr. Robbins was seen and evaluated with cardiology fellow, Dr. Magana. He was recently diagnosed with obstructive HCM. After being initiated on medical therapy along undergoing PCI for CAD his cardiac symptoms improved markedly. He has completed 15 cardiac rehab sessions on 2- with excellent progress. He walks daily for about an hour and works as a , which involves walking and taking stairs (2-3 flights), with no symptoms. Although his blood pressure is elevated at  today's visit, he is known to have white coat syndrome.  He reports BP being better controlled when checking at home: -130s, DBP 80-90s, and HR 70-80s. These readings are on metoprolol succinate 50 mg qd. We will change metoprolol to bisoprolol 5 mg qd for even better blood pressure control. He reports weight loss in the past few years due to dietary changes associated with difficulty swallowing along with loss of taste attributed to previous radical surgery and radiation therapy for cancer. He is also undergoing work up for jaw osteonecrosis with plan to have multiple teeth extraction once he is done with DAPT. Lipid panel from 9/2022 revealed elevated total cholesterol (207) and LDL (157) levels along with low HDL (30). He has been taking rosuvastatin 40 mg qd for lipid management and we will recheck lipid panel. Today we also will initiate genetic testing to evaluate for pathogenic mutations associted with HCM and phenocopies and to help with family screening. He does have a heart murmur on examination today that appears to be consistent with aortic valve sclerosis/ stenosis. Mr. Robbins will be seen in HCM clinic in 6 month.      8-7-2023:  Since his last office visit on 2- the patient has continued to follow with the Infectious Disease team and remains on high dose amoxicillin for left mandibular osteomyelitis. He is due to start hyperbaric oxygen treatment on 8- to prepare for oral surgery on 9- with removal of 17 teeth and lower left mandibular debridement for osteomyelitis. He will require cardiac clearance which will be provided and faxed to the appropriate offices. He may proceed with these procedures with no need for updated cardiac testing.   Today the patient states he feels well from a cardiac standpoint.  He is very active walking at least 2 miles every day and does 20 to 30 minutes of stretching/weightlifting several times a week.  He can perform all of these activities with  no complaints of chest discomfort, dyspnea on exertion or palpitations.  He has no lower extremity edema or orthopnea.  He does note occasional lightheadedness with standing and I have encouraged adequate hydration and asked him to start performing leg pumping exercises prior to standing.  His blood pressure is very elevated today at 176/110 but the patient has known whitecoat hypertension.  At times if he is able to relax himself during an office visit his blood pressure will normalize.  At recent PCP office visit in June his blood pressure was 134/83.  He brings me a list of his blood pressures from home and his systolic blood pressure typically runs in the 110's with diastolic blood pressure running from the 70s to 80s and heart rate in the 50s.  He does follow a low-sodium diet and only drinks 1 cup of caffeine a day.  His recent lipid panel from March 2023 does show that his LDL is now at goal which would be less than 70 in the setting of known CAD..  His current LDL is 62.  He did also have a recent CT of the neck in March of this year to evaluate for any malignancy.  No malignancy was noted but there was evidence of bilateral carotid stenosis with there being less than 50% stenosis of the proximal right ICA and approximately 60% short segment stenosis of proximal left ICA.  At this time I do not think we need any additional testing to further investigate.  We do have risk factors for atherosclerotic disease under control and we can simply order an updated vascular study next year.  At this time the patient is stable from a cardiac standpoint and will follow-up in HCM clinic in 6 months.    1-9-2024: Since his last office visit on 8/7/2023 he has had several hyperbaric oxygen treatments in the setting of chronic mandibular osteomyelitis (group F strep). He underwent multiple dental extractions with mandibular bone debridement on 10/5/2023. He was continued on amoxicillin until mid-November which has now been  "discontinued and infectious disease feels he is currently stable from an osteomyelitis standpoint. He is active walking two miles a day and at times hiking with no cardiac complaints. He also performs stretching and strength exercises for ~20 minutes every day. He does have chronic intrascapular back pain which can be provoked at times with movement and standing for long periods of time. Stretching exercises do help with this pain at times. He will have occasional palpitations if he \"overindulges\" in alcohol or caffeine but on a daily basis he does not note these symptoms. He denies dyspnea, edema and orthopnea. He will occasionally have lightheadedness with standing and he has been encouraged to remain well hydrated. He had a CAT scan of the neck in March 2023 which revealed less than 50% right ICA stenosis and approximately 60% left ICA stenosis which we will investigate further with carotid duplex. His Lipid panel from March 2023 shows LDL of 62 and he will remain on rosuvastatin. He is limited on the foods he can eat due to dysphagia from the prior radiation to the neck, he sticks with softer foods. His blood pressure is elevated today but he historically has white coat hypertension and his readings from home show systolics have been in the 110's-120's. EPIC review shows his BP has been controlled during recent ID and PCP office visit. An echocardiogram performed today showed:       Left Ventricle: Wall thickness is severely increased. There is severe asymmetric hypertrophy of the septal wall. The left ventricular ejection fraction is 64%. Systolic function is normal. Global longitudinal strain is normal at -18%. Wall motion is normal. Diastolic function is mildly abnormal, consistent with grade I (abnormal) relaxation. There is no LV dynamic obstruction with a peak gradient of 22.0 mmHg at rest and 31.0 mmHg with Valsalva.    Left Atrium: The atrium is mildly dilated.    Aortic Valve: The aortic valve is " trileaflet. The leaflets are moderately thickened. The leaflets are moderately calcified. There is mildly reduced mobility. There is mild stenosis. The aortic valve mean gradient is 15 mmHg. The dimensionless velocity index is 0.66. The aortic valve area is 2.50 cm2.    Mitral Valve: There is mild annular calcification. There is systolic anterior motion of the anterior leaflet without late peaking gradient.    Aorta: The aortic root is mildly dilated. The ascending aorta is mildly dilated. The aortic root is 4.10 cm. The ascending aorta is 4.1 cm.     Low sodium diet reinforced. Other issues were discussed including scheduling colonoscopy and upper endoscopy (radiation-related esophageal stricture) for which antiplatelets could be stopped for 2 days prior. He will follow up in our office in 6 months.     Risk stratification:  Nonsustained ventricular tachycardia - No  Severe left ventricular hypertrophy (>30 mm) - No  Family history of sudden death: No  Unexplained syncope: No  LVOT obstruction: yes  Atrial fibrillation and left atrial dilation: no  Age - 67  NYHA class 1  Myocardial fibrosis - 5%  LV systolic dysfunction - No  Apical aneurysm - apical akinesis due to CAD     Review of systems:  Denies chest pain, dyspnea on exertion and palpitations; denies lower extremity edema and orthopnea; notes occasional lightheadedness with standing     Family history: Father,  at age of 64, presumably from MI, was heavy smoker, had CABG at 56. Mother,  at age of 73 from GBS. He had 4 sisters, 2 brothers. 1 sister  at age of 55 from kidney failure and one brother  at age of 65 from prostate cancer. Living sisters' age ranges from 50-67, have HTN and diabetes. Living brother is 62 and has HLD, and HTN. Mr. Robbins has 2 sons: 32 and 38 yo, both have white coat syndrome related HTN.  He has recommended that his family get screened, but he is unsure if any of them have. Denies history of sudden cardiac death in  the close or distant family.       Genetic testing:       Devices: none    Historical Information   Past Medical History:   Diagnosis Date    Anxiety     Cancer (HCC)     Chemotherapy follow-up examination     History of radiation therapy 2006    Hypertension      Past Surgical History:   Procedure Laterality Date    ANKLE SURGERY Left     CARDIAC CATHETERIZATION Left 10/07/2022    Procedure: Cardiac catheterization;  Surgeon: Simeon Pastor MD;  Location: [facility];  Service: Cardiology    CARDIAC CATHETERIZATION Left 10/07/2022    Procedure: Cardiac Left Heart Cath;  Surgeon: Simeon Pastor MD;  Location: [facility];  Service: Cardiology    CARDIAC CATHETERIZATION N/A 10/07/2022    Procedure: Cardiac Coronary Angiogram;  Surgeon: Simeon Pastor MD;  Location: [facility];  Service: Cardiology    CARDIAC CATHETERIZATION N/A 12/28/2022    Procedure: Cardiac pci;  Surgeon: Dada Berrios MD;  Location: [facility];  Service: Cardiology    MULTIPLE TOOTH EXTRACTIONS N/A 10/5/2023    Procedure: EXTRACTION TEETH MULTIPLE 1,2,3,4,5,6,7,8,9,10,11,12,13,14,19,20,21,22,23,26,27,28,29,30;  Surgeon: Kusum Esquivel DMD;  Location: [facility];  Service: Maxillofacial    NECK SURGERY Right     SHOULDER OPEN ROTATOR CUFF REPAIR Right     SKIN BIOPSY       Family History   Problem Relation Age of Onset    Hypertension Mother     Obesity Mother     Diabetes Father     Heart attack Father     Stroke Father     Heart disease Father     Hyperlipidemia Sister     Obesity Sister     Hypertension Sister     Diabetes Sister     Kidney disease Sister     Hyperlipidemia Brother     Hypertension Brother     Diabetes Brother     Prostate cancer Brother     Skin cancer Maternal Grandfather      Current Outpatient Medications on File Prior to Visit   Medication Sig Dispense Refill    aspirin (ECOTRIN LOW STRENGTH) 81 mg EC tablet Take 1 tablet (81 mg total) by mouth daily 30 tablet 11    bisoprolol  "(ZEBETA) 5 mg tablet Take 1/2 (one-half) tablet by mouth twice daily 30 tablet 5    clopidogrel (PLAVIX) 75 mg tablet Take 1 tablet (75 mg total) by mouth daily 30 tablet 11    famotidine (PEPCID) 20 mg tablet Take 1 tablet (20 mg total) by mouth in the morning (Patient not taking: Reported on 7/5/2024) 30 tablet 11    levothyroxine 150 mcg tablet Take 1 tablet by mouth once daily 90 tablet 0    omeprazole (PriLOSEC) 40 MG capsule Take 1 capsule (40 mg total) by mouth 2 (two) times a day 60 capsule 3    rosuvastatin (CRESTOR) 40 MG tablet Take 1 tablet by mouth once daily 30 tablet 5    tamsulosin (FLOMAX) 0.4 mg Take 1 capsule (0.4 mg total) by mouth daily with dinner 30 capsule 11     No current facility-administered medications on file prior to visit.     No Known Allergies  Social History     Substance and Sexual Activity   Alcohol Use Not Currently     Social History     Substance and Sexual Activity   Drug Use Not Currently    Types: Marijuana     Social History     Tobacco Use   Smoking Status Former    Passive exposure: Never   Smokeless Tobacco Former   Tobacco Comments    Quit 35 yrs ago     Objective   Vitals:   Vitals:    10/01/24 1303 10/01/24 1325   BP: (!) 184/110 142/80   BP Location: Right arm Left arm   Patient Position: Sitting    Cuff Size: Standard    Pulse: (!) 52    SpO2: 100%    Weight: 75.8 kg (167 lb)    Height: 5' 11\" (1.803 m)    Body surface area is 1.95 meters squared.  Body mass index is 23.29 kg/m².    Invasive Devices       Line  Duration             Arterial Sheath -- days                  Physical Exam:  GEN: Simeon Robbins appears well, alert and oriented x 3, pleasant and cooperative   HEENT: pupils equal, round, and reactive to light; extraocular muscles intact  NECK: supple, no carotid bruits   HEART: regular rhythm, normal S1 and S2, harsh 3/6 systolic murmur throughout the precordium without change in intensity with maneuvers, S2 in preserved, no clicks, gallops or rubs " "  LUNGS: clear to auscultation bilaterally; no wheezes, rales, or rhonchi   ABDOMEN: normal bowel sounds, soft, no tenderness, no distention  EXTREMITIES: peripheral pulses normal; no clubbing, cyanosis, or edema  NEURO: no focal findings   SKIN: normal without suspicious lesions on exposed skin    Lab Results:   Lab Results   Component Value Date    WBC 9.16 2024    RBC 4.78 2024    HGB 14.3 2024    HCT 45.4 2024    MCV 95 2024     2024    RDW 14.4 2024     Lab Results   Component Value Date     2016    K 4.3 2024     2024    CO2 31 2024    BUN 12 2024    CREATININE 0.67 2024    EGFR 98 2024    GLUCOSE 112 (H) 2016    CALCIUM 9.2 2024    AST 20 2024    ALT 16 2024    ALKPHOS 60 2024    PROT 7.2 2016    BILITOT 0.5 2016     Lab Results   Component Value Date    MG 2.6 2023     Lab Results   Component Value Date    CHOL 287 (H) 2016    HDL 27 (L) 2024    TRIG 102 2024    LDLCALC 84 2024     Lab Results   Component Value Date    IOW2IGRPFCPS 3.696 2024    FREET4 0.90 2023     Imaging:   I have personally reviewed pertinent films in PACS  FL barium swallow video w speech    Result Date: 2024  Narrative: A video barium swallow study was performed by the Department of Speech Pathology. Please refer to the report for the official interpretation. The images are stored for archival purposes only. Study images were not formally reviewed by the Radiology Department.    Cardiac testing:     Name: Simeon Robbins                       : 1955  MRN: [id number]                       Age: 68 y.o.  Patient Status: Outpatient          Gender: male  Echo complete w/ strain    Height: 5' 11\" (1.803 m)   Weight: 77.6 kg (171 lb)   BSA: 1.97 m²   Blood Pressure: 138/88    Date of Study: 24   Ordering Provider: HERMELINDA Jones " "  Clinical Indications: HOCM (hypertrophic obstructive cardiomyopathy) ([facility]) [I42.1 (ICD-10-CM)]       Reading Physicians  Performing Staff   Cardiology: Claude Sher MD    Tech: MOHINDER Woody        Vitals    Height Weight BSA (Calculated - m2) BP Pulse   5' 11\" (1.803 m) 77.6 kg (171 lb) 1.97 sq meters 138/88 63     PACS Images     Show images for Echo complete w/ contrast if indicated  Study Details    This transthoracic echocardiogram was performed in the echo lab. This was a routine, outpatient study. Study quality was adequate. This was a technically difficult study due to poor acoustic windows. A complete 2D, color flow Doppler, spectral Doppler, 2D, color flow Doppler, spectral Doppler and strain transthoracic echocardiogram was performed.  The apical, parasternal, subcostal and suprasternal views were obtained.     History    Hypertrophic obstructive cardiomyopathy. HLD. Hypertension. CAD. Cardiac stent.     Interpretation Summary  Show Result Comparison     Left Ventricle: Wall thickness is severely increased. There is severe asymmetric hypertrophy of the septal wall. The left ventricular ejection fraction is 64%. Systolic function is normal. Global longitudinal strain is normal at -18%. Wall motion is normal. Diastolic function is mildly abnormal, consistent with grade I (abnormal) relaxation. There is no LV dynamic obstruction with a peak gradient of 22.0 mmHg at rest and 31.0 mmHg with Valsalva.    Left Atrium: The atrium is mildly dilated.    Aortic Valve: The aortic valve is trileaflet. The leaflets are moderately thickened. The leaflets are moderately calcified. There is mildly reduced mobility. There is mild stenosis. The aortic valve mean gradient is 15 mmHg. The dimensionless velocity index is 0.66. The aortic valve area is 2.50 cm2.    Mitral Valve: There is mild annular calcification. There is systolic anterior motion of the anterior leaflet without late peaking gradient.    Aorta: The " aortic root is mildly dilated. The ascending aorta is mildly dilated. The aortic root is 4.10 cm. The ascending aorta is 4.1 cm.     Strain was performed to quantify interventricular dyssynchrony and evaluate components of myocardial function due to HCM. Results from the utilization of Strain Analysis are listed in the report below.     Findings    Left Ventricle Left ventricular cavity size is small. Wall thickness is severely increased. There is severe asymmetric hypertrophy of the septal wall. The left ventricular ejection fraction is 64%. Systolic function is normal. Global longitudinal strain is normal at -18%.  Wall motion is normal. Diastolic function is mildly abnormal, consistent with grade I (abnormal) relaxation.  There is no LV dynamic obstruction with a peak gradient of 22.0 mmHg at rest and 31.0 mmHg with Valsalva.   Right Ventricle Right ventricular cavity size is normal. Systolic function is normal. Wall thickness is normal.   Left Atrium The atrium is mildly dilated.   Right Atrium The atrium is normal in size.   Aortic Valve The aortic valve is trileaflet. The leaflets are moderately thickened. The leaflets are moderately calcified. There is mildly reduced mobility. There is no evidence of regurgitation. There is mild stenosis. The aortic valve mean gradient is 15 mmHg. The dimensionless velocity index is 0.66. The aortic valve area is 2.50 cm2.   Mitral Valve The leaflets are not thickened. The leaflets are not calcified. The leaflets exhibit normal mobility. There is mild annular calcification.  There is no evidence of regurgitation. There is no evidence of stenosis. There is systolic anterior motion of the anterior leaflet without late peaking gradient.   Tricuspid Valve Tricuspid valve structure is normal. There is trace regurgitation. There is no evidence of stenosis. The right ventricular systolic pressure is normal.   Pulmonic Valve Pulmonic valve structure is normal. There is trace  regurgitation. There is no evidence of stenosis.   Ascending Aorta The aortic root is mildly dilated. The ascending aorta is mildly dilated. The aortic root is 4.10 cm. The ascending aorta is 4.1 cm.   Pericardium There is no pericardial effusion. The pericardium is normal in appearance.     Left Ventricle Measurements    Function/Volumes   A4C EF 64 %         LVOT stroke volume 147.11         LVOT stroke volume index 72.2 ml/m2         Left ventricular stroke volume (2D) 32 mL         LVOT Cardiac Output 8.39 l/min         LVOT Cardiac Index 4.24 l/min/m2         Dimensions   LVIDd 3.5 cm         LVIDS 2.4 cm         IVSd 1.7 cm         LVPWd 1.1 cm         LVOT area 3.8 cm2         FS 31         Diastolic Filling   MV E' Tissue Velocity Septal 5 cm/s         LA Volume Index (BP) 31.3 mL/m2         E/A ratio 0.53         E wave deceleration time 211 ms         MV Peak E Kirk 63 cm/s         MV Peak A Kirk 1.19 m/s         Strain   GLS -18 %          Report Measurements   AV LVOT peak gradient 12 mmHg         Other Measurements   Peak gradient (Rest) 22 mmHg         Peak Gradient (Valsalva) 31 mmHg              Interventricular Septum Measurements    Shunt Ratio   LVOT peak VTI 38.72 cm         LVOT peak kirk 1.7 m/s              Right Ventricle Measurements    Dimensions   RVID d 3.9 cm         Tricuspid annular plane systolic excursion 2.3 cm               Left Atrium Measurements    Dimensions   LA size 3.4 cm         LA length (A2C) 6.2 cm         Volumes   LA volume (BP) 62 mL         LA Volume Index (BP) 31.3 mL/m2               Right Atrium Measurements    Dimensions   RAA A4C 12.9 cm2               Atrial Septum Measurements    Shunt Ratio   LVOT peak VTI 38.72 cm         LVOT peak kirk 1.7 m/s               Aortic Valve Measurements    Stenosis   Aortic valve peak velocity 2.57 m/s         LVOT peak kirk 1.7 m/s         Ao VTI 58.79 cm         LVOT peak VTI 38.72 cm         AV mean gradient 15 mmHg         LVOT  mn grad 7 mmHg         AV peak gradient 26 mmHg         AV LVOT peak gradient 12 mmHg         Area/Dimensions   DVI 0.66         AV valve area 2.5 cm2         AV area by cont VTI 2.5 cm2         AV area peak kirk 2.5 cm2         LVOT diameter 2.2 cm         LVOT area 3.8 cm2               Mitral Valve Measurements    Stenosis   MV stenosis pressure 1/2 time 61 ms         MV valve area p 1/2 method 3.61               Tricuspid Valve Measurements    RVSP Parameters   TR Peak Kirk 2.5 m/s         Triscuspid Valve Regurgitation Peak Gradient 25 mmHg               Aorta Measurements    Aortic Dimensions   Ao root 4.1 cm         STJ 3.2 cm         Asc Ao 4.1 cm               Exam Details    Performed Procedure Technologist Supporting Staff Performing Physician   Echo complete w/ strain MOHINDER Woody           Appointment Date/Status Modality Department    1/9/2024     Completed BE ECHO PORT 3 BE CAR NON INV           Begin Exam End Exam  End Exam Questionnaires   1/9/2024 11:50 AM 1/9/2024 12:43 PM  PATIENT EDUCATION            All Reviewers List    HERMELINDA Jones on 1/10/2024  8:54 AM     Signed    Electronically signed by Claude Sher MD on 1/10/24 at 0729 EST     Counseling / Coordination of Care  Total time spent today 33 minutes.  Greater than 50% of total time was spent with the patient and / or family counseling and / or coordination of care.

## 2024-10-01 ENCOUNTER — OFFICE VISIT (OUTPATIENT)
Dept: CARDIOLOGY CLINIC | Facility: CLINIC | Age: 69
End: 2024-10-01
Payer: COMMERCIAL

## 2024-10-01 VITALS
WEIGHT: 167 LBS | HEIGHT: 71 IN | DIASTOLIC BLOOD PRESSURE: 80 MMHG | HEART RATE: 52 BPM | BODY MASS INDEX: 23.38 KG/M2 | SYSTOLIC BLOOD PRESSURE: 142 MMHG | OXYGEN SATURATION: 100 %

## 2024-10-01 DIAGNOSIS — Z95.5 STATUS POST INSERTION OF DRUG ELUTING CORONARY ARTERY STENT: ICD-10-CM

## 2024-10-01 DIAGNOSIS — I25.119 CORONARY ARTERY DISEASE INVOLVING NATIVE CORONARY ARTERY OF NATIVE HEART WITH ANGINA PECTORIS (HCC): ICD-10-CM

## 2024-10-01 DIAGNOSIS — I25.10 CORONARY ARTERY DISEASE INVOLVING NATIVE CORONARY ARTERY OF NATIVE HEART WITHOUT ANGINA PECTORIS: ICD-10-CM

## 2024-10-01 DIAGNOSIS — E78.5 HYPERLIPIDEMIA, UNSPECIFIED HYPERLIPIDEMIA TYPE: ICD-10-CM

## 2024-10-01 DIAGNOSIS — I10 PRIMARY HYPERTENSION: ICD-10-CM

## 2024-10-01 DIAGNOSIS — I35.0 NONRHEUMATIC AORTIC VALVE STENOSIS: ICD-10-CM

## 2024-10-01 DIAGNOSIS — I42.1 HOCM (HYPERTROPHIC OBSTRUCTIVE CARDIOMYOPATHY) (HCC): Primary | ICD-10-CM

## 2024-10-01 PROCEDURE — 99214 OFFICE O/P EST MOD 30 MIN: CPT | Performed by: INTERNAL MEDICINE

## 2024-10-11 ENCOUNTER — TELEPHONE (OUTPATIENT)
Dept: CARDIOLOGY CLINIC | Facility: CLINIC | Age: 69
End: 2024-10-11

## 2024-10-11 NOTE — TELEPHONE ENCOUNTER
L/m/m asking patient to cb or message Dr Sher in [facility] with 1 week of blood pressure readings

## 2024-10-23 ENCOUNTER — TELEPHONE (OUTPATIENT)
Age: 69
End: 2024-10-23

## 2024-10-23 DIAGNOSIS — R13.12 OROPHARYNGEAL DYSPHAGIA: Primary | ICD-10-CM

## 2024-10-23 NOTE — TELEPHONE ENCOUNTER
Pt. Calling asking for  speech therapy referral to be placed since having swallow study it was recommended he start therapy, please call pt. Once referral is placed as he would like the contact number to call and schedule appointment if possible

## 2024-11-05 ENCOUNTER — EVALUATION (OUTPATIENT)
Dept: SPEECH THERAPY | Facility: CLINIC | Age: 69
End: 2024-11-05
Payer: COMMERCIAL

## 2024-11-05 DIAGNOSIS — R13.12 OROPHARYNGEAL DYSPHAGIA: ICD-10-CM

## 2024-11-05 DIAGNOSIS — C76.0 HEAD AND NECK CANCER (HCC): Primary | ICD-10-CM

## 2024-11-05 PROCEDURE — 92526 ORAL FUNCTION THERAPY: CPT

## 2024-11-05 PROCEDURE — 92610 EVALUATE SWALLOWING FUNCTION: CPT

## 2024-11-05 NOTE — PROGRESS NOTES
Speech-Language Pathology Initial Evaluation    Today's date: 2024   Patient’s name: Simeon Robbins  : 1955  MRN: [id number]  Safety measures:   Referring provider: Caleb Ibanez MD    Encounter Diagnosis     ICD-10-CM    1. Head and neck cancer (HCC)  C76.0       2. Oropharyngeal dysphagia  R13.12 Ambulatory Referral to Speech Therapy            Assessment:  Patient presents with moderate-severe oral / pharyngeal dysphagia characterized by decreased oral control / delayed swallow initiation and decreased oral and pharyngeal muscular strength with decreased airway protection.  Patient did complete VFSS on 24 with developed treatment plan from those recent results.  This is patient's first time that he will be participating in dysphagia therapy following head / neck CA (over 15 years prior).  Increased dysphagia secondary to late effects of radiation and s/p head / neck CA surgery and radiation.  At this time, recommend to continue recommendations from VFSS with puree & thin liquids with compensatory strategies.  Educaton provided. Recommend: Dysphagia therapy services focused on further education & exercise training, along with review of compensation techniques.      -Factors affecting performance: Endurance    -Safety concerns: Risk for aspiration, Risk for choking, and Risk for inadequate nutrition/ hydration    -Risk factors: History of Head and Neck Cancer, Complex medical history, GERD, and Xerostomia      SWALLOWING SAFETY PRECAUTIONS:  -Recommended solids: Puree    -Recommended liquids: Thin    -Recommended medication form: pills with thin liquids    -Frequent/thorough oral care     -Aspiration precautions   *Monitor for signs/symptoms concerning for aspiration (e.g., low grade fever, increase in WBC, change in chest x-ray, increased congestion, increased coughing with P.O. intake)    -Reflux precautions     -Strategies: Small sips and bites when eating, Slow rate, swallow between bites,  No straw, and Alternate liquids and solids    -Positioning: Upright position during meals and Upright position at least 30 minutes after P.O. intake    -Compensatory strategies:     -Supervision: Independent     -Referrals: None       Treatment:  EMST-150, exercise training, iopi              Short Term    Patient will complete pharyngeal strengthening exercises for increased safety & improved swallow function.       Patient will complete daily oral motor exercises (IOPI as appropriate) to increase lingual/labial range of motion, strength, and coordination with min cues to 90% effectiveness to improve bolus formation and strengthen oral musculature.    Patient will perform compensatory strategies (e.g., upright positioning, small bites/sips, slow rate, alternation of consistencies, multiple swallows, effortful swallow, chin tuck, head turn, etc.) with 80% accuracy with minimized overt s/sx penetration/aspiration of least restrictive food/liquid consistencies.     Patient will tolerate least restrictive diet with minimized overt s/sx of penetration or aspiration.      Long Term    Patient will maintain adequate hydration and nutrition with optimum safety and efficacy of swallowing function on P.O. intake without overt s/sx of penetration or aspiration by discharge.     Patient will utilize compensatory strategies with optimum safety and efficacy of swallowing function on P.O. intake without overt s/sx of penetration or aspiration by discharge.    Patient will develop safe and functional chewing and swallowing via use of dysphagia management strategies and diet modifications for adequate meal completion without significant s/sx of dysphagia and aspiration in order to maximize quality of life and to decrease potential for medical complications for dysphagia.          Plan:  Patient would benefit from outpatient skilled Speech Therapy services: Dysphagia therapy    Frequency: 2x weekly  Duration: 6-8  weeks    Intervention certification from: 11/5/2024  Intervention certification to: 12/31/24      Subjective:  History of present illness: Patient is a 68 y.o. male who was referred to outpatient skilled Speech Therapy services for a dysphagia evaluation. History of head/neck CA (base of tongue and lymph nodes with resulting surgery and radiation).         VFSS: 9/11/24: Pt presents w/ mod-severe oropharyngeal dysphagia.        Slowed bolus transfer. Reduced bolus control w/ premature spill over BOT. Reduced BOT retraction resulting in mild oral residue on BOT. Delayed swallow initiation w/ bolus head in the pyriforms w/ majority of trials. Reduced laryngeal elevation, anterior hyoid excursion, vestibule closure, and incomplete epiglottic inversion. Penetration noted across all trials w/ epiglottic undercoating noted w/ majority of trials, thin and nectar thick liquids contacting vocal folds. Throat clear, cough and strategies not effective in reducing/eliminating penetration. No aspiration visualized on today's study. See below for detailed trials. Reduced pharyngeal stripping wave and reduced UES opening resulting in mild pharyngeal residue of thin liquids, ultimately worsens w/ thickened liquids and puree textures placing pt at an increased risk of aspiration from residual spillover.      Pt remains at a high risk of aspiration 2/2 PMHx, dysphagia, and known radiation tx, however pt reports no recent PNA or respiratory infections. If pt is dx w/ recurrent/persistent PNA or upper respiratory infections consider GOC and possible need for alternate means of nutrition. MBS findings thoroughly reviewed w/ pt  w/ use of images to aid in understanding. Education provided on anatomy/physiology of the swallow and pt specific identified impairments including reduced airway protection, residual and reduced UES opening. Further education provided on risks of aspiration w/ each administered consistency and options for diet  modifications to potentially reduce aspiration. Further education provided on risks/benefits of liquid/solid texture modifications (reduced aspiration risk and subsequent medical implications, increased retention, potential dehydration, etc.) vs risks/benefits of continuing baseline diet w/ use of strategies to optimize swallow safety and understanding/acceptance of any potential risks. Pt verbalized understanding of options and associated risks/benefits. Physician notified of MBS report and results. Education provided on the importance of frequent/thorough oral care and s/s worsening dysphagia/aspiration to notify medical team of should they arise. Pt demonstrated understanding and denied questions/concerns at this time.         Recommendations:  Diet: Puree textures w/ thin liquid wash   Liquids: Thin liquids   Meds: crushed in puree or liquid form   Strategies: alternate solids/liquids, small bites/sips, upright posture   Frequent oral care  Upright position  F/u ST tx: OP ST   Therapy Prognosis: fair/guarded  Prognosis considerations: age, PMHx, known radiation tx  Aspiration Precautions  Results reviewed with: pt, physician  Aspiration precautions posted.  Repeat MBS as necessary  If a dedicated assessment of the esophagus is desired, consider esophagram/barium swallow or EGD.        H&P/pertinent provider notes: (PMH noted above)  Dale Robbins is a 68 y.o male w/ a PMHx significant for but not limited to HTN, esophagitis, HLD, HOCM, head and neck cancer 2006, presenting to [facility] for OP VFSS. Per chart review pt noted to have signs of oropharyngeal dysphagia per MD Ibanez in May of 2023 w/ subsequent EGD, pt most recently received EGD revealing laryngeal penetration to the level of the vocal cords, see below.         Patient's goal(s): To be stable with swallowing and not be worse     Hearing: Decreased - Tinnitus  Vision: glasses    Home environment/lifestyle: lives with friend, 2 sons ,4 grand  children    Vocational status: retired, security maintenance       Objective (testing):  Dysphagia Evaluation:    -Reason for referral: Signs/symptoms of dysphagia and Change in health status    -Subjective report of swallowing difficulty: Poor secretion management , Coughing, Choking, Globus sensation , and Regurgitation of food    -Eating Assessment Tool (EAT-10) is a self-administered, symptom-specific outcome instrument for dysphagia. It consists of ten statements that a patient rates on a scale of 0-4, with 0=no problem to 4=severe problem. A score of 3 or more is abnormal. Patient obtained a score of 31/40  -Difficulty swallowing: Solids, Liquids, and Pills    -Current diet (solids): Pureed  -Current diet (liquids): Thin  -Current pill intake method: pills with liquid as able  -Alternative Feeding Method?: No - Education provided    -Facial appearance Symmetrical   -Mandible function Decreased strength   -Dentition Full dentures   -Labial function Decreased ROM (bilateral) and Decreased strength (bilateral)   -Lingual function Decreased strength (bilateral) and Deviaton to the right side   -Velar function Unable to visualize   -Oral apraxia? Absent   -Vocal quality Weak, Hoarse, and Gurgly   -Volitional cough Weak   -Respiration WFL   -Drooling? No   -Tremor/involuntary movement? Not present     LIQUID CONSISTENCY TESTING:   Item(s) tested: (Thin) - water     Administered by: Cup    Clinical findings:  -Oral phase impairments: prolonged anterior-posterior transit time, reduced bolus formation/control, and suspected premature spillage  -Pharyngeal phase impairments: delayed swallow initiation, reduced hyolaryngeal elevation, reduced hyolaryngeal excursion, and overt s/x of penetration/aspiration (Wet vocal quality) (subtle)    Other notes: Multiple swallows engaged    Strategies, attempts, and responses: small sips and multiple swallow      SOLID CONSISTENCY TESTING:  Item(s) tested: (Puree)   Administered by:  Spoon    Clinical findings:  -Oral phase impairments: anterior leakage, prolonged anterior-posterior transit time, reduced bolus formation/control, and suspected premature spillage  -Pharyngeal phase impairments: delayed swallow initiation, reduced hyolaryngeal elevation, reduced hyolaryngeal excursion, and overt s/x of penetration/aspiration (Wet vocal quality and Throat clear)    Other notes: multiple swallows    Strategies, attempts, and responses: small bites, small sips, and multiple swallow            Visit Tracking:  POC   Expires Auth Expiration Date ST Visit Limit   12/31/24 12/31/24 1          Visit/Unit Tracking:  Auth Status Date 12/31/24   BOMN Used 1    Remaining BOMN       Intervention Comments:  Emst, exercise training for all impairments, IOPI... MFR

## 2024-11-07 ENCOUNTER — OFFICE VISIT (OUTPATIENT)
Dept: SPEECH THERAPY | Facility: CLINIC | Age: 69
End: 2024-11-07
Payer: COMMERCIAL

## 2024-11-07 DIAGNOSIS — R13.12 OROPHARYNGEAL DYSPHAGIA: Primary | ICD-10-CM

## 2024-11-07 DIAGNOSIS — C76.0 HEAD AND NECK CANCER (HCC): ICD-10-CM

## 2024-11-07 PROCEDURE — 92526 ORAL FUNCTION THERAPY: CPT

## 2024-11-07 NOTE — PROGRESS NOTES
Daily Speech Treatment Note    Today's date: 2024   Patient’s name: Simeon Robbins  : 1955  MRN: [id number]  Safety measures:   Referring provider: Caleb Ibanez MD    Encounter Diagnosis     ICD-10-CM    1. Oropharyngeal dysphagia  R13.12       2. Head and neck cancer (HCC)  C76.0         Visit Trackin    Subjective/Behavioral:  -  Pleasant/Cooperative     Objective/Assessment:  Completed IOPI evaluation and initial treatment exercises. Provided for home practice and patient expressed understanding of home plan. EMST-75 can be ordered through insurance- will complete paperwork for this and device will be mailed to patient's home.     Short-term goals:     Patient will complete pharyngeal strengthening exercises for increased safety & improved swallow function.        Patient will complete daily oral motor exercises (IOPI as appropriate) to increase lingual/labial range of motion, strength, and coordination with min cues to 90% effectiveness to improve bolus formation and strengthen oral musculature.      24:    Strength and Endurance Testing:  The IOPI Pro is used by medical professionals to measure, evaluate, and increase the strength and endurance of the tongue and lip in patients with oral motor disorders, including dysphagia and dysarthria.  The IOPI measures the maximum pressure (Pmax) a patient can produce in an air-filled bulb when it is compressed as hard as possible by the tongue or lip against a hard surface (e.g. The palate or teeth, respectively). Pmax is a measure of strength, expressed in kilopascals (kPa, an international unit of pressure).       For patients with dysphagia or dysarthria, oral motor fatigability may be of interest.  The IOPI Pro can be used to assess tongue fatigability.  Low endurance values are an indicator of a high fatigability.  Endurance is measured with the IOPI Pro by quantifying the length of time that a patient can maintain 50% of his or her Pmax.   This procedure is conducted in Target Mode by setting the target value to 50% of the patient's Pmax and timing how long the patient can hold the top (green) light on.       The medical professional determines what target value is appropriate for exercise therapy purposes and provides specific instructions to the patient for a particular exercise protocol.  In Target Mode, the pressure required to illuminate the green light a the top of the light array can be adjusted using the Set Target arrow buttons.  This green light is used as a visual target for the patient.     Tongue tip Peak Max after 3 trials: 15 Norm for age/gender is 56   Tongue back Peak Max after 3 trials: 12 Norm for age/gender is 50   Right lip Peak Max after 3 trials: 21 Norm for age/gender is 35   Left lip Peak Max after 3 trials: 13 Norm for age/gender is 35      IOPI PEAK MEASUREMENT WEEKLY GRID      11/7           Tongue tip  (Goal = 56) 15           Tongue back  (Goal = 50) 12           Right Lip  (Goal = 35) 21        Left Lip  (Goal = 35) 13            IOPI -- Today's Exercise Targets    Tongue tip  (Goal = 56) Peak Max after 3 trials: 15 Effort level: 65 & 70% Target for treatment: 10, 11    Tongue back  (Goal = 50) Peak Max after 3 trials: 12 Effort level: 65% Target for treatment: 8   Right Lip  (Goal = 35) Peak Max after 3 trials: 21 Effort level: 65% Target for treatment: 14   Left Lip  (Goal = 35) Peak Max after 3 trials: 13 Effort level: 65% Target for treatment: 8        11/7/24        Tongue tip  10 x 30, 11 x 30        Tongue back 8 X 60        Right Lip 14 x 10         Left Lip 8 x 11             Endurance testing held at max pressure 8 - 50% of max front of tongue- 15 for 8 seconds.      Patient will perform compensatory strategies (e.g., upright positioning, small bites/sips, slow rate, alternation of consistencies, multiple swallows, effortful swallow, chin tuck, head turn, etc.) with 80% accuracy with minimized overt s/sx  penetration/aspiration of least restrictive food/liquid consistencies.     Patient will tolerate least restrictive diet with minimized overt s/sx of penetration or aspiration.        Long Term     Patient will maintain adequate hydration and nutrition with optimum safety and efficacy of swallowing function on P.O. intake without overt s/sx of penetration or aspiration by discharge.      Patient will utilize compensatory strategies with optimum safety and efficacy of swallowing function on P.O. intake without overt s/sx of penetration or aspiration by discharge.     Patient will develop safe and functional chewing and swallowing via use of dysphagia management strategies and diet modifications for adequate meal completion without significant s/sx of dysphagia and aspiration in order to maximize quality of life and to decrease potential for medical complications for dysphagia.        Plan:  -Continue with current plan of care.

## 2024-11-14 ENCOUNTER — APPOINTMENT (OUTPATIENT)
Dept: SPEECH THERAPY | Facility: CLINIC | Age: 69
End: 2024-11-14
Payer: COMMERCIAL

## 2024-11-18 DIAGNOSIS — Z95.5 STATUS POST INSERTION OF DRUG ELUTING CORONARY ARTERY STENT: ICD-10-CM

## 2024-11-19 RX ORDER — CLOPIDOGREL BISULFATE 75 MG/1
75 TABLET ORAL DAILY
Qty: 30 TABLET | Refills: 5 | Status: SHIPPED | OUTPATIENT
Start: 2024-11-19

## 2024-11-20 ENCOUNTER — OFFICE VISIT (OUTPATIENT)
Dept: SPEECH THERAPY | Facility: CLINIC | Age: 69
End: 2024-11-20
Payer: COMMERCIAL

## 2024-11-20 DIAGNOSIS — C76.0 HEAD AND NECK CANCER (HCC): ICD-10-CM

## 2024-11-20 DIAGNOSIS — R13.12 OROPHARYNGEAL DYSPHAGIA: Primary | ICD-10-CM

## 2024-11-20 PROCEDURE — 92526 ORAL FUNCTION THERAPY: CPT

## 2024-11-20 NOTE — PROGRESS NOTES
"Daily Speech Treatment Note    Today's date: 2024   Patient’s name: Simeon Robbins  : 1955  MRN: [id number]  Safety measures:   Referring provider: Caleb Ibanez MD    Encounter Diagnosis     ICD-10-CM    1. Oropharyngeal dysphagia  R13.12       2. Head and neck cancer (HCC)  C76.0         Visit Trackin    Subjective/Behavioral:  -  Pleasant/Cooperative     Objective/Assessment:    Short-term goals:     Patient will complete pharyngeal strengthening exercises for increased safety & improved swallow function.         :  Provided patient educational video to demonstrate how to utilize the EMST 75 device and the purpose of it. Clinician demonstrated how to put together the device and established a baseline for patient. Provided education to clean device with water and soap.  Baseline: 25 cmH20. Provided education to patient to create adequate labial seal and utilized \"blowing out candles\" as the visual cue. Provided education to think 5. Completed 5 sets of 5 reps with 15 second rest in between each rep and 1 min rest between each set. Provided patient a handout to keep track of the data. Reciprocal agreement was noted.          Patient will complete daily oral motor exercises (IOPI as appropriate) to increase lingual/labial range of motion, strength, and coordination with min cues to 90% effectiveness to improve bolus formation and strengthen oral musculature.      24:    Strength and Endurance Testing:  The IOPI Pro is used by medical professionals to measure, evaluate, and increase the strength and endurance of the tongue and lip in patients with oral motor disorders, including dysphagia and dysarthria.  The IOPI measures the maximum pressure (Pmax) a patient can produce in an air-filled bulb when it is compressed as hard as possible by the tongue or lip against a hard surface (e.g. The palate or teeth, respectively). Pmax is a measure of strength, expressed in kilopascals (kPa, an " international unit of pressure).       For patients with dysphagia or dysarthria, oral motor fatigability may be of interest.  The IOPI Pro can be used to assess tongue fatigability.  Low endurance values are an indicator of a high fatigability.  Endurance is measured with the IOPI Pro by quantifying the length of time that a patient can maintain 50% of his or her Pmax.  This procedure is conducted in Target Mode by setting the target value to 50% of the patient's Pmax and timing how long the patient can hold the top (green) light on.       The medical professional determines what target value is appropriate for exercise therapy purposes and provides specific instructions to the patient for a particular exercise protocol.  In Target Mode, the pressure required to illuminate the green light a the top of the light array can be adjusted using the Set Target arrow buttons.  This green light is used as a visual target for the patient.     Tongue tip Peak Max after 3 trials: 15 Norm for age/gender is 56   Tongue back Peak Max after 3 trials: 12 Norm for age/gender is 50   Right lip Peak Max after 3 trials: 21 Norm for age/gender is 35   Left lip Peak Max after 3 trials: 13 Norm for age/gender is 35      IOPI PEAK MEASUREMENT WEEKLY GRID      11/7           Tongue tip  (Goal = 56) 15           Tongue back  (Goal = 50) 12           Right Lip  (Goal = 35) 21        Left Lip  (Goal = 35) 13            IOPI -- Today's Exercise Targets    Tongue tip  (Goal = 56) Peak Max after 3 trials: 15 Effort level: 65 & 70% Target for treatment: 10, 11    Tongue back  (Goal = 50) Peak Max after 3 trials: 12 Effort level: 65% Target for treatment: 8   Right Lip  (Goal = 35) Peak Max after 3 trials: 21 Effort level: 65% Target for treatment: 14   Left Lip  (Goal = 35) Peak Max after 3 trials: 13 Effort level: 65% Target for treatment: 8        11/7/24        Tongue tip  10 x 30, 11 x 30        Tongue back 8 X 60        Right Lip 14 x 10          Left Lip 8 x 11             Endurance testing held at max pressure 8 - 50% of max front of tongue- 15 for 8 seconds.      Patient will perform compensatory strategies (e.g., upright positioning, small bites/sips, slow rate, alternation of consistencies, multiple swallows, effortful swallow, chin tuck, head turn, etc.) with 80% accuracy with minimized overt s/sx penetration/aspiration of least restrictive food/liquid consistencies.     Patient will tolerate least restrictive diet with minimized overt s/sx of penetration or aspiration.        Long Term     Patient will maintain adequate hydration and nutrition with optimum safety and efficacy of swallowing function on P.O. intake without overt s/sx of penetration or aspiration by discharge.      Patient will utilize compensatory strategies with optimum safety and efficacy of swallowing function on P.O. intake without overt s/sx of penetration or aspiration by discharge.     Patient will develop safe and functional chewing and swallowing via use of dysphagia management strategies and diet modifications for adequate meal completion without significant s/sx of dysphagia and aspiration in order to maximize quality of life and to decrease potential for medical complications for dysphagia.        Plan:  -Continue with current plan of care.

## 2024-11-27 ENCOUNTER — OFFICE VISIT (OUTPATIENT)
Dept: SPEECH THERAPY | Facility: CLINIC | Age: 69
End: 2024-11-27
Payer: COMMERCIAL

## 2024-11-27 DIAGNOSIS — C76.0 HEAD AND NECK CANCER (HCC): ICD-10-CM

## 2024-11-27 DIAGNOSIS — R13.12 OROPHARYNGEAL DYSPHAGIA: Primary | ICD-10-CM

## 2024-11-27 PROCEDURE — 92526 ORAL FUNCTION THERAPY: CPT

## 2024-11-27 NOTE — PROGRESS NOTES
"Daily Speech Treatment Note    Today's date: 2024   Patient’s name: Simeon Robbins  : 1955  MRN: [id number]  Safety measures:   Referring provider: Caleb Ibanez MD    Encounter Diagnosis     ICD-10-CM    1. Oropharyngeal dysphagia  R13.12       2. Head and neck cancer (HCC)  C76.0         Visit Trackin    Visit Tracking:  POC   Expires Auth Expiration Date ST Visit Limit   24 1              Visit/Unit Tracking:  Auth Status Date 24   BOMN Used 1 4     Remaining BOMN BOMN             Subjective/Behavioral:  -  Pleasant/Cooperative     Objective/Assessment:    Short-term goals:     Patient will complete pharyngeal strengthening exercises for increased safety & improved swallow function.   : Completed education on exercises to strengthen pharyngeal stage of swallowing: effortful & Mendelsohn. Patient expressed understanding and able to complete both.  Encouraged and education on home exercise practice of these.        :  Provided patient educational video to demonstrate how to utilize the EMST 75 device and the purpose of it. Clinician demonstrated how to put together the device and established a baseline for patient. Provided education to clean device with water and soap.  Baseline: 25 cmH20. Provided education to patient to create adequate labial seal and utilized \"blowing out candles\" as the visual cue. Provided education to think 5. Completed 5 sets of 5 reps with 15 second rest in between each rep and 1 min rest between each set. Provided patient a handout to keep track of the data. Reciprocal agreement was noted.          Patient will complete daily oral motor exercises (IOPI as appropriate) to increase lingual/labial range of motion, strength, and coordination with min cues to 90% effectiveness to improve bolus formation and strengthen oral musculature.      24:    Strength and Endurance Testing:  The IOPI Pro is used by medical professionals to " measure, evaluate, and increase the strength and endurance of the tongue and lip in patients with oral motor disorders, including dysphagia and dysarthria.  The IOPI measures the maximum pressure (Pmax) a patient can produce in an air-filled bulb when it is compressed as hard as possible by the tongue or lip against a hard surface (e.g. The palate or teeth, respectively). Pmax is a measure of strength, expressed in kilopascals (kPa, an international unit of pressure).       For patients with dysphagia or dysarthria, oral motor fatigability may be of interest.  The IOPI Pro can be used to assess tongue fatigability.  Low endurance values are an indicator of a high fatigability.  Endurance is measured with the IOPI Pro by quantifying the length of time that a patient can maintain 50% of his or her Pmax.  This procedure is conducted in Target Mode by setting the target value to 50% of the patient's Pmax and timing how long the patient can hold the top (green) light on.       The medical professional determines what target value is appropriate for exercise therapy purposes and provides specific instructions to the patient for a particular exercise protocol.  In Target Mode, the pressure required to illuminate the green light a the top of the light array can be adjusted using the Set Target arrow buttons.  This green light is used as a visual target for the patient.     Tongue tip Peak Max after 3 trials: 15 Norm for age/gender is 56   Tongue back Peak Max after 3 trials: 12 Norm for age/gender is 50   Right lip Peak Max after 3 trials: 21 Norm for age/gender is 35   Left lip Peak Max after 3 trials: 13 Norm for age/gender is 35      IOPI PEAK MEASUREMENT WEEKLY GRID      11/7 11/27          Tongue tip  (Goal = 56) 15 24          Tongue back  (Goal = 50) 12 22          Right Lip  (Goal = 35) 21        Left Lip  (Goal = 35) 13            IOPI -- Today's Exercise Targets    Tongue tip  (Goal = 56) Peak Max after 3 trials:  "24 Effort level: 75% Target for treatment: 18   Tongue back  (Goal = 50) Peak Max after 3 trials: 22 Effort level: 75% Target for treatment: 17   Right Lip  (Goal = 35) Peak Max after 3 trials:  Effort level:  Target for treatment:    Left Lip  (Goal = 35) Peak Max after 3 trials:  Effort level:  Target for treatment:         11/7/24 11/27/24       Tongue tip  10 x 30, 11 x 30 18 x 60        Tongue back 8 X 60 17 x 60       Right Lip 14 x 10         Left Lip 8 x 11             Endurance testing held at max pressure 8 - 50% of max front of tongue- 15 for 8 seconds.   Endurance testing held at max pressure 12 - 50% of max front of tongue - held for 44 seconds.      Patient will perform compensatory strategies (e.g., upright positioning, small bites/sips, slow rate, alternation of consistencies, multiple swallows, effortful swallow, chin tuck, head turn, etc.) with 80% accuracy with minimized overt s/sx penetration/aspiration of least restrictive food/liquid consistencies.  11/27: Ongoing education provided on moistening foods, strategies. Patient reports solids going down \"easier.\"      Patient will tolerate least restrictive diet with minimized overt s/sx of penetration or aspiration.        Long Term     Patient will maintain adequate hydration and nutrition with optimum safety and efficacy of swallowing function on P.O. intake without overt s/sx of penetration or aspiration by discharge.      Patient will utilize compensatory strategies with optimum safety and efficacy of swallowing function on P.O. intake without overt s/sx of penetration or aspiration by discharge.     Patient will develop safe and functional chewing and swallowing via use of dysphagia management strategies and diet modifications for adequate meal completion without significant s/sx of dysphagia and aspiration in order to maximize quality of life and to decrease potential for medical complications for dysphagia.        Plan:  -Continue with " current plan of care.   MFR next session & IOPI

## 2024-12-04 DIAGNOSIS — K21.01 GASTROESOPHAGEAL REFLUX DISEASE WITH ESOPHAGITIS AND HEMORRHAGE: ICD-10-CM

## 2024-12-04 DIAGNOSIS — E03.9 ACQUIRED HYPOTHYROIDISM: ICD-10-CM

## 2024-12-05 ENCOUNTER — OFFICE VISIT (OUTPATIENT)
Dept: SPEECH THERAPY | Facility: CLINIC | Age: 69
End: 2024-12-05
Payer: COMMERCIAL

## 2024-12-05 DIAGNOSIS — C76.0 HEAD AND NECK CANCER (HCC): ICD-10-CM

## 2024-12-05 DIAGNOSIS — R13.12 OROPHARYNGEAL DYSPHAGIA: Primary | ICD-10-CM

## 2024-12-05 PROCEDURE — 92526 ORAL FUNCTION THERAPY: CPT

## 2024-12-05 RX ORDER — OMEPRAZOLE 40 MG/1
40 CAPSULE, DELAYED RELEASE ORAL 2 TIMES DAILY
Qty: 60 CAPSULE | Refills: 5 | Status: SHIPPED | OUTPATIENT
Start: 2024-12-05

## 2024-12-05 RX ORDER — LEVOTHYROXINE SODIUM 150 UG/1
150 TABLET ORAL DAILY
Qty: 90 TABLET | Refills: 1 | Status: SHIPPED | OUTPATIENT
Start: 2024-12-05

## 2024-12-05 NOTE — PROGRESS NOTES
"Daily Speech Treatment Note    Today's date: 2024   Patient’s name: Simeon Robbins  : 1955  MRN: [id number]  Safety measures:   Referring provider: Caleb Ibanez MD    Encounter Diagnosis     ICD-10-CM    1. Oropharyngeal dysphagia  R13.12       2. Head and neck cancer (HCC)  C76.0         Visit Trackin    Visit Tracking:  POC   Expires Auth Expiration Date ST Visit Limit   24 1              Visit/Unit Tracking:  Auth Status Date 24   BOMN Used 1 4 5     Remaining BOMN BOMN BOMN             Subjective/Behavioral:  -  Pleasant/Cooperative , pills noted to be \"a little easier..\" per patient report. Takes with protein shake seems to best approach by patient report.      Objective/Assessment:    Short-term goals:     Patient will complete pharyngeal strengthening exercises for increased safety & improved swallow function.   : Completed education on exercises to strengthen pharyngeal stage of swallowing: effortful & Mendelsohn. Patient expressed understanding and able to complete both.  Encouraged and education on home exercise practice of these.      : Provided instruction on stretches / MFR and completed neck / throat stretches / holds and instructed patient. Patient expressed understanding. Patient with severe tightness / decreased range of motion right side in particular. Patient would benefit from PT to further work on neck tightness from surgery and radiation.       :  Provided patient educational video to demonstrate how to utilize the EMST 75 device and the purpose of it. Clinician demonstrated how to put together the device and established a baseline for patient. Provided education to clean device with water and soap.  Baseline: 25 cmH20. Provided education to patient to create adequate labial seal and utilized \"blowing out candles\" as the visual cue. Provided education to think 5. Completed 5 sets of 5 reps with 15 second rest in between each rep " and 1 min rest between each set. Provided patient a handout to keep track of the data. Reciprocal agreement was noted.          Patient will complete daily oral motor exercises (IOPI as appropriate) to increase lingual/labial range of motion, strength, and coordination with min cues to 90% effectiveness to improve bolus formation and strengthen oral musculature.      11/27/24:    Strength and Endurance Testing:  The IOPI Pro is used by medical professionals to measure, evaluate, and increase the strength and endurance of the tongue and lip in patients with oral motor disorders, including dysphagia and dysarthria.  The IOPI measures the maximum pressure (Pmax) a patient can produce in an air-filled bulb when it is compressed as hard as possible by the tongue or lip against a hard surface (e.g. The palate or teeth, respectively). Pmax is a measure of strength, expressed in kilopascals (kPa, an international unit of pressure).       For patients with dysphagia or dysarthria, oral motor fatigability may be of interest.  The IOPI Pro can be used to assess tongue fatigability.  Low endurance values are an indicator of a high fatigability.  Endurance is measured with the IOPI Pro by quantifying the length of time that a patient can maintain 50% of his or her Pmax.  This procedure is conducted in Target Mode by setting the target value to 50% of the patient's Pmax and timing how long the patient can hold the top (green) light on.       The medical professional determines what target value is appropriate for exercise therapy purposes and provides specific instructions to the patient for a particular exercise protocol.  In Target Mode, the pressure required to illuminate the green light a the top of the light array can be adjusted using the Set Target arrow buttons.  This green light is used as a visual target for the patient.     Tongue tip Peak Max after 3 trials: 15 Norm for age/gender is 56   Tongue back Peak Max after 3  "trials: 12 Norm for age/gender is 50   Right lip Peak Max after 3 trials: 21 Norm for age/gender is 35   Left lip Peak Max after 3 trials: 13 Norm for age/gender is 35      IOPI PEAK MEASUREMENT WEEKLY GRID      11/7 11/27          Tongue tip  (Goal = 56) 15 24          Tongue back  (Goal = 50) 12 22          Right Lip  (Goal = 35) 21        Left Lip  (Goal = 35) 13            IOPI -- Today's Exercise Targets    Tongue tip  (Goal = 56) Peak Max after 3 trials: 24 Effort level: 75% Target for treatment: 18   Tongue back  (Goal = 50) Peak Max after 3 trials: 22 Effort level: 75% Target for treatment: 17   Right Lip  (Goal = 35) Peak Max after 3 trials:  Effort level:  Target for treatment:    Left Lip  (Goal = 35) Peak Max after 3 trials:  Effort level:  Target for treatment:         11/7/24 11/27/24       Tongue tip  10 x 30, 11 x 30 18 x 60        Tongue back 8 X 60 17 x 60       Right Lip 14 x 10         Left Lip 8 x 11             Endurance testing held at max pressure 8 - 50% of max front of tongue- 15 for 8 seconds.   Endurance testing held at max pressure 12 - 50% of max front of tongue - held for 44 seconds.      Patient will perform compensatory strategies (e.g., upright positioning, small bites/sips, slow rate, alternation of consistencies, multiple swallows, effortful swallow, chin tuck, head turn, etc.) with 80% accuracy with minimized overt s/sx penetration/aspiration of least restrictive food/liquid consistencies.  11/27: Ongoing education provided on moistening foods, strategies. Patient reports solids going down \"easier.\"      Patient will tolerate least restrictive diet with minimized overt s/sx of penetration or aspiration.        Long Term     Patient will maintain adequate hydration and nutrition with optimum safety and efficacy of swallowing function on P.O. intake without overt s/sx of penetration or aspiration by discharge.      Patient will utilize compensatory strategies with optimum safety " and efficacy of swallowing function on P.O. intake without overt s/sx of penetration or aspiration by discharge.     Patient will develop safe and functional chewing and swallowing via use of dysphagia management strategies and diet modifications for adequate meal completion without significant s/sx of dysphagia and aspiration in order to maximize quality of life and to decrease potential for medical complications for dysphagia.        Plan:  -Continue with current plan of care.   MFR next session & IOPI

## 2024-12-13 ENCOUNTER — OFFICE VISIT (OUTPATIENT)
Dept: SPEECH THERAPY | Facility: CLINIC | Age: 69
End: 2024-12-13
Payer: COMMERCIAL

## 2024-12-13 DIAGNOSIS — C76.0 HEAD AND NECK CANCER (HCC): ICD-10-CM

## 2024-12-13 DIAGNOSIS — R13.12 OROPHARYNGEAL DYSPHAGIA: Primary | ICD-10-CM

## 2024-12-13 PROCEDURE — 92526 ORAL FUNCTION THERAPY: CPT

## 2024-12-13 NOTE — PROGRESS NOTES
"Daily Speech Treatment Note    Today's date: 2024   Patient’s name: Simeon Robbins  : 1955  MRN: [id number]  Safety measures:   Referring provider: Caleb Ibanez MD    Encounter Diagnosis     ICD-10-CM    1. Oropharyngeal dysphagia  R13.12       2. Head and neck cancer (HCC)  C76.0         Visit Trackin    Visit Tracking:  POC   Expires Auth Expiration Date ST Visit Limit   24 1              Visit/Unit Tracking:  Auth Status Date 24   BOMN Used 1 4 5 6     Remaining BOMN BOMN BOMN              Subjective/Behavioral:  -  Pleasant/Cooperative , pills noted to be \"a little easier..\" per patient report. Takes with protein shake seems to best approach by patient report.      Objective/Assessment:    Short-term goals:     Patient will complete pharyngeal strengthening exercises for increased safety & improved swallow function.   : Completed education on exercises to strengthen pharyngeal stage of swallowing: effortful & Mendelsohn. Patient expressed understanding and able to complete both.  Encouraged and education on home exercise practice of these.      : Provided instruction on stretches / MFR and completed neck / throat stretches / holds and instructed patient. Patient expressed understanding. Patient with severe tightness / decreased range of motion right side in particular. Patient would benefit from PT to further work on neck tightness from surgery and radiation.       :  Provided patient educational video to demonstrate how to utilize the EMST 75 device and the purpose of it. Clinician demonstrated how to put together the device and established a baseline for patient. Provided education to clean device with water and soap.  Baseline: 25 cmH20. Provided education to patient to create adequate labial seal and utilized \"blowing out candles\" as the visual cue. Provided education to think 5. Completed 5 sets of 5 reps with 15 second rest in " between each rep and 1 min rest between each set. Provided patient a handout to keep track of the data. Reciprocal agreement was noted.          Patient will complete daily oral motor exercises (IOPI as appropriate) to increase lingual/labial range of motion, strength, and coordination with min cues to 90% effectiveness to improve bolus formation and strengthen oral musculature.      11/27/24:    Strength and Endurance Testing:  The IOPI Pro is used by medical professionals to measure, evaluate, and increase the strength and endurance of the tongue and lip in patients with oral motor disorders, including dysphagia and dysarthria.  The IOPI measures the maximum pressure (Pmax) a patient can produce in an air-filled bulb when it is compressed as hard as possible by the tongue or lip against a hard surface (e.g. The palate or teeth, respectively). Pmax is a measure of strength, expressed in kilopascals (kPa, an international unit of pressure).       For patients with dysphagia or dysarthria, oral motor fatigability may be of interest.  The IOPI Pro can be used to assess tongue fatigability.  Low endurance values are an indicator of a high fatigability.  Endurance is measured with the IOPI Pro by quantifying the length of time that a patient can maintain 50% of his or her Pmax.  This procedure is conducted in Target Mode by setting the target value to 50% of the patient's Pmax and timing how long the patient can hold the top (green) light on.       The medical professional determines what target value is appropriate for exercise therapy purposes and provides specific instructions to the patient for a particular exercise protocol.  In Target Mode, the pressure required to illuminate the green light a the top of the light array can be adjusted using the Set Target arrow buttons.  This green light is used as a visual target for the patient.     Tongue tip Peak Max after 3 trials: 24 Norm for age/gender is 56   Tongue back  "Peak Max after 3 trials: 19 Norm for age/gender is 50   Right lip Peak Max after 3 trials:  Norm for age/gender is 35   Left lip Peak Max after 3 trials:  Norm for age/gender is 35      IOPI PEAK MEASUREMENT WEEKLY GRID      11/7 11/27 12/13         Tongue tip  (Goal = 56) 15 24 24         Tongue back  (Goal = 50) 12 22 19         Right Lip  (Goal = 35) 21        Left Lip  (Goal = 35) 13            IOPI -- Today's Exercise Targets    Tongue tip  (Goal = 56) Peak Max after 3 trials: 24 Effort level: 80% Target for treatment: 19   Tongue back  (Goal = 50) Peak Max after 3 trials: 19 Effort level: 80% Target for treatment: 15   Right Lip  (Goal = 35) Peak Max after 3 trials:  Effort level:  Target for treatment:    Left Lip  (Goal = 35) Peak Max after 3 trials:  Effort level:  Target for treatment:         11/7/24 11/27/24 12/13/25      Tongue tip  10 x 30, 11 x 30 18 x 60  19 x 60      Tongue back 8 X 60 17 x 60 15 x 60      Right Lip 14 x 10         Left Lip 8 x 11             Endurance testing held at max pressure 8 - 50% of max front of tongue- 15 for 8 seconds.   Endurance testing held at max pressure 12 - 50% of max front of tongue - held for 44 seconds.  Endurance testing held at max pressure 12- 50% of max front of tongue- held for 65 seconds.     Patient will perform compensatory strategies (e.g., upright positioning, small bites/sips, slow rate, alternation of consistencies, multiple swallows, effortful swallow, chin tuck, head turn, etc.) with 80% accuracy with minimized overt s/sx penetration/aspiration of least restrictive food/liquid consistencies.  11/27: Ongoing education provided on moistening foods, strategies. Patient reports solids going down \"easier.\"      Patient will tolerate least restrictive diet with minimized overt s/sx of penetration or aspiration.        Long Term     Patient will maintain adequate hydration and nutrition with optimum safety and efficacy of swallowing function on P.O. " intake without overt s/sx of penetration or aspiration by discharge.      Patient will utilize compensatory strategies with optimum safety and efficacy of swallowing function on P.O. intake without overt s/sx of penetration or aspiration by discharge.     Patient will develop safe and functional chewing and swallowing via use of dysphagia management strategies and diet modifications for adequate meal completion without significant s/sx of dysphagia and aspiration in order to maximize quality of life and to decrease potential for medical complications for dysphagia.        Plan:  -Continue with current plan of care.   NMES next Friday- trial

## 2024-12-20 ENCOUNTER — OFFICE VISIT (OUTPATIENT)
Dept: SPEECH THERAPY | Facility: CLINIC | Age: 69
End: 2024-12-20
Payer: COMMERCIAL

## 2024-12-20 DIAGNOSIS — C76.0 HEAD AND NECK CANCER (HCC): ICD-10-CM

## 2024-12-20 DIAGNOSIS — R13.12 OROPHARYNGEAL DYSPHAGIA: Primary | ICD-10-CM

## 2024-12-20 PROCEDURE — 92526 ORAL FUNCTION THERAPY: CPT

## 2024-12-20 NOTE — PROGRESS NOTES
Reevaluation    Today's date: 2024   Patient’s name: Simeon Robbins  : 1955  MRN: [id number]  Safety measures:   Referring provider: Caleb Ibanez MD    Encounter Diagnosis     ICD-10-CM    1. Oropharyngeal dysphagia  R13.12       2. Head and neck cancer (HCC)  C76.0         Plan:  Patient would benefit from outpatient skilled Speech Therapy services: Dysphagia therapy     Frequency: 2x weekly  Duration: 8-12 weeks     Intervention certification from: 2024  Intervention certification to: 3/14/2025       Visit Trackin    Visit Tracking:  POC   Expires Auth Expiration Date ST Visit Limit   3/14/25  1              Visit/Unit Tracking:  Auth Status Date 24   BOMN Used 1 4 5 6 7     Remaining BOMN BOMN BOMN           Subjective/Behavioral:  -  Pleasant/Cooperative - Patient completed 7 sessions focused on maximizing swallow function.  Patient tolerating modified diet with use of compensatory strategies.  Recommend: 2x  / week , 8-12 week span focused on NMES as today's trial session went well with NMES as adjunct in dysphagia therapy.     Objective/Assessment:    Short-term goals:     Patient will complete pharyngeal strengthening exercises for increased safety & improved swallow function.   : Completed education on exercises to strengthen pharyngeal stage of swallowing: effortful & Mendelsohn. Patient expressed understanding and able to complete both.  Encouraged and education on home exercise practice of these.  : Completed NMES with patient - 3V position. Completed x 35 minutes @ 12mA. Patient tolerated well with skin intact, completed over 55 fast, hard swallows with oatmeal, noted baseline sensation of residuals as he does at home. No coughing or change in vocal quality.  CONTINUE     : Provided instruction on stretches / MFR and completed neck / throat stretches / holds and instructed patient. Patient expressed understanding. Patient with severe  "tightness / decreased range of motion right side in particular. Patient would benefit from PT to further work on neck tightness from surgery and radiation.       11/20:  Provided patient educational video to demonstrate how to utilize the EMST 75 device and the purpose of it. Clinician demonstrated how to put together the device and established a baseline for patient. Provided education to clean device with water and soap.  Baseline: 25 cmH20. Provided education to patient to create adequate labial seal and utilized \"blowing out candles\" as the visual cue. Provided education to think 5. Completed 5 sets of 5 reps with 15 second rest in between each rep and 1 min rest between each set. Provided patient a handout to keep track of the data. Reciprocal agreement was noted.          Patient will complete daily oral motor exercises (IOPI as appropriate) to increase lingual/labial range of motion, strength, and coordination with min cues to 90% effectiveness to improve bolus formation and strengthen oral musculature.  CONTINUE AS WARRANTED      11/27/24:    Strength and Endurance Testing:  The IOPI Pro is used by medical professionals to measure, evaluate, and increase the strength and endurance of the tongue and lip in patients with oral motor disorders, including dysphagia and dysarthria.  The IOPI measures the maximum pressure (Pmax) a patient can produce in an air-filled bulb when it is compressed as hard as possible by the tongue or lip against a hard surface (e.g. The palate or teeth, respectively). Pmax is a measure of strength, expressed in kilopascals (kPa, an international unit of pressure).       For patients with dysphagia or dysarthria, oral motor fatigability may be of interest.  The IOPI Pro can be used to assess tongue fatigability.  Low endurance values are an indicator of a high fatigability.  Endurance is measured with the IOPI Pro by quantifying the length of time that a patient can maintain 50% of his " or her Pmax.  This procedure is conducted in Target Mode by setting the target value to 50% of the patient's Pmax and timing how long the patient can hold the top (green) light on.       The medical professional determines what target value is appropriate for exercise therapy purposes and provides specific instructions to the patient for a particular exercise protocol.  In Target Mode, the pressure required to illuminate the green light a the top of the light array can be adjusted using the Set Target arrow buttons.  This green light is used as a visual target for the patient.     Tongue tip Peak Max after 3 trials: 24 Norm for age/gender is 56   Tongue back Peak Max after 3 trials: 19 Norm for age/gender is 50   Right lip Peak Max after 3 trials:  Norm for age/gender is 35   Left lip Peak Max after 3 trials:  Norm for age/gender is 35      IOPI PEAK MEASUREMENT WEEKLY GRID      11/7 11/27 12/13         Tongue tip  (Goal = 56) 15 24 24         Tongue back  (Goal = 50) 12 22 19         Right Lip  (Goal = 35) 21        Left Lip  (Goal = 35) 13            IOPI -- Today's Exercise Targets    Tongue tip  (Goal = 56) Peak Max after 3 trials: 24 Effort level: 80% Target for treatment: 19   Tongue back  (Goal = 50) Peak Max after 3 trials: 19 Effort level: 80% Target for treatment: 15   Right Lip  (Goal = 35) Peak Max after 3 trials:  Effort level:  Target for treatment:    Left Lip  (Goal = 35) Peak Max after 3 trials:  Effort level:  Target for treatment:         11/7/24 11/27/24 12/13/25      Tongue tip  10 x 30, 11 x 30 18 x 60  19 x 60      Tongue back 8 X 60 17 x 60 15 x 60      Right Lip 14 x 10         Left Lip 8 x 11             Endurance testing held at max pressure 8 - 50% of max front of tongue- 15 for 8 seconds.   Endurance testing held at max pressure 12 - 50% of max front of tongue - held for 44 seconds.  Endurance testing held at max pressure 12- 50% of max front of tongue- held for 65 seconds.     Patient  "will perform compensatory strategies (e.g., upright positioning, small bites/sips, slow rate, alternation of consistencies, multiple swallows, effortful swallow, chin tuck, head turn, etc.) with 80% accuracy with minimized overt s/sx penetration/aspiration of least restrictive food/liquid consistencies.  11/27: Ongoing education provided on moistening foods, strategies. Patient reports solids going down \"easier.\"  ACHIEVED     Patient will tolerate least restrictive diet with minimized overt s/sx of penetration or aspiration. CONTINUE        Long Term     Patient will maintain adequate hydration and nutrition with optimum safety and efficacy of swallowing function on P.O. intake without overt s/sx of penetration or aspiration by discharge.  CONTINUE     Patient will utilize compensatory strategies with optimum safety and efficacy of swallowing function on P.O. intake without overt s/sx of penetration or aspiration by discharge.  CONTINUE     Patient will develop safe and functional chewing and swallowing via use of dysphagia management strategies and diet modifications for adequate meal completion without significant s/sx of dysphagia and aspiration in order to maximize quality of life and to decrease potential for medical complications for dysphagia.   CONTINUE       Plan:  -Continue with current plan of care.     "

## 2025-01-09 DIAGNOSIS — I42.1 HOCM (HYPERTROPHIC OBSTRUCTIVE CARDIOMYOPATHY) (HCC): ICD-10-CM

## 2025-01-10 RX ORDER — BISOPROLOL FUMARATE 5 MG/1
2.5 TABLET, FILM COATED ORAL 2 TIMES DAILY
Qty: 30 TABLET | Refills: 5 | Status: SHIPPED | OUTPATIENT
Start: 2025-01-10

## 2025-01-28 DIAGNOSIS — N40.0 ENLARGED PROSTATE: ICD-10-CM

## 2025-01-28 RX ORDER — TAMSULOSIN HYDROCHLORIDE 0.4 MG/1
CAPSULE ORAL
Qty: 30 CAPSULE | Refills: 0 | Status: SHIPPED | OUTPATIENT
Start: 2025-01-28

## 2025-02-02 ENCOUNTER — VBI (OUTPATIENT)
Dept: ADMINISTRATIVE | Facility: OTHER | Age: 70
End: 2025-02-02

## 2025-02-03 ENCOUNTER — OFFICE VISIT (OUTPATIENT)
Dept: SPEECH THERAPY | Facility: CLINIC | Age: 70
End: 2025-02-03
Payer: COMMERCIAL

## 2025-02-03 DIAGNOSIS — R13.12 DYSPHAGIA, OROPHARYNGEAL PHASE: Primary | ICD-10-CM

## 2025-02-03 DIAGNOSIS — C76.0 HEAD AND NECK CANCER (HCC): ICD-10-CM

## 2025-02-03 PROCEDURE — 92526 ORAL FUNCTION THERAPY: CPT

## 2025-02-03 NOTE — TELEPHONE ENCOUNTER
02/02/25 9:11 PM     Chart reviewed for BP reading was/were submitted to the patient's insurance.     Liliana Meza   [facility]

## 2025-02-03 NOTE — PROGRESS NOTES
Daily Progress Note    Today's date: 2/3/2025   Patient’s name: Simeon Robbins  : 1955  MRN: [id number]  Safety measures:   Referring provider: Caleb Ibanez MD    Encounter Diagnosis     ICD-10-CM    1. Dysphagia, oropharyngeal phase  R13.12       2. Head and neck cancer (HCC)  C76.0                Visit Trackin    Visit Tracking:  POC   Expires Auth Expiration Date ST Visit Limit   3/14/25  1              Visit/Unit Tracking:  Auth Status Date 24 2/3   BOMN Used 1 4 5 6 7 8     Remaining BOMN BOMN BOMN            Subjective/Behavioral:  -  Pleasant/Cooperative -    Objective/Assessment:    Short-term goals:     Patient will complete pharyngeal strengthening exercises for increased safety & improved swallow function.     2/3: Patient practicing at home diligently.          Patient will complete daily oral motor exercises (IOPI as appropriate) to increase lingual/labial range of motion, strength, and coordination with min cues to 90% effectiveness to improve bolus formation and strengthen oral musculature.        Strength and Endurance Testing:  The IOPI Pro is used by medical professionals to measure, evaluate, and increase the strength and endurance of the tongue and lip in patients with oral motor disorders, including dysphagia and dysarthria.  The IOPI measures the maximum pressure (Pmax) a patient can produce in an air-filled bulb when it is compressed as hard as possible by the tongue or lip against a hard surface (e.g. The palate or teeth, respectively). Pmax is a measure of strength, expressed in kilopascals (kPa, an international unit of pressure).       For patients with dysphagia or dysarthria, oral motor fatigability may be of interest.  The IOPI Pro can be used to assess tongue fatigability.  Low endurance values are an indicator of a high fatigability.  Endurance is measured with the IOPI Pro by quantifying the length of time that a patient can maintain 50%  of his or her Pmax.  This procedure is conducted in Target Mode by setting the target value to 50% of the patient's Pmax and timing how long the patient can hold the top (green) light on.       The medical professional determines what target value is appropriate for exercise therapy purposes and provides specific instructions to the patient for a particular exercise protocol.  In Target Mode, the pressure required to illuminate the green light a the top of the light array can be adjusted using the Set Target arrow buttons.  This green light is used as a visual target for the patient.     Tongue tip Peak Max after 3 trials: 24 Norm for age/gender is 56   Tongue back Peak Max after 3 trials: 19 Norm for age/gender is 50   Right lip Peak Max after 3 trials:  Norm for age/gender is 35   Left lip Peak Max after 3 trials:  Norm for age/gender is 35      IOPI PEAK MEASUREMENT WEEKLY GRID      11/7 11/27 12/13         Tongue tip  (Goal = 56) 15 24 24         Tongue back  (Goal = 50) 12 22 19         Right Lip  (Goal = 35) 21        Left Lip  (Goal = 35) 13            IOPI -- Today's Exercise Targets    Tongue tip  (Goal = 56) Peak Max after 3 trials: 24 Effort level: 80% Target for treatment: 19   Tongue back  (Goal = 50) Peak Max after 3 trials: 19 Effort level: 80% Target for treatment: 15   Right Lip  (Goal = 35) Peak Max after 3 trials:  Effort level:  Target for treatment:    Left Lip  (Goal = 35) Peak Max after 3 trials:  Effort level:  Target for treatment:         11/7/24 11/27/24 12/13/25      Tongue tip  10 x 30, 11 x 30 18 x 60  19 x 60      Tongue back 8 X 60 17 x 60 15 x 60      Right Lip 14 x 10         Left Lip 8 x 11             Endurance testing held at max pressure 8 - 50% of max front of tongue- 15 for 8 seconds.   Endurance testing held at max pressure 12 - 50% of max front of tongue - held for 44 seconds.  Endurance testing held at max pressure 12- 50% of max front of tongue- held for 65 seconds.         Patient will tolerate least restrictive diet with minimized overt s/sx of penetration or aspiration.  : Tolerated NMES with oatmeal and thin liquid over 50 swallows, position 3h, 5.5-12.0. Noted throat clearing and coughing (25%) of time with thins. Skin intact after removal of electrodes. Will trial SH-V.         Long Term     Patient will maintain adequate hydration and nutrition with optimum safety and efficacy of swallowing function on P.O. intake without overt s/sx of penetration or aspiration by discharge.       Patient will utilize compensatory strategies with optimum safety and efficacy of swallowing function on P.O. intake without overt s/sx of penetration or aspiration by discharge.       Patient will develop safe and functional chewing and swallowing via use of dysphagia management strategies and diet modifications for adequate meal completion without significant s/sx of dysphagia and aspiration in order to maximize quality of life and to decrease potential for medical complications for dysphagia.          Plan:  -Continue with current plan of care.

## 2025-02-05 ENCOUNTER — OFFICE VISIT (OUTPATIENT)
Dept: SPEECH THERAPY | Facility: CLINIC | Age: 70
End: 2025-02-05
Payer: COMMERCIAL

## 2025-02-05 DIAGNOSIS — R13.12 DYSPHAGIA, OROPHARYNGEAL PHASE: Primary | ICD-10-CM

## 2025-02-05 DIAGNOSIS — C76.0 HEAD AND NECK CANCER (HCC): ICD-10-CM

## 2025-02-05 PROCEDURE — 92526 ORAL FUNCTION THERAPY: CPT

## 2025-02-05 NOTE — PROGRESS NOTES
Daily Progress Note    Today's date: 2025   Patient’s name: Simeon Robbins  : 1955  MRN: [id number]  Safety measures:   Referring provider: Caleb Ibanez MD    Encounter Diagnosis     ICD-10-CM    1. Dysphagia, oropharyngeal phase  R13.12       2. Head and neck cancer (HCC)  C76.0                    Visit Trackin    Visit Tracking:  POC   Expires Auth Expiration Date ST Visit Limit   3/14/25  1              Visit/Unit Tracking:  Auth Status Date 12/31/24 11/27 12/5 12/13 12/20 2/3 2/5   BOMN Used 1 4 5 6 7 8 9     Remaining BOMN BOMN BOMN  9 8 7         Subjective/Behavioral:  -  Pleasant/Cooperative -    Objective/Assessment:    Short-term goals:     Patient will complete pharyngeal strengthening exercises for increased safety & improved swallow function.     2/3: Patient practicing at home diligently.          Patient will complete daily oral motor exercises (IOPI as appropriate) to increase lingual/labial range of motion, strength, and coordination with min cues to 90% effectiveness to improve bolus formation and strengthen oral musculature.        Strength and Endurance Testing:  The IOPI Pro is used by medical professionals to measure, evaluate, and increase the strength and endurance of the tongue and lip in patients with oral motor disorders, including dysphagia and dysarthria.  The IOPI measures the maximum pressure (Pmax) a patient can produce in an air-filled bulb when it is compressed as hard as possible by the tongue or lip against a hard surface (e.g. The palate or teeth, respectively). Pmax is a measure of strength, expressed in kilopascals (kPa, an international unit of pressure).       For patients with dysphagia or dysarthria, oral motor fatigability may be of interest.  The IOPI Pro can be used to assess tongue fatigability.  Low endurance values are an indicator of a high fatigability.  Endurance is measured with the IOPI Pro by quantifying the length of time that a patient can  maintain 50% of his or her Pmax.  This procedure is conducted in Target Mode by setting the target value to 50% of the patient's Pmax and timing how long the patient can hold the top (green) light on.       The medical professional determines what target value is appropriate for exercise therapy purposes and provides specific instructions to the patient for a particular exercise protocol.  In Target Mode, the pressure required to illuminate the green light a the top of the light array can be adjusted using the Set Target arrow buttons.  This green light is used as a visual target for the patient.     Tongue tip Peak Max after 3 trials: 24 Norm for age/gender is 56   Tongue back Peak Max after 3 trials: 19 Norm for age/gender is 50   Right lip Peak Max after 3 trials:  Norm for age/gender is 35   Left lip Peak Max after 3 trials:  Norm for age/gender is 35      IOPI PEAK MEASUREMENT WEEKLY GRID      11/7 11/27 12/13         Tongue tip  (Goal = 56) 15 24 24         Tongue back  (Goal = 50) 12 22 19         Right Lip  (Goal = 35) 21        Left Lip  (Goal = 35) 13            IOPI -- Today's Exercise Targets    Tongue tip  (Goal = 56) Peak Max after 3 trials: 24 Effort level: 80% Target for treatment: 19   Tongue back  (Goal = 50) Peak Max after 3 trials: 19 Effort level: 80% Target for treatment: 15   Right Lip  (Goal = 35) Peak Max after 3 trials:  Effort level:  Target for treatment:    Left Lip  (Goal = 35) Peak Max after 3 trials:  Effort level:  Target for treatment:         11/7/24 11/27/24 12/13/25      Tongue tip  10 x 30, 11 x 30 18 x 60  19 x 60      Tongue back 8 X 60 17 x 60 15 x 60      Right Lip 14 x 10         Left Lip 8 x 11             Endurance testing held at max pressure 8 - 50% of max front of tongue- 15 for 8 seconds.   Endurance testing held at max pressure 12 - 50% of max front of tongue - held for 44 seconds.  Endurance testing held at max pressure 12- 50% of max front of tongue- held for 65  "seconds.        Patient will tolerate least restrictive diet with minimized overt s/sx of penetration or aspiration.  : Tolerated NMES with oatmeal and thin liquid over 50 swallows, position 3h, 5.5-12.0. Noted throat clearing and coughing (25%) of time with thins. Skin intact after removal of electrodes. Will trial SH-V.  2/5: Completed NMES with oatmeal/ water x 70 swallows, noted throat clears, intermittent coughing with thins, position SH-h x 30 minutes.  Patient felt solid oatmeal went \"down pretty good.\" Residuals were noted, cleared with liquid.  Patient notes his tongue gets stuck at \"roof of mouth\" and back of mouth at times, difficult to \"get moving\" again.  Encouraged range of motion lingual exercises along with IOPI exercises.         Long Term     Patient will maintain adequate hydration and nutrition with optimum safety and efficacy of swallowing function on P.O. intake without overt s/sx of penetration or aspiration by discharge.       Patient will utilize compensatory strategies with optimum safety and efficacy of swallowing function on P.O. intake without overt s/sx of penetration or aspiration by discharge.       Patient will develop safe and functional chewing and swallowing via use of dysphagia management strategies and diet modifications for adequate meal completion without significant s/sx of dysphagia and aspiration in order to maximize quality of life and to decrease potential for medical complications for dysphagia.          Plan:  -Continue with current plan of care.     "

## 2025-02-10 ENCOUNTER — APPOINTMENT (OUTPATIENT)
Dept: SPEECH THERAPY | Facility: CLINIC | Age: 70
End: 2025-02-10
Payer: COMMERCIAL

## 2025-02-12 ENCOUNTER — OFFICE VISIT (OUTPATIENT)
Dept: SPEECH THERAPY | Facility: CLINIC | Age: 70
End: 2025-02-12
Payer: COMMERCIAL

## 2025-02-12 DIAGNOSIS — C76.0 HEAD AND NECK CANCER (HCC): ICD-10-CM

## 2025-02-12 DIAGNOSIS — R13.12 DYSPHAGIA, OROPHARYNGEAL PHASE: Primary | ICD-10-CM

## 2025-02-12 PROCEDURE — 92526 ORAL FUNCTION THERAPY: CPT

## 2025-02-12 NOTE — PROGRESS NOTES
Daily Progress Note    Today's date: 2025   Patient’s name: Simeon Robbins  : 1955  MRN: [id number]  Safety measures:   Referring provider: Caleb Ibanez MD      Encounter Diagnosis     ICD-10-CM    1. Dysphagia, oropharyngeal phase  R13.12       2. Head and neck cancer (HCC)  C76.0                      Visit Tracking:  10    Visit Tracking:  POC   Expires Auth Expiration Date ST Visit Limit   3/14/25  BOMN              Visit/Unit Tracking:  Auth Status Date 12/31/24 11/27 12/5 12/13 12/20 2/3 2/5 2/12   BOMN Used 1 4 5 6 7 8 9 10     Remaining BOMN BOMN BOMN  9 8 7 6         Subjective/Behavioral:  -  Pleasant/Cooperative -    Objective/Assessment:    Short-term goals:     Patient will complete pharyngeal strengthening exercises for increased safety & improved swallow function.     2/3: Patient practicing at home diligently.          Patient will complete daily oral motor exercises (IOPI as appropriate) to increase lingual/labial range of motion, strength, and coordination with min cues to 90% effectiveness to improve bolus formation and strengthen oral musculature.        Strength and Endurance Testing:  The IOPI Pro is used by medical professionals to measure, evaluate, and increase the strength and endurance of the tongue and lip in patients with oral motor disorders, including dysphagia and dysarthria.  The IOPI measures the maximum pressure (Pmax) a patient can produce in an air-filled bulb when it is compressed as hard as possible by the tongue or lip against a hard surface (e.g. The palate or teeth, respectively). Pmax is a measure of strength, expressed in kilopascals (kPa, an international unit of pressure).       For patients with dysphagia or dysarthria, oral motor fatigability may be of interest.  The IOPI Pro can be used to assess tongue fatigability.  Low endurance values are an indicator of a high fatigability.  Endurance is measured with the IOPI Pro by quantifying the length of time  that a patient can maintain 50% of his or her Pmax.  This procedure is conducted in Target Mode by setting the target value to 50% of the patient's Pmax and timing how long the patient can hold the top (green) light on.       The medical professional determines what target value is appropriate for exercise therapy purposes and provides specific instructions to the patient for a particular exercise protocol.  In Target Mode, the pressure required to illuminate the green light a the top of the light array can be adjusted using the Set Target arrow buttons.  This green light is used as a visual target for the patient.     Tongue tip Peak Max after 3 trials: 24 Norm for age/gender is 56   Tongue back Peak Max after 3 trials: 19 Norm for age/gender is 50   Right lip Peak Max after 3 trials:  Norm for age/gender is 35   Left lip Peak Max after 3 trials:  Norm for age/gender is 35      IOPI PEAK MEASUREMENT WEEKLY GRID      11/7 11/27 12/13         Tongue tip  (Goal = 56) 15 24 24         Tongue back  (Goal = 50) 12 22 19         Right Lip  (Goal = 35) 21        Left Lip  (Goal = 35) 13            IOPI -- Today's Exercise Targets    Tongue tip  (Goal = 56) Peak Max after 3 trials: 24 Effort level: 80% Target for treatment: 19   Tongue back  (Goal = 50) Peak Max after 3 trials: 19 Effort level: 80% Target for treatment: 15   Right Lip  (Goal = 35) Peak Max after 3 trials:  Effort level:  Target for treatment:    Left Lip  (Goal = 35) Peak Max after 3 trials:  Effort level:  Target for treatment:         11/7/24 11/27/24 12/13/25      Tongue tip  10 x 30, 11 x 30 18 x 60  19 x 60      Tongue back 8 X 60 17 x 60 15 x 60      Right Lip 14 x 10         Left Lip 8 x 11             Endurance testing held at max pressure 8 - 50% of max front of tongue- 15 for 8 seconds.   Endurance testing held at max pressure 12 - 50% of max front of tongue - held for 44 seconds.  Endurance testing held at max pressure 12- 50% of max front of  "tongue- held for 65 seconds.        Patient will tolerate least restrictive diet with minimized overt s/sx of penetration or aspiration.  : Tolerated NMES with oatmeal and thin liquid over 50 swallows, position 3h, 5.5-12.0. Noted throat clearing and coughing (25%) of time with thins. Skin intact after removal of electrodes. Will trial SH-V.  2/5: Completed NMES with oatmeal/ water x 70 swallows, noted throat clears, intermittent coughing with thins, position SH-h x 30 minutes.  Patient felt solid oatmeal went \"down pretty good.\" Residuals were noted, cleared with liquid.  Patient notes his tongue gets stuck at \"roof of mouth\" and back of mouth at times, difficult to \"get moving\" again.  Encouraged range of motion lingual exercises along with IOPI exercises. 2/12: Completed NMES with oatmeal/water with over 50 swallows, position 2 GH-TH.  Tolerated 5-13.5. Patient exhibited frequent throat clearing/ coughing with thin liquid, small sips, but this is baseline for patient. Will continue NMES & assess for improvement. Patient continuing exercises at home.           Long Term     Patient will maintain adequate hydration and nutrition with optimum safety and efficacy of swallowing function on P.O. intake without overt s/sx of penetration or aspiration by discharge.       Patient will utilize compensatory strategies with optimum safety and efficacy of swallowing function on P.O. intake without overt s/sx of penetration or aspiration by discharge.       Patient will develop safe and functional chewing and swallowing via use of dysphagia management strategies and diet modifications for adequate meal completion without significant s/sx of dysphagia and aspiration in order to maximize quality of life and to decrease potential for medical complications for dysphagia.          Plan:  -Continue with current plan of care.     "

## 2025-02-13 ENCOUNTER — OFFICE VISIT (OUTPATIENT)
Dept: GASTROENTEROLOGY | Facility: CLINIC | Age: 70
End: 2025-02-13
Payer: COMMERCIAL

## 2025-02-13 VITALS
HEIGHT: 71 IN | WEIGHT: 172 LBS | DIASTOLIC BLOOD PRESSURE: 80 MMHG | SYSTOLIC BLOOD PRESSURE: 128 MMHG | BODY MASS INDEX: 24.08 KG/M2 | TEMPERATURE: 96 F

## 2025-02-13 DIAGNOSIS — K21.00 GASTROESOPHAGEAL REFLUX DISEASE WITH ESOPHAGITIS WITHOUT HEMORRHAGE: ICD-10-CM

## 2025-02-13 DIAGNOSIS — R13.12 OROPHARYNGEAL DYSPHAGIA: Primary | ICD-10-CM

## 2025-02-13 DIAGNOSIS — Z85.89 HISTORY OF HEAD AND NECK CANCER: ICD-10-CM

## 2025-02-13 PROCEDURE — 99214 OFFICE O/P EST MOD 30 MIN: CPT | Performed by: INTERNAL MEDICINE

## 2025-02-13 NOTE — ASSESSMENT & PLAN NOTE
Secondary to radiation 18 years ago to head and neck cancer.  He is seeing speech and language therapist for pharyngeal strengthening exercise  He is happy with the therapy  He is eating mostly soft foods and puréed diet  He is meeting his caloric needs and his weight is stable  Continue follow-up with the speech and language therapist

## 2025-02-13 NOTE — PROGRESS NOTES
Name: Simeon Robbins      : 1955      MRN: [id number]  Encounter Provider: Caleb Ibanez MD  Encounter Date: 2025   Encounter department: [facility] [place]  :  Assessment & Plan  Oropharyngeal dysphagia  Secondary to radiation 18 years ago to head and neck cancer.  He is seeing speech and language therapist for pharyngeal strengthening exercise  He is happy with the therapy  He is eating mostly soft foods and puréed diet  He is meeting his caloric needs and his weight is stable  Continue follow-up with the speech and language therapist         Gastroesophageal reflux disease with esophagitis without hemorrhage  We reviewed reflux precautions   I reviewed diet and lifestyle precautions. This includes limiting coffee, soda, tomatoes, citrus, fatty and spicy foods.  I recommend waiting 3 hours after dinner to lie down. I recommend eating small frequent meals as well as sleeping with head of bed elevated at night.   I recommend decreasing the dose of omeprazole to 40 mg once daily.  I asked him to call our office or message me how he is doing with omeprazole 40 mg once daily.  If his symptoms are controlled with omeprazole 40 mg once daily will decrease the dose         History of head and neck cancer  Status post radiation 18 years ago           History of Present Illness   HPI  Simeon Robbins is a 69 y.o. male who presents for follow-up visit for oropharyngeal dysphagia.  He had EGD which showed esophagitis.  Biopsy was negative for Fall's esophagus.  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     Path - negative for intestinal metaplasia     Colonoscopy 4/8/2024   Scattered diverticula of mild severity in the descending colon and sigmoid colon  The entire colon appeared normal.        Review of Systems   Constitutional:  Negative for chills and fever.   HENT:  Negative for ear pain and sore throat.    Eyes:  Negative for pain and visual disturbance.   Respiratory:  Negative for cough and shortness of breath.    Cardiovascular:  Negative for chest pain and palpitations.   Gastrointestinal:  Negative for abdominal pain and vomiting.   Genitourinary:  Negative for dysuria and hematuria.   Musculoskeletal:  Negative for arthralgias and back pain.   Skin:  Negative for color change and rash.   Neurological:  Negative for seizures and syncope.   All other systems reviewed and are negative.         Objective   There were no vitals taken for this visit.     Physical Exam  Vitals and nursing note reviewed. Exam conducted with a chaperone present.   Constitutional:       General: He is not in acute distress.     Appearance: He is well-developed.   HENT:      Head: Normocephalic and atraumatic.   Eyes:      Conjunctiva/sclera: Conjunctivae normal.   Cardiovascular:      Rate and Rhythm: Normal rate and regular rhythm.      Heart sounds: No murmur heard.  Pulmonary:      Effort: Pulmonary effort is normal. No respiratory distress.      Breath sounds: Normal breath sounds.   Abdominal:      Palpations: Abdomen is soft.      Tenderness: There is no abdominal tenderness.   Musculoskeletal:         General: No swelling.      Cervical back: Neck supple.   Skin:     General: Skin is warm and dry.      Capillary Refill: Capillary refill takes less than 2 seconds.   Neurological:       Mental Status: He is alert.   Psychiatric:         Mood and Affect: Mood normal.

## 2025-02-13 NOTE — ASSESSMENT & PLAN NOTE
We reviewed reflux precautions   I reviewed diet and lifestyle precautions. This includes limiting coffee, soda, tomatoes, citrus, fatty and spicy foods.  I recommend waiting 3 hours after dinner to lie down. I recommend eating small frequent meals as well as sleeping with head of bed elevated at night.   I recommend decreasing the dose of omeprazole to 40 mg once daily.  I asked him to call our office or message me how he is doing with omeprazole 40 mg once daily.  If his symptoms are controlled with omeprazole 40 mg once daily will decrease the dose

## 2025-02-14 ENCOUNTER — OFFICE VISIT (OUTPATIENT)
Dept: FAMILY MEDICINE CLINIC | Facility: CLINIC | Age: 70
End: 2025-02-14
Payer: COMMERCIAL

## 2025-02-14 VITALS
DIASTOLIC BLOOD PRESSURE: 88 MMHG | HEART RATE: 69 BPM | BODY MASS INDEX: 24.11 KG/M2 | HEIGHT: 71 IN | WEIGHT: 172.2 LBS | OXYGEN SATURATION: 96 % | RESPIRATION RATE: 18 BRPM | TEMPERATURE: 97.7 F | SYSTOLIC BLOOD PRESSURE: 138 MMHG

## 2025-02-14 DIAGNOSIS — I42.1 HOCM (HYPERTROPHIC OBSTRUCTIVE CARDIOMYOPATHY) (HCC): ICD-10-CM

## 2025-02-14 DIAGNOSIS — E78.5 HYPERLIPIDEMIA, UNSPECIFIED HYPERLIPIDEMIA TYPE: ICD-10-CM

## 2025-02-14 DIAGNOSIS — R13.12 OROPHARYNGEAL DYSPHAGIA: Primary | ICD-10-CM

## 2025-02-14 DIAGNOSIS — I10 PRIMARY HYPERTENSION: ICD-10-CM

## 2025-02-14 DIAGNOSIS — R10.32 LEFT GROIN PAIN: ICD-10-CM

## 2025-02-14 PROCEDURE — G2211 COMPLEX E/M VISIT ADD ON: HCPCS | Performed by: FAMILY MEDICINE

## 2025-02-14 PROCEDURE — 99214 OFFICE O/P EST MOD 30 MIN: CPT | Performed by: FAMILY MEDICINE

## 2025-02-14 RX ORDER — ROSUVASTATIN CALCIUM 40 MG/1
40 TABLET, COATED ORAL DAILY
Qty: 90 TABLET | Refills: 3 | Status: SHIPPED | OUTPATIENT
Start: 2025-02-14

## 2025-02-14 NOTE — ASSESSMENT & PLAN NOTE
Home blood pressure log reviewed and he has excellent control of his pressure  Follows with cardiology as well

## 2025-02-14 NOTE — ASSESSMENT & PLAN NOTE
Follows with cardiology  Note reviewed from last office visit from 10/2024  Continue medications per cardiology

## 2025-02-14 NOTE — PROGRESS NOTES
Simeon Robbins 1955 male MRN: [id number]    Family Medicine Follow-up Visit    ASSESSMENT/PLAN  Problem List Items Addressed This Visit          Cardiovascular and Mediastinum    Primary hypertension    Home blood pressure log reviewed and he has excellent control of his pressure  Follows with cardiology as well          HOCM (hypertrophic obstructive cardiomyopathy) (HCC)    Follows with cardiology  Note reviewed from last office visit from 10/2024  Continue medications per cardiology             Respiratory    Oropharyngeal dysphagia - Primary    Note reviewed from GI apt with Dr. Ibanez yesterday  Is doing speech and therapy   He reports this has been helpful  He also is taking his prilosec as prescribed by GI            Other    Hyperlipidemia    Well controlled on crestor 40 mg daily  Continue as prescribed  Reviewed last lipid panel from 7/2024         Relevant Medications    rosuvastatin (CRESTOR) 40 MG tablet     Other Visit Diagnoses         Left groin pain        Relevant Orders    US groin/inguinal area            Follow up for AWV after July 6 2025         Future Appointments   Date Time Provider Department Center   2/17/2025 11:45 AM Neha Jain, SLP [facility]   2/19/2025  2:00 PM Neha Jain, SLP [facility]   2/24/2025  2:00 PM Neha Jain, SLP [facility]   2/26/2025  8:00 AM Neha Jain, SLP [facility]   3/3/2025  5:00 PM Neha Jain, SLP [facility]   3/5/2025 10:15 AM Neha Jain, SLP [facility]   3/28/2025 11:00 AM HERMELINDA Nj [facility]   7/1/2025 12:30 PM [facility] ECHO 1 [facility] Car [address]   7/1/2025  2:00 PM Claude Sher MD CARD [facility]          SUBJECTIVE  CC: Follow-up (6 month)      HPI:  Simeon Robbins is a 69 y.o. male who presents for check up      HPI    Review of Systems   Constitutional:  Negative for chills and fever.   HENT:  Negative for congestion,  postnasal drip, rhinorrhea and sinus pain.    Eyes:  Negative for photophobia and visual disturbance.   Respiratory:  Negative for cough and shortness of breath.    Cardiovascular:  Negative for chest pain, palpitations and leg swelling.   Gastrointestinal:  Negative for abdominal pain, constipation, diarrhea, nausea and vomiting.   Genitourinary:  Negative for difficulty urinating and dysuria.   Musculoskeletal:  Negative for arthralgias and myalgias.   Skin:  Negative for rash.   Neurological:  Negative for dizziness and syncope.       Historical Information   The patient history was reviewed as follows:    Past Medical History:   Diagnosis Date    Anxiety     Cancer (HCC)     Chemotherapy follow-up examination     Coronary artery disease     GERD (gastroesophageal reflux disease)     History of radiation therapy 2006    Hypertension      Past Surgical History:   Procedure Laterality Date    ANKLE SURGERY Left     CARDIAC CATHETERIZATION Left 10/07/2022    Procedure: Cardiac catheterization;  Surgeon: Simeon Pastor MD;  Location: [facility];  Service: Cardiology    CARDIAC CATHETERIZATION Left 10/07/2022    Procedure: Cardiac Left Heart Cath;  Surgeon: Simeon Pastor MD;  Location: [facility];  Service: Cardiology    CARDIAC CATHETERIZATION N/A 10/07/2022    Procedure: Cardiac Coronary Angiogram;  Surgeon: Simeon Pastor MD;  Location: [facility];  Service: Cardiology    CARDIAC CATHETERIZATION N/A 12/28/2022    Procedure: Cardiac pci;  Surgeon: Dada Berrios MD;  Location: [facility];  Service: Cardiology    MULTIPLE TOOTH EXTRACTIONS N/A 10/5/2023    Procedure: EXTRACTION TEETH MULTIPLE 1,2,3,4,5,6,7,8,9,10,11,12,13,14,19,20,21,22,23,26,27,28,29,30;  Surgeon: Kusum Esquivel DMD;  Location: [facility];  Service: Maxillofacial    NECK SURGERY Right     SHOULDER OPEN ROTATOR CUFF REPAIR Right     SKIN BIOPSY       Family History   Problem Relation Age of Onset     Hypertension Mother     Obesity Mother     Diabetes Mother     Diabetes Father     Heart attack Father     Stroke Father     Heart disease Father     Hypertension Father     Hyperlipidemia Sister     Obesity Sister     Hypertension Sister     Diabetes Sister     Kidney disease Sister     Hyperlipidemia Brother     Hypertension Brother     Diabetes Brother     Prostate cancer Brother     Skin cancer Maternal Grandfather     Cancer Maternal Grandfather       Social History   Social History     Substance and Sexual Activity   Alcohol Use Not Currently     Social History     Substance and Sexual Activity   Drug Use Not Currently    Types: Marijuana     Social History     Tobacco Use   Smoking Status Former    Current packs/day: 0.00    Average packs/day: 1 pack/day for 20.0 years (20.0 ttl pk-yrs)    Types: Cigarettes    Start date: 1968    Quit date: 1988    Years since quittin.2    Passive exposure: Never   Smokeless Tobacco Former    Types: Snuff    Quit date: 1988   Tobacco Comments    Quit 35 yrs ago       Medications:     Current Outpatient Medications:     aspirin (ECOTRIN LOW STRENGTH) 81 mg EC tablet, Take 1 tablet (81 mg total) by mouth daily, Disp: 30 tablet, Rfl: 11    bisoprolol (ZEBETA) 5 mg tablet, Take 1/2 (one-half) tablet by mouth twice daily, Disp: 30 tablet, Rfl: 5    clopidogrel (PLAVIX) 75 mg tablet, Take 1 tablet by mouth once daily, Disp: 30 tablet, Rfl: 5    levothyroxine 150 mcg tablet, Take 1 tablet by mouth once daily, Disp: 90 tablet, Rfl: 1    omeprazole (PriLOSEC) 40 MG capsule, Take 1 capsule (40 mg total) by mouth 2 (two) times a day (Patient taking differently: Take 40 mg by mouth 2 (two) times a day 25 once a day from GI doctor), Disp: 60 capsule, Rfl: 5    rosuvastatin (CRESTOR) 40 MG tablet, Take 1 tablet (40 mg total) by mouth daily, Disp: 90 tablet, Rfl: 3    tamsulosin (FLOMAX) 0.4 mg, TAKE 1 CAPSULE BY MOUTH ONCE DAILY WITH SUPPER, Disp: 30 capsule,  "Rfl: 0    famotidine (PEPCID) 20 mg tablet, Take 1 tablet (20 mg total) by mouth in the morning (Patient not taking: Reported on 7/5/2024), Disp: 30 tablet, Rfl: 11  No Known Allergies    OBJECTIVE    Vitals:   Vitals:    02/14/25 1411 02/14/25 1449   BP: 160/76 138/88   BP Location: Left arm    Patient Position: Sitting    Cuff Size: Large    Pulse: 69    Resp: 18    Temp: 97.7 °F (36.5 °C)    TempSrc: Tympanic    SpO2: 96%    Weight: 78.1 kg (172 lb 3.2 oz)    Height: 5' 11\" (1.803 m)            Physical Exam  Constitutional:       Appearance: He is well-developed.   HENT:      Head: Normocephalic and atraumatic.      Right Ear: External ear normal.      Left Ear: External ear normal.   Eyes:      Conjunctiva/sclera: Conjunctivae normal.   Cardiovascular:      Rate and Rhythm: Normal rate and regular rhythm.      Heart sounds: Murmur heard.   Pulmonary:      Effort: Pulmonary effort is normal. No respiratory distress.      Breath sounds: Normal breath sounds. No wheezing.   Abdominal:      General: Bowel sounds are normal. There is no distension.      Palpations: Abdomen is soft.      Tenderness: There is abdominal tenderness.       Musculoskeletal:         General: Normal range of motion.      Cervical back: Normal range of motion and neck supple.   Skin:     General: Skin is warm and dry.   Neurological:      Mental Status: He is alert and oriented to person, place, and time.   Psychiatric:         Behavior: Behavior normal.            Labs:        Heather Gillespie DO    2/14/2025      "

## 2025-02-14 NOTE — ASSESSMENT & PLAN NOTE
Note reviewed from GI apt with Dr. Ibanez yesterday  Is doing speech and therapy   He reports this has been helpful  He also is taking his prilosec as prescribed by GI

## 2025-02-14 NOTE — ASSESSMENT & PLAN NOTE
Well controlled on crestor 40 mg daily  Continue as prescribed  Reviewed last lipid panel from 7/2024

## 2025-02-17 ENCOUNTER — OFFICE VISIT (OUTPATIENT)
Dept: SPEECH THERAPY | Facility: CLINIC | Age: 70
End: 2025-02-17
Payer: COMMERCIAL

## 2025-02-17 DIAGNOSIS — C76.0 HEAD AND NECK CANCER (HCC): Primary | ICD-10-CM

## 2025-02-17 DIAGNOSIS — R13.12 DYSPHAGIA, OROPHARYNGEAL PHASE: ICD-10-CM

## 2025-02-17 PROCEDURE — 92526 ORAL FUNCTION THERAPY: CPT

## 2025-02-17 NOTE — PROGRESS NOTES
Daily Progress Note    Today's date: 2025   Patient’s name: Simeon Robbins  : 1955  MRN: [id number]  Safety measures:   Referring provider: Caleb Ibanez MD      Encounter Diagnosis     ICD-10-CM    1. Head and neck cancer (HCC)  C76.0       2. Dysphagia, oropharyngeal phase  R13.12           Visit Tracking:      Visit Tracking:  POC   Expires Auth Expiration Date ST Visit Limit   3/14/25  BOMN              Visit/Unit Tracking:  Auth Status Date 12/31/24 11/27 12/5 12/13 12/20 2/3 2/5 2/12 2/17/25   BOMN Used 1 4 5 6 7 8 9 10 11     Remaining BOMN BOMN BOMN  9 8 7 6 5         Subjective/Behavioral:  -  Pleasant/Cooperative -    Objective/Assessment:    Short-term goals:     Patient will complete pharyngeal strengthening exercises for increased safety & improved swallow function.     2/3: Patient practicing at home diligently.          Patient will complete daily oral motor exercises (IOPI as appropriate) to increase lingual/labial range of motion, strength, and coordination with min cues to 90% effectiveness to improve bolus formation and strengthen oral musculature.        Strength and Endurance Testing:  The IOPI Pro is used by medical professionals to measure, evaluate, and increase the strength and endurance of the tongue and lip in patients with oral motor disorders, including dysphagia and dysarthria.  The IOPI measures the maximum pressure (Pmax) a patient can produce in an air-filled bulb when it is compressed as hard as possible by the tongue or lip against a hard surface (e.g. The palate or teeth, respectively). Pmax is a measure of strength, expressed in kilopascals (kPa, an international unit of pressure).       For patients with dysphagia or dysarthria, oral motor fatigability may be of interest.  The IOPI Pro can be used to assess tongue fatigability.  Low endurance values are an indicator of a high fatigability.  Endurance is measured with the IOPI Pro by quantifying the length of time  that a patient can maintain 50% of his or her Pmax.  This procedure is conducted in Target Mode by setting the target value to 50% of the patient's Pmax and timing how long the patient can hold the top (green) light on.       The medical professional determines what target value is appropriate for exercise therapy purposes and provides specific instructions to the patient for a particular exercise protocol.  In Target Mode, the pressure required to illuminate the green light a the top of the light array can be adjusted using the Set Target arrow buttons.  This green light is used as a visual target for the patient.     Tongue tip Peak Max after 3 trials: 24 Norm for age/gender is 56   Tongue back Peak Max after 3 trials: 19 Norm for age/gender is 50   Right lip Peak Max after 3 trials:  Norm for age/gender is 35   Left lip Peak Max after 3 trials:  Norm for age/gender is 35      IOPI PEAK MEASUREMENT WEEKLY GRID      11/7 11/27 12/13 2/17        Tongue tip  (Goal = 56) 15 24 24 27        Tongue back  (Goal = 50) 12 22 19 22        Right Lip  (Goal = 35) 21        Left Lip  (Goal = 35) 13            IOPI -- Today's Exercise Targets    Tongue tip  (Goal = 56) Peak Max after 3 trials: 24 Effort level: 80% Target for treatment: 19   Tongue back  (Goal = 50) Peak Max after 3 trials: 19 Effort level: 80% Target for treatment: 15   Right Lip  (Goal = 35) Peak Max after 3 trials:  Effort level:  Target for treatment:    Left Lip  (Goal = 35) Peak Max after 3 trials:  Effort level:  Target for treatment:         11/7/24 11/27/24 12/13/25      Tongue tip  10 x 30, 11 x 30 18 x 60  19 x 60      Tongue back 8 X 60 17 x 60 15 x 60      Right Lip 14 x 10         Left Lip 8 x 11             Endurance testing held at max pressure 8 - 50% of max front of tongue- 15 for 8 seconds.   Endurance testing held at max pressure 12 - 50% of max front of tongue - held for 44 seconds.  Endurance testing held at max pressure 12- 50% of max  "front of tongue- held for 65 seconds.        Patient will tolerate least restrictive diet with minimized overt s/sx of penetration or aspiration.  : Tolerated NMES with oatmeal and thin liquid over 50 swallows, position 3h, 5.5-12.0. Noted throat clearing and coughing (25%) of time with thins. Skin intact after removal of electrodes. Will trial SH-V.  2/5: Completed NMES with oatmeal/ water x 70 swallows, noted throat clears, intermittent coughing with thins, position SH-h x 30 minutes.  Patient felt solid oatmeal went \"down pretty good.\" Residuals were noted, cleared with liquid.  Patient notes his tongue gets stuck at \"roof of mouth\" and back of mouth at times, difficult to \"get moving\" again.  Encouraged range of motion lingual exercises along with IOPI exercises. 2/12: Completed NMES with oatmeal/water with over 50 swallows, position 2 GH-TH.  Tolerated 5-13.5. Patient exhibited frequent throat clearing/ coughing with thin liquid, small sips, but this is baseline for patient. Will continue NMES & assess for improvement. Patient continuing exercises at home.  2/17: Completed NMES with position 2 GH-TH, oatmeal, water, less coughing with thins noted today but still present.  40% of swallowing thins with coughing. Tolerated all oatmeal solid trials with no overt signs or symptoms of aspiration.           Long Term  Patient will maintain adequate hydration and nutrition with optimum safety and efficacy of swallowing function on P.O. intake without overt s/sx of penetration or aspiration by discharge.       Patient will utilize compensatory strategies with optimum safety and efficacy of swallowing function on P.O. intake without overt s/sx of penetration or aspiration by discharge.       Patient will develop safe and functional chewing and swallowing via use of dysphagia management strategies and diet modifications for adequate meal completion without significant s/sx of dysphagia and aspiration in order to maximize " quality of life and to decrease potential for medical complications for dysphagia.          Plan:  -Continue with current plan of care.

## 2025-02-19 ENCOUNTER — OFFICE VISIT (OUTPATIENT)
Dept: SPEECH THERAPY | Facility: CLINIC | Age: 70
End: 2025-02-19
Payer: COMMERCIAL

## 2025-02-19 DIAGNOSIS — R13.12 DYSPHAGIA, OROPHARYNGEAL PHASE: ICD-10-CM

## 2025-02-19 DIAGNOSIS — C76.0 HEAD AND NECK CANCER (HCC): Primary | ICD-10-CM

## 2025-02-19 PROCEDURE — 92526 ORAL FUNCTION THERAPY: CPT

## 2025-02-19 NOTE — PROGRESS NOTES
Daily Progress Note    Today's date: 2025   Patient’s name: Simeon Robbins  : 1955  MRN: [id number]  Safety measures:   Referring provider: Caleb Ibanez MD      Encounter Diagnosis     ICD-10-CM    1. Head and neck cancer (HCC)  C76.0       2. Dysphagia, oropharyngeal phase  R13.12           Visit Tracking:      Visit Tracking:  POC   Expires Auth Expiration Date ST Visit Limit   3/14/25  BOMN              Visit/Unit Tracking:  Auth Status Date 12/31/24 11/27 12/5 12/13 12/20 2/3 2/5 2/12 2/17/25 2/19   BOMN Used 1 4 5 6 7 8 9 10 11 12     Remaining BOMN BOMN BOMN  9 8 7 6 5 4         Subjective/Behavioral:  -  Pleasant/Cooperative -    Objective/Assessment:    Short-term goals:     Patient will complete pharyngeal strengthening exercises for increased safety & improved swallow function.     2/3: Patient practicing at home diligently.          Patient will complete daily oral motor exercises (IOPI as appropriate) to increase lingual/labial range of motion, strength, and coordination with min cues to 90% effectiveness to improve bolus formation and strengthen oral musculature.        Strength and Endurance Testing:  The IOPI Pro is used by medical professionals to measure, evaluate, and increase the strength and endurance of the tongue and lip in patients with oral motor disorders, including dysphagia and dysarthria.  The IOPI measures the maximum pressure (Pmax) a patient can produce in an air-filled bulb when it is compressed as hard as possible by the tongue or lip against a hard surface (e.g. The palate or teeth, respectively). Pmax is a measure of strength, expressed in kilopascals (kPa, an international unit of pressure).       For patients with dysphagia or dysarthria, oral motor fatigability may be of interest.  The IOPI Pro can be used to assess tongue fatigability.  Low endurance values are an indicator of a high fatigability.  Endurance is measured with the IOPI Pro by quantifying the  length of time that a patient can maintain 50% of his or her Pmax.  This procedure is conducted in Target Mode by setting the target value to 50% of the patient's Pmax and timing how long the patient can hold the top (green) light on.       The medical professional determines what target value is appropriate for exercise therapy purposes and provides specific instructions to the patient for a particular exercise protocol.  In Target Mode, the pressure required to illuminate the green light a the top of the light array can be adjusted using the Set Target arrow buttons.  This green light is used as a visual target for the patient.     Tongue tip Peak Max after 3 trials: 24 Norm for age/gender is 56   Tongue back Peak Max after 3 trials: 19 Norm for age/gender is 50   Right lip Peak Max after 3 trials:  Norm for age/gender is 35   Left lip Peak Max after 3 trials:  Norm for age/gender is 35      IOPI PEAK MEASUREMENT WEEKLY GRID      11/7 11/27 12/13 2/17        Tongue tip  (Goal = 56) 15 24 24 27        Tongue back  (Goal = 50) 12 22 19 22        Right Lip  (Goal = 35) 21        Left Lip  (Goal = 35) 13            IOPI -- Today's Exercise Targets    Tongue tip  (Goal = 56) Peak Max after 3 trials: 24 Effort level: 80% Target for treatment: 19   Tongue back  (Goal = 50) Peak Max after 3 trials: 19 Effort level: 80% Target for treatment: 15   Right Lip  (Goal = 35) Peak Max after 3 trials:  Effort level:  Target for treatment:    Left Lip  (Goal = 35) Peak Max after 3 trials:  Effort level:  Target for treatment:         11/7/24 11/27/24 12/13/25      Tongue tip  10 x 30, 11 x 30 18 x 60  19 x 60      Tongue back 8 X 60 17 x 60 15 x 60      Right Lip 14 x 10         Left Lip 8 x 11             Endurance testing held at max pressure 8 - 50% of max front of tongue- 15 for 8 seconds.   Endurance testing held at max pressure 12 - 50% of max front of tongue - held for 44 seconds.  Endurance testing held at max pressure 12-  "50% of max front of tongue- held for 65 seconds.        Patient will tolerate least restrictive diet with minimized overt s/sx of penetration or aspiration.  : Tolerated NMES with oatmeal and thin liquid over 50 swallows, position 3h, 5.5-12.0. Noted throat clearing and coughing (25%) of time with thins. Skin intact after removal of electrodes. Will trial SH-V.  2/5: Completed NMES with oatmeal/ water x 70 swallows, noted throat clears, intermittent coughing with thins, position SH-h x 30 minutes.  Patient felt solid oatmeal went \"down pretty good.\" Residuals were noted, cleared with liquid.  Patient notes his tongue gets stuck at \"roof of mouth\" and back of mouth at times, difficult to \"get moving\" again.  Encouraged range of motion lingual exercises along with IOPI exercises. 2/12: Completed NMES with oatmeal/water with over 50 swallows, position 2 GH-TH.  Tolerated 5-13.5. Patient exhibited frequent throat clearing/ coughing with thin liquid, small sips, but this is baseline for patient. Will continue NMES & assess for improvement. Patient continuing exercises at home.  2/17: Completed NMES with position 2 GH-TH, oatmeal, water, less coughing with thins noted today but still present.  40% of swallowing thins with coughing. Tolerated all oatmeal solid trials with no overt signs or symptoms of aspiration. 2/19: 2GH-TH position with oatmeal/ water. Over 50 swallows. No overt signs with aspiration with oatmeal noted but frequent coughing/ clearing with water (baseline).  Patient was able to clear voice towards end of session.             Long Term  Patient will maintain adequate hydration and nutrition with optimum safety and efficacy of swallowing function on P.O. intake without overt s/sx of penetration or aspiration by discharge.       Patient will utilize compensatory strategies with optimum safety and efficacy of swallowing function on P.O. intake without overt s/sx of penetration or aspiration by discharge.     "   Patient will develop safe and functional chewing and swallowing via use of dysphagia management strategies and diet modifications for adequate meal completion without significant s/sx of dysphagia and aspiration in order to maximize quality of life and to decrease potential for medical complications for dysphagia.          Plan:  -Continue with current plan of care.

## 2025-02-24 ENCOUNTER — OFFICE VISIT (OUTPATIENT)
Dept: SPEECH THERAPY | Facility: CLINIC | Age: 70
End: 2025-02-24
Payer: COMMERCIAL

## 2025-02-24 DIAGNOSIS — R13.12 DYSPHAGIA, OROPHARYNGEAL PHASE: ICD-10-CM

## 2025-02-24 DIAGNOSIS — N40.0 ENLARGED PROSTATE: ICD-10-CM

## 2025-02-24 DIAGNOSIS — C76.0 HEAD AND NECK CANCER (HCC): Primary | ICD-10-CM

## 2025-02-24 PROCEDURE — 92526 ORAL FUNCTION THERAPY: CPT

## 2025-02-24 NOTE — PROGRESS NOTES
Daily Progress Note    Today's date: 2025   Patient’s name: Simeon Robbins  : 1955  MRN: [id number]  Safety measures:   Referring provider: Caleb Ibanez MD      Encounter Diagnosis     ICD-10-CM    1. Head and neck cancer (HCC)  C76.0       2. Dysphagia, oropharyngeal phase  R13.12           Visit Tracking:      Visit Tracking:  POC   Expires Auth Expiration Date ST Visit Limit   3/14/25  BOMN              Visit/Unit Tracking:  Auth Status Date 12/31/24 11/27 12/5 12/13 12/20 2/3 2/5 2/12 2/17/25 2/19 2/24   BOMN Used 1 4 5 6 7 8 9 10 11 12 13     Remaining BOMN BOMN BOMN  9 8 7 6 5 4 3         Subjective/Behavioral:  -  Pleasant/Cooperative -    Objective/Assessment:    Short-term goals:     Patient will complete pharyngeal strengthening exercises for increased safety & improved swallow function.     2/3: Patient practicing at home diligently.          Patient will complete daily oral motor exercises (IOPI as appropriate) to increase lingual/labial range of motion, strength, and coordination with min cues to 90% effectiveness to improve bolus formation and strengthen oral musculature.        Strength and Endurance Testing:  The IOPI Pro is used by medical professionals to measure, evaluate, and increase the strength and endurance of the tongue and lip in patients with oral motor disorders, including dysphagia and dysarthria.  The IOPI measures the maximum pressure (Pmax) a patient can produce in an air-filled bulb when it is compressed as hard as possible by the tongue or lip against a hard surface (e.g. The palate or teeth, respectively). Pmax is a measure of strength, expressed in kilopascals (kPa, an international unit of pressure).       For patients with dysphagia or dysarthria, oral motor fatigability may be of interest.  The IOPI Pro can be used to assess tongue fatigability.  Low endurance values are an indicator of a high fatigability.  Endurance is measured with the IOPI Pro by quantifying  the length of time that a patient can maintain 50% of his or her Pmax.  This procedure is conducted in Target Mode by setting the target value to 50% of the patient's Pmax and timing how long the patient can hold the top (green) light on.       The medical professional determines what target value is appropriate for exercise therapy purposes and provides specific instructions to the patient for a particular exercise protocol.  In Target Mode, the pressure required to illuminate the green light a the top of the light array can be adjusted using the Set Target arrow buttons.  This green light is used as a visual target for the patient.     Tongue tip Peak Max after 3 trials: 24 Norm for age/gender is 56   Tongue back Peak Max after 3 trials: 19 Norm for age/gender is 50   Right lip Peak Max after 3 trials:  Norm for age/gender is 35   Left lip Peak Max after 3 trials:  Norm for age/gender is 35      IOPI PEAK MEASUREMENT WEEKLY GRID      11/7 11/27 12/13 2/17        Tongue tip  (Goal = 56) 15 24 24 27        Tongue back  (Goal = 50) 12 22 19 22        Right Lip  (Goal = 35) 21        Left Lip  (Goal = 35) 13            IOPI -- Today's Exercise Targets    Tongue tip  (Goal = 56) Peak Max after 3 trials: 24 Effort level: 80% Target for treatment: 19   Tongue back  (Goal = 50) Peak Max after 3 trials: 19 Effort level: 80% Target for treatment: 15   Right Lip  (Goal = 35) Peak Max after 3 trials:  Effort level:  Target for treatment:    Left Lip  (Goal = 35) Peak Max after 3 trials:  Effort level:  Target for treatment:         11/7/24 11/27/24 12/13/25      Tongue tip  10 x 30, 11 x 30 18 x 60  19 x 60      Tongue back 8 X 60 17 x 60 15 x 60      Right Lip 14 x 10         Left Lip 8 x 11             Endurance testing held at max pressure 8 - 50% of max front of tongue- 15 for 8 seconds.   Endurance testing held at max pressure 12 - 50% of max front of tongue - held for 44 seconds.  Endurance testing held at max pressure  "12- 50% of max front of tongue- held for 65 seconds.        Patient will tolerate least restrictive diet with minimized overt s/sx of penetration or aspiration.  : Tolerated NMES with oatmeal and thin liquid over 50 swallows, position 3h, 5.5-12.0. Noted throat clearing and coughing (25%) of time with thins. Skin intact after removal of electrodes. Will trial SH-V.  2/5: Completed NMES with oatmeal/ water x 70 swallows, noted throat clears, intermittent coughing with thins, position SH-h x 30 minutes.  Patient felt solid oatmeal went \"down pretty good.\" Residuals were noted, cleared with liquid.  Patient notes his tongue gets stuck at \"roof of mouth\" and back of mouth at times, difficult to \"get moving\" again.  Encouraged range of motion lingual exercises along with IOPI exercises. 2/12: Completed NMES with oatmeal/water with over 50 swallows, position 2 GH-TH.  Tolerated 5-13.5. Patient exhibited frequent throat clearing/ coughing with thin liquid, small sips, but this is baseline for patient. Will continue NMES & assess for improvement. Patient continuing exercises at home.  2/17: Completed NMES with position 2 GH-TH, oatmeal, water, less coughing with thins noted today but still present.  40% of swallowing thins with coughing. Tolerated all oatmeal solid trials with no overt signs or symptoms of aspiration. 2/19: 2GH-TH position with oatmeal/ water. Over 50 swallows. No overt signs with aspiration with oatmeal noted but frequent coughing/ clearing with water (baseline).  Patient was able to clear voice towards end of session.   2/24: 2GH-TH position with oatmeal / water, over 50 swallows. Coughing with water. Patient to continue home exercise protocol, x 3 sessions left of NMES.          Long Term  Patient will maintain adequate hydration and nutrition with optimum safety and efficacy of swallowing function on P.O. intake without overt s/sx of penetration or aspiration by discharge.       Patient will utilize " compensatory strategies with optimum safety and efficacy of swallowing function on P.O. intake without overt s/sx of penetration or aspiration by discharge.       Patient will develop safe and functional chewing and swallowing via use of dysphagia management strategies and diet modifications for adequate meal completion without significant s/sx of dysphagia and aspiration in order to maximize quality of life and to decrease potential for medical complications for dysphagia.          Plan:  -Continue with current plan of care.

## 2025-02-25 RX ORDER — TAMSULOSIN HYDROCHLORIDE 0.4 MG/1
CAPSULE ORAL
Qty: 30 CAPSULE | Refills: 0 | Status: SHIPPED | OUTPATIENT
Start: 2025-02-25

## 2025-02-26 ENCOUNTER — OFFICE VISIT (OUTPATIENT)
Dept: SPEECH THERAPY | Facility: CLINIC | Age: 70
End: 2025-02-26
Payer: COMMERCIAL

## 2025-02-26 DIAGNOSIS — C76.0 HEAD AND NECK CANCER (HCC): Primary | ICD-10-CM

## 2025-02-26 DIAGNOSIS — R13.12 DYSPHAGIA, OROPHARYNGEAL PHASE: ICD-10-CM

## 2025-02-26 PROCEDURE — 92526 ORAL FUNCTION THERAPY: CPT

## 2025-02-26 NOTE — PROGRESS NOTES
Daily Progress Note    Today's date: 2025   Patient’s name: Simeon Robbins  : 1955  MRN: [id number]  Safety measures:   Referring provider: Caleb Ibanez MD      Encounter Diagnosis     ICD-10-CM    1. Head and neck cancer (HCC)  C76.0       2. Dysphagia, oropharyngeal phase  R13.12           Visit Tracking:      Visit Tracking:  POC   Expires Auth Expiration Date ST Visit Limit   3/14/25  BOMN              Visit/Unit Tracking:  Auth Status Date 12/31/24 11/27 12/5 12/13 12/20 2/3 2/5 2/12 2/17/25 2/19 2/24 2/26   BOMN Used 1 4 5 6 7 8 9 10 11 12  14     Remaining BOMN BOMN BOMN  9 8 7 6 5 4 3 2         Subjective/Behavioral:  -  Pleasant/Cooperative -    Objective/Assessment:    Short-term goals:     Patient will complete pharyngeal strengthening exercises for increased safety & improved swallow function.     2/3: Patient practicing at home diligently.          Patient will complete daily oral motor exercises (IOPI as appropriate) to increase lingual/labial range of motion, strength, and coordination with min cues to 90% effectiveness to improve bolus formation and strengthen oral musculature.        Strength and Endurance Testing:  The IOPI Pro is used by medical professionals to measure, evaluate, and increase the strength and endurance of the tongue and lip in patients with oral motor disorders, including dysphagia and dysarthria.  The IOPI measures the maximum pressure (Pmax) a patient can produce in an air-filled bulb when it is compressed as hard as possible by the tongue or lip against a hard surface (e.g. The palate or teeth, respectively). Pmax is a measure of strength, expressed in kilopascals (kPa, an international unit of pressure).       For patients with dysphagia or dysarthria, oral motor fatigability may be of interest.  The IOPI Pro can be used to assess tongue fatigability.  Low endurance values are an indicator of a high fatigability.  Endurance is measured with the IOPI Pro by  quantifying the length of time that a patient can maintain 50% of his or her Pmax.  This procedure is conducted in Target Mode by setting the target value to 50% of the patient's Pmax and timing how long the patient can hold the top (green) light on.       The medical professional determines what target value is appropriate for exercise therapy purposes and provides specific instructions to the patient for a particular exercise protocol.  In Target Mode, the pressure required to illuminate the green light a the top of the light array can be adjusted using the Set Target arrow buttons.  This green light is used as a visual target for the patient.     Tongue tip Peak Max after 3 trials: 24 Norm for age/gender is 56   Tongue back Peak Max after 3 trials: 19 Norm for age/gender is 50   Right lip Peak Max after 3 trials:  Norm for age/gender is 35   Left lip Peak Max after 3 trials:  Norm for age/gender is 35      IOPI PEAK MEASUREMENT WEEKLY GRID      11/7 11/27 12/13 2/17        Tongue tip  (Goal = 56) 15 24 24 27        Tongue back  (Goal = 50) 12 22 19 22        Right Lip  (Goal = 35) 21        Left Lip  (Goal = 35) 13            IOPI -- Today's Exercise Targets    Tongue tip  (Goal = 56) Peak Max after 3 trials: 24 Effort level: 80% Target for treatment: 19   Tongue back  (Goal = 50) Peak Max after 3 trials: 19 Effort level: 80% Target for treatment: 15   Right Lip  (Goal = 35) Peak Max after 3 trials:  Effort level:  Target for treatment:    Left Lip  (Goal = 35) Peak Max after 3 trials:  Effort level:  Target for treatment:         11/7/24 11/27/24 12/13/25      Tongue tip  10 x 30, 11 x 30 18 x 60  19 x 60      Tongue back 8 X 60 17 x 60 15 x 60      Right Lip 14 x 10         Left Lip 8 x 11             Endurance testing held at max pressure 8 - 50% of max front of tongue- 15 for 8 seconds.   Endurance testing held at max pressure 12 - 50% of max front of tongue - held for 44 seconds.  Endurance testing held at  "max pressure 12- 50% of max front of tongue- held for 65 seconds.        Patient will tolerate least restrictive diet with minimized overt s/sx of penetration or aspiration.  : Tolerated NMES with oatmeal and thin liquid over 50 swallows, position 3h, 5.5-12.0. Noted throat clearing and coughing (25%) of time with thins. Skin intact after removal of electrodes. Will trial SH-V.  2/5: Completed NMES with oatmeal/ water x 70 swallows, noted throat clears, intermittent coughing with thins, position SH-h x 30 minutes.  Patient felt solid oatmeal went \"down pretty good.\" Residuals were noted, cleared with liquid.  Patient notes his tongue gets stuck at \"roof of mouth\" and back of mouth at times, difficult to \"get moving\" again.  Encouraged range of motion lingual exercises along with IOPI exercises. 2/12: Completed NMES with oatmeal/water with over 50 swallows, position 2 GH-TH.  Tolerated 5-13.5. Patient exhibited frequent throat clearing/ coughing with thin liquid, small sips, but this is baseline for patient. Will continue NMES & assess for improvement. Patient continuing exercises at home.  2/17: Completed NMES with position 2 GH-TH, oatmeal, water, less coughing with thins noted today but still present.  40% of swallowing thins with coughing. Tolerated all oatmeal solid trials with no overt signs or symptoms of aspiration. 2/19: 2GH-TH position with oatmeal/ water. Over 50 swallows. No overt signs with aspiration with oatmeal noted but frequent coughing/ clearing with water (baseline).  Patient was able to clear voice towards end of session.   2/24: 2GH-TH position with oatmeal / water, over 50 swallows. Coughing with water. Patient to continue home exercise protocol, x 3 sessions left of NMES.  2/26: 2GH-TH position with oatmeal mixed with applesauce, over 50 swallows, tolerated 100% very well and quick, Patient noted pills appear to be going down easier and easier with puree. + coughing with thin liquids " continues. Patient declines trialing nectar thick.         Long Term  Patient will maintain adequate hydration and nutrition with optimum safety and efficacy of swallowing function on P.O. intake without overt s/sx of penetration or aspiration by discharge.       Patient will utilize compensatory strategies with optimum safety and efficacy of swallowing function on P.O. intake without overt s/sx of penetration or aspiration by discharge.       Patient will develop safe and functional chewing and swallowing via use of dysphagia management strategies and diet modifications for adequate meal completion without significant s/sx of dysphagia and aspiration in order to maximize quality of life and to decrease potential for medical complications for dysphagia.          Plan:  -Continue with current plan of care.

## 2025-03-03 ENCOUNTER — OFFICE VISIT (OUTPATIENT)
Dept: SPEECH THERAPY | Facility: CLINIC | Age: 70
End: 2025-03-03
Payer: COMMERCIAL

## 2025-03-03 DIAGNOSIS — R13.12 DYSPHAGIA, OROPHARYNGEAL PHASE: ICD-10-CM

## 2025-03-03 DIAGNOSIS — C76.0 HEAD AND NECK CANCER (HCC): Primary | ICD-10-CM

## 2025-03-03 PROCEDURE — 92526 ORAL FUNCTION THERAPY: CPT

## 2025-03-04 ENCOUNTER — HOSPITAL ENCOUNTER (OUTPATIENT)
Dept: ULTRASOUND IMAGING | Facility: CLINIC | Age: 70
Discharge: HOME | End: 2025-03-04
Payer: COMMERCIAL

## 2025-03-04 DIAGNOSIS — R10.32 LEFT GROIN PAIN: ICD-10-CM

## 2025-03-04 PROCEDURE — 76705 ECHO EXAM OF ABDOMEN: CPT

## 2025-03-04 NOTE — PROGRESS NOTES
Daily Progress Note    Today's date: 3/3/2025   Patient’s name: Simeon Robbins  : 1955  MRN: [id number]  Safety measures:   Referring provider: Caleb Ibanez MD      Encounter Diagnosis     ICD-10-CM    1. Head and neck cancer (HCC)  C76.0       2. Dysphagia, oropharyngeal phase  R13.12           Visit Tracking:      Visit Tracking:  POC   Expires Auth Expiration Date ST Visit Limit   3/14/25  BOMN              Visit/Unit Tracking:  Auth Status Date 12/31/24 11/27 12/5 12/13 12/20 2/3 2/5 2/12 2/17/25 2/19 2/24 2/26 3/3   BOMN Used 1 4 5 6 7 8 9 10 11 12 13 14 15     Remaining BOMN BOMN BOMN  9 8 7 6 5 4 3 2 1         Subjective/Behavioral:  -  Pleasant/Cooperative -    Objective/Assessment:    Short-term goals:     Patient will complete pharyngeal strengthening exercises for increased safety & improved swallow function.     2/3: Patient practicing at home diligently.          Patient will complete daily oral motor exercises (IOPI as appropriate) to increase lingual/labial range of motion, strength, and coordination with min cues to 90% effectiveness to improve bolus formation and strengthen oral musculature.        Strength and Endurance Testing:  The IOPI Pro is used by medical professionals to measure, evaluate, and increase the strength and endurance of the tongue and lip in patients with oral motor disorders, including dysphagia and dysarthria.  The IOPI measures the maximum pressure (Pmax) a patient can produce in an air-filled bulb when it is compressed as hard as possible by the tongue or lip against a hard surface (e.g. The palate or teeth, respectively). Pmax is a measure of strength, expressed in kilopascals (kPa, an international unit of pressure).       For patients with dysphagia or dysarthria, oral motor fatigability may be of interest.  The IOPI Pro can be used to assess tongue fatigability.  Low endurance values are an indicator of a high fatigability.  Endurance is measured with the IOPI  Pro by quantifying the length of time that a patient can maintain 50% of his or her Pmax.  This procedure is conducted in Target Mode by setting the target value to 50% of the patient's Pmax and timing how long the patient can hold the top (green) light on.       The medical professional determines what target value is appropriate for exercise therapy purposes and provides specific instructions to the patient for a particular exercise protocol.  In Target Mode, the pressure required to illuminate the green light a the top of the light array can be adjusted using the Set Target arrow buttons.  This green light is used as a visual target for the patient.     Tongue tip Peak Max after 3 trials: 24 Norm for age/gender is 56   Tongue back Peak Max after 3 trials: 19 Norm for age/gender is 50   Right lip Peak Max after 3 trials:  Norm for age/gender is 35   Left lip Peak Max after 3 trials:  Norm for age/gender is 35      IOPI PEAK MEASUREMENT WEEKLY GRID      11/7 11/27 12/13 2/17        Tongue tip  (Goal = 56) 15 24 24 27        Tongue back  (Goal = 50) 12 22 19 22        Right Lip  (Goal = 35) 21        Left Lip  (Goal = 35) 13            IOPI -- Today's Exercise Targets    Tongue tip  (Goal = 56) Peak Max after 3 trials: 24 Effort level: 80% Target for treatment: 19   Tongue back  (Goal = 50) Peak Max after 3 trials: 19 Effort level: 80% Target for treatment: 15   Right Lip  (Goal = 35) Peak Max after 3 trials:  Effort level:  Target for treatment:    Left Lip  (Goal = 35) Peak Max after 3 trials:  Effort level:  Target for treatment:         11/7/24 11/27/24 12/13/25      Tongue tip  10 x 30, 11 x 30 18 x 60  19 x 60      Tongue back 8 X 60 17 x 60 15 x 60      Right Lip 14 x 10         Left Lip 8 x 11             Endurance testing held at max pressure 8 - 50% of max front of tongue- 15 for 8 seconds.   Endurance testing held at max pressure 12 - 50% of max front of tongue - held for 44 seconds.  Endurance testing  "held at max pressure 12- 50% of max front of tongue- held for 65 seconds.        Patient will tolerate least restrictive diet with minimized overt s/sx of penetration or aspiration.  : Tolerated NMES with oatmeal and thin liquid over 50 swallows, position 3h, 5.5-12.0. Noted throat clearing and coughing (25%) of time with thins. Skin intact after removal of electrodes. Will trial SH-V.  2/5: Completed NMES with oatmeal/ water x 70 swallows, noted throat clears, intermittent coughing with thins, position SH-h x 30 minutes.  Patient felt solid oatmeal went \"down pretty good.\" Residuals were noted, cleared with liquid.  Patient notes his tongue gets stuck at \"roof of mouth\" and back of mouth at times, difficult to \"get moving\" again.  Encouraged range of motion lingual exercises along with IOPI exercises. 2/12: Completed NMES with oatmeal/water with over 50 swallows, position 2 GH-TH.  Tolerated 5-13.5. Patient exhibited frequent throat clearing/ coughing with thin liquid, small sips, but this is baseline for patient. Will continue NMES & assess for improvement. Patient continuing exercises at home.  2/17: Completed NMES with position 2 GH-TH, oatmeal, water, less coughing with thins noted today but still present.  40% of swallowing thins with coughing. Tolerated all oatmeal solid trials with no overt signs or symptoms of aspiration. 2/19: 2GH-TH position with oatmeal/ water. Over 50 swallows. No overt signs with aspiration with oatmeal noted but frequent coughing/ clearing with water (baseline).  Patient was able to clear voice towards end of session.   2/24: 2GH-TH position with oatmeal / water, over 50 swallows. Coughing with water. Patient to continue home exercise protocol, x 3 sessions left of NMES.  2/26: 2GH-TH position with oatmeal mixed with applesauce, over 50 swallows, tolerated 100% very well and quick, Patient noted pills appear to be going down easier and easier with puree. + coughing with thin liquids " "continues.  VFSS showed worse with thickened liquids.  3/3: 2GH-TH position x 25 charge. Tolerated puree (oatmeal/applesauce mixed); + 1 cough. Thin liquid via bottle / cup, + frequent coughing; however, patient noted his swallowing is \"going easier\" at home, encouraged continual practice.         Long Term  Patient will maintain adequate hydration and nutrition with optimum safety and efficacy of swallowing function on P.O. intake without overt s/sx of penetration or aspiration by discharge.       Patient will utilize compensatory strategies with optimum safety and efficacy of swallowing function on P.O. intake without overt s/sx of penetration or aspiration by discharge.       Patient will develop safe and functional chewing and swallowing via use of dysphagia management strategies and diet modifications for adequate meal completion without significant s/sx of dysphagia and aspiration in order to maximize quality of life and to decrease potential for medical complications for dysphagia.          Plan:  -Continue with current plan of care.   Discharge next session  "

## 2025-03-05 ENCOUNTER — OFFICE VISIT (OUTPATIENT)
Dept: SPEECH THERAPY | Facility: CLINIC | Age: 70
End: 2025-03-05
Payer: COMMERCIAL

## 2025-03-05 DIAGNOSIS — R13.12 DYSPHAGIA, OROPHARYNGEAL PHASE: ICD-10-CM

## 2025-03-05 DIAGNOSIS — C76.0 HEAD AND NECK CANCER (HCC): Primary | ICD-10-CM

## 2025-03-05 PROCEDURE — 92526 ORAL FUNCTION THERAPY: CPT

## 2025-03-05 NOTE — PROGRESS NOTES
Daily Progress Note - Discharge Report    Today's date: 3/5/2025   Patient’s name: Simeon Robbins  : 1955  MRN: [id number]  Safety measures:   Referring provider: Caleb Ibanez MD      Encounter Diagnosis     ICD-10-CM    1. Head and neck cancer (HCC)  C76.0       2. Dysphagia, oropharyngeal phase  R13.12           Visit Tracking:  Episode 16, Visit 9 for this referral    Visit Tracking:  POC   Expires Auth Expiration Date ST Visit Limit   2/3/25 12/31/25 BOMN              Visit/Unit Tracking:  Auth Status Date 3/5   BOMN Used 9     Remaining          Subjective/Behavioral:  -  Pleasant/Cooperative -    Objective/Assessment:    Short-term goals:     Patient will complete pharyngeal strengthening exercises for increased safety & improved swallow function.     /3: Patient practicing at home diligently.          Patient will complete daily oral motor exercises (IOPI as appropriate) to increase lingual/labial range of motion, strength, and coordination with min cues to 90% effectiveness to improve bolus formation and strengthen oral musculature.        Strength and Endurance Testing:  The IOPI Pro is used by medical professionals to measure, evaluate, and increase the strength and endurance of the tongue and lip in patients with oral motor disorders, including dysphagia and dysarthria.  The IOPI measures the maximum pressure (Pmax) a patient can produce in an air-filled bulb when it is compressed as hard as possible by the tongue or lip against a hard surface (e.g. The palate or teeth, respectively). Pmax is a measure of strength, expressed in kilopascals (kPa, an international unit of pressure).       For patients with dysphagia or dysarthria, oral motor fatigability may be of interest.  The IOPI Pro can be used to assess tongue fatigability.  Low endurance values are an indicator of a high fatigability.  Endurance is measured with the IOPI Pro by quantifying the length of time that a patient can maintain  50% of his or her Pmax.  This procedure is conducted in Target Mode by setting the target value to 50% of the patient's Pmax and timing how long the patient can hold the top (green) light on.       The medical professional determines what target value is appropriate for exercise therapy purposes and provides specific instructions to the patient for a particular exercise protocol.  In Target Mode, the pressure required to illuminate the green light a the top of the light array can be adjusted using the Set Target arrow buttons.  This green light is used as a visual target for the patient.     Tongue tip Peak Max after 3 trials: 24 Norm for age/gender is 56   Tongue back Peak Max after 3 trials: 19 Norm for age/gender is 50   Right lip Peak Max after 3 trials:  Norm for age/gender is 35   Left lip Peak Max after 3 trials:  Norm for age/gender is 35      IOPI PEAK MEASUREMENT WEEKLY GRID      11/7 11/27 12/13 2/17        Tongue tip  (Goal = 56) 15 24 24 27        Tongue back  (Goal = 50) 12 22 19 22        Right Lip  (Goal = 35) 21        Left Lip  (Goal = 35) 13            IOPI -- Today's Exercise Targets    Tongue tip  (Goal = 56) Peak Max after 3 trials: 24 Effort level: 80% Target for treatment: 19   Tongue back  (Goal = 50) Peak Max after 3 trials: 19 Effort level: 80% Target for treatment: 15   Right Lip  (Goal = 35) Peak Max after 3 trials:  Effort level:  Target for treatment:    Left Lip  (Goal = 35) Peak Max after 3 trials:  Effort level:  Target for treatment:         11/7/24 11/27/24 12/13/25      Tongue tip  10 x 30, 11 x 30 18 x 60  19 x 60      Tongue back 8 X 60 17 x 60 15 x 60      Right Lip 14 x 10         Left Lip 8 x 11             Endurance testing held at max pressure 8 - 50% of max front of tongue- 15 for 8 seconds.   Endurance testing held at max pressure 12 - 50% of max front of tongue - held for 44 seconds.  Endurance testing held at max pressure 12- 50% of max front of tongue- held for 65  "seconds.        Patient will tolerate least restrictive diet with minimized overt s/sx of penetration or aspiration.  : Tolerated NMES with oatmeal and thin liquid over 50 swallows, position 3h, 5.5-12.0. Noted throat clearing and coughing (25%) of time with thins. Skin intact after removal of electrodes. Will trial SH-V.  2/5: Completed NMES with oatmeal/ water x 70 swallows, noted throat clears, intermittent coughing with thins, position SH-h x 30 minutes.  Patient felt solid oatmeal went \"down pretty good.\" Residuals were noted, cleared with liquid.  Patient notes his tongue gets stuck at \"roof of mouth\" and back of mouth at times, difficult to \"get moving\" again.  Encouraged range of motion lingual exercises along with IOPI exercises. 2/12: Completed NMES with oatmeal/water with over 50 swallows, position 2 GH-TH.  Tolerated 5-13.5. Patient exhibited frequent throat clearing/ coughing with thin liquid, small sips, but this is baseline for patient. Will continue NMES & assess for improvement. Patient continuing exercises at home.  2/17: Completed NMES with position 2 GH-TH, oatmeal, water, less coughing with thins noted today but still present.  40% of swallowing thins with coughing. Tolerated all oatmeal solid trials with no overt signs or symptoms of aspiration. 2/19: 2GH-TH position with oatmeal/ water. Over 50 swallows. No overt signs with aspiration with oatmeal noted but frequent coughing/ clearing with water (baseline).  Patient was able to clear voice towards end of session.   2/24: 2GH-TH position with oatmeal / water, over 50 swallows. Coughing with water. Patient to continue home exercise protocol, x 3 sessions left of NMES.  2/26: 2GH-TH position with oatmeal mixed with applesauce, over 50 swallows, tolerated 100% very well and quick, Patient noted pills appear to be going down easier and easier with puree. + coughing with thin liquids continues.  VFSS showed worse with thickened liquids.  3/3: 2GH-TH " "position x 25 charge. Tolerated puree (oatmeal/applesauce mixed); + 1 cough. Thin liquid via bottle / cup, + frequent coughing; however, patient noted his swallowing is \"going easier\" at home, encouraged continual practice.  3/5: 2GH-TH position x 23.5 charge. Patient notes people have commented his voice is stronger, more audible. Patient with no coughing with solids and + consistent coughing with thin coffee.  Patient utilizing all compensatory strategies and completing home exercises, appropriate for discharge at this time.         Long Term  - ACHIEVED MAXIMUM POTENTIAL  Patient will maintain adequate hydration and nutrition with optimum safety and efficacy of swallowing function on P.O. intake without overt s/sx of penetration or aspiration by discharge.       Patient will utilize compensatory strategies with optimum safety and efficacy of swallowing function on P.O. intake without overt s/sx of penetration or aspiration by discharge.       Patient will develop safe and functional chewing and swallowing via use of dysphagia management strategies and diet modifications for adequate meal completion without significant s/sx of dysphagia and aspiration in order to maximize quality of life and to decrease potential for medical complications for dysphagia.          Plan:  Discharge at this time, patient will continue with home program  / exercises.  Patient is independent with diet modifications.      "

## 2025-03-10 ENCOUNTER — RESULTS FOLLOW-UP (OUTPATIENT)
Dept: FAMILY MEDICINE CLINIC | Facility: CLINIC | Age: 70
End: 2025-03-10

## 2025-03-14 DIAGNOSIS — K21.01 GASTROESOPHAGEAL REFLUX DISEASE WITH ESOPHAGITIS AND HEMORRHAGE: ICD-10-CM

## 2025-03-14 RX ORDER — OMEPRAZOLE 40 MG/1
40 CAPSULE, DELAYED RELEASE ORAL DAILY
Qty: 90 CAPSULE | Refills: 1 | Status: SHIPPED | OUTPATIENT
Start: 2025-03-14

## 2025-03-14 NOTE — TELEPHONE ENCOUNTER
Pt calling in to give Dr. Ibanez an update- he is doing well on omeprazole 40 mg daily. He would like a 90 day supply for daily dosing sent to pharmacy.

## 2025-03-25 DIAGNOSIS — N40.0 ENLARGED PROSTATE: ICD-10-CM

## 2025-03-25 RX ORDER — TAMSULOSIN HYDROCHLORIDE 0.4 MG/1
CAPSULE ORAL
Qty: 30 CAPSULE | Refills: 0 | Status: SHIPPED | OUTPATIENT
Start: 2025-03-25 | End: 2025-03-28 | Stop reason: SDUPTHER

## 2025-03-28 ENCOUNTER — OFFICE VISIT (OUTPATIENT)
Dept: UROLOGY | Facility: AMBULATORY SURGERY CENTER | Age: 70
End: 2025-03-28
Payer: COMMERCIAL

## 2025-03-28 VITALS
BODY MASS INDEX: 23.8 KG/M2 | OXYGEN SATURATION: 100 % | HEIGHT: 71 IN | HEART RATE: 63 BPM | DIASTOLIC BLOOD PRESSURE: 110 MMHG | WEIGHT: 170 LBS | SYSTOLIC BLOOD PRESSURE: 166 MMHG

## 2025-03-28 DIAGNOSIS — Z12.5 SCREENING FOR PROSTATE CANCER: ICD-10-CM

## 2025-03-28 DIAGNOSIS — N40.0 ENLARGED PROSTATE: ICD-10-CM

## 2025-03-28 DIAGNOSIS — N13.8 BPH WITH OBSTRUCTION/LOWER URINARY TRACT SYMPTOMS: Primary | ICD-10-CM

## 2025-03-28 DIAGNOSIS — N40.1 BPH WITH OBSTRUCTION/LOWER URINARY TRACT SYMPTOMS: Primary | ICD-10-CM

## 2025-03-28 LAB — POST-VOID RESIDUAL VOLUME, ML POC: 70 ML

## 2025-03-28 PROCEDURE — 51798 US URINE CAPACITY MEASURE: CPT

## 2025-03-28 PROCEDURE — 99214 OFFICE O/P EST MOD 30 MIN: CPT

## 2025-03-28 RX ORDER — TAMSULOSIN HYDROCHLORIDE 0.4 MG/1
0.8 CAPSULE ORAL
Qty: 180 CAPSULE | Refills: 3 | Status: SHIPPED | OUTPATIENT
Start: 2025-03-28

## 2025-03-28 NOTE — PROGRESS NOTES
Name: Simeon Robbins      : 1955      MRN: [id number]  Encounter Provider: HERMELINDA Nj  Encounter Date: 3/28/2025   Encounter department: [facility]OLOGY [place]  :  Assessment & Plan  BPH with obstruction/lower urinary tract symptoms  Patient currently managed on Flomax 0.4 mg with worsening lower urinary tract symptoms.  Recommend increasing Flomax to 0.8 mg, side effect profile discussed.    Discussed continuing with diet lifestyle modifications such as limiting bladder irritants and stopping fluid consumption 2 hours prior to bedtime.    We did briefly discuss surgical intervention for bladder outlet obstruction which would require further workup by a physician, he wishes to defer any surgical intervention at this time.    Follow-up in 2 to 3 months.    Orders:    POCT Measure PVR    tamsulosin (FLOMAX) 0.4 mg; Take 2 capsules (0.8 mg total) by mouth daily with dinner    Screening for prostate cancer  PSA has remained low and stable.  Unfortunately a PSA was not checked prior to today's appointment, and a repeat PSA order was placed and patient will plan to have it obtained prior to his follow-up appointment.    Lab Results   Component Value Date    PSA 2.6 2024    PSA 2.7 2016      Orders:    PSA, Total Screen; Future      History of Present Illness   Simeon Robbins is a 69 y.o. male who presents today to the office for follow-up of BPH with LUTS and prostate cancer screening.  He was last seen in 2024.    Today in the office he states that he has started to notice worsening urinary symptoms.  He states that he has nocturia every 2 hours, a worsening weakened urinary stream and postvoid dribbling.  He states that he feels like his symptoms are back to where they were before he started the Flomax.    He states that he does drink coffee in the morning and he states that he will notice worsening of symptoms when he does drink coffee.  He has tried to switch  to half caffeinated but is still noticing bothersome urinary symptoms.    He also states that sometimes he has difficulty swallowing pills related to his head and neck cancer that required radiation.    AUA SYMPTOM SCORE      Flowsheet Row Most Recent Value   AUA SYMPTOM SCORE    How often have you had a sensation of not emptying your bladder completely after you finished urinating? 3 (P)     How often have you had to urinate again less than two hours after you finished urinating? 3 (P)     How often have you found you stopped and started again several times when you urinate? 1 (P)     How often have you found it difficult to postpone urination? 3 (P)     How often have you had a weak urinary stream? 3 (P)     How often have you had to push or strain to begin urination? 0 (P)     How many times did you most typically get up to urinate from the time you went to bed at night until the time you got up in the morning? 5 (P)     Quality of Life: If you were to spend the rest of your life with your urinary condition just the way it is now, how would you feel about that? 4 (P)     AUA SYMPTOM SCORE 18 (P)            Review of Systems   Constitutional:  Negative for chills and fever.   Respiratory: Negative.  Negative for cough and shortness of breath.    Cardiovascular: Negative.  Negative for chest pain.   Gastrointestinal: Negative.  Negative for abdominal distention, abdominal pain, nausea and vomiting.   Genitourinary:  Positive for difficulty urinating, frequency and urgency. Negative for decreased urine volume, dysuria, flank pain, hematuria, penile discharge, penile pain, penile swelling, scrotal swelling and testicular pain.   Skin: Negative.  Negative for rash.   Neurological: Negative.    Hematological:  Negative for adenopathy. Does not bruise/bleed easily.          Objective   There were no vitals taken for this visit.    Physical Exam  Vitals reviewed.   Constitutional:       Appearance: Normal appearance.    HENT:      Head: Normocephalic and atraumatic.   Eyes:      Pupils: Pupils are equal, round, and reactive to light.   Cardiovascular:      Rate and Rhythm: Normal rate.   Pulmonary:      Effort: Pulmonary effort is normal.   Abdominal:      General: Abdomen is flat.      Palpations: Abdomen is soft.   Musculoskeletal:      Cervical back: Normal range of motion.   Skin:     General: Skin is warm and dry.   Neurological:      General: No focal deficit present.      Mental Status: He is alert and oriented to person, place, and time.   Psychiatric:         Mood and Affect: Mood normal.         Behavior: Behavior normal.         Thought Content: Thought content normal.         Judgment: Judgment normal.           Results   Lab Results   Component Value Date    PSA 2.6 01/04/2024    PSA 2.7 02/29/2016     Lab Results   Component Value Date    GLUCOSE 112 (H) 02/29/2016    CALCIUM 9.2 07/06/2024     02/29/2016    K 4.3 07/06/2024    CO2 31 07/06/2024     07/06/2024    BUN 12 07/06/2024    CREATININE 0.67 07/06/2024     Lab Results   Component Value Date    WBC 9.16 07/06/2024    HGB 14.3 07/06/2024    HCT 45.4 07/06/2024    MCV 95 07/06/2024     07/06/2024       Office Urine Dip  No results found for this or any previous visit (from the past hour).

## 2025-03-28 NOTE — ASSESSMENT & PLAN NOTE
PSA has remained low and stable.  Unfortunately a PSA was not checked prior to today's appointment, and a repeat PSA order was placed and patient will plan to have it obtained prior to his follow-up appointment.    Lab Results   Component Value Date    PSA 2.6 01/04/2024    PSA 2.7 02/29/2016      Orders:    PSA, Total Screen; Future

## 2025-03-28 NOTE — ASSESSMENT & PLAN NOTE
Patient currently managed on Flomax 0.4 mg with worsening lower urinary tract symptoms.  Recommend increasing Flomax to 0.8 mg, side effect profile discussed.    Discussed continuing with diet lifestyle modifications such as limiting bladder irritants and stopping fluid consumption 2 hours prior to bedtime.    We did briefly discuss surgical intervention for bladder outlet obstruction which would require further workup by a physician, he wishes to defer any surgical intervention at this time.    Follow-up in 2 to 3 months.    Orders:    POCT Measure PVR    tamsulosin (FLOMAX) 0.4 mg; Take 2 capsules (0.8 mg total) by mouth daily with dinner

## 2025-04-24 DIAGNOSIS — Z95.5 STATUS POST INSERTION OF DRUG ELUTING CORONARY ARTERY STENT: ICD-10-CM

## 2025-04-24 RX ORDER — CLOPIDOGREL BISULFATE 75 MG/1
75 TABLET ORAL DAILY
Qty: 30 TABLET | Refills: 0 | Status: SHIPPED | OUTPATIENT
Start: 2025-04-24

## 2025-04-27 PROBLEM — Z12.5 SCREENING FOR PROSTATE CANCER: Status: RESOLVED | Noted: 2023-05-15 | Resolved: 2025-04-27

## 2025-05-23 DIAGNOSIS — E03.9 ACQUIRED HYPOTHYROIDISM: ICD-10-CM

## 2025-05-23 NOTE — ASSESSMENT & PLAN NOTE
PSA has remained low and stable.  Due to patient's age of 69, recommend discontinuing PSA screening. Patient is agreeable to this.    Lab Results   Component Value Date    PSA 1.840 05/28/2025    PSA 2.6 01/04/2024    PSA 2.7 02/29/2016

## 2025-05-23 NOTE — ASSESSMENT & PLAN NOTE
Patient currently managed on Flomax 0.8 mg for urinary urgency and frequency.  Patient is overall satisfied with the improvement of his urinary symptoms while on flomax.     Discussed continuing with diet lifestyle modifications such as limiting bladder irritants and stopping fluid consumption 2 hours prior to bedtime.    We did briefly discuss surgical intervention for bladder outlet obstruction which would require further workup by a physician, he wishes to defer any surgical intervention at this time.    Follow up in 6 months.

## 2025-05-23 NOTE — PROGRESS NOTES
Name: Simeon Robbins      : 1955      MRN: [id number]  Encounter Provider: HERMELINDA Nj  Encounter Date: 2025   Encounter department: [facility]OLOG[facility] [place]  :  Assessment & Plan  BPH with obstruction/lower urinary tract symptoms  Patient currently managed on Flomax 0.8 mg for urinary urgency and frequency.  Patient is overall satisfied with the improvement of his urinary symptoms while on flomax.     Discussed continuing with diet lifestyle modifications such as limiting bladder irritants and stopping fluid consumption 2 hours prior to bedtime.    We did briefly discuss surgical intervention for bladder outlet obstruction which would require further workup by a physician, he wishes to defer any surgical intervention at this time.    Follow up in 6 months.       Screening for prostate cancer  PSA has remained low and stable.  Due to patient's age of 69, recommend discontinuing PSA screening. Patient is agreeable to this.    Lab Results   Component Value Date    PSA 1.840 2025    PSA 2.6 2024    PSA 2.7 2016                 History of Present Illness   Simeon Robbins is a 69 y.o. male who presents today to the office for follow-up of BPH with LUTS and prostate cancer screening.  He was last seen in 2025. Patient reports a 50-60 percent improvement in his urinary symptoms since starting the flomax 0.8 mg once daily. His urinary symptoms include frequency and urgency. Denies any urinary incontinence or hematuria. Patient has nocturia 0-4 times per night. Coffee is a trigger for his urinary symptoms. Denies any issues with erectile dysfunction.     Review of Systems   Constitutional:  Negative for chills and fever.   Genitourinary:  Positive for frequency and urgency. Negative for difficulty urinating, dysuria, flank pain and hematuria.   Musculoskeletal:  Negative for back pain.   Skin:  Negative for wound.          Objective   There were no vitals taken  for this visit.    Physical Exam  Constitutional:       Appearance: Normal appearance.   HENT:      Head: Normocephalic and atraumatic.   Pulmonary:      Effort: Pulmonary effort is normal.     Skin:     General: Skin is warm.     Neurological:      General: No focal deficit present.      Mental Status: He is alert.      Gait: Gait normal.     Psychiatric:         Mood and Affect: Mood normal.           Results   Lab Results   Component Value Date    PSA 2.6 01/04/2024    PSA 2.7 02/29/2016     Lab Results   Component Value Date    GLUCOSE 112 (H) 02/29/2016    CALCIUM 9.2 07/06/2024     02/29/2016    K 4.3 07/06/2024    CO2 31 07/06/2024     07/06/2024    BUN 12 07/06/2024    CREATININE 0.67 07/06/2024     Lab Results   Component Value Date    WBC 9.16 07/06/2024    HGB 14.3 07/06/2024    HCT 45.4 07/06/2024    MCV 95 07/06/2024     07/06/2024       Office Urine Dip  No results found for this or any previous visit (from the past hour).

## 2025-05-24 DIAGNOSIS — Z95.5 STATUS POST INSERTION OF DRUG ELUTING CORONARY ARTERY STENT: ICD-10-CM

## 2025-05-25 RX ORDER — CLOPIDOGREL BISULFATE 75 MG/1
75 TABLET ORAL DAILY
Qty: 30 TABLET | Refills: 0 | Status: SHIPPED | OUTPATIENT
Start: 2025-05-25

## 2025-05-25 RX ORDER — LEVOTHYROXINE SODIUM 150 UG/1
150 TABLET ORAL DAILY
Qty: 90 TABLET | Refills: 1 | Status: SHIPPED | OUTPATIENT
Start: 2025-05-25

## 2025-05-28 ENCOUNTER — APPOINTMENT (OUTPATIENT)
Dept: LAB | Facility: CLINIC | Age: 70
End: 2025-05-28
Payer: COMMERCIAL

## 2025-05-28 DIAGNOSIS — Z12.5 SCREENING FOR PROSTATE CANCER: ICD-10-CM

## 2025-05-28 LAB — PSA SERPL-MCNC: 1.84 NG/ML (ref 0–4)

## 2025-05-28 PROCEDURE — 36415 COLL VENOUS BLD VENIPUNCTURE: CPT

## 2025-05-28 PROCEDURE — G0103 PSA SCREENING: HCPCS

## 2025-05-30 ENCOUNTER — TELEPHONE (OUTPATIENT)
Dept: FAMILY MEDICINE CLINIC | Facility: CLINIC | Age: 70
End: 2025-05-30

## 2025-05-30 NOTE — TELEPHONE ENCOUNTER
Left message on VM and sent letter.      sorry for inconvenience - need to reschedule 7/8 AWV as provider is out of office. Please call to reschedule any time after 7/8.    Thank you

## 2025-06-02 ENCOUNTER — OFFICE VISIT (OUTPATIENT)
Dept: UROLOGY | Facility: AMBULATORY SURGERY CENTER | Age: 70
End: 2025-06-02
Payer: COMMERCIAL

## 2025-06-02 VITALS
HEIGHT: 71 IN | DIASTOLIC BLOOD PRESSURE: 110 MMHG | SYSTOLIC BLOOD PRESSURE: 180 MMHG | OXYGEN SATURATION: 98 % | HEART RATE: 75 BPM | WEIGHT: 171.4 LBS | BODY MASS INDEX: 24 KG/M2

## 2025-06-02 DIAGNOSIS — Z12.5 SCREENING FOR PROSTATE CANCER: ICD-10-CM

## 2025-06-02 DIAGNOSIS — N40.1 BPH WITH OBSTRUCTION/LOWER URINARY TRACT SYMPTOMS: Primary | ICD-10-CM

## 2025-06-02 DIAGNOSIS — N13.8 BPH WITH OBSTRUCTION/LOWER URINARY TRACT SYMPTOMS: Primary | ICD-10-CM

## 2025-06-02 LAB — POST-VOID RESIDUAL VOLUME, ML POC: 88 ML

## 2025-06-02 PROCEDURE — 51798 US URINE CAPACITY MEASURE: CPT

## 2025-06-02 PROCEDURE — 99213 OFFICE O/P EST LOW 20 MIN: CPT

## 2025-06-22 DIAGNOSIS — I42.1 HOCM (HYPERTROPHIC OBSTRUCTIVE CARDIOMYOPATHY) (HCC): ICD-10-CM

## 2025-06-22 DIAGNOSIS — Z95.5 STATUS POST INSERTION OF DRUG ELUTING CORONARY ARTERY STENT: ICD-10-CM

## 2025-06-22 PROBLEM — Z12.5 SCREENING FOR PROSTATE CANCER: Status: RESOLVED | Noted: 2023-05-15 | Resolved: 2025-06-22

## 2025-06-23 DIAGNOSIS — Z95.5 STATUS POST INSERTION OF DRUG ELUTING CORONARY ARTERY STENT: ICD-10-CM

## 2025-06-23 DIAGNOSIS — I42.1 HOCM (HYPERTROPHIC OBSTRUCTIVE CARDIOMYOPATHY) (HCC): ICD-10-CM

## 2025-06-23 RX ORDER — BISOPROLOL FUMARATE 5 MG/1
5 TABLET, FILM COATED ORAL DAILY
Qty: 90 TABLET | Refills: 2 | Status: SHIPPED | OUTPATIENT
Start: 2025-06-23 | End: 2026-03-20

## 2025-06-23 RX ORDER — CLOPIDOGREL BISULFATE 75 MG/1
75 TABLET ORAL DAILY
Qty: 30 TABLET | Refills: 0 | Status: SHIPPED | OUTPATIENT
Start: 2025-06-23

## 2025-06-25 ENCOUNTER — TELEPHONE (OUTPATIENT)
Dept: CARDIOLOGY CLINIC | Facility: CLINIC | Age: 70
End: 2025-06-25

## 2025-06-25 RX ORDER — BISOPROLOL FUMARATE 5 MG/1
2.5 TABLET, FILM COATED ORAL 2 TIMES DAILY
Qty: 30 TABLET | Refills: 0 | OUTPATIENT
Start: 2025-06-25

## 2025-06-25 RX ORDER — CLOPIDOGREL BISULFATE 75 MG/1
75 TABLET ORAL DAILY
Qty: 30 TABLET | Refills: 0 | OUTPATIENT
Start: 2025-06-25

## 2025-06-25 NOTE — TELEPHONE ENCOUNTER
Call placed to patient by request of Dr. Sher, to inform him he is to resume Bisoprolol 5 mg daily- 1/2 tab (2.5 mg) AM, 1/2 tab (2.5 mg) PM. Patient states he has been and will continue to take as stated above.

## 2025-06-30 NOTE — PROGRESS NOTES
[facility] Clinic Follow-up Visit- Cardiology   Simeon Robbins 69 y.o. male MRN: [id number]  Unit/Bed#:  Encounter: [id number]    Patient Active Problem List    Diagnosis Date Noted    BPH with obstruction/lower urinary tract symptoms 02/16/2024    Other proteinuria 02/16/2024    Dental caries, unspecified 10/05/2023    Oropharyngeal dysphagia 05/15/2023    Coronary artery disease involving native coronary artery of native heart with angina pectoris (HCC) 01/17/2023    Status post insertion of drug eluting coronary artery stent 12/28/2022    HOCM (hypertrophic obstructive cardiomyopathy) (HCC) 10/14/2022    Nonrheumatic aortic valve stenosis 09/22/2022    History of head and neck cancer 06/23/2022    Arthralgia of multiple joints 02/29/2016    Bilateral hip pain 02/29/2016    Chronic low back pain 02/29/2016    Myalgia 02/29/2016    Folliculitis 08/01/2014    Esophagitis, reflux 10/10/2012    Hyperlipidemia 10/10/2012    Primary hypertension 05/10/2012    Hypothyroidism 05/10/2012     Plan: Mr Dale Robbins was last seen in the [facility]CM Clinic on 10-1-2024. He has genotype negative obstructive HCM. He also has comorbid conditions of hypertension, hyperlipidemia, CAD (s/p PCI and stent of LAD on 12-), mild aortic stenosis, and treated head and neck cancer as well as benign prostate hypertrophy. Today, Mr Robbins states that he has begun to notice exertional dyspnea as well as feeling of chest tightness when walking uphill. He has also felt his heart pounding during exercise. Recently, he has also started to experience orthostatic dizziness and several episodes of presyncope. He denies miguel syncope or lower extremity edema. He does adequately hydrate himself and limits salt intake to <1500 mg of sodium per day. His weight has ranged from 165 to 170 lbs at home and has been stable. He weighs 170 lbs in the office today with a BMI of 23.72 kg/m2. He does plenty of walking and hiking every day but most recently has had to slow  down due to symptoms. She drinks 14 oz of half strength coffee per day, only occasionally drinks alcoholic beverages and does not use any recreational substances. He has not had any recent blood work except for PSA (5-) that has been normal. On physical examination, his blood pressure is 186/110 mmHg without significant change on repeat measurement. He has had similar measurements during previous office visits and carries a diagnosis of white coat hypertension. A BP roster brought in by the patient shows consistent systolic blood pressures <125 mmHg throughout month of June. There is a 2/6 crescendo decrescendo systolic murmur at the base with radiation to the carotids and no accentuation upon standing most consistent with aortic stenosis. The A2 component of the second sound is preserved. There is no evidence of volume overload. I have scheduled Mr Robbins for a bicycle exercise stress echo on 7- at 1 pm (to be done at [facility]) to assess potential contribution of inducible LVOT gradients or myocardial ischemia (stent restenosis) to his current symptoms. He will continue monitoring his blood pressure at home. An ECG performed in the office today shows: Sinus bradycardia, possible anterior infarct , age undetermined, and non-diagnostic ST & T wave abnormality. An echocardiogram performed in the office shows no segmental or regional wall motion abnormality:      Left Ventricle: Wall thickness is severely increased. There is severe asymmetric hypertrophy of the basal septal wall. The left ventricular ejection fraction is 65%. Systolic function is normal. Global longitudinal strain is normal at -18%. Wall motion is normal. Diastolic function is mildly abnormal, consistent with grade I (abnormal) relaxation. There is  outflow tract dynamic obstruction at rest with a peak gradient of 46.0 mmHg. There is outflow tract dynamic obstruction with valsalva with a peak gradient of 54.0 mmHg.    Left Atrium: The atrium is  mildly dilated.    Aortic Valve: The aortic valve is trileaflet. The leaflets are moderately thickened. The leaflets are moderately calcified. There is mildly reduced mobility. There is mild stenosis. The aortic valve mean gradient is 18 mmHg. The dimensionless velocity index is 0.58. The aortic valve area is 2.01 cm2.    Mitral Valve: There is mild annular calcification. There is systolic anterior motion of the anterior leaflet with late peaking gradient.    Aorta: The aortic root is mildly dilated. The ascending aorta is mildly dilated. The aortic root is 3.80 cm. The ascending aorta is 3.9 cm.    Family history is updated. Both children know about their father's cardiac diagnosis.There is no new diagnosis of heart disease or any cardiac event in the family.    Physician Requesting Consult: Dylan Gaona MD  Reason for Consult / Principal Problem: HOCM       HPI: Simeon Robbins is a 68 year old male with past medical history of mild aortic valve stenosis, hyperlipidemia, hypertension, and coronary artery disease (s/p PCI for  of LAD in December 2022), head and neck cancer in 2006 (s/p surgery and radiation) who is referred for evaluation after recent diagnosis of obstructive HCM. He was seen by Dr Gaona on 09/22/2022 for evaluation of a heart murmur and complaint of exertional chest tightness. Re-interpretation of an echocardiogram performed earlier (08/08/2022) indicated severe asymmetric septal hypertrophy 1.6-1.7 cm and LAD territory regional wall motion abnormalities. Further evaluation included nuclear stress test on 09/29/2022 that showed reversible perfusion defect in the mid to apical anterior and anterior septal segments. Cardiac catheterization subsequently (10-) indicated a 99% tubular mid LAD lesion with JUAN 1-2 flow. Significant LVOT obstruction was noted during catheterization with a resting gradient of 45 mmHg that increased to 125 mmHg after a PVC. He was started on metoprolol XL  25 mg and underwent PCI of the LAD lesion on 12/28/22 with placement of a 3.25x38 mm MENDY. A cardiac MRI on 11/18/22 showed wall thickness of 18 mm in the anteroseptum, SHANNON and LVOT obstruction, normal RV size and function, and small foci of interstitial fibrosis in the basal interventricular septum, involving less than 5% of the myocardium. His symptoms improved markedly after initiation of medical therapy and PCI.      Other work up has included:     48-hour ambulatory ECG monitor (11-4-2022):  INDICATIONS: HOCM   FINDINGS:  1. A 48 hour holter monitor demonstrated normal sinus rhythm with an average rate of 66 BPM; a minimum rate of 46 BPM; and a maximum rate of 152 BPM.  2. There were no ventricular ectopic beats.  3. There were 56 supraventricular ectopic beats, including a 36 beat run of atrial tachycardia.  4. The longest R-R interval was 1.3 seconds.  5. The patient kept a diary during holter monitor.  It demonstrated episodes of chest tightness that did not correlate to any dysrhythmia.     IMPRESSION:  Abnormal 48 hour holter monitor.   Patient in normal sinus rhythm throughout holter monitoring.  No premature ventricular contractions.  36 beat run of atrial tachycardia.  No significant pauses.  Symptoms on diary did not correlate to any dysrhythmia.      Bicycle stress echo (2-6-2023):    Left Ventricle: Left ventricular cavity size is normal. Wall thickness is severely increased. There is severe asymmetric hypertrophy of the septal wall. maximum septal wall thickness was 21 mm. The left ventricular ejection fraction is 60%. Systolic function is normal. Global longitudinal strain is reduced at -11%. There is focal akinesis of the apical anteroseptal segment. Diastolic function is abnormal. There is  outflow tract dynamic obstruction at rest with a peak gradient of 27.0 mmHg. There is outflow tract dynamic obstruction with valsalva with a peak gradient of 42.0 mmHg.    Aortic Valve: The aortic valve is  trileaflet. The leaflets are moderately thickened. The leaflets are moderately calcified. There is mildly reduced mobility.    Mitral Valve: There is mild annular calcification. There is systolic anterior motion of the anterior leaflet with late peaking gradient.    Stress ECG: No ST deviation is noted. There were no arrhythmias during recovery. . The stress ECG is negative for ischemia after maximal exercise, without reproduction of symptoms.    Post Stress Echo: Left ventricle cavity has normal reduction in size post-stress. The left ventricle systolic function is hyperdynamic post-stress. The post-stress echo showed unchanged wall motion abnormalities compared to baseline.    Echo Post Impression: Study was negative for inducible myocardial ischemia after submaximal exercise. There was no inducible high outflow tract gradients at peak exercise. Borderline aortic valve stenosis was present.          2-: Mr. Robbins was seen and evaluated with cardiology fellow, Dr. Magana. He was recently diagnosed with obstructive HCM. After being initiated on medical therapy along undergoing PCI for CAD his cardiac symptoms improved markedly. He has completed 15 cardiac rehab sessions on 2- with excellent progress. He walks daily for about an hour and works as a , which involves walking and taking stairs (2-3 flights), with no symptoms. Although his blood pressure is elevated at today's visit, he is known to have white coat syndrome.  He reports BP being better controlled when checking at home: -130s, DBP 80-90s, and HR 70-80s. These readings are on metoprolol succinate 50 mg qd. We will change metoprolol to bisoprolol 5 mg qd for even better blood pressure control. He reports weight loss in the past few years due to dietary changes associated with difficulty swallowing along with loss of taste attributed to previous radical surgery and radiation therapy for cancer. He is also undergoing  work up for jaw osteonecrosis with plan to have multiple teeth extraction once he is done with DAPT. Lipid panel from 9/2022 revealed elevated total cholesterol (207) and LDL (157) levels along with low HDL (30). He has been taking rosuvastatin 40 mg qd for lipid management and we will recheck lipid panel. Today we also will initiate genetic testing to evaluate for pathogenic mutations associted with HCM and phenocopies and to help with family screening. He does have a heart murmur on examination today that appears to be consistent with aortic valve sclerosis/ stenosis. Mr. Robbins will be seen in HCM clinic in 6 month.      8-7-2023:  Since his last office visit on 2- the patient has continued to follow with the Infectious Disease team and remains on high dose amoxicillin for left mandibular osteomyelitis. He is due to start hyperbaric oxygen treatment on 8- to prepare for oral surgery on 9- with removal of 17 teeth and lower left mandibular debridement for osteomyelitis. He will require cardiac clearance which will be provided and faxed to the appropriate offices. He may proceed with these procedures with no need for updated cardiac testing.   Today the patient states he feels well from a cardiac standpoint.  He is very active walking at least 2 miles every day and does 20 to 30 minutes of stretching/weightlifting several times a week.  He can perform all of these activities with no complaints of chest discomfort, dyspnea on exertion or palpitations.  He has no lower extremity edema or orthopnea.  He does note occasional lightheadedness with standing and I have encouraged adequate hydration and asked him to start performing leg pumping exercises prior to standing.  His blood pressure is very elevated today at 176/110 but the patient has known whitecoat hypertension.  At times if he is able to relax himself during an office visit his blood pressure will normalize.  At recent PCP office visit in  "June his blood pressure was 134/83.  He brings me a list of his blood pressures from home and his systolic blood pressure typically runs in the 110's with diastolic blood pressure running from the 70s to 80s and heart rate in the 50s.  He does follow a low-sodium diet and only drinks 1 cup of caffeine a day.  His recent lipid panel from March 2023 does show that his LDL is now at goal which would be less than 70 in the setting of known CAD..  His current LDL is 62.  He did also have a recent CT of the neck in March of this year to evaluate for any malignancy.  No malignancy was noted but there was evidence of bilateral carotid stenosis with there being less than 50% stenosis of the proximal right ICA and approximately 60% short segment stenosis of proximal left ICA.  At this time I do not think we need any additional testing to further investigate.  We do have risk factors for atherosclerotic disease under control and we can simply order an updated vascular study next year.  At this time the patient is stable from a cardiac standpoint and will follow-up in HCM clinic in 6 months.     1-9-2024: Since his last office visit on 8/7/2023 he has had several hyperbaric oxygen treatments in the setting of chronic mandibular osteomyelitis (group F strep). He underwent multiple dental extractions with mandibular bone debridement on 10/5/2023. He was continued on amoxicillin until mid-November which has now been discontinued and infectious disease feels he is currently stable from an osteomyelitis standpoint. He is active walking two miles a day and at times hiking with no cardiac complaints. He also performs stretching and strength exercises for ~20 minutes every day. He does have chronic intrascapular back pain which can be provoked at times with movement and standing for long periods of time. Stretching exercises do help with this pain at times. He will have occasional palpitations if he \"overindulges\" in alcohol or " caffeine but on a daily basis he does not note these symptoms. He denies dyspnea, edema and orthopnea. He will occasionally have lightheadedness with standing and he has been encouraged to remain well hydrated. He had a CAT scan of the neck in March 2023 which revealed less than 50% right ICA stenosis and approximately 60% left ICA stenosis which we will investigate further with carotid duplex. His Lipid panel from March 2023 shows LDL of 62 and he will remain on rosuvastatin. He is limited on the foods he can eat due to dysphagia from the prior radiation to the neck, he sticks with softer foods. His blood pressure is elevated today but he historically has white coat hypertension and his readings from home show systolics have been in the 110's-120's. EPIC review shows his BP has been controlled during recent ID and PCP office visit. An echocardiogram performed today showed:       Left Ventricle: Wall thickness is severely increased. There is severe asymmetric hypertrophy of the septal wall. The left ventricular ejection fraction is 64%. Systolic function is normal. Global longitudinal strain is normal at -18%. Wall motion is normal. Diastolic function is mildly abnormal, consistent with grade I (abnormal) relaxation. There is no LV dynamic obstruction with a peak gradient of 22.0 mmHg at rest and 31.0 mmHg with Valsalva.    Left Atrium: The atrium is mildly dilated.    Aortic Valve: The aortic valve is trileaflet. The leaflets are moderately thickened. The leaflets are moderately calcified. There is mildly reduced mobility. There is mild stenosis. The aortic valve mean gradient is 15 mmHg. The dimensionless velocity index is 0.66. The aortic valve area is 2.50 cm2.    Mitral Valve: There is mild annular calcification. There is systolic anterior motion of the anterior leaflet without late peaking gradient.    Aorta: The aortic root is mildly dilated. The ascending aorta is mildly dilated. The aortic root is 4.10 cm.  The ascending aorta is 4.1 cm.     Low sodium diet reinforced. Other issues were discussed including scheduling colonoscopy and upper endoscopy (radiation-related esophageal stricture) for which antiplatelets could be stopped for 2 days prior. He will follow up in our office in 6 months.    10-1-2024: Mr Robbins was last seen in the HCM Clinic on 1-9-2024. His blood pressure is elevated at 184/110 in the office today. He states he is very nervous when he comes to the office as he is worried we will find something wrong. His blood pressure at home runs 102-126/60-79 on multiple occasions in recent days. Repeat blood pressure after talking with patient was 142/80. His jaw is healed well now and he has dentures. He has had difficulty with swallowing. He had a barium swallow study which was abnormal. He was recommended to eat a pureed diet and will be working with speech therapy. He bikes 45 mins to an hour almost everyday, but at least 5 days a week. He does stretching and strengthening exercises at home as well. He does not get short of breath with this, but does note a stronger heart beat. He has not had any more dizziness since adjusting to the tamsulosin. He did have a fall when first starting tamsulosin. He got up quickly and went to adjust the stereo and he lost consciousness. He now takes it right before bed and has not had any significant issues. He still has back pain when he walks or stands too long. The pain is in between his shoulders. He does stretching exercises for that. He is drinking 2-3, 8 ounce cups of coffee a day, mostly half caffeine. He has not been drinking alcohol. He had a carotid ultrasound on 2/1/2024 with <50% stenosis bilaterally. Echo from 1/2024 demonstrated mild aortic stenosis. We discussed this with the patient and we will continue to monitor this. He remains on rosuvastatin 40 mg daily with most recent LDL of 84 as of 7/2024. He remains on clopidogrel and aspirin for coronary artery  disease. He remains on bisoprolol 2.5 mg twice daily without side effects. He eats about 2,000 calories daily and restricts saturated fat in his diet. No medication changes today. Follow up in 6 months with echo to be done on the same day. His 2 sons, a brother and a sister have been informed about the diagnosis of HCM but patient is unsure if they followed up with their physicians for echocardiographic screening. He has a history of ascending aortic dilation (41 mm) that will be followed echocardiographically along with his aortic valve for now. TSH, CBC and BMP were normal on 2024.      Risk stratification:  Nonsustained ventricular tachycardia - No  Severe left ventricular hypertrophy (>30 mm) - No  Family history of sudden death: No  Unexplained syncope: No  LVOT obstruction: yes  Atrial fibrillation and left atrial dilation: no  Age - 67  NYHA class 1  Myocardial fibrosis - 5%  LV systolic dysfunction - No  Apical aneurysm - apical akinesis due to CAD     Review of systems:  Today, Mr Robbins states that he has begun to notice exertional dyspnea as well as feeling of chest tightness when walking uphill. He has also felt his heart pounding during exercise. Recently, he has also started to experience orthostatic dizziness and several episodes of presyncope. He denies miguel syncope or lower extremity edema.     Family history: Father,  at age of 64, presumably from MI, was heavy smoker, had CABG at 56. Mother,  at age of 73 from GBS. He had 4 sisters, 2 brothers. 1 sister  at age of 55 from kidney failure and one brother  at age of 65 from prostate cancer. Living sisters' age ranges from 50-67, have HTN and diabetes. Living brother is 62 and has HLD, and HTN. Mr. Robbins has 2 sons: 35 and 40 yo, both have white coat syndrome related HTN.  He has recommended that his family get screened, but he is unsure if any of them have. Denies history of sudden cardiac death in the close or distant family.        Genetic testing:      Historical Information   Past Medical History[1]  Past Surgical History[2]  Family History[3]  Medications Ordered Prior to Encounter[4]  Allergies[5]  Social History     Substance and Sexual Activity   Alcohol Use Not Currently     Social History     Substance and Sexual Activity   Drug Use Not Currently    Types: Marijuana     Tobacco Use History[6]    Objective   Vitals:  Vitals:    07/01/25 1353   BP: (!) 186/110   BP Location: Right arm   Patient Position: Sitting   Cuff Size: Standard   Pulse: 67   SpO2: 100%   Weight: 77.2 kg (170 lb 1.6 oz)   Body surface area is 1.97 meters squared.  Body mass index is 23.72 kg/m².    Invasive Devices       Line  Duration             Arterial Sheath -- days                  Physical Exam:  GEN: Simeon Robbins appears well, alert and oriented x 3, pleasant and cooperative   HEENT: pupils equal, round, and reactive to light; extraocular muscles intact  NECK: supple, no carotid bruits   HEART: regular rhythm, normal S1 and S2, 2/6 crescendo decrescendo systolic murmur at the base with radiation to the carotids and no accentuation upon standing most consistent with aortic stenosis. The A2 component of the second sound is preserved, no clicks, gallops or rubs   LUNGS: clear to auscultation bilaterally; no wheezes, rales, or rhonchi   ABDOMEN: normal bowel sounds, soft, no tenderness, no distention  EXTREMITIES: peripheral pulses normal; no clubbing, cyanosis, or edema  NEURO: no focal findings   SKIN: normal without suspicious lesions on exposed skin    Lab Results:   Lab Results   Component Value Date    WBC 9.16 07/06/2024    RBC 4.78 07/06/2024    HGB 14.3 07/06/2024    HCT 45.4 07/06/2024    MCV 95 07/06/2024     07/06/2024    RDW 14.4 07/06/2024     Lab Results   Component Value Date     02/29/2016    K 4.3 07/06/2024     07/06/2024    CO2 31 07/06/2024    BUN 12 07/06/2024    CREATININE 0.67 07/06/2024    EGFR 98 07/06/2024     "GLUCOSE 112 (H) 2016    CALCIUM 9.2 2024    AST 20 2024    ALT 16 2024    ALKPHOS 60 2024    PROT 7.2 2016    BILITOT 0.5 2016     Lab Results   Component Value Date    MG 2.6 2023     Lab Results   Component Value Date    CHOL 287 (H) 2016    HDL 27 (L) 2024    TRIG 102 2024    LDLCALC 84 2024     Lab Results   Component Value Date    XPX0KIXLUQJT 3.696 2024    FREET4 0.90 2023     Imaging:   I have personally reviewed pertinent films in PACS    EKG:     Cardiac testing:     Name: Simeon Robbins                       : 1955  MRN: [id number]                       Age: 69 y.o.  Patient Status: Outpatient          Gender: male  ECHO Complete With Contrast If Indicated    Height: 5' 11\" (1.803 m)   Weight: 77.6 kg (171 lb)   BSA: 1.97 m²   Blood Pressure: 138/88    Date of Study: 24   Ordering Provider: HERMELINDA Jones    Clinical Indications: HOCM (hypertrophic obstructive cardiomyopathy) (HCC) [I42.1 (ICD-10-CM)]       Reading Physicians  Performing Staff   Cardiology: Claude Sher MD    Tech: Sienna Christianson,          Vitals    Height Weight BSA (Calculated - m2) BP Pulse   5' 11\" (1.803 m) 77.6 kg (171 lb) 1.97 sq meters 138/88 63     PACS Images     Show images for Echo complete w/ contrast if indicated  PACS Images - Sectra     Show images for Echo complete w/ contrast if indicated  Study Details    This transthoracic echocardiogram was performed in the echo lab. This was a routine, outpatient study. Study quality was adequate. This was a technically difficult study due to poor acoustic windows. A complete 2D, color flow Doppler, spectral Doppler, 2D, color flow Doppler, spectral Doppler and strain transthoracic echocardiogram was performed.  The apical, parasternal, subcostal and suprasternal views were obtained.     History    Hypertrophic obstructive cardiomyopathy. HLD. Hypertension. CAD. Cardiac stent. "     Interpretation Summary  Show Result Comparison     Left Ventricle: Wall thickness is severely increased. There is severe asymmetric hypertrophy of the septal wall. The left ventricular ejection fraction is 64%. Systolic function is normal. Global longitudinal strain is normal at -18%. Wall motion is normal. Diastolic function is mildly abnormal, consistent with grade I (abnormal) relaxation. There is no LV dynamic obstruction with a peak gradient of 22.0 mmHg at rest and 31.0 mmHg with Valsalva.    Left Atrium: The atrium is mildly dilated.    Aortic Valve: The aortic valve is trileaflet. The leaflets are moderately thickened. The leaflets are moderately calcified. There is mildly reduced mobility. There is mild stenosis. The aortic valve mean gradient is 15 mmHg. The dimensionless velocity index is 0.66. The aortic valve area is 2.50 cm2.    Mitral Valve: There is mild annular calcification. There is systolic anterior motion of the anterior leaflet without late peaking gradient.    Aorta: The aortic root is mildly dilated. The ascending aorta is mildly dilated. The aortic root is 4.10 cm. The ascending aorta is 4.1 cm.     Strain was performed to quantify interventricular dyssynchrony and evaluate components of myocardial function due to HCM. Results from the utilization of Strain Analysis are listed in the report below.     Findings    Left Ventricle Left ventricular cavity size is small. Wall thickness is severely increased. There is severe asymmetric hypertrophy of the septal wall. The left ventricular ejection fraction is 64%. Systolic function is normal. Global longitudinal strain is normal at -18%.  Wall motion is normal. Diastolic function is mildly abnormal, consistent with grade I (abnormal) relaxation.  There is no LV dynamic obstruction with a peak gradient of 22.0 mmHg at rest and 31.0 mmHg with Valsalva.   Right Ventricle Right ventricular cavity size is normal. Systolic function is normal. Wall  thickness is normal.   Left Atrium The atrium is mildly dilated.   Right Atrium The atrium is normal in size.   Aortic Valve The aortic valve is trileaflet. The leaflets are moderately thickened. The leaflets are moderately calcified. There is mildly reduced mobility. There is no evidence of regurgitation. There is mild stenosis. The aortic valve mean gradient is 15 mmHg. The dimensionless velocity index is 0.66. The aortic valve area is 2.50 cm2.   Mitral Valve The leaflets are not thickened. The leaflets are not calcified. The leaflets exhibit normal mobility. There is mild annular calcification.  There is no evidence of regurgitation. There is no evidence of stenosis. There is systolic anterior motion of the anterior leaflet without late peaking gradient.   Tricuspid Valve Tricuspid valve structure is normal. There is trace regurgitation. There is no evidence of stenosis. The right ventricular systolic pressure is normal.   Pulmonic Valve Pulmonic valve structure is normal. There is trace regurgitation. There is no evidence of stenosis.   Ascending Aorta The aortic root is mildly dilated. The ascending aorta is mildly dilated. The aortic root is 4.10 cm. The ascending aorta is 4.1 cm.   Pericardium There is no pericardial effusion. The pericardium is normal in appearance.     Left Ventricle Measurements    Function/Volumes   A4C EF 64 %         LVOT stroke volume 147.11         LVOT stroke volume index 72.2 ml/m2         Left ventricular stroke volume (2D) 32 mL         LVOT Cardiac Output 8.39 l/min         LVOT Cardiac Index 4.24 l/min/m2         Dimensions   LVIDd 3.5 cm         LVIDS 2.4 cm         IVSd 1.7 cm         LVPWd 1.1 cm         LVOT area 3.8 cm2         FS 31         Diastolic Filling   MV E' Tissue Velocity Septal 5 cm/s         LA Volume Index (BP) 31.3 mL/m2         E/A ratio 0.53         E wave deceleration time 211 ms         MV Peak E Kirk 63 cm/s         MV Peak A Kirk 1.19 m/s         Strain    GLS -18 %          Report Measurements   AV LVOT peak gradient 12 mmHg         Other Measurements   Peak gradient (Rest) 22 mmHg         Peak Gradient (Valsalva) 31 mmHg              Interventricular Septum Measurements    Shunt Ratio   LVOT peak VTI 38.72 cm         LVOT peak kirk 1.7 m/s              Right Ventricle Measurements    Dimensions   RVID d 3.9 cm         Tricuspid annular plane systolic excursion 2.3 cm               Left Atrium Measurements    Dimensions   LA size 3.4 cm         LA length (A2C) 6.2 cm         Volumes   LA volume (BP) 62 mL         LA Volume Index (BP) 31.3 mL/m2               Right Atrium Measurements    Dimensions   RAA A4C 12.9 cm2               Atrial Septum Measurements    Shunt Ratio   LVOT peak VTI 38.72 cm         LVOT peak kirk 1.7 m/s               Aortic Valve Measurements    Stenosis   Aortic valve peak velocity 2.57 m/s         LVOT peak kirk 1.7 m/s         Ao VTI 58.79 cm         LVOT peak VTI 38.72 cm         AV mean gradient 15 mmHg         LVOT mn grad 7 mmHg         AV peak gradient 26 mmHg         AV LVOT peak gradient 12 mmHg         Area/Dimensions   DVI 0.66         AV valve area 2.5 cm2         AV area by cont VTI 2.5 cm2         AV area peak kirk 2.5 cm2         LVOT diameter 2.2 cm         LVOT area 3.8 cm2               Mitral Valve Measurements    Stenosis   MV stenosis pressure 1/2 time 61 ms         MV valve area p 1/2 method 3.61               Tricuspid Valve Measurements    RVSP Parameters   TR Peak Kirk 2.5 m/s         Triscuspid Valve Regurgitation Peak Gradient 25 mmHg               Aorta Measurements    Aortic Dimensions   Ao root 4.1 cm         STJ 3.2 cm         Asc Ao 4.1 cm               Exam Details    Performed Procedure Technologist Supporting Staff Performing Physician   Echo complete w/ strain MOHINDER Woody            Appointment Date/Status Modality Department    1/9/2024     Completed [facility] ECHO PORT 3 BE CAR NON INV           Begin Exam End  "Exam  End Exam Questionnaires   2024 11:50 AM 2024 12:43 PM  PATIENT EDUCATION            All Reviewers List    HERMELINDA Jones on 1/10/2024  8:54 AM     Signed    Electronically signed by Calude Sher MD on 1/10/24 at 0729 EST     Name: Simeon Robbins                       : 1955  MRN: [id number]                       Age: 69 y.o.  Patient Status: Outpatient          Gender: male  Echo (TTE, complete, 2D, color doppler, spectral doppler, strain)    Height: 5' 11\" (1.803 m)   Weight: 77.7 kg (171 lb 4.8 oz)   BSA: 1.97 m²   Blood Pressure: 180/110    Date of Study: 25   Ordering Provider: Emily Wu DO    Clinical Indications: HOCM (hypertrophic obstructive cardiomyopathy) (HCC) [I42.1 (ICD-10-CM)]       Reading Physicians  Performing Staff   Cardiology: Claude Sher MD    Tech: [email or web address]         Vitals    Height Weight BSA (Calculated - m2) BP Pulse   5' 11\" (1.803 m) 77.7 kg (171 lb 4.8 oz) 1.97 sq meters 180/110 75     PACS Images     Show images for Echo complete w/ contrast if indicated  PACS Images - [facility]     Show images for Echo complete w/ contrast if indicated  Study Details    Technical Details: This procedure was performed in the echo lab. This was a routine and outpatient study. A complete transthoracic echo (TTE) procedure was performed with 2D imaging, color flow Doppler, strain and complete spectral Doppler. Apical, parasternal, subcostal and suprasternal views obtained.     Clinical Details: Adequate image quality.     History    Hypertrophic obstructive cardiomyopathy. HLD. Hypertension. CAD. Cardiac stent.     Interpretation Summary  Show Result Comparison     Left Ventricle: Wall thickness is severely increased. There is severe asymmetric hypertrophy of the basal septal wall. The left ventricular ejection fraction is 65%. Systolic function is normal. Global longitudinal strain is normal at -18%. Wall motion is normal. Diastolic function is mildly " abnormal, consistent with grade I (abnormal) relaxation. There is  outflow tract dynamic obstruction at rest with a peak gradient of 46.0 mmHg. There is outflow tract dynamic obstruction with valsalva with a peak gradient of 54.0 mmHg.    Left Atrium: The atrium is mildly dilated.    Aortic Valve: The aortic valve is trileaflet. The leaflets are moderately thickened. The leaflets are moderately calcified. There is mildly reduced mobility. There is mild stenosis. The aortic valve mean gradient is 18 mmHg. The dimensionless velocity index is 0.58. The aortic valve area is 2.01 cm2.    Mitral Valve: There is mild annular calcification. There is systolic anterior motion of the anterior leaflet with late peaking gradient.    Aorta: The aortic root is mildly dilated. The ascending aorta is mildly dilated. The aortic root is 3.80 cm. The ascending aorta is 3.9 cm.     Strain was performed to quantify interventricular dyssynchrony and evaluate components of myocardial function due to HCM. Results from the utilization of Strain Analysis are listed in the report below.     Findings    Left Ventricle Left ventricular cavity size is small. Wall thickness is severely increased. There is severe asymmetric hypertrophy of the basal septal wall. The left ventricular ejection fraction is 65%. Systolic function is normal. Global longitudinal strain is normal at -18%.  Wall motion is normal. Diastolic function is mildly abnormal, consistent with grade I (abnormal) relaxation.  There is  outflow tract dynamic obstruction at rest with a peak gradient of 46.0 mmHg. There is outflow tract dynamic obstruction with valsalva with a peak gradient of 54.0 mmHg.   Right Ventricle Right ventricular cavity size is normal. Systolic function is normal. Wall thickness is normal.   Left Atrium The atrium is mildly dilated.   Right Atrium The atrium is normal in size.   Aortic Valve The aortic valve is trileaflet. The leaflets are moderately thickened.  The leaflets are moderately calcified. There is mildly reduced mobility. There is no evidence of regurgitation. There is mild stenosis. The aortic valve mean gradient is 18 mmHg. The dimensionless velocity index is 0.58. The aortic valve area is 2.01 cm2.   Mitral Valve The leaflets are not thickened. The leaflets are not calcified. The leaflets exhibit normal mobility. There is mild annular calcification.  There is no evidence of regurgitation. There is no evidence of stenosis. There is systolic anterior motion of the anterior leaflet with late peaking gradient.   Tricuspid Valve Tricuspid valve structure is normal. There is trace regurgitation. There is no evidence of stenosis. The right ventricular systolic pressure is normal.   Pulmonic Valve Pulmonic valve structure is normal. There is trace regurgitation. There is no evidence of stenosis.   Ascending Aorta The aortic root is mildly dilated. The ascending aorta is mildly dilated. The aortic root is 3.80 cm. The ascending aorta is 3.9 cm.   Pericardium There is no pericardial effusion. The pericardium is normal in appearance.     Left Ventricle Measurements    Function/Volumes   A4C EF 61 %         LVOT stroke volume 135.29 cm3         LVOT stroke volume index 79.2 ml/m2         Left ventricular stroke volume (2D) 34 mL         LVOT Cardiac Output 8.58 l/min         LVOT Cardiac Index 4.36 l/min/m2         Dimensions   LVIDd 3.4 cm         LVIDS 2.1 cm         IVSd 1.7 cm         LVPWd 1.2 cm         LVOT area 3.46 cm2         FS 38         Diastolic Filling   LA Volume Index (BP) 21.8 mL/m2         E/A ratio 0.68         E wave deceleration time 204 ms         MV Peak E Kirk 76 cm/s         MV Peak A Kirk 1.12 m/s         Strain   GLS -18 %          Report Measurements   AV LVOT peak gradient 9 mmHg         Other Measurements   Peak gradient (Rest) 46 mmHg         Peak Gradient (Valsalva) 54 mmHg              Interventricular Septum Measurements    Shunt Ratio    LVOT peak VTI 39.08 cm         LVOT peak kirk 1.49 m/s              Right Ventricle Measurements    Dimensions   RVID d 3.4 cm               Left Atrium Measurements    Dimensions   LA size 3.9 cm         LA length (A2C) 4.5 cm         Volumes   LA volume (BP) 43 mL         LA Volume Index (BP) 21.8 mL/m2               Right Atrium Measurements    Dimensions   RAA A4C 12.8 cm2               Atrial Septum Measurements    Shunt Ratio   LVOT peak VTI 39.08 cm         LVOT peak kirk 1.49 m/s               Aortic Valve Measurements    Stenosis   Aortic valve peak velocity 2.66 m/s         LVOT peak kirk 1.49 m/s         Ao VTI 67.24 cm         LVOT peak VTI 39.08 cm         AV mean gradient 18 mmHg         LVOT mn grad 6 mmHg         AV peak gradient 28 mmHg         AV LVOT peak gradient 9 mmHg         Area/Dimensions   DVI 0.58         AV valve area 2.01 cm2         AV area by cont VTI 2.4 cm2         AV area peak kirk 2.3 cm2         LVOT diameter 2.1 cm         LVOT area 3.46 cm2               Mitral Valve Measurements    Stenosis   MV stenosis pressure 1/2 time 59 ms         MV valve area p 1/2 method 3.73               Tricuspid Valve Measurements    RVSP Parameters   TR Peak Kirk 2.4 m/s         Triscuspid Valve Regurgitation Peak Gradient 24 mmHg               Aorta Measurements    Aortic Dimensions   Ao root 3.8 cm         Asc Ao 3.9 cm               Exam Details    Performed Procedure Technologist Supporting Staff Performing Physician   Echo Komal Darnell            Appointment Date/Status Modality Department    7/1/2025     Arrived [facility] ECHO 1 BE HV CAR NON INV           Begin Exam End Exam  End Exam Questionnaires   7/1/2025 12:27 PM 7/1/2025  1:27 PM  PATIENT EDUCATION            Signed    Electronically signed by Claude Sher MD on 7/2/25 at 1019 EDT     Counseling / Coordination of Care  Total time spent today 52 minutes.  Greater than 50% of total time was spent with the patient and / or family counseling  and / or coordination of care.           [1]   Past Medical History:  Diagnosis Date    Anxiety     Cancer (HCC)     Chemotherapy follow-up examination     Coronary artery disease     GERD (gastroesophageal reflux disease)     History of radiation therapy 2006    Hypertension    [2]   Past Surgical History:  Procedure Laterality Date    ANKLE SURGERY Left     CARDIAC CATHETERIZATION Left 10/07/2022    Procedure: Cardiac catheterization;  Surgeon: Simeon Pastor MD;  Location: [facility];  Service: Cardiology    CARDIAC CATHETERIZATION Left 10/07/2022    Procedure: Cardiac Left Heart Cath;  Surgeon: Simeon Pastor MD;  Location: [facility];  Service: Cardiology    CARDIAC CATHETERIZATION N/A 10/07/2022    Procedure: Cardiac Coronary Angiogram;  Surgeon: Simeon Pastor MD;  Location: [facility];  Service: Cardiology    CARDIAC CATHETERIZATION N/A 12/28/2022    Procedure: Cardiac pci;  Surgeon: Dada Berrios MD;  Location: [facility];  Service: Cardiology    MULTIPLE TOOTH EXTRACTIONS N/A 10/5/2023    Procedure: EXTRACTION TEETH MULTIPLE 1,2,3,4,5,6,7,8,9,10,11,12,13,14,19,20,21,22,23,26,27,28,29,30;  Surgeon: Kusum Esquivel DMD;  Location: [facility];  Service: Maxillofacial    NECK SURGERY Right     SHOULDER OPEN ROTATOR CUFF REPAIR Right     SKIN BIOPSY     [3]   Family History  Problem Relation Name Age of Onset    Hypertension Mother Shantell     Obesity Mother Shantell     Diabetes Mother Shantell     Diabetes Father Natanael     Heart attack Father Natanael     Stroke Father Natanael     Heart disease Father Natanael     Hypertension Father Natanael     Hyperlipidemia Sister Elis     Obesity Sister Elis     Hypertension Sister Elis     Diabetes Sister Elis     Kidney disease Sister Elis     Hyperlipidemia Brother Ezra     Hypertension Brother Ezra     Diabetes Brother Ezra     Prostate cancer Brother Ezra     Skin cancer Maternal Grandfather Hardeep     Cancer Maternal Grandfather  Hardeep    [4]   Current Outpatient Medications on File Prior to Visit   Medication Sig Dispense Refill    aspirin (ECOTRIN LOW STRENGTH) 81 mg EC tablet Take 1 tablet (81 mg total) by mouth daily 30 tablet 11    bisoprolol (ZEBETA) 5 mg tablet Take 1 tablet (5 mg total) by mouth daily 90 tablet 2    clopidogrel (PLAVIX) 75 mg tablet Take 1 tablet (75 mg total) by mouth daily 30 tablet 0    famotidine (PEPCID) 20 mg tablet Take 1 tablet (20 mg total) by mouth in the morning 30 tablet 11    levothyroxine 150 mcg tablet Take 1 tablet by mouth once daily 90 tablet 1    omeprazole (PriLOSEC) 40 MG capsule Take 1 capsule (40 mg total) by mouth daily 90 capsule 1    rosuvastatin (CRESTOR) 40 MG tablet Take 1 tablet (40 mg total) by mouth daily 90 tablet 3    tamsulosin (FLOMAX) 0.4 mg Take 2 capsules (0.8 mg total) by mouth daily with dinner 180 capsule 3     No current facility-administered medications on file prior to visit.   [5] No Known Allergies  [6]   Social History  Tobacco Use   Smoking Status Former    Current packs/day: 0.00    Average packs/day: 1 pack/day for 20.0 years (20.0 ttl pk-yrs)    Types: Cigarettes    Start date: 1968    Quit date: 1988    Years since quittin.6    Passive exposure: Never   Smokeless Tobacco Former    Types: Snuff    Quit date: 1988   Tobacco Comments    Quit 35 yrs ago

## 2025-07-01 ENCOUNTER — HOSPITAL ENCOUNTER (OUTPATIENT)
Dept: NON INVASIVE DIAGNOSTICS | Facility: CLINIC | Age: 70
Discharge: HOME | End: 2025-07-01
Payer: COMMERCIAL

## 2025-07-01 ENCOUNTER — OFFICE VISIT (OUTPATIENT)
Dept: CARDIOLOGY CLINIC | Facility: CLINIC | Age: 70
End: 2025-07-01
Payer: COMMERCIAL

## 2025-07-01 VITALS
BODY MASS INDEX: 23.98 KG/M2 | HEART RATE: 75 BPM | SYSTOLIC BLOOD PRESSURE: 180 MMHG | WEIGHT: 171.3 LBS | HEIGHT: 71 IN | DIASTOLIC BLOOD PRESSURE: 110 MMHG

## 2025-07-01 VITALS
DIASTOLIC BLOOD PRESSURE: 110 MMHG | BODY MASS INDEX: 23.72 KG/M2 | HEART RATE: 67 BPM | SYSTOLIC BLOOD PRESSURE: 186 MMHG | WEIGHT: 170.1 LBS | OXYGEN SATURATION: 100 %

## 2025-07-01 DIAGNOSIS — E78.5 HYPERLIPIDEMIA, UNSPECIFIED HYPERLIPIDEMIA TYPE: ICD-10-CM

## 2025-07-01 DIAGNOSIS — I25.119 CORONARY ARTERY DISEASE INVOLVING NATIVE CORONARY ARTERY OF NATIVE HEART WITH ANGINA PECTORIS (HCC): ICD-10-CM

## 2025-07-01 DIAGNOSIS — I42.1 HOCM (HYPERTROPHIC OBSTRUCTIVE CARDIOMYOPATHY) (HCC): ICD-10-CM

## 2025-07-01 DIAGNOSIS — Z95.5 STATUS POST INSERTION OF DRUG ELUTING CORONARY ARTERY STENT: ICD-10-CM

## 2025-07-01 DIAGNOSIS — I10 PRIMARY HYPERTENSION: Primary | ICD-10-CM

## 2025-07-01 DIAGNOSIS — I35.0 NONRHEUMATIC AORTIC VALVE STENOSIS: ICD-10-CM

## 2025-07-01 PROCEDURE — 99215 OFFICE O/P EST HI 40 MIN: CPT | Performed by: INTERNAL MEDICINE

## 2025-07-01 PROCEDURE — 93356 MYOCRD STRAIN IMG SPCKL TRCK: CPT

## 2025-07-01 PROCEDURE — 93306 TTE W/DOPPLER COMPLETE: CPT

## 2025-07-01 PROCEDURE — 93306 TTE W/DOPPLER COMPLETE: CPT | Performed by: INTERNAL MEDICINE

## 2025-07-01 PROCEDURE — 93356 MYOCRD STRAIN IMG SPCKL TRCK: CPT | Performed by: INTERNAL MEDICINE

## 2025-07-02 LAB
AORTIC ROOT: 3.8 CM
AORTIC VALVE MEAN VELOCITY: 20.1 M/S
ASCENDING AORTA: 3.9 CM
ATRIAL RATE: 56 BPM
AV AREA BY CONTINUOUS VTI: 2.4 CM2
AV AREA PEAK VELOCITY: 2.3 CM2
AV LVOT MEAN GRADIENT: 6 MMHG
AV LVOT PEAK GRADIENT: 9 MMHG
AV MEAN PRESS GRAD SYS DOP V1V2: 18 MMHG
AV ORIFICE AREA US: 2.01 CM2
AV PEAK GRADIENT: 28 MMHG
AV VELOCITY RATIO: 0.58
AV VMAX SYS DOP: 2.66 M/S
BSA FOR ECHO PROCEDURE: 1.97 M2
DOP CALC AO VTI: 67.24 CM
DOP CALC LVOT AREA: 3.46 CM2
DOP CALC LVOT CARDIAC INDEX: 4.36 L/MIN/M2
DOP CALC LVOT CARDIAC OUTPUT: 8.58 L/MIN
DOP CALC LVOT DIAMETER: 2.1 CM
DOP CALC LVOT PEAK VEL VTI: 39.08 CM
DOP CALC LVOT PEAK VEL: 1.49 M/S
DOP CALC LVOT STROKE INDEX: 79.2 ML/M2
DOP CALC LVOT STROKE VOLUME: 135.29 CM3
E WAVE DECELERATION TIME: 204 MS
E/A RATIO: 0.68
FRACTIONAL SHORTENING: 38 (ref 28–44)
GLOBAL LONGITUIDAL STRAIN: -18 %
INTERVENTRICULAR SEPTUM IN DIASTOLE (PARASTERNAL SHORT AXIS VIEW): 1.7 CM
INTERVENTRICULAR SEPTUM: 1.7 CM (ref 0.6–1.1)
LAAS-AP2: 17 CM2
LAAS-AP4: 14.6 CM2
LEFT ATRIUM SIZE: 3.9 CM
LEFT ATRIUM VOLUME (MOD BIPLANE): 43 ML
LEFT ATRIUM VOLUME INDEX (MOD BIPLANE): 21.8 ML/M2
LEFT INTERNAL DIMENSION IN SYSTOLE: 2.1 CM (ref 2.1–4)
LEFT VENTRICULAR INTERNAL DIMENSION IN DIASTOLE: 3.4 CM (ref 3.5–6)
LEFT VENTRICULAR POSTERIOR WALL IN END DIASTOLE: 1.2 CM
LEFT VENTRICULAR STROKE VOLUME: 34 ML
LV EF US.2D.A4C+ESTIMATED: 61 %
LVSV (TEICH): 34 ML
MV PEAK A VEL: 1.12 M/S
MV PEAK E VEL: 76 CM/S
MV STENOSIS PRESSURE HALF TIME: 59 MS
MV VALVE AREA P 1/2 METHOD: 3.73
PR INTERVAL: 184 MS
QRS AXIS: -31 DEGREES
QRSD INTERVAL: 94 MS
QT INTERVAL: 430 MS
QTC INTERVAL: 414 MS
RIGHT ATRIUM AREA SYSTOLE A4C: 12.8 CM2
RIGHT VENTRICLE ID DIMENSION: 3.4 CM
SL CV ECHO LV DYNAMIC OBSTRUCTION PEAK GRADIENT (REST): 46 MMHG
SL CV ECHO LV DYNAMIC OBSTRUCTION PEAK GRADIENT (VALSAL: 54 MMHG
SL CV LEFT ATRIUM LENGTH A2C: 4.5 CM
SL CV LV EF: 65
SL CV PED ECHO LEFT VENTRICLE DIASTOLIC VOLUME (MOD BIPLANE) 2D: 49 ML
SL CV PED ECHO LEFT VENTRICLE SYSTOLIC VOLUME (MOD BIPLANE) 2D: 15 ML
T WAVE AXIS: 147 DEGREES
TR MAX PG: 24 MMHG
TR PEAK VELOCITY: 2.4 M/S
TRICUSPID VALVE PEAK REGURGITATION VELOCITY: 2.44 M/S
VENTRICULAR RATE: 56 BPM

## 2025-07-02 PROCEDURE — 93000 ELECTROCARDIOGRAM COMPLETE: CPT | Performed by: INTERNAL MEDICINE

## 2025-07-23 DIAGNOSIS — Z95.5 STATUS POST INSERTION OF DRUG ELUTING CORONARY ARTERY STENT: ICD-10-CM

## 2025-07-25 RX ORDER — CLOPIDOGREL BISULFATE 75 MG/1
75 TABLET ORAL DAILY
Qty: 30 TABLET | Refills: 0 | Status: SHIPPED | OUTPATIENT
Start: 2025-07-25

## 2025-07-25 NOTE — TELEPHONE ENCOUNTER
Requested medication(s) are due for refill today: No  Patient has already received a courtesy refill: No  Other reason request has been forwarded to provider: Patient needs updated blood work. Please place orders CBC and CMP

## 2025-07-28 ENCOUNTER — HOSPITAL ENCOUNTER (OUTPATIENT)
Dept: NON INVASIVE DIAGNOSTICS | Facility: HOSPITAL | Age: 70
Discharge: HOME | End: 2025-07-28

## 2025-08-05 ENCOUNTER — OFFICE VISIT (OUTPATIENT)
Dept: FAMILY MEDICINE CLINIC | Facility: CLINIC | Age: 70
End: 2025-08-05
Payer: COMMERCIAL

## 2025-08-05 VITALS
WEIGHT: 173.4 LBS | OXYGEN SATURATION: 97 % | SYSTOLIC BLOOD PRESSURE: 160 MMHG | HEIGHT: 71 IN | DIASTOLIC BLOOD PRESSURE: 80 MMHG | BODY MASS INDEX: 24.27 KG/M2 | RESPIRATION RATE: 18 BRPM | HEART RATE: 51 BPM | TEMPERATURE: 97.9 F

## 2025-08-05 DIAGNOSIS — Z00.00 MEDICARE ANNUAL WELLNESS VISIT, SUBSEQUENT: Primary | ICD-10-CM

## 2025-08-05 DIAGNOSIS — E03.9 HYPOTHYROIDISM, UNSPECIFIED TYPE: ICD-10-CM

## 2025-08-05 DIAGNOSIS — E78.5 HYPERLIPIDEMIA, UNSPECIFIED HYPERLIPIDEMIA TYPE: ICD-10-CM

## 2025-08-05 DIAGNOSIS — I10 PRIMARY HYPERTENSION: ICD-10-CM

## 2025-08-05 DIAGNOSIS — E03.9 ACQUIRED HYPOTHYROIDISM: ICD-10-CM

## 2025-08-05 PROCEDURE — G0439 PPPS, SUBSEQ VISIT: HCPCS | Performed by: FAMILY MEDICINE

## 2025-08-05 RX ORDER — LEVOTHYROXINE SODIUM 150 UG/1
150 TABLET ORAL DAILY
Qty: 90 TABLET | Refills: 3 | Status: SHIPPED | OUTPATIENT
Start: 2025-08-05

## 2025-08-07 ENCOUNTER — APPOINTMENT (OUTPATIENT)
Dept: LAB | Facility: CLINIC | Age: 70
End: 2025-08-07
Payer: COMMERCIAL

## 2025-08-18 ENCOUNTER — HOSPITAL ENCOUNTER (OUTPATIENT)
Dept: NON INVASIVE DIAGNOSTICS | Facility: HOSPITAL | Age: 70
Discharge: HOME | End: 2025-08-18
Payer: COMMERCIAL

## 2025-08-18 VITALS
OXYGEN SATURATION: 99 % | SYSTOLIC BLOOD PRESSURE: 170 MMHG | WEIGHT: 173 LBS | BODY MASS INDEX: 24.22 KG/M2 | DIASTOLIC BLOOD PRESSURE: 100 MMHG | HEART RATE: 62 BPM | HEIGHT: 71 IN

## 2025-08-18 DIAGNOSIS — Z95.5 STATUS POST INSERTION OF DRUG ELUTING CORONARY ARTERY STENT: ICD-10-CM

## 2025-08-18 DIAGNOSIS — I42.1 HOCM (HYPERTROPHIC OBSTRUCTIVE CARDIOMYOPATHY) (HCC): ICD-10-CM

## 2025-08-18 PROCEDURE — 93356 MYOCRD STRAIN IMG SPCKL TRCK: CPT | Performed by: INTERNAL MEDICINE

## 2025-08-18 PROCEDURE — 93350 STRESS TTE ONLY: CPT

## 2025-08-18 PROCEDURE — 93356 MYOCRD STRAIN IMG SPCKL TRCK: CPT

## 2025-08-18 PROCEDURE — 93350 STRESS TTE ONLY: CPT | Performed by: INTERNAL MEDICINE

## 2025-08-20 LAB
AORTIC VALVE MEAN VELOCITY: 17.9 M/S
AV AREA BY CONTINUOUS VTI: 2.3 CM2
AV AREA PEAK VELOCITY: 2.2 CM2
AV LVOT MEAN GRADIENT: 5 MMHG
AV LVOT PEAK GRADIENT: 7 MMHG
AV MEAN PRESS GRAD SYS DOP V1V2: 14 MMHG
AV ORIFICE AREA US: 2.34 CM2
AV PEAK GRADIENT: 22 MMHG
AV VELOCITY RATIO: 0.62
AV VMAX SYS DOP: 2.37 M/S
DOP CALC AO VTI: 57.71 CM
DOP CALC LVOT AREA: 3.8 CM2
DOP CALC LVOT CARDIAC INDEX: 4.11 L/MIN/M2
DOP CALC LVOT CARDIAC OUTPUT: 8.13 L/MIN
DOP CALC LVOT DIAMETER: 2.2 CM
DOP CALC LVOT PEAK VEL VTI: 35.59 CM
DOP CALC LVOT PEAK VEL: 1.35 M/S
DOP CALC LVOT STROKE INDEX: 68.7 ML/M2
DOP CALC LVOT STROKE VOLUME: 135.22
E WAVE DECELERATION TIME: 254 MS
E/A RATIO: 0.76
FRACTIONAL SHORTENING: 45 (ref 28–44)
GLOBAL LONGITUIDAL STRAIN: -16 %
INTERVENTRICULAR SEPTUM IN DIASTOLE (PARASTERNAL SHORT AXIS VIEW): 1.5 CM
INTERVENTRICULAR SEPTUM: 1.5 CM (ref 0.6–1.1)
LEFT INTERNAL DIMENSION IN SYSTOLE: 2.1 CM (ref 2.1–4)
LEFT VENTRICULAR INTERNAL DIMENSION IN DIASTOLE: 3.8 CM (ref 3.5–6)
LEFT VENTRICULAR POSTERIOR WALL IN END DIASTOLE: 1.3 CM
LEFT VENTRICULAR STROKE VOLUME: 46 ML
LV EF US.2D.A4C+ESTIMATED: 69 %
LVSV (TEICH): 46 ML
MAX HR PERCENT: 97 %
MAX HR: 99 BPM
MV E'TISSUE VEL-LAT: 9 CM/S
MV E'TISSUE VEL-SEP: 6 CM/S
MV PEAK A VEL: 1.08 M/S
MV PEAK E VEL: 82 CM/S
MV STENOSIS PRESSURE HALF TIME: 74 MS
MV VALVE AREA P 1/2 METHOD: 2.97
RATE PRESSURE PRODUCT: NORMAL
SL CV LV EF: 69
SL CV PED ECHO LEFT VENTRICLE DIASTOLIC VOLUME (MOD BIPLANE) 2D: 61 ML
SL CV PED ECHO LEFT VENTRICLE SYSTOLIC VOLUME (MOD BIPLANE) 2D: 15 ML
SL CV STRESS RECOVERY BP: NORMAL MMHG
SL CV STRESS RECOVERY HR: 72 BPM
SL CV STRESS RECOVERY O2 SAT: 99 %
SL CV STRESS STAGE REACHED: 5
STRESS ANGINA INDEX: 0
STRESS BASELINE BP: NORMAL MMHG
STRESS BASELINE HR: 62 BPM
STRESS O2 SAT REST: 99 %
STRESS PEAK HR: 128 BPM
STRESS POST ESTIMATED WORKLOAD: 6.7 METS
STRESS POST EXERCISE DUR MIN: 9 MIN
STRESS POST EXERCISE DUR SEC: 0 SEC
STRESS POST O2 SAT PEAK: 97 %
STRESS POST PEAK BP: 190 MMHG
TRICUSPID ANNULAR PLANE SYSTOLIC EXCURSION: 3 CM

## 2025-08-26 LAB
CHEST PAIN STATEMENT: NORMAL
MAX DIASTOLIC BP: 108 MMHG
MAX PREDICTED HEART RATE: 150 BPM
PROTOCOL NAME: NORMAL
REASON FOR TERMINATION: NORMAL
STRESS POST EXERCISE DUR MIN: 9 MIN
STRESS POST EXERCISE DUR SEC: 1 SEC
STRESS POST PEAK HR: 99 BPM
STRESS POST PEAK SYSTOLIC BP: 190 MMHG
TARGET HR FORMULA: NORMAL
TEST INDICATION: NORMAL

## (undated) DEVICE — GLOVE SRG BIOGEL 7

## (undated) DEVICE — HI-TORQUE PILOT 50 GUIDE WIRE .014 STRAIGHT TIP 3.0 CM X 300 CM: Brand: HI-TORQUE PILOT

## (undated) DEVICE — RADIFOCUS GLIDEWIRE: Brand: GLIDEWIRE

## (undated) DEVICE — RADIFOCUS OPTITORQUE ANGIOGRAPHIC CATHETER: Brand: OPTITORQUE

## (undated) DEVICE — GUIDEWIRE WHOLEY HI TORQUE INTERM MOD J .035 145CM

## (undated) DEVICE — GUIDEWIRE WHOLEY .035 145 CM FLOP STR TIP

## (undated) DEVICE — MINI TREK CORONARY DILATATION CATHETER 1.50 MM X 15 MM / RAPID-EXCHANGE: Brand: MINI TREK

## (undated) DEVICE — GUIDELINER CATHETERS ARE INTENDED TO BE USED IN CONJUNCTION WITH GUIDE CATHETERS TO ACCESS DISCRETE REGIONS OF THE CORONARY AND/OR PERIPHERAL VASCULATURE, AND TO FACILITATE PLACEMENT OF INTERVENTIONAL DEVICES.: Brand: GUIDELINER® V3 CATHETER

## (undated) DEVICE — STERILE MANDIBLE PACK: Brand: CARDINAL HEALTH

## (undated) DEVICE — GLIDESHEATH SLENDER STAINLESS STEEL KIT: Brand: GLIDESHEATH SLENDER

## (undated) DEVICE — CATH GUIDE LAUNCHER 6FR EBU 3.5

## (undated) DEVICE — HI-TORQUE PILOT 150 GUIDE WIRE .014 STRAIGHT TIP 3.0 CM X 300 CM: Brand: HI-TORQUE PILOT

## (undated) DEVICE — CORONARY IMAGING CATHETER: Brand: OPTICROSS™ 6 HD

## (undated) DEVICE — Device

## (undated) DEVICE — TREK CORONARY DILATATION CATHETER 2.50 MM X 20 MM / RAPID-EXCHANGE: Brand: TREK

## (undated) DEVICE — 2000CC GUARDIAN II: Brand: GUARDIAN

## (undated) DEVICE — NEEDLE 25G X 1 1/2

## (undated) DEVICE — GLIDESHEATH BASIC HYDROPHILIC COATED INTRODUCER SHEATH: Brand: GLIDESHEATH

## (undated) DEVICE — HI-TORQUE WHISPER MS GUIDE WIRE .014 STRAIGHT TIP 3.0 CM X 300 CM: Brand: HI-TORQUE WHISPER

## (undated) DEVICE — CATH DIAG 5FR IMPULSE 100CM FR4

## (undated) DEVICE — DGW .035 FC J3MM 260CM TEF: Brand: EMERALD

## (undated) DEVICE — RUNTHROUGH NS EXTRA FLOPPY PTCA GUIDEWIRE: Brand: RUNTHROUGH

## (undated) DEVICE — TR BAND RADIAL ARTERY COMPRESSION DEVICE: Brand: TR BAND